# Patient Record
Sex: MALE | Race: WHITE | NOT HISPANIC OR LATINO | Employment: FULL TIME | URBAN - METROPOLITAN AREA
[De-identification: names, ages, dates, MRNs, and addresses within clinical notes are randomized per-mention and may not be internally consistent; named-entity substitution may affect disease eponyms.]

---

## 2017-01-03 ENCOUNTER — CLINICAL SUPPORT (OUTPATIENT)
Dept: SPEECH THERAPY | Facility: HOSPITAL | Age: 72
End: 2017-01-03
Attending: INTERNAL MEDICINE
Payer: COMMERCIAL

## 2017-01-03 DIAGNOSIS — M25.551 PAIN OF RIGHT HIP JOINT: ICD-10-CM

## 2017-01-03 DIAGNOSIS — R29.898 WEAKNESS OF BOTH LOWER EXTREMITIES: ICD-10-CM

## 2017-01-03 PROCEDURE — 97140 MANUAL THERAPY 1/> REGIONS: CPT

## 2017-01-03 PROCEDURE — 97110 THERAPEUTIC EXERCISES: CPT

## 2017-01-03 NOTE — PROGRESS NOTES
"TIME RECORD    Date:  01/03/2017    Start Time:  400  Stop Time:  500    PROCEDURES:    TIMED  Procedure Min.   Manual therapy 15   Therex 40                 UNTIMED  Procedure Min.   Bike 5         Total Timed Minutes:  55  Total Timed Units:  4  Total Untimed Units:  0  Charges Billed/# of units:  4  MTx1, TEx3      Progress/Current Status    Subjective:     Patient ID: Edis Huggins Jr. is a 71 y.o. male.  Diagnosis:   1. Pain of right hip joint     2. Weakness of both lower extremities       Patient states he has been doing HEP, but his are 2.5# weights.  Reports no problem using them.  Requested review of stretches.  "I want to make sure I'm doing it right."    Objective:     Pt initiated therapy session on stationary bike x 5' supervise5. Pt received MT consisting of STM to (R) quad and (R) ITB x 15'. Pt participated in therex per log below 1:1 with PTA x 40'. Verbal and tactile cues for correct technique with self stretching and therex.    Date  1/3/16 12/30/16   VISIT 3 2   MT 15' 13'   Long Sit HS 30"x3 B 3x30" B    Piriformis stretch 30"x3 B 3x30" B   IT Band stretch 30"x3 supine w/strap 3x30" supine w/strap   Gastroc Str.  -   Bike S=11  5' 5'   QS -- -   SAQ -- -   SLR 2.5# 2x10 B 1.5# 2x10 B   SL Abd 2.5# 2x12 B 1.5# 2x10 B   bridging 2x10 2x10   SL clams RTB 2x12 RTB 2x10   Heel Slides -- -   Nesha Hip Add -- -   LAQs -- -   Seated Hip Flex -- -   Cybex   Leg Press -- -   TKE -- -   Hip Abd -- -   Hip Flex -- -   Hip Ext -- -   HS Curls -- -   Knee Ext -- -   Step Ups -- -   Step Downs -- -   Gait -- -   CP -- -   Initials DH 1/6 CK       Assessment:     Patient required multiple tactile cues and verbal instruction as well as increased time to perform self stretching and therex.  Able to complete all therex without reports of pain or difficulty.    Patient Education/Response:     Reviewed correct technique for self stretching.  Performed same after.  At patient's request, given written printout to " assist ordering of stretch out strap from [employer].    Plans and Goals:     Continue with POC, progress pt as tolerated    Short Term Goals = Long Term Goals (8 Weeks): 2/12/17  1. Pt will be independent with HEP  2. Pt will improve (B) LE strength to 5/5 on MMT   3. Pt will report 0/10 pain in (R) hip with activity and ADLS  4. Pt will improve FOTO score to </= to 30% impaired  5. Pt will improve (B) hamstring flexibility by at least 10 degrees

## 2017-01-05 ENCOUNTER — CLINICAL SUPPORT (OUTPATIENT)
Dept: SPEECH THERAPY | Facility: HOSPITAL | Age: 72
End: 2017-01-05
Attending: INTERNAL MEDICINE
Payer: COMMERCIAL

## 2017-01-05 ENCOUNTER — TELEPHONE (OUTPATIENT)
Dept: INTERNAL MEDICINE | Facility: CLINIC | Age: 72
End: 2017-01-05

## 2017-01-05 DIAGNOSIS — R29.898 WEAKNESS OF BOTH LOWER EXTREMITIES: ICD-10-CM

## 2017-01-05 DIAGNOSIS — M25.551 PAIN OF RIGHT HIP JOINT: ICD-10-CM

## 2017-01-05 PROCEDURE — 97110 THERAPEUTIC EXERCISES: CPT

## 2017-01-05 PROCEDURE — 97140 MANUAL THERAPY 1/> REGIONS: CPT

## 2017-01-05 NOTE — PROGRESS NOTES
"TIME RECORD    Date:  01/05/2017    Start Time:  400  Stop Time:  505    PROCEDURES:    TIMED  Procedure Min.   Manual therpay 15   Therex 40                 UNTIMED  Procedure Min.   Bike 7         Total Timed Minutes:  55  Total Timed Units:  4  Total Untimed Units:  0  Charges Billed/# of units:  4  MTx1, TEx3      Progress/Current Status    Subjective:     Patient ID: Edis Huggins Jr. is a 71 y.o. male.  Diagnosis:   1. Pain of right hip joint     2. Weakness of both lower extremities       Patient reports he has been too busy to do HEP.  Will make time over the weekend.  Reports "tightness" right quad and hip.    Objective:     Pt initiated therapy session on stationary bike x 7' supervised. Patient received MT consisting of STM to (R) quad and (R) ITB x 15'. Pt participated in therex per log below 1:1 with PTA x 40'. Verbal and tactile cues for correct technique with self stretching and therex.    Date  1/5/17 1/3/16 12/30/16   VISIT 4 3 2   MT 15' 15' 13'   Long Sit HS 30"x3 B 30"x3 B 3x30" B    Piriformis stretch 30"x3 B 30"x3 B 3x30" B   IT Band stretch 30"x3 supine w/strap 30"x3 supine w/strap 3x30" supine w/strap   Gastroc Str.   -   Bike S=11  7' S=11  5' 5'   QS -- -- -   SAQ -- -- -   SLR 2.5# 2x15 B 2.5# 2x10 B 1.5# 2x10 B   SL Abd 2.5# 2x15 B 2.5# 2x12 B 1.5# 2x10 B   bridging 2x12 2x10 2x10   SL clams RTB 2x15 RTB 2x12 RTB 2x10   Heel Slides -- -- -   Nesha Hip Add -- -- -   LAQs -- -- -   Seated Hip Flex -- -- -   Cybex   Leg Press 4.0 2x15  ^ next   -- -   TKE -- -- -   Hip Abd next -- -   Hip Flex next -- -   Hip Ext next -- -   HS Curls -- -- -   Knee Ext -- -- -   Step Ups -- -- -   Step Downs -- -- -   Gait -- -- -   CP -- -- -   Initials DH 2/6  1/6 CK       Assessment:     Patient able to increase activity with added reps and trial on Leg Press without complaint of pain or difficulty.  Stated "Leg Press was easy."    Patient education and response:    Patient educated importance of and " to perform HEP.  Verbalized understanding.    Plans and Goals:     Continue with POC, progress pt as tolerated    Short Term Goals = Long Term Goals (8 Weeks): 2/12/17  1. Pt will be independent with HEP  2. Pt will improve (B) LE strength to 5/5 on MMT   3. Pt will report 0/10 pain in (R) hip with activity and ADLS  4. Pt will improve FOTO score to </= to 30% impaired  5. Pt will improve (B) hamstring flexibility by at least 10 degrees

## 2017-01-05 NOTE — TELEPHONE ENCOUNTER
----- Message from Sylvain Price sent at 1/5/2017  9:24 AM CST -----  Contact: Sylvain Price/Surgery Coordinator/Ophthalmology   Mr. Huggins is scheduled for cataract surgery  with Dr. Chow on Wednesday 2/8/17. Pt will need medical clearance for this procedure.  Surgery is scheduled with local/MAC anesthesia. Please call if you have any questions.    Thanks,   Sylvain Price  Surgery Coordinator  Ophthalmology  [facility] [[id number]] [phone]

## 2017-01-10 ENCOUNTER — CLINICAL SUPPORT (OUTPATIENT)
Dept: SPEECH THERAPY | Facility: HOSPITAL | Age: 72
End: 2017-01-10
Attending: INTERNAL MEDICINE
Payer: COMMERCIAL

## 2017-01-10 DIAGNOSIS — R29.898 WEAKNESS OF BOTH LOWER EXTREMITIES: ICD-10-CM

## 2017-01-10 DIAGNOSIS — M25.551 PAIN OF RIGHT HIP JOINT: ICD-10-CM

## 2017-01-10 PROCEDURE — 97110 THERAPEUTIC EXERCISES: CPT

## 2017-01-10 PROCEDURE — 97140 MANUAL THERAPY 1/> REGIONS: CPT

## 2017-01-10 NOTE — PROGRESS NOTES
"TIME RECORD    Date:  01/10/2017    Start Time:  400  Stop Time:  505    PROCEDURES:    TIMED  Procedure Min.   Manual therapy 15   Therex 38                 UNTIMED  Procedure Min.   Bike 8         Total Timed Minutes:  53  Total Timed Units:  4  Total Untimed Units:  0  Charges Billed/# of units:  4  MTx1, TEx3      Progress/Current Status    Subjective:     Patient ID: Edis Huggins Jr. is a 71 y.o. male.  Diagnosis:   1. Pain of right hip joint     2. Weakness of both lower extremities         "I really don't feel any pain - the bursitis is much better and not disturbing my sleep anymore.    Objective:     Pt initiated therapy session on stationary bike x 8' supervised. Patient received MT consisting of STM to (R) quad and (R) ITB x 15'. Pt participated in therex per log below 1:1 with PTA x 38'. Verbal and tactile cues for correct technique with self stretching and therex. Added trial standing hip TE as noted today.    **FOTO THIS VISIT**    Date  1/10/17 1/5/17 1/3/16 12/30/16   VISIT 5 4 3 2   MT 15' 15' 15' 13'   Long Sit HS 30"x3 B 30"x3 B 30"x3 B 3x30" B    Piriformis stretch 30"x3 B 30"x3 B 30"x3 B 3x30" B   IT Band stretch 30"x3 30"x3 supine w/strap 30"x3 supine w/strap 3x30" supine w/strap   Gastroc Str.    -   Bike S=11 8' S=11  7' S=11  5' 5'   QS -- -- -- -   SAQ -- -- -- -   SLR 2.5# 2.5# 2x15 B 2.5# 2x10 B 1.5# 2x10 B   SL Abd 2.5# 2x15 B 2.5# 2x15 B 2.5# 2x12 B 1.5# 2x10 B   bridging 2x15 2x12 2x10 2x10   SL clams GTB 2x10 RTB 2x15 RTB 2x12 RTB 2x10   Heel Slides -- -- -- -   Nesha Hip Add -- -- -- -   LAQs -- -- -- -   Seated Hip Flex -- -- -- -   Cybex   Leg Press 5.0 2x15 DL 4.0 2x15  ^ next   -- -   TKE -- -- -- -   Hip Abd 2x10 B next -- -   Hip Flex 2x10 B next -- -   Hip Ext 2x10 B next -- -   HS Curls -- -- -- -   Knee Ext -- -- -- -   Step Ups -- -- -- -   Step Downs -- -- -- -   Gait -- -- -- -   CP -- -- -- -   Initials DH 3/6 DH 2/6 DH 1/6 CK       Assessment:     Patient able to " "increase activity with reps and added standing therex as noted.  Reports "good" after treatment.    Patient Education/Response:     Reviewed written handout for HEP in standing including hip abd, flex, extension, hamstring curls, heel raises and mini squats.  Given to patient who demonstrated understanding.    Plans and Goals:     Continue with POC, progress pt as tolerated    Short Term Goals = Long Term Goals (8 Weeks): 2/12/17  1. Pt will be independent with HEP  2. Pt will improve (B) LE strength to 5/5 on MMT   3. Pt will report 0/10 pain in (R) hip with activity and ADLS  4. Pt will improve FOTO score to </= to 30% impaired  5. Pt will improve (B) hamstring flexibility by at least 10 degrees       "

## 2017-01-12 ENCOUNTER — CLINICAL SUPPORT (OUTPATIENT)
Dept: SPEECH THERAPY | Facility: HOSPITAL | Age: 72
End: 2017-01-12
Attending: INTERNAL MEDICINE
Payer: COMMERCIAL

## 2017-01-12 DIAGNOSIS — R29.898 WEAKNESS OF BOTH LOWER EXTREMITIES: ICD-10-CM

## 2017-01-12 DIAGNOSIS — M25.551 PAIN OF RIGHT HIP JOINT: ICD-10-CM

## 2017-01-12 PROCEDURE — 97110 THERAPEUTIC EXERCISES: CPT

## 2017-01-12 PROCEDURE — 97140 MANUAL THERAPY 1/> REGIONS: CPT

## 2017-01-12 NOTE — PROGRESS NOTES
TIME RECORD    Date:  01/12/2017    Start Time:  1700  Stop Time:  1715    PROCEDURES:    TIMED  Procedure Min.   MT 15                     UNTIMED  Procedure Min.             Total Timed Minutes:  15  Total Timed Units:  1  Total Untimed Units:  0  Charges Billed/# of units:  1 MT       Progress/Current Status    Subjective:     Patient ID: Edis Huggins Jr. is a 71 y.o. male.  Diagnosis:   1. Pain of right hip joint     2. Weakness of both lower extremities       Agreeable to FDN    Objective:     Patient educated on fdn. patient provided written and verbal consent to FDN. MT x 15 consisting of FDN to R glut min with estim in prone, R glut med with estim in prone, R vastus lateralis along quad/ITB interface with estim in supine.     Assessment:     Patient demonstrated appropriate response to FDN.     Patient Education/Response:         Plans and Goals:     Monitor response to FDN. Continue with FDN in POC as tolerated.

## 2017-01-12 NOTE — PROGRESS NOTES
"TIME RECORD    Date:  01/12/2017    Start Time:  4:00 pm  Stop Time:   5:00pm     PROCEDURES:    TIMED  Procedure Min.   Manual therapy 15   Therex 38                 UNTIMED  Procedure Min.   Bike 7         Total Timed Minutes:  53  Total Timed Units:  4  Total Untimed Units:  0  Charges Billed/# of units:  4  MTx1, TEx3      Progress/Current Status    Subjective:     Patient ID: Edis Huggins Jr. is a 71 y.o. male.  Diagnosis:   1. Pain of right hip joint     2. Weakness of both lower extremities         "I really don't feel any pain - the bursitis is much better and not disturbing my sleep anymore.    Objective:     Pt initiated therapy session on stationary bike x 7' supervised. Patient received MT consisting of STM to (R) quad,HS,calf and (R) ITB x 15'. Pt participated in therex per log below 1:1 with PTA x 38'. Verbal and tactile cues for correct technique with self stretching and therex. Also seen by Evelin Ruiz PT for dry needling.    **FOTO THIS VISIT**     Date  1/12/17 1/10/17 1/5/17 1/3/16 12/30/16    VISIT 6 5 4 3 2    MT 15' 15' 15' 15' 13'    Long Sit HS NP 30"x3 B 30"x3 B 30"x3 B 3x30" B     HSS wstrap 2 x 30"        Piriformis stretch 3 x 30" 30"x3 B 30"x3 B 30"x3 B 3x30" B    IT Band stretch 3 x30" 30"x3 30"x3 supine w/strap 30"x3 supine w/strap 3x30" supine w/strap    Gastroc Str. EOS 3x30'  "        -    Bike S=11   10  mins S=11 8' S=11  7' S=11  5' 5'    QS  -- -- -- -    SAQ  -- -- -- -    SLR 2.5# 2x15 2.5# 2.5# 2x15 B 2.5# 2x10 B 1.5# 2x10 B    SL Abd 2.5# 2x15 B 2.5# 2x15 B 2.5# 2x15 B 2.5# 2x12 B 1.5# 2x10 B    bridging 2x15 2x15 2x12 2x10 2x10    SL clams GTB 2x10 GTB 2x10 RTB 2x15 RTB 2x12 RTB 2x10    Heel Slides  -- -- -- -    Nesha Hip Add  -- -- -- -    LAQs  -- -- -- -    Seated Hip Flex  -- -- -- -    Cybex   Leg Press 6.0 2x15 B 5.0 2x15 DL 4.0 2x15  ^ next   -- -    TKE  -- -- -- -    Hip Abd 2x10 B 4.0 2x10 B next -- -    Hip Flex 2x10 B 4.0 2x10 B next -- -    Hip Ext " "2x10 B 4.0 2x10 B next -- -    HS Curls  -- -- -- -    Knee Ext  -- -- -- -    Step Ups  -- -- -- -    Step Downs  -- -- -- -    Gait  -- -- -- -    CP  -- -- -- -    Initials MB 4/6 DH 3/6 DH 2/6 DH 1/6 CK       Assessment:     Patient able to increase activity with reps and added standing therex as noted.  Reports "good" after treatment.    Patient Education/Response:     Reviewed written handout for HEP in standing including hip abd, flex, extension, hamstring curls, heel raises and mini squats.  Given to patient who demonstrated understanding.    Plans and Goals:     Continue with POC, progress pt as tolerated    Short Term Goals = Long Term Goals (8 Weeks): 2/12/17  1. Pt will be independent with HEP  2. Pt will improve (B) LE strength to 5/5 on MMT   3. Pt will report 0/10 pain in (R) hip with activity and ADLS  4. Pt will improve FOTO score to </= to 30% impaired  5. Pt will improve (B) hamstring flexibility by at least 10 degrees     Alfie Olguin, PTA  1/12/17  "

## 2017-01-17 ENCOUNTER — CLINICAL SUPPORT (OUTPATIENT)
Dept: SPEECH THERAPY | Facility: HOSPITAL | Age: 72
End: 2017-01-17
Attending: INTERNAL MEDICINE
Payer: COMMERCIAL

## 2017-01-17 DIAGNOSIS — M25.551 PAIN OF RIGHT HIP JOINT: ICD-10-CM

## 2017-01-17 DIAGNOSIS — R29.898 WEAKNESS OF BOTH LOWER EXTREMITIES: ICD-10-CM

## 2017-01-17 PROCEDURE — 97140 MANUAL THERAPY 1/> REGIONS: CPT

## 2017-01-17 PROCEDURE — 97110 THERAPEUTIC EXERCISES: CPT

## 2017-01-17 NOTE — PROGRESS NOTES
TIME RECORD    Date:  01/17/2017    Start Time:  1605  Stop Time:  1620    PROCEDURES:    TIMED  Procedure Min.   MT 15                     UNTIMED  Procedure Min.             Total Timed Minutes:  15  Total Timed Units:  1  Total Untimed Units:  0  Charges Billed/# of units:  1 MT       Progress/Current Status    Subjective:     Patient ID: Edis Huggins Jr. is a 71 y.o. male.  Diagnosis:   1. Pain of right hip joint     2. Weakness of both lower extremities       Agreeable to FDN    Objective:     patient provided verbal consent to FDN. MT x 15 consisting of FDN to R glut min with estim in prone, R glut med with estim in prone, R vastus lateralis along quad/ITB interface with estim in supine.     Assessment:     Unable to elicit glut med contraction with estim     Patient Education/Response:         Plans and Goals:     Monitor response to FDN. Continue with FDN in POC as tolerated.

## 2017-01-17 NOTE — PROGRESS NOTES
"TIME RECORD    Date:  01/17/2017    Start Time:  4:05 pm   Stop Time:  5:09 pm     PROCEDURES:    TIMED  Procedure Min.   TE supervised 15' NC   TE 34'   FDN 15' see note             UNTIMED  Procedure Min.             Total Timed Minutes:  34'  Total Timed Units:  2  Total Untimed Units:  0  Charges Billed/# of units:  2 TE      Progress/Current Status    Subjective:     Patient ID: Edis Huggins Jr. is a 71 y.o. male.  Diagnosis:   1. Pain of right hip joint     2. Weakness of both lower extremities       Pain: 0 /10  Pt reported that he was not experiencing any pain prior to therapy session.     Objective:     Pt initiated therapy session on stationary bike x 5' supervised. Pt then received FDN performed by Mykel Ram PT ( see note).  Pt participated in therex per log below supervised by PT x 10' and  1:1 with PT x 34'. Verbal and tactile cues for correct technique with self stretching and therex.     **FOTO THIS VISIT**    Date  1/17/17 1/12/17 1/10/17 1/5/17 1/3/16 12/30/16   VISIT 7 6 5 4 3 2   MT FDN 15' 15' 15' 15' 13'   Long Sit HS  NP 30"x3 B 30"x3 B 30"x3 B 3x30" B    HSS wstrap 3x30"  2 x 30"       Piriformis stretch 3x30" B 3 x 30" 30"x3 B 30"x3 B 30"x3 B 3x30" B   IT Band stretch - 3 x30" 30"x3 30"x3 supine w/strap 30"x3 supine w/strap 3x30" supine w/strap   Gastroc Str. oot EOS 3x30'  "        -   Bike 5' S= 11 5' S=11   10  mins S=11 8' S=11  7' S=11  5' 5'   QS -  -- -- -- -   SAQ -  -- -- -- -   SLR 3# 2x10 2.5# 2x15 2.5# 2.5# 2x15 B 2.5# 2x10 B 1.5# 2x10 B   SL Abd 3# 2x15 N 2.5# 2x15 B 2.5# 2x15 B 2.5# 2x15 B 2.5# 2x12 B 1.5# 2x10 B   bridging 2x15  2x15 2x15 2x12 2x10 2x10   SL clams GTB 2x15  GTB 2x10 GTB 2x10 RTB 2x15 RTB 2x12 RTB 2x10   Heel Slides -  -- -- -- -   Nesha Hip Add -  -- -- -- -   LAQs -  -- -- -- -   Seated Hip Flex -  -- -- -- -   Cybex   Leg Press 6.0 2x15 6.0 2x15 B 5.0 2x15 DL 4.0 2x15  ^ next   -- -   TKE -  -- -- -- -   Hip Abd 2x15 B 4.0  2x10 B 4.0 2x10 B next " -- -   Hip Flex 2x15 B 4.0 2x10 B 4.0 2x10 B next -- -   Hip Ext 2x15 B 4.0  2x10 B 4.0 2x10 B next -- -   HS Curls 5.5 2x10  -- -- -- -   Knee Ext -  -- -- -- -   Step Ups -  -- -- -- -   Step Downs -  -- -- -- -   Gait -  -- -- -- -   CP -  -- -- -- -   Initials CK MB 4/6 DH 3/6 DH 2/6 DH 1/6 CK       Assessment:     Pt was able to tolerate increased reps noted per log above. Pt tolerated therapy session without any c/o increased pain.     Patient Education/Response:     Continue with HEP     Plans and Goals:     Continue with POC, progress pt as tolerated    Short Term Goals = Long Term Goals (8 Weeks): 2/12/17  1. Pt will be independent with HEP  2. Pt will improve (B) LE strength to 5/5 on MMT   3. Pt will report 0/10 pain in (R) hip with activity and ADLS  4. Pt will improve FOTO score to </= to 30% impaired  5. Pt will improve (B) hamstring flexibility by at least 10 degrees

## 2017-01-19 ENCOUNTER — CLINICAL SUPPORT (OUTPATIENT)
Dept: SPEECH THERAPY | Facility: HOSPITAL | Age: 72
End: 2017-01-19
Attending: INTERNAL MEDICINE
Payer: COMMERCIAL

## 2017-01-19 DIAGNOSIS — R29.898 WEAKNESS OF BOTH LOWER EXTREMITIES: ICD-10-CM

## 2017-01-19 DIAGNOSIS — M25.551 PAIN OF RIGHT HIP JOINT: ICD-10-CM

## 2017-01-19 PROCEDURE — 97140 MANUAL THERAPY 1/> REGIONS: CPT

## 2017-01-19 PROCEDURE — 97110 THERAPEUTIC EXERCISES: CPT

## 2017-01-19 NOTE — PROGRESS NOTES
"TIME RECORD    Date:  01/19/2017    Start Time:  400  Stop Time:  505    PROCEDURES:    TIMED  Procedure Min.   Manual therapy 15   Therex 45                 UNTIMED  Procedure Min.   Bike 5'         Total Timed Minutes:  60  Total Timed Units:  4  Total Untimed Units:  0  Charges Billed/# of units:  4  MTx1, TEx3    Progress/Current Status    Subjective:     Patient ID: Edis Huggins Jr. is a 71 y.o. male.  Diagnosis:   1. Pain of right hip joint     2. Weakness of both lower extremities       Pain: 2-3 /10  Patient reports increased soreness after FDN continued through yesterday.  "It was 4/10 pain and I was very tired."  Patient requested no FDN today.  States increased pain after performed multi hip last visit as well.  "Can we go down on the weight?"    Objective:     Pt initiated therapy session on stationary bike x 5' supervised. .Patient received MT consisting of STM to (R) quad and (R) ITB x 15'.  Pt participated in therex per log below with 1:1 by PTA x 45'. Verbal and tactile cues for correct technique with self stretching and therex. Extensive edu for self ITB stretching with modifications.    Date  1/19/17 1/17/17 1/12/17 1/10/17 1/5/17 1/3/16 12/30/16   VISIT 8 7 6 5 4 3 2   MT 15' FDN 15' 15' 15' 15' 13'   Long Sit HS 30"x3  NP 30"x3 B 30"x3 B 30"x3 B 3x30" B    HSS wstrap 30"x3 3x30"  2 x 30"       Piriformis stretch 30"x3 3x30" B 3 x 30" 30"x3 B 30"x3 B 30"x3 B 3x30" B   IT Band stretch MT & self w/wo strap - 3 x30" 30"x3 30"x3 supine w/strap 30"x3 supine w/strap 3x30" supine w/strap   Gastroc Str.  oot EOS 3x30'          -   Bike S=11 x 5' 5' S= 11 5' S=11   10  mins S=11 8' S=11  7' S=11  5' 5'   QS -- -  -- -- -- -   SAQ -- -  -- -- -- -   SLR 3# 2x10 B 3# 2x10 2.5# 2x15 2.5# 2.5# 2x15 B 2.5# 2x10 B 1.5# 2x10 B   SL Abd 3# 2x10 B 3# 2x15 N 2.5# 2x15 B 2.5# 2x15 B 2.5# 2x15 B 2.5# 2x12 B 1.5# 2x10 B   bridging 2x15 2x15  2x15 2x15 2x12 2x10 2x10   SL clams GTB 2x15 GTB 2x15  GTB 2x10 GTB 2x10 " "RTB 2x15 RTB 2x12 RTB 2x10   Heel Slides -- -  -- -- -- -   Nesha Hip Add -- -  -- -- -- -   LAQs -- -  -- -- -- -   Seated Hip Flex -- -  -- -- -- -   Cybex   Leg Press 6.5 2x15 DL    3.0 2x10 R 6.0 2x15 6.0 2x15 B 5.0 2x15 DL 4.0 2x15  ^ next   -- -   TKE  -  -- -- -- -   Hip Abd 2.0 2x15 B 2x15 B 4.0  2x10 B 4.0 2x10 B next -- -   Hip Flex 2.0 2x15 B 2x15 B 4.0 2x10 B 4.0 2x10 B next -- -   Hip Ext 2.0 2x15 B 2x15 B 4.0  2x10 B 4.0 2x10 B next -- -   HS Curls 5.5 2x15 5.5 2x10  -- -- -- -   Knee Ext -- -  -- -- -- -   Step Ups -- -  -- -- -- -   Step Downs -- -  -- -- -- -   Gait -- -  -- -- -- -   CP -- -  -- -- -- -   Initials DH 1/6 CK MB 4/6 DH 3/6 DH 2/6 DH 1/6 CK       Assessment:     Patient able to perform all therex as noted without complaint of pain or difficulty.  Reports Multi hip "much better" with lowered weight.  Extensive stretching and review for self stretching right ITB.  Modifications needed due to difficulty with positioning as a result of body mass. And fear of following with trial in sidelying.  "I fell out of bed at home trying that one.  Reports no complainnnnnnnts of pain after treatment.    Patient Education/Response:     Self stretching technique.  Will need review.    Plans and Goals:     Continue with POC, progress pt as tolerated    Short Term Goals = Long Term Goals (8 Weeks): 2/12/17  1. Pt will be independent with HEP  2. Pt will improve (B) LE strength to 5/5 on MMT   3. Pt will report 0/10 pain in (R) hip with activity and ADLS  4. Pt will improve FOTO score to </= to 30% impaired  5. Pt will improve (B) hamstring flexibility by at least 10 degrees     "

## 2017-01-24 ENCOUNTER — CLINICAL SUPPORT (OUTPATIENT)
Dept: SPEECH THERAPY | Facility: HOSPITAL | Age: 72
End: 2017-01-24
Attending: INTERNAL MEDICINE
Payer: COMMERCIAL

## 2017-01-24 DIAGNOSIS — M25.551 PAIN OF RIGHT HIP JOINT: ICD-10-CM

## 2017-01-24 DIAGNOSIS — R29.898 WEAKNESS OF BOTH LOWER EXTREMITIES: ICD-10-CM

## 2017-01-24 PROCEDURE — 97110 THERAPEUTIC EXERCISES: CPT

## 2017-01-24 PROCEDURE — 97140 MANUAL THERAPY 1/> REGIONS: CPT

## 2017-01-24 NOTE — PROGRESS NOTES
"TIME RECORD    Date:  01/24/2017    Start Time:  4:05 pm   Stop Time:  5:02 pm     PROCEDURES:    TIMED  Procedure Min.   TE supervised 5' NC   TE 42'   MT 10'             UNTIMED  Procedure Min.             Total Timed Minutes:  52'  Total Timed Units:  3  Total Untimed Units:  0  Charges Billed/# of units:  3 ( 2 TE, 1 MT)       Progress/Current Status    Subjective:     Patient ID: Edis Huggins Jr. is a 71 y.o. male.  Diagnosis:   1. Pain of right hip joint     2. Weakness of both lower extremities       Pain: 2 /10  Pt reported experiencing pain in (R) anterior thigh extending into (R) hip.     Objective:     Pt initiated therapy session on stationary bike x 5' supervised. .Patient received MT consisting of STM to (R) quad and (R) ITB x 10'.  Pt participated in therex per log below with 1:1 by PT x 42'.     Date  1/24/7 1/19/17 1/17/17 1/12/17 1/10/17 1/5/17 1/3/16 12/30/16   VISIT 9 8 7 6 5 4 3 2   MT 10' 15' FDN 15' 15' 15' 15' 13'   Long Sit HS - 30"x3  NP 30"x3 B 30"x3 B 30"x3 B 3x30" B    HSS wstrap 30"x3  30"x3 3x30"  2 x 30"       Piriformis stretch 30"x3 B 30"x3 3x30" B 3 x 30" 30"x3 B 30"x3 B 30"x3 B 3x30" B   IT Band stretch W/ strap 3x30" MT & self w/wo strap - 3 x30" 30"x3 30"x3 supine w/strap 30"x3 supine w/strap 3x30" supine w/strap   Gastroc Str. -  oot EOS 3x30'          -   Bike 5' S=11 x 5' 5' S= 11 5' S=11   10  mins S=11 8' S=11  7' S=11  5' 5'   QS - -- -  -- -- -- -   SAQ - -- -  -- -- -- -   SLR 3# 2x15 B 3# 2x10 B 3# 2x10 2.5# 2x15 2.5# 2.5# 2x15 B 2.5# 2x10 B 1.5# 2x10 B   SL Abd 3# 2x15 B 3# 2x10 B 3# 2x15 N 2.5# 2x15 B 2.5# 2x15 B 2.5# 2x15 B 2.5# 2x12 B 1.5# 2x10 B   bridging 2x15 2x15 2x15  2x15 2x15 2x12 2x10 2x10   SL clams GTB 2x15 B GTB 2x15 GTB 2x15  GTB 2x10 GTB 2x10 RTB 2x15 RTB 2x12 RTB 2x10   Heel Slides - -- -  -- -- -- -   Nesha Hip Add - -- -  -- -- -- -   LAQs - -- -  -- -- -- -   Seated Hip Flex - -- -  -- -- -- -   Cybex   Leg Press 6.5 2x15 DL 6.5 2x15 DL    3.0 " 2x10 R 6.0 2x15 6.0 2x15 B 5.0 2x15 DL 4.0 2x15  ^ next   -- -   TKE -  -  -- -- -- -   Hip Abd 2.0 2x15 B 2.0 2x15 B 2x15 B 4.0  2x10 B 4.0 2x10 B next -- -   Hip Flex 2.0 2x15 B 2.0 2x15 B 2x15 B 4.0 2x10 B 4.0 2x10 B next -- -   Hip Ext 2.0 2x15 B 2.0 2x15 B 2x15 B 4.0  2x10 B 4.0 2x10 B next -- -   HS Curls oot 5.5 2x15 5.5 2x10  -- -- -- -   Knee Ext - -- -  -- -- -- -   Step Ups - -- -  -- -- -- -   Step Downs - -- -  -- -- -- -   Gait - -- -  -- -- -- -   CP - -- -  -- -- -- -   Initials CK DH 1/6 CK MB 4/6 DH 3/6 DH 2/6 DH 1/6 CK       Assessment:     Pt stated that his (R) hip/thigh felt better after therapy session. Pt tolerated therapy session without any c/o increased pain.     Patient Education/Response:     Continue with HEP    Plans and Goals:     Continue with POC, progress pt as tolerated    Short Term Goals = Long Term Goals (8 Weeks): 2/12/17  1. Pt will be independent with HEP  2. Pt will improve (B) LE strength to 5/5 on MMT   3. Pt will report 0/10 pain in (R) hip with activity and ADLS  4. Pt will improve FOTO score to </= to 30% impaired  5. Pt will improve (B) hamstring flexibility by at least 10 degrees

## 2017-01-26 ENCOUNTER — CLINICAL SUPPORT (OUTPATIENT)
Dept: SPEECH THERAPY | Facility: HOSPITAL | Age: 72
End: 2017-01-26
Attending: INTERNAL MEDICINE
Payer: COMMERCIAL

## 2017-01-26 DIAGNOSIS — M25.551 PAIN OF RIGHT HIP JOINT: ICD-10-CM

## 2017-01-26 DIAGNOSIS — R29.898 WEAKNESS OF BOTH LOWER EXTREMITIES: ICD-10-CM

## 2017-01-26 PROCEDURE — 97140 MANUAL THERAPY 1/> REGIONS: CPT

## 2017-01-26 PROCEDURE — 97110 THERAPEUTIC EXERCISES: CPT

## 2017-01-26 NOTE — PROGRESS NOTES
"TIME RECORD    Date:  01/26/2017    Start Time:  4:00 pm   Stop Time:  4:55 pm     PROCEDURES:    TIMED  Procedure Min.   TE supervised 5' NC   TE 40'   MT 10'             UNTIMED  Procedure Min.             Total Timed Minutes:  55'  Total Timed Units:  3  Total Untimed Units:  0  Charges Billed/# of units:  3 ( 2 TE, 1 MT)       Progress/Current Status    Subjective:     Patient ID: Edis Huggins Jr. is a 71 y.o. male.  Diagnosis:   1. Pain of right hip joint     2. Weakness of both lower extremities       Pain: 0 /10  Pt reported experiencing pain in (R) anterior thigh extending into (R) hip.     Objective:     Pt initiated therapy session on stationary bike x 5' supervised. .Patient received MT consisting of STM to (R) quad and (R) ITB x 10'.  Pt participated in therex per log below with 1:1 by PT x 42'.     Date  1/26/17 1/24/7 1/19/17 1/17/17 1/12/17 1/10/17 1/5/17 1/3/16 12/30/16   VISIT 10 9 8 7 6 5 4 3 2   MT 10' 10' 15' FDN 15' 15' 15' 15' 13'   Long Sit HS 30" x 3 - 30"x3  NP 30"x3 B 30"x3 B 30"x3 B 3x30" B    HSS wstrap 30" x 3 30"x3  30"x3 3x30"  2 x 30"       Piriformis stretch 30" x 3 30"x3 B 30"x3 3x30" B 3 x 30" 30"x3 B 30"x3 B 30"x3 B 3x30" B   Prone quad stretch w/strap 2 x 30:            IT Band stretch W/ strap 3x30" W/ strap 3x30" MT & self w/wo strap - 3 x30" 30"x3 30"x3 supine w/strap 30"x3 supine w/strap 3x30" supine w/strap   Gastroc Str. EOS 3x30" -  oot EOS 3x30'          -   Bike 5' 5' S=11 x 5' 5' S= 11 5' S=11   10  mins S=11 8' S=11  7' S=11  5' 5'   QS  - -- -  -- -- -- -   SAQ  - -- -  -- -- -- -   SLR  3# 2x15 B 3# 2x10 B 3# 2x10 2.5# 2x15 2.5# 2.5# 2x15 B 2.5# 2x10 B 1.5# 2x10 B   SL Abd  3# 2x15 B 3# 2x10 B 3# 2x15 N 2.5# 2x15 B 2.5# 2x15 B 2.5# 2x15 B 2.5# 2x12 B 1.5# 2x10 B   bridging  2x15 2x15 2x15  2x15 2x15 2x12 2x10 2x10   SL clams  GTB 2x15 B GTB 2x15 GTB 2x15  GTB 2x10 GTB 2x10 RTB 2x15 RTB 2x12 RTB 2x10   Heel Slides  - -- -  -- -- -- -   Nesha Hip Add  - -- -  -- -- " -- -   LAQs  - -- -  -- -- -- -   Seated Hip Flex  - -- -  -- -- -- -   Cybex   Leg Press 7.0 2x15 B 6.5 2x15 DL 6.5 2x15 DL    3.0 2x10 R 6.0 2x15 6.0 2x15 B 5.0 2x15 DL 4.0 2x15  ^ next   -- -   TKE  -  -  -- -- -- -   Hip Abd 2.0 2x15 B 2.0 2x15 B 2.0 2x15 B 2x15 B 4.0  2x10 B 4.0 2x10 B next -- -   Hip Flex 2.5 2x15 B 2.0 2x15 B 2.0 2x15 B 2x15 B 4.0 2x10 B 4.0 2x10 B next -- -   Hip Ext 2.5 2x15 B 2.0 2x15 B 2.0 2x15 B 2x15 B 4.0  2x10 B 4.0 2x10 B next -- -   HS Curls  oot 5.5 2x15 5.5 2x10  -- -- -- -   Knee Ext  - -- -  -- -- -- -   Step Ups  - -- -  -- -- -- -   Step Downs  - -- -  -- -- -- -   Gait  - -- -  -- -- -- -   CP  - -- -  -- -- -- -   Initials MB 1/6 CK DH 1/6 CK MB 4/6 DH 3/6 DH 2/6 DH 1/6 CK       Assessment:     Pt stated pain free currently and better since last visit. Added prone quad stretch with good tolerance. Increased resistance Cybex hip flex and ext. Pt tolerated therapy session without any c/o increased pain.     Patient Education/Response:     Continue with HEP    Plans and Goals:     Continue with POC, progress pt as tolerated    Short Term Goals = Long Term Goals (8 Weeks): 2/12/17  1. Pt will be independent with HEP  2. Pt will improve (B) LE strength to 5/5 on MMT   3. Pt will report 0/10 pain in (R) hip with activity and ADLS  4. Pt will improve FOTO score to </= to 30% impaired  5. Pt will improve (B) hamstring flexibility by at least 10 degrees

## 2017-01-27 ENCOUNTER — OFFICE VISIT (OUTPATIENT)
Dept: OPHTHALMOLOGY | Facility: CLINIC | Age: 72
End: 2017-01-27
Payer: COMMERCIAL

## 2017-01-27 DIAGNOSIS — H25.12 NUCLEAR SCLEROTIC CATARACT OF LEFT EYE: ICD-10-CM

## 2017-01-27 DIAGNOSIS — H25.13 SENILE NUCLEAR SCLEROSIS, BILATERAL: Primary | ICD-10-CM

## 2017-01-27 DIAGNOSIS — H40.043 STEROID RESPONDERS, BILATERAL: ICD-10-CM

## 2017-01-27 DIAGNOSIS — H57.03 SMALL PUPILS: ICD-10-CM

## 2017-01-27 DIAGNOSIS — H40.1111 PRIMARY OPEN ANGLE GLAUCOMA OF RIGHT EYE, MILD STAGE: ICD-10-CM

## 2017-01-27 DIAGNOSIS — H40.1122 PRIMARY OPEN ANGLE GLAUCOMA OF LEFT EYE, MODERATE STAGE: ICD-10-CM

## 2017-01-27 DIAGNOSIS — H57.09 RELATIVE AFFERENT PUPILLARY DEFECT: ICD-10-CM

## 2017-01-27 PROCEDURE — 92136 OPHTHALMIC BIOMETRY: CPT | Mod: 26,LT,S$GLB, | Performed by: OPHTHALMOLOGY

## 2017-01-27 PROCEDURE — 99499 UNLISTED E&M SERVICE: CPT | Mod: S$GLB,,, | Performed by: OPHTHALMOLOGY

## 2017-01-27 PROCEDURE — 99999 PR PBB SHADOW E&M-EST. PATIENT-LVL III: CPT | Mod: PBBFAC,,, | Performed by: OPHTHALMOLOGY

## 2017-01-27 RX ORDER — CYCLOBENZAPRINE HCL 10 MG
TABLET ORAL
Refills: 1 | COMMUNITY
Start: 2017-01-08 | End: 2017-03-30

## 2017-01-31 ENCOUNTER — CLINICAL SUPPORT (OUTPATIENT)
Dept: SPEECH THERAPY | Facility: HOSPITAL | Age: 72
End: 2017-01-31
Attending: INTERNAL MEDICINE
Payer: COMMERCIAL

## 2017-01-31 DIAGNOSIS — R29.898 WEAKNESS OF BOTH LOWER EXTREMITIES: ICD-10-CM

## 2017-01-31 DIAGNOSIS — M25.551 PAIN OF RIGHT HIP JOINT: ICD-10-CM

## 2017-01-31 PROCEDURE — 97530 THERAPEUTIC ACTIVITIES: CPT

## 2017-01-31 NOTE — PROGRESS NOTES
"TIME RECORD    Date:  01/31/2017    Start Time:  4:05 pm   Stop Time:   4:55 pm     PROCEDURES:    TIMED  Procedure Min.   TE supervised 5' NC   TE 45'   MT '             UNTIMED  Procedure Min.             Total Timed Minutes:  50'  Total Timed Units:  3  Total Untimed Units:  0  Charges Billed/# of units:  3 ( 3 TE,)       Progress/Current Status    Subjective:   Reports adding new stretches. Especially benefits form prone quad stretch. Presents with no pain today but is pre medicated muscle relaxer. Did have pain earlier when walking from grocery to car carrying bags.  Patient ID: Edis Huggins Jr. is a 71 y.o. male.  Diagnosis:   1. Pain of right hip joint     2. Weakness of both lower extremities       Pain: 0 /10; 0/10 following interventions  Pt reported experiencing no pain in (R) anterior thigh extending into (R) hip today in treatment.     Objective:     Pt initiated therapy session on stationary bike x 5' supervised. .Patient received MT consisting of STM to (R) quad and (R) ITB x 10'.  Pt participated in therex per log below with 1:1 by PT x 45.     Date  1/31/17 1/26/17 1/24/7 1/19/17 1/17/17 1/12/17 1/10/17 1/5/17 1/3/16 12/30/16   VISIT 11 10 9 8 7 6 5 4 3 2   MT --- 10' 10' 15' FDN 15' 15' 15' 15' 13'   Long Sit HS 30"x3 30" x 3 - 30"x3  NP 30"x3 B 30"x3 B 30"x3 B 3x30" B    HSS wstrap 30"x3 30" x 3 30"x3  30"x3 3x30"  2 x 30"       Piriformis stretch 30"x3 30" x 3 30"x3 B 30"x3 3x30" B 3 x 30" 30"x3 B 30"x3 B 30"x3 B 3x30" B   Prone quad stretch w/strap 30"x3 2 x 30:            IT Band stretch W/ strap 3x30" W/ strap 3x30" W/ strap 3x30" MT & self w/wo strap - 3 x30" 30"x3 30"x3 supine w/strap 30"x3 supine w/strap 3x30" supine w/strap   Gastroc Str. EOS 3x30" EOS 3x30" -  oot EOS 3x30'          -   Bike 5' 5' 5' S=11 x 5' 5' S= 11 5' S=11   10  mins S=11 8' S=11  7' S=11  5' 5'   QS   - -- -  -- -- -- -   SAQ   - -- -  -- -- -- -   SLR 3# 2x15 B  3# 2x15 B 3# 2x10 B 3# 2x10 2.5# 2x15 2.5# 2.5# " 2x15 B 2.5# 2x10 B 1.5# 2x10 B   SL Abd 3# 2x15 B  3# 2x15 B 3# 2x10 B 3# 2x15 N 2.5# 2x15 B 2.5# 2x15 B 2.5# 2x15 B 2.5# 2x12 B 1.5# 2x10 B   bridging 2x15  2x15 2x15 2x15  2x15 2x15 2x12 2x10 2x10   SL clams GTB 2x15 B  GTB 2x15 B GTB 2x15 GTB 2x15  GTB 2x10 GTB 2x10 RTB 2x15 RTB 2x12 RTB 2x10   Heel Slides   - -- -  -- -- -- -   Nesha Hip Add   - -- -  -- -- -- -   LAQs   - -- -  -- -- -- -   Seated Hip Flex   - -- -  -- -- -- -   Cybex   Leg Press 7.0 2x15 B 7.0 2x15 B 6.5 2x15 DL 6.5 2x15 DL    3.0 2x10 R 6.0 2x15 6.0 2x15 B 5.0 2x15 DL 4.0 2x15  ^ next   -- -   TKE   -  -  -- -- -- -   Hip Abd 2.0 2x15 B 2.0 2x15 B 2.0 2x15 B 2.0 2x15 B 2x15 B 4.0  2x10 B 4.0 2x10 B next -- -   Hip Flex 2.5 2x15 B 2.5 2x15 B 2.0 2x15 B 2.0 2x15 B 2x15 B 4.0 2x10 B 4.0 2x10 B next -- -   Hip Ext 2.5 2x15 B 2.5 2x15 B 2.0 2x15 B 2.0 2x15 B 2x15 B 4.0  2x10 B 4.0 2x10 B next -- -   HS Curls   oot 5.5 2x15 5.5 2x10  -- -- -- -   Knee Ext   - -- -  -- -- -- -   Step Ups   - -- -  -- -- -- -   Step Downs   - -- -  -- -- -- -   Gait   - -- -  -- -- -- -   CP   - -- -  -- -- -- -   Initials MB 2/6 MB 1/6 CK DH 1/6 CK MB 4/6 DH 3/6 DH 2/6 DH 1/6 CK       Assessment:      Pt tolerated therapy session without any c/o increased pain. Deferred manual as patient pain free.    Patient Education/Response:     Continue with HEP    Plans and Goals:     Continue with POC, progress pt as tolerated    Short Term Goals = Long Term Goals (8 Weeks): 2/12/17  1. Pt will be independent with HEP  2. Pt will improve (B) LE strength to 5/5 on MMT   3. Pt will report 0/10 pain in (R) hip with activity and ADLS  4. Pt will improve FOTO score to </= to 30% impaired  5. Pt will improve (B) hamstring flexibility by at least 10 degrees

## 2017-02-01 ENCOUNTER — OFFICE VISIT (OUTPATIENT)
Dept: INTERNAL MEDICINE | Facility: CLINIC | Age: 72
End: 2017-02-01
Payer: COMMERCIAL

## 2017-02-01 VITALS
HEART RATE: 62 BPM | BODY MASS INDEX: 41.76 KG/M2 | WEIGHT: 298.31 LBS | HEIGHT: 71 IN | DIASTOLIC BLOOD PRESSURE: 78 MMHG | SYSTOLIC BLOOD PRESSURE: 134 MMHG

## 2017-02-01 DIAGNOSIS — Z01.818 PRE-OP EVALUATION: ICD-10-CM

## 2017-02-01 DIAGNOSIS — M25.551 PAIN OF RIGHT HIP JOINT: ICD-10-CM

## 2017-02-01 DIAGNOSIS — E78.1 HYPERTRIGLYCERIDEMIA: ICD-10-CM

## 2017-02-01 DIAGNOSIS — I10 ESSENTIAL HYPERTENSION: ICD-10-CM

## 2017-02-01 DIAGNOSIS — H26.9 CATARACT OF LEFT EYE, UNSPECIFIED CATARACT TYPE: Primary | ICD-10-CM

## 2017-02-01 PROCEDURE — 1159F MED LIST DOCD IN RCRD: CPT | Mod: S$GLB,,, | Performed by: INTERNAL MEDICINE

## 2017-02-01 PROCEDURE — 1126F AMNT PAIN NOTED NONE PRSNT: CPT | Mod: S$GLB,,, | Performed by: INTERNAL MEDICINE

## 2017-02-01 PROCEDURE — 1157F ADVNC CARE PLAN IN RCRD: CPT | Mod: S$GLB,,, | Performed by: INTERNAL MEDICINE

## 2017-02-01 PROCEDURE — 3075F SYST BP GE 130 - 139MM HG: CPT | Mod: S$GLB,,, | Performed by: INTERNAL MEDICINE

## 2017-02-01 PROCEDURE — 3078F DIAST BP <80 MM HG: CPT | Mod: S$GLB,,, | Performed by: INTERNAL MEDICINE

## 2017-02-01 PROCEDURE — 99213 OFFICE O/P EST LOW 20 MIN: CPT | Mod: S$GLB,,, | Performed by: INTERNAL MEDICINE

## 2017-02-01 PROCEDURE — 1160F RVW MEDS BY RX/DR IN RCRD: CPT | Mod: S$GLB,,, | Performed by: INTERNAL MEDICINE

## 2017-02-01 PROCEDURE — 99999 PR PBB SHADOW E&M-EST. PATIENT-LVL III: CPT | Mod: PBBFAC,,, | Performed by: INTERNAL MEDICINE

## 2017-02-01 RX ORDER — MOXIFLOXACIN 5 MG/ML
1 SOLUTION/ DROPS OPHTHALMIC
Status: CANCELLED | OUTPATIENT
Start: 2017-02-01

## 2017-02-01 RX ORDER — LOSARTAN POTASSIUM AND HYDROCHLOROTHIAZIDE 25; 100 MG/1; MG/1
1 TABLET ORAL DAILY
Qty: 90 TABLET | Refills: 3
Start: 2017-02-01 | End: 2017-06-12 | Stop reason: ALTCHOICE

## 2017-02-01 RX ORDER — TROPICAMIDE 10 MG/ML
1 SOLUTION/ DROPS OPHTHALMIC
Status: CANCELLED | OUTPATIENT
Start: 2017-02-01

## 2017-02-01 RX ORDER — LIDOCAINE HYDROCHLORIDE 10 MG/ML
1 INJECTION, SOLUTION EPIDURAL; INFILTRATION; INTRACAUDAL; PERINEURAL ONCE
Status: CANCELLED | OUTPATIENT
Start: 2017-02-01 | End: 2017-02-01

## 2017-02-01 RX ORDER — FINASTERIDE 5 MG/1
5 TABLET, FILM COATED ORAL DAILY
Qty: 90 TABLET | Refills: 3 | Status: SHIPPED | OUTPATIENT
Start: 2017-02-01 | End: 2017-06-28 | Stop reason: SDUPTHER

## 2017-02-01 RX ORDER — KETOROLAC TROMETHAMINE 5 MG/ML
1 SOLUTION OPHTHALMIC
Status: CANCELLED | OUTPATIENT
Start: 2017-02-01

## 2017-02-01 RX ORDER — ACYCLOVIR 400 MG/1
400 TABLET ORAL 3 TIMES DAILY
Qty: 30 TABLET | Refills: 5
Start: 2017-02-01 | End: 2017-05-11 | Stop reason: ALTCHOICE

## 2017-02-01 RX ORDER — PHENYLEPHRINE HYDROCHLORIDE 100 MG/ML
1 SOLUTION/ DROPS OPHTHALMIC
Status: CANCELLED | OUTPATIENT
Start: 2017-02-01

## 2017-02-01 RX ORDER — GEMFIBROZIL 600 MG/1
600 TABLET, FILM COATED ORAL 2 TIMES DAILY
Qty: 180 TABLET | Refills: 3
Start: 2017-02-01 | End: 2017-04-04 | Stop reason: SDUPTHER

## 2017-02-01 RX ORDER — SODIUM CHLORIDE 9 MG/ML
INJECTION, SOLUTION INTRAVENOUS CONTINUOUS
Status: CANCELLED | OUTPATIENT
Start: 2017-02-01

## 2017-02-01 NOTE — PROGRESS NOTES
HPI     DLS: 11/22/16    Pt here for Pre-Op complex phaco w/IOL OS- (Surgery is 2/08/17)    Meds: T 1/2 GFS qam ou             Travatan Z qhs ou     1. Primary open angle glaucoma of right eye, mild stage  2. Primary open angle glaucoma of left eye, moderate stage  3. Senile nuclear sclerosis, bilateral   4. Steroid responders, bilateral  5. Relative afferent  pupillary defect, left eye       Last edited by Millie Lakhani on 1/27/2017  2:06 PM.         Assessment /Plan     For exam results, see Encounter Report.    Senile nuclear sclerosis, bilateral    Nuclear sclerotic cataract of left eye    Primary open angle glaucoma of right eye, mild stage    Primary open angle glaucoma of left eye, moderate stage    Steroid responders, bilateral    Relative afferent pupillary defect - Left Eye    Small pupils        Pt is switching from [employer] to [place] - bring photo file over   Lost to F/u from 11/4/2013 to 8/11/2015   Knows Sharlene Mo and MIKHAIL - use to work with them     1. POAG (primary open-angle glaucoma) - Both Eyes   Old pt of [employer] [facility] [id number]  Began at [facility] 2012       POAG OS>OD           Family history    neg        Glaucoma meds    travatan z ou, T1/2 GFS ou        H/O adverse rxn to glaucoma drops    none        LASERS    SLT os 9/26/2013 -( good resp 17 --> 14)        GLAUCOMA SURGERIES    none        OTHER EYE SURGERIES    none        CDR    0.6/0.7-0.8        Tbase    20-23/21-24          Tmax    23/24            Ttarget    16/16            HVF    5 tests 3716-7973 OD essentially full; OS Sup paracentral defect, IAD/INS        Gonio    +4 ou        CCT    598/598        OCT    3 test 2012 to 2013 - RNFL - nl od // dec S bord T        HRT    3 test-2012 to 2016 OD bord N; OS dec S/T/I        Disc photos    2012 - IOS    - Ttoday    16/15  - Test done today   Pre-op phaco/IOL os - complex - small pupil // goniotomy - [place] or trabectome       2. Senile nuclear sclerosis - Both Eyes   -  vis  sign       3. Steroid responders - Both Eyes  2/2 nasacort      4. ? Trace APD os      5. S/P Rt knee replacement Feb 2013  -doing well      6.  Myopia / presbyopia ou       Plan  CSM  IOP below target OU  SLT done OS 9/26/2013 - good initial resp 24-->17    -( if responds well consider SLT od - but full VF and helathy ON still od)    HVF stable OU    patient is interested in cataract surgery/ TAYLER's - kahook / or trabectome oOS then OD   Explained HVF losses OS to patient at length and went over OCT- patient understands that he may still have blurry vision 2/2 to glaucoma after cataract surgery OS.     Dilates to 6.5 mm OD 5.5 mm OS- may need ej ring    Aim for -0.50 ou   OS  PCB00 14.0  AC IOL 11.5    Risks and benefits discussed and consent signed.    I have seen and personally examined the patient.  I agree with the findings, assessment and plan of the resident and/or fellow.     Deborah Khan MD      I have seen and personally examined the patient.  I agree with the findings, assessment and plan of the resident and/or fellow.     Deborah Khan MD

## 2017-02-01 NOTE — H&P
Subjective:       Patient ID: Edis Huggins Jr. is a 71 y.o. male.    Chief Complaint: Pre-op Exam      Past Medical History   Diagnosis Date    Allergy     Anemia     Anxiety     Basal cell carcinoma     BPH (benign prostatic hypertrophy)     Cataract     HLD (hyperlipidemia)     HTN (hypertension)     IFG (impaired fasting glucose)     Primary open angle glaucoma      Past Surgical History   Procedure Laterality Date    Hernia repair      Tonsillectomy      Right tka       Family History   Problem Relation Age of Onset    Colon cancer Maternal Grandmother      70s    Cataracts Mother     Atrial fibrillation Mother     Cataracts Father     Heart disease Father      Valve disorder    Glaucoma Brother     Prostate cancer Neg Hx     Amblyopia Neg Hx     Blindness Neg Hx     Macular degeneration Neg Hx     Retinal detachment Neg Hx     Strabismus Neg Hx     Diabetes Neg Hx     Melanoma Neg Hx     Psoriasis Neg Hx     Lupus Neg Hx     Eczema Neg Hx     Acne Neg Hx      Social History     Social History    Marital status:      Spouse name: N/A    Number of children: N/A    Years of education: N/A     Occupational History     [employer]     Social History Main Topics    Smoking status: Former Smoker     Packs/day: 1.00     Years: 10.00     Quit date: 1/1/1974    Smokeless tobacco: Never Used    Alcohol use 0.0 oz/week     0 Standard drinks or equivalent per week      Comment: one drink a month    Drug use: No    Sexual activity: Not Asked     Other Topics Concern    None     Social History Narrative       Current Outpatient Prescriptions   Medication Sig Dispense Refill    citalopram (CELEXA) 20 MG tablet TAKE 1 TABLET BY MOUTH DAILY 90 tablet 3    cyclobenzaprine (FLEXERIL) 10 MG tablet TK 1 T PO QHS PRF MUSCLE SPASMS  1    timolol maleate 0.5% (TIMOPTIC-XE) 0.5 % SolG Place 1 drop into both eyes every morning. 5 mL 12    travoprost (TRAVATAN Z)  0.004 % Drop Place 1 drop into both eyes every evening. 5 mL 12    acyclovir (ZOVIRAX) 400 MG tablet Take 1 tablet (400 mg total) by mouth 3 (three) times daily. X 5 days at sign of fever blister 30 tablet 5    finasteride (PROSCAR) 5 mg tablet Take 1 tablet (5 mg total) by mouth once daily. 90 tablet 3    gemfibrozil (LOPID) 600 MG tablet Take 1 tablet (600 mg total) by mouth 2 (two) times daily. 180 tablet 3    losartan-hydrochlorothiazide 100-25 mg (HYZAAR) 100-25 mg per tablet Take 1 tablet by mouth once daily. 90 tablet 3     No current facility-administered medications for this visit.      Review of patient's allergies indicates:  No Known Allergies    Review of Systems   Constitutional: Negative.    HENT: Negative.    Eyes: Positive for visual disturbance.   Respiratory: Negative.    Cardiovascular: Negative.    Neurological: Negative.    Psychiatric/Behavioral: Negative.        Objective:      There were no vitals filed for this visit.  Physical Exam   Constitutional: He is oriented to person, place, and time. He appears well-developed and well-nourished.   HENT:   Head: Normocephalic and atraumatic.   Eyes:   See eye exam   Cardiovascular: Normal rate.    Pulmonary/Chest: Effort normal.   Neurological: He is alert and oriented to person, place, and time.   Skin: Skin is warm and dry.   Psychiatric: He has a normal mood and affect.          Assessment:       1. Senile nuclear sclerosis, bilateral    2. Nuclear sclerotic cataract of left eye    3. Primary open angle glaucoma of right eye, mild stage    4. Primary open angle glaucoma of left eye, moderate stage    5. Steroid responders, bilateral    6. Relative afferent pupillary defect - Left Eye    7. Small pupils        Plan:       Complex phaco/IOL os - trypan blue and ? Renita ring // with goniotomy -

## 2017-02-01 NOTE — PROGRESS NOTES
"Subjective:       Patient ID: Edis Huggins Jr. is a 71 y.o. male.    Chief Complaint: Pre-op Exam    HPI    Last visit with me 11/2016. Since then seen by Ophthalmology and Rehab.     Hasn't needed Flexeril, things are going better on Rehab. Still with pain if walks a lot or stands a lot. Plans to complete therapy next Monday.    Denies chest pain and dyspnea on exertion. Has chronic shortness of breath, thinks related to weight. Has some wheezing occasionally with exercise. Never used inhaler, never dx with asthma. Not new symptoms, even as symptoms had some shortness of breath on exertion.    No history of stroke or MI.     Review of Systems    As per HPI    Objective:      Physical Exam   Constitutional: He is oriented to person, place, and time. No distress.    man whose Body mass index is 41.6 kg/(m^2).    HENT:   Mouth/Throat: Oropharynx is clear and moist. No oropharyngeal exudate.   Neck: Normal range of motion. No thyromegaly present.   Cardiovascular: Normal rate, regular rhythm and normal heart sounds.    Pulmonary/Chest: Effort normal and breath sounds normal. No stridor. He has no wheezes. He has no rales.   Lymphadenopathy:     He has no cervical adenopathy.   Neurological: He is alert and oriented to person, place, and time.   Skin: Skin is warm and dry. No rash noted.   Nursing note and vitals reviewed.      Vitals:    02/01/17 1410   BP: 134/78   BP Location: Right arm   Patient Position: Sitting   BP Method: Manual   Pulse: 62   Weight: 135.3 kg (298 lb 4.5 oz)   Height: 5' 11" (1.803 m)     Body mass index is 41.6 kg/(m^2).    RESULTS: Reviewed labs from last 12 months. Reviewed EKG 2013.    Assessment:       1. Cataract of left eye, unspecified cataract type    2. Hypertriglyceridemia    3. Essential hypertension    4. Pain of right hip joint    5. Pre-op evaluation        Plan:   Edis was seen today for pre-op exam.    Diagnoses and all orders for this visit:    Cataract of left " eye, unspecified cataract type:  Going for surgical removal in 1 week. Preop evaluation completed.    Hypertriglyceridemia:  Prior diagnosis, well controlled on current management.   -     gemfibrozil (LOPID) 600 MG tablet; Take 1 tablet (600 mg total) by mouth 2 (two) times daily.    Essential hypertension:  Prior diagnosis, well controlled on current management.   -     losartan-hydrochlorothiazide 100-25 mg (HYZAAR) 100-25 mg per tablet; Take 1 tablet by mouth once daily.    Pain of right hip joint:  improving with rehab, not needing Flexeril.    Pre-op evaluation:  Exercise capacity >=4 METS, has no risk factors on Revised Cardiac Risk Index. Patient is at low cardiovascular risk for planned operation, recommend proceed with surgery as planned.     Other orders  -     finasteride (PROSCAR) 5 mg tablet; Take 1 tablet (5 mg total) by mouth once daily.  -     acyclovir (ZOVIRAX) 400 MG tablet; Take 1 tablet (400 mg total) by mouth 3 (three) times daily. X 5 days at sign of fever blister    Keep follow up appointment March 3rd  Forest Miles MD  Internal Medicine    Portions of this note were completed using [employer] dictation software. Please excuse typographical or syntax errors.

## 2017-02-01 NOTE — Clinical Note
Exercise capacity >=4 METS, has no risk factors on Revised Cardiac Risk Index. Patient is at low cardiovascular risk for planned operation, recommend proceed with surgery as planned.   If pt needs to be seen prior to cataract extraction on right eye in a few weeks please let me know, however I do not see any specific need unless his condition changes.  Please let me know if you have additional questions or concerns. Thanks!  Forest Miles MD Internal Medicine

## 2017-02-01 NOTE — MR AVS SNAPSHOT
[facility] - Internal Medicine  [address]  [place] [address]  Phone: [phone]  Fax: [phone]                  Edis Huggins Jr.   2017 1:40 PM   Office Visit    Description:  Male : 1945   Provider:  Forest Miles MD   Department:  [facility] - Internal Medicine           Reason for Visit     Pre-op Exam           Diagnoses this Visit        Comments    Cataract of left eye, unspecified cataract type    -  Primary     Hypertriglyceridemia         Essential hypertension         Pain of right hip joint         Pre-op evaluation                To Do List           Future Appointments        Provider Department Dept Phone    2017 4:00 PM Yvonne Baca PTA [facility] [phone]    2017 3:00 PM Mendy Canseco PT [facility] [phone]    2017 8:30 AM Deborah Khan MD [facility] Ophthalmology [phone]    3/3/2017 10:00 AM Forest Miles MD [facility] Internal Medicine [phone]      Your Future Surgeries/Procedures     2017   Surgery with Deborah Khan MD   [facility]    [address] [facility] [address] [phone]              Goals (5 Years of Data)     None       These Medications        Disp Refills Start End    gemfibrozil (LOPID) 600 MG tablet 180 tablet 3 2017     Take 1 tablet (600 mg total) by mouth 2 (two) times daily. - Oral    Pharmacy: [facility] [address] [place] [address] [address] AT [facility] Ph #: [phone]       Notes to Pharmacy: **Patient requests 90 days supply**    finasteride (PROSCAR) 5 mg tablet 90 tablet 3 2017     Take 1 tablet (5 mg total) by mouth once daily. - Oral    Pharmacy: [facility] [place] [address] [address] AT [facility] Ph #: [phone]       losartan-hydrochlorothiazide 100-25 mg (HYZAAR) 100-25 mg per  tablet 90 tablet 3 2/1/2017     Take 1 tablet by mouth once daily. - Oral    Pharmacy: [facility] [address] [place] - [address] AT [facility] #: [phone]       acyclovir (ZOVIRAX) 400 MG tablet 30 tablet 5 2/1/2017     Take 1 tablet (400 mg total) by mouth 3 (three) times daily. X 5 days at sign of fever blister - Oral    Pharmacy: [facility] [id number] [place] - [address] AT [facility] Ph #: [phone]         [facility] On [facility]     [facility] - 24/7 Assistance  Registered nurses in the [facility] provide clinical advisement, health education, appointment booking, and other advisory services.  Call for this free service at [phone].             Medications           Message regarding Medications     Verify the changes and/or additions to your medication regime listed below are the same as discussed with your clinician today.  If any of these changes or additions are incorrect, please notify your healthcare provider.        CHANGE how you are taking these medications     Start Taking Instead of    gemfibrozil (LOPID) 600 MG tablet gemfibrozil (LOPID) 600 MG tablet    Dosage:  Take 1 tablet (600 mg total) by mouth 2 (two) times daily. Dosage:  TAKE 1 TABLET BY MOUTH TWICE DAILY    Reason for Change:  Reorder     finasteride (PROSCAR) 5 mg tablet finasteride (PROSCAR) 5 mg tablet    Dosage:  Take 1 tablet (5 mg total) by mouth once daily. Dosage:  TAKE 1 TABLET BY MOUTH EVERY DAY    Reason for Change:  Reorder     losartan-hydrochlorothiazide 100-25 mg (HYZAAR) 100-25 mg per tablet losartan-hydrochlorothiazide 100-25 mg (HYZAAR) 100-25 mg per tablet    Dosage:  Take 1 tablet by mouth once daily. Dosage:  TAKE 1 TABLET BY MOUTH DAILY    Reason for Change:  Reorder            Verify that the below list of medications is an accurate representation of the medications you are currently taking.  If  "none reported, the list may be blank. If incorrect, please contact your healthcare provider. Carry this list with you in case of emergency.           Current Medications     acyclovir (ZOVIRAX) 400 MG tablet Take 1 tablet (400 mg total) by mouth 3 (three) times daily. X 5 days at sign of fever blister    citalopram (CELEXA) 20 MG tablet TAKE 1 TABLET BY MOUTH DAILY    cyclobenzaprine (FLEXERIL) 10 MG tablet TK 1 T PO QHS PRF MUSCLE SPASMS    finasteride (PROSCAR) 5 mg tablet Take 1 tablet (5 mg total) by mouth once daily.    gemfibrozil (LOPID) 600 MG tablet Take 1 tablet (600 mg total) by mouth 2 (two) times daily.    losartan-hydrochlorothiazide 100-25 mg (HYZAAR) 100-25 mg per tablet Take 1 tablet by mouth once daily.    timolol maleate 0.5% (TIMOPTIC-XE) 0.5 % SolG Place 1 drop into both eyes every morning.    travoprost (TRAVATAN Z) 0.004 % Drop Place 1 drop into both eyes every evening.           Clinical Reference Information           Vital Signs - Last Recorded  Most recent update: 2/1/2017  2:12 PM by Flor Brown MA    BP Pulse Ht Wt BMI    134/78 (BP Location: Right arm, Patient Position: Sitting, BP Method: Manual) 62 5' 11" (1.803 m) 135.3 kg (298 lb 4.5 oz) 41.6 kg/m2      Blood Pressure          Most Recent Value    BP  134/78      Allergies as of 2/1/2017     No Known Allergies      Immunizations Administered on Date of Encounter - 2/1/2017     None      "

## 2017-02-02 ENCOUNTER — CLINICAL SUPPORT (OUTPATIENT)
Dept: SPEECH THERAPY | Facility: HOSPITAL | Age: 72
End: 2017-02-02
Attending: INTERNAL MEDICINE
Payer: COMMERCIAL

## 2017-02-02 DIAGNOSIS — R29.898 WEAKNESS OF BOTH LOWER EXTREMITIES: ICD-10-CM

## 2017-02-02 DIAGNOSIS — M25.551 PAIN OF RIGHT HIP JOINT: ICD-10-CM

## 2017-02-02 PROCEDURE — 97140 MANUAL THERAPY 1/> REGIONS: CPT

## 2017-02-02 PROCEDURE — 97110 THERAPEUTIC EXERCISES: CPT

## 2017-02-02 NOTE — PROGRESS NOTES
"TIME RECORD    Date:  02/02/2017    Start Time:  400  Stop Time:  505    PROCEDURES:    TIMED  Procedure Min.   Manual therapy 10   Therex 45                 UNTIMED  Procedure Min.   Bike 5         Total Timed Minutes:  55  Total Timed Units:  4  Total Untimed Units:  0  Charges Billed/# of units:  4  MTx1, TYEx3      Progress/Current Status    Subjective:     Patient ID: Edis Huggins Jr. is a 71 y.o. male.  Diagnosis:   1. Pain of right hip joint     2. Weakness of both lower extremities         Patient states he is having more right hip pain and "feels weaker today - I don't know why."  Further reports he will have cataract removal surgery    Objective:     Pt initiated therapy session on stationary bike x 5' supervised. Patient received MT consisting of STM to (R) quad and (R) ITB x 10'.  Pt participated in therex per log below with 1:1 by PTA x 45.     Date  2/2/17 1/31/17 1/26/17 1/24/7 1/19/17 1/17/17 1/12/17 1/10/17 1/5/17 1/3/16 12/30/16   VISIT 12 11 10 9 8 7 6 5 4 3 2   MT 10 --- 10' 10' 15' FDN 15' 15' 15' 15' 13'   Long Sit HS 30"x3 B 30"x3 30" x 3 - 30"x3  NP 30"x3 B 30"x3 B 30"x3 B 3x30" B    HSS wstrap 30"x3 B 30"x3 30" x 3 30"x3  30"x3 3x30"  2 x 30"       Piriformis stretch 30"x3  30"x3 30" x 3 30"x3 B 30"x3 3x30" B 3 x 30" 30"x3 B 30"x3 B 30"x3 B 3x30" B   Prone quad stretch w/strap 30"x3 30"x3 2 x 30           IT Band stretch W/strap 30"x3 W/ strap 3x30" W/ strap 3x30" W/ strap 3x30" MT & self w/wo strap - 3 x30" 30"x3 30"x3 supine w/strap 30"x3 supine w/strap 3x30" supine w/strap   Gastroc Str. EOS  EOS 3x30" EOS 3x30" -  oot EOS 3x30'          -   Bike 5' 5' 5' 5' S=11 x 5' 5' S= 11 5' S=11   10  mins S=11 8' S=11  7' S=11  5' 5'   QS    - -- -  -- -- -- -   SAQ    - -- -  -- -- -- -   SLR 3# 2x15 B 3# 2x15 B  3# 2x15 B 3# 2x10 B 3# 2x10 2.5# 2x15 2.5# 2.5# 2x15 B 2.5# 2x10 B 1.5# 2x10 B   SL Abd 3# 2x15 B 3# 2x15 B  3# 2x15 B 3# 2x10 B 3# 2x15 N 2.5# 2x15 B 2.5# 2x15 B 2.5# 2x15 B 2.5# " 2x12 B 1.5# 2x10 B   bridging 2x15 2x15  2x15 2x15 2x15  2x15 2x15 2x12 2x10 2x10   SL clams BTB 2x10 B GTB 2x15 B  GTB 2x15 B GTB 2x15 GTB 2x15  GTB 2x10 GTB 2x10 RTB 2x15 RTB 2x12 RTB 2x10   Heel Slides    - -- -  -- -- -- -   Nesha Hip Add    - -- -  -- -- -- -   LAQs    - -- -  -- -- -- -   Seated Hip Flex    - -- -  -- -- -- -   Cybex   Leg Press 7.0 2x15 B 7.0 2x15 B 7.0 2x15 B 6.5 2x15 DL 6.5 2x15 DL    3.0 2x10 R 6.0 2x15 6.0 2x15 B 5.0 2x15 DL 4.0 2x15  ^ next   -- -   TKE    -  -  -- -- -- -   Hip Abd 2.0 2x15 B  ^ next 2.0 2x15 B 2.0 2x15 B 2.0 2x15 B 2.0 2x15 B 2x15 B 4.0  2x10 B 4.0 2x10 B next -- -   Hip Flex 2.5 2x15 B 2.5 2x15 B 2.5 2x15 B 2.0 2x15 B 2.0 2x15 B 2x15 B 4.0 2x10 B 4.0 2x10 B next -- -   Hip Ext 2.5 2x15 B 2.5 2x15 B 2.5 2x15 B 2.0 2x15 B 2.0 2x15 B 2x15 B 4.0  2x10 B 4.0 2x10 B next -- -   HS Curls    oot 5.5 2x15 5.5 2x10  -- -- -- -   Knee Ext --   - -- -  -- -- -- -   Step Ups --   - -- -  -- -- -- -   Step Downs --   - -- -  -- -- -- -   Gait --   - -- -  -- -- -- -   CP --   - -- -  -- -- -- -   Initials DH 3/6 MB 2/6 MB 1/6 CK DH 1/6 CK MB 4/6 DH 3/6 DH 2/6 DH 1/6 CK       Assessment:     Held most weight and reps same today at patient request due to right hip pain.     Patient Education/Response:     Patient educated to continue HEP.  Verbalized understanding.    Plans and Goals:     Continue with POC, progress pt as tolerated    Short Term Goals = Long Term Goals (8 Weeks): 2/12/17  1. Pt will be independent with HEP  2. Pt will improve (B) LE strength to 5/5 on MMT   3. Pt will report 0/10 pain in (R) hip with activity and ADLS  4. Pt will improve FOTO score to </= to 30% impaired  5. Pt will improve (B) hamstring flexibility by at least 10 degrees

## 2017-02-06 ENCOUNTER — CLINICAL SUPPORT (OUTPATIENT)
Dept: SPEECH THERAPY | Facility: HOSPITAL | Age: 72
End: 2017-02-06
Attending: INTERNAL MEDICINE
Payer: COMMERCIAL

## 2017-02-06 DIAGNOSIS — R29.898 WEAKNESS OF BOTH LOWER EXTREMITIES: ICD-10-CM

## 2017-02-06 DIAGNOSIS — M25.551 PAIN OF RIGHT HIP JOINT: ICD-10-CM

## 2017-02-06 PROCEDURE — 97110 THERAPEUTIC EXERCISES: CPT

## 2017-02-06 NOTE — PROGRESS NOTES
"TIME RECORD    Date:  02/06/2017    Start Time:  3:05 pm   Stop Time:  3:55 pm     PROCEDURES:    TIMED  Procedure Min.   TE supervised 5'   TE 45'                 UNTIMED  Procedure Min.             Total Timed Minutes:  45'  Total Timed Units:  3  Total Untimed Units:  0  Charges Billed/# of units:  3 TE       Progress/Current Status    Subjective:     Patient ID: Edis Huggins Jr. is a 71 y.o. male.  Diagnosis:   1. Pain of right hip joint     2. Weakness of both lower extremities       Pain:   Pt stated that he has been experiencing pain in (L) anteiror hip. Pt stated that he sat in emergency room chair last night for prolonged period of time. Pt also reported that he stopped taking his pain medicine due to future eye surgery.     Objective:     Pt initiated therapy session on stationary bike x 5' supervised. Objective measurements were taken and pt participated in therex per log below 1:1 with PT x 45'.     Date  2/6/17 2/2/17 1/31/17 1/26/17 1/24/7 1/19/17 1/17/17 1/12/17 1/10/17 1/5/17 1/3/16 12/30/16   VISIT 13 12 11 10 9 8 7 6 5 4 3 2   MT  10 --- 10' 10' 15' FDN 15' 15' 15' 15' 13'   Long Sit HS - 30"x3 B 30"x3 30" x 3 - 30"x3  NP 30"x3 B 30"x3 B 30"x3 B 3x30" B    HSS wstrap 30"x3 B 30"x3 B 30"x3 30" x 3 30"x3  30"x3 3x30"  2 x 30"       Piriformis stretch 30"x3 B 30"x3  30"x3 30" x 3 30"x3 B 30"x3 3x30" B 3 x 30" 30"x3 B 30"x3 B 30"x3 B 3x30" B   Prone quad stretch w/strap - 30"x3 30"x3 2 x 30           IT Band stretch W/ strap 30"x3 W/strap 30"x3 W/ strap 3x30" W/ strap 3x30" W/ strap 3x30" MT & self w/wo strap - 3 x30" 30"x3 30"x3 supine w/strap 30"x3 supine w/strap 3x30" supine w/strap   Gastroc Str.  EOS  EOS 3x30" EOS 3x30" -  oot EOS 3x30'          -   Bike 5' 5' 5' 5' 5' S=11 x 5' 5' S= 11 5' S=11   10  mins S=11 8' S=11  7' S=11  5' 5'   TAs 5"x10              QS -    - -- -  -- -- -- -   SAQ -    - -- -  -- -- -- -   SLR - 3# 2x15 B 3# 2x15 B  3# 2x15 B 3# 2x10 B 3# 2x10 2.5# 2x15 2.5# 2.5# " 2x15 B 2.5# 2x10 B 1.5# 2x10 B   SL Abd - 3# 2x15 B 3# 2x15 B  3# 2x15 B 3# 2x10 B 3# 2x15 N 2.5# 2x15 B 2.5# 2x15 B 2.5# 2x15 B 2.5# 2x12 B 1.5# 2x10 B   bridging - 2x15 2x15  2x15 2x15 2x15  2x15 2x15 2x12 2x10 2x10   SL clams - BTB 2x10 B GTB 2x15 B  GTB 2x15 B GTB 2x15 GTB 2x15  GTB 2x10 GTB 2x10 RTB 2x15 RTB 2x12 RTB 2x10   Heel Slides -    - -- -  -- -- -- -   Nesha Hip Add --    - -- -  -- -- -- -   LAQs -    - -- -  -- -- -- -   Seated Hip Flex -    - -- -  -- -- -- -   Cybex   Leg Press - 7.0 2x15 B 7.0 2x15 B 7.0 2x15 B 6.5 2x15 DL 6.5 2x15 DL    3.0 2x10 R 6.0 2x15 6.0 2x15 B 5.0 2x15 DL 4.0 2x15  ^ next   -- -   TKE -    -  -  -- -- -- -   Hip Abd 2.5# 2x10 B 2.0 2x15 B  ^ next 2.0 2x15 B 2.0 2x15 B 2.0 2x15 B 2.0 2x15 B 2x15 B 4.0  2x10 B 4.0 2x10 B next -- -   Hip Flex 2.5# 2x10 B 2.5 2x15 B 2.5 2x15 B 2.5 2x15 B 2.0 2x15 B 2.0 2x15 B 2x15 B 4.0 2x10 B 4.0 2x10 B next -- -   Hip Ext 2.5# 2x10 B  2.5 2x15 B 2.5 2x15 B 2.5 2x15 B 2.0 2x15 B 2.0 2x15 B 2x15 B 4.0  2x10 B 4.0 2x10 B next -- -   HS Curls 2.5# 2x10 B    oot 5.5 2x15 5.5 2x10  -- -- -- -   Knee Ext - --   - -- -  -- -- -- -   Step Ups - --   - -- -  -- -- -- -   Step Downs - --   - -- -  -- -- -- -   Gait - --   - -- -  -- -- -- -   CP - --   - -- -  -- -- -- -   Initials CK DH 3/6 MB 2/6 MB 1/6 CK DH 1/6 CK MB 4/6 DH 3/6 DH 2/6 DH 1/6 CK         Assessment:     See DC summary below    Patient Education/Response:     See DC summary below    Plans and Goals:   DC from PT    REHAB SERVICES OUTPATIENT DISCHARGE SUMMARY  Physical Therapy      Name:  Edis Huggins   Date:  2/6/17  Date of Evaluation:  12/12/16  Physician:  Forest Miles MD  Total # Of Visits:  13  Diagnosis:    1. Pain of right hip joint     2. Weakness of both lower extremities         Physical/Functional Status:  At time of discharge, patient stated that his bursitis is gone and he can lie on his (B) sides without any difficulty. Pt stated that currently he is having  increased pain in (L) anterior hip when ambulating. Pt informed therapist that he is scheduled to under eye surgery on 2/8/17 and surgery on his other eye in a couple of weeks. Therefore, pt stated that he would not be able to attend PT for a few weeks. Pt was agreeable to being discharged with HEP at this time. See objective measurements below.     Objective measurements: 2/7/17    Lumbar AROM: Pain/Dysfunction with Movement:     No c/o increased pain with lumbar AROM   Flexion 70 degrees     Extension 15 degrees     Right side bending 25 degrees      Left side bending 25 degrees     Right rotation 80 degrees     Left rotation 80 degrees           Range of Motion/Strength: strength measurements taken within available ROM     Hip Right   Left   Pain/Dysfunction with Movement     AROM MMT AROM MMT ! = pain   Flexion 90 5/5 90 5/5     Extension 15 4+/5 14 4+/5     Abduction 40 5/5 40 5/5     Adduction 35 4+/5 35 4+/5     Internal rotation 28 5/5 26 5/5    External rotation 45 5/5 45 5/5        Knee Right   Left   Pain/Dysfunction with Movement     AROM MMT AROM MMT     Flexion WFL 5/5 WFL 5/5     Extension WFL 5/5 WFL 5/5       FOTO hip survey: 47% limited    Objective measurements: initial evaluation    Lumbar AROM: Pain/Dysfunction with Movement:     No c/o increased pain with lumbar AROM   Flexion 60 degrees     Extension 15 degrees     Right side bending 20 degrees      Left side bending 20 degrees     Right rotation 80 degrees     Left rotation 80 degrees           Range of Motion/Strength: strength measurements taken within available ROM     Hip Right   Left   Pain/Dysfunction with Movement     AROM MMT AROM MMT ! = pain   Flexion 90 5/5 90 5/5     Extension 12 4/5 12 4+/5     Abduction 30 4/5 40 4/5     Adduction 35 4+/5 35 4+/5     Internal rotation 25 ! 4/5 25! 5/5 Pain in (R) lateral hip    External rotation 40  4/5 40 4/5        Knee Right   Left   Pain/Dysfunction with Movement     AROM MMT AROM MMT      Flexion WFL 5/5 WFL 5/5     Extension WFL 5/5 WFL 5/5       FOTO hip survey: 53% limited      The patient is to be discharged from our Therapy service for the following reason(s):  Patient has reached the maximum rehab potential for the present time    Degree of Goal Achievement:  Patient has partially met goals  Short Term Goals = Long Term Goals (8 Weeks): 2/12/17  1. Pt will be independent with HEP - MET  2. Pt will improve (B) LE strength to 5/5 on MMT - PARTIALLY MET  3. Pt will report 0/10 pain in (R) hip with activity and ADLS - NOT MET  4. Pt will improve FOTO score to </= to 30% impaired - PARTIALLY MET  5. Pt will improve (B) hamstring flexibility by at least 10 degrees  NT    Patient Education:  Pt was educated on and given handout on HEP consisting of supine IT band stretch, supine hamstring stretch, supine piriformis stretch, prone quad stretch, supine TA activation, SLR, SL hip abduction, SL clams, bridges, standing hip flexion, standing hip extension, standing hip abduction. Pt was given BTB to use for resistance. Pt demo and verbalized understanding.     Discharge Plan:  Home Program:  See above. Follow up with MD prn

## 2017-02-06 NOTE — Clinical Note
Please see PT DC summary for Edis Huggins,  49. Thank you for this referral.   Mendy Canseco, PT 2017

## 2017-02-08 ENCOUNTER — ANESTHESIA EVENT (OUTPATIENT)
Dept: SURGERY | Facility: HOSPITAL | Age: 72
End: 2017-02-08
Payer: COMMERCIAL

## 2017-02-08 ENCOUNTER — ANESTHESIA (OUTPATIENT)
Dept: SURGERY | Facility: HOSPITAL | Age: 72
End: 2017-02-08
Payer: COMMERCIAL

## 2017-02-08 PROCEDURE — D9220A PRA ANESTHESIA: Mod: CRNA,,, | Performed by: NURSE ANESTHETIST, CERTIFIED REGISTERED

## 2017-02-08 PROCEDURE — 63600175 PHARM REV CODE 636 W HCPCS: Performed by: NURSE ANESTHETIST, CERTIFIED REGISTERED

## 2017-02-08 PROCEDURE — D9220A PRA ANESTHESIA: Mod: ANES,,, | Performed by: ANESTHESIOLOGY

## 2017-02-08 RX ORDER — MIDAZOLAM HYDROCHLORIDE 1 MG/ML
INJECTION, SOLUTION INTRAMUSCULAR; INTRAVENOUS
Status: DISCONTINUED | OUTPATIENT
Start: 2017-02-08 | End: 2017-02-08

## 2017-02-08 RX ORDER — FENTANYL CITRATE 50 UG/ML
INJECTION, SOLUTION INTRAMUSCULAR; INTRAVENOUS
Status: DISCONTINUED | OUTPATIENT
Start: 2017-02-08 | End: 2017-02-08

## 2017-02-08 RX ADMIN — FENTANYL CITRATE 50 MCG: 50 INJECTION, SOLUTION INTRAMUSCULAR; INTRAVENOUS at 09:02

## 2017-02-08 RX ADMIN — MIDAZOLAM HYDROCHLORIDE 2 MG: 1 INJECTION, SOLUTION INTRAMUSCULAR; INTRAVENOUS at 09:02

## 2017-02-08 NOTE — TRANSFER OF CARE
"Anesthesia Transfer of Care Note    Patient: Edis Huggins Jr.    Procedure(s) Performed: Procedure(s) (LRB):  PHACOEMULSIFICATION-ASPIRATION-CATARACT (Left)  INSERTION-INTRAOCULAR LENS (IOL) (Left)  GONIOTOMY (Left)    Patient location: PACU    Anesthesia Type: MAC    Transport from OR: Transported from OR on room air with adequate spontaneous ventilation    Post pain: adequate analgesia    Post assessment: no apparent anesthetic complications    Post vital signs: stable    Level of consciousness: awake and alert    Nausea/Vomiting: no nausea/vomiting    Complications: none          Last vitals:   Visit Vitals    /70 (BP Location: Left arm, Patient Position: Lying, BP Method: Automatic)    Pulse (!) 54    Temp 36.7 °C (98.1 °F)    Resp 18    Ht 5' 11" (1.803 m)    Wt 131.5 kg (290 lb)    SpO2 98%    BMI 40.45 kg/m2     "

## 2017-02-08 NOTE — ANESTHESIA PREPROCEDURE EVALUATION
02/08/2017  Edis Huggins Jr. is a 71 y.o., male.    [facility] Anesthesia Evaluation         Review of Systems  Anesthesia Hx:  No problems with previous Anesthesia   Social:  Non-Smoker, No Alcohol Use    Cardiovascular:   Exercise tolerance: good Hypertension, well controlled    Pulmonary:  Pulmonary Normal snoaring   Hepatic/GI:  Hepatic/GI Normal  Denies GERD.    Neurological:  Neurology Normal        Physical Exam  General:  Morbid Obesity    Airway/Jaw/Neck:  Airway Findings: Mouth Opening: Normal Tongue: Normal  General Airway Assessment: Adult  Mallampati: III  Improves to II with phonation.  TM Distance: Normal, at least 6 cm  Jaw/Neck Findings:  Neck ROM: Normal ROM      Dental:  Dental Findings: In tact   Chest/Lungs:  Chest/Lungs Findings: Clear to auscultation         Mental Status:  Mental Status Findings:  Cooperative         Anesthesia Plan  Type of Anesthesia, risks & benefits discussed:  Anesthesia Type:  MAC  Patient's Preference:   Intra-op Monitoring Plan:   Intra-op Monitoring Plan Comments:   Post Op Pain Control Plan:   Post Op Pain Control Plan Comments:   Induction:   IV  Beta Blocker:  Patient is not currently on a Beta-Blocker (No further documentation required).       Informed Consent: Patient understands risks and agrees with Anesthesia plan.  Questions answered. Anesthesia consent signed with patient.  ASA Score: 2     Day of Surgery Review of History & Physical:    H&P update referred to the surgeon.         Ready For Surgery From Anesthesia Perspective.

## 2017-02-08 NOTE — ANESTHESIA RELEASE NOTE
"Anesthesia Release from PACU Note    Patient: Edis Huggins Jr.    Procedure(s) Performed: Procedure(s) (LRB):  PHACOEMULSIFICATION-ASPIRATION-CATARACT (Left)  INSERTION-INTRAOCULAR LENS (IOL) (Left)  GONIOTOMY (Left)    Anesthesia type: general    Post pain: Adequate analgesia    Post assessment: no apparent anesthetic complications    Last Vitals:   Visit Vitals    BP (!) 123/59 (BP Location: Left arm, Patient Position: Lying, BP Method: Automatic)    Pulse (!) 55    Temp 36.7 °C (98.1 °F) (Temporal)    Resp 18    Ht 5' 11" (1.803 m)    Wt 131.5 kg (290 lb)    SpO2 98%    BMI 40.45 kg/m2       Post vital signs: stable    Level of consciousness: awake, alert  and oriented    Nausea/Vomiting: no nausea/no vomiting    Complications: none    Airway Patency: patent    Respiratory: unassisted    Cardiovascular: stable and blood pressure at baseline    Hydration: euvolemic  "

## 2017-02-08 NOTE — ANESTHESIA POSTPROCEDURE EVALUATION
"Anesthesia Post Evaluation    Patient: Edis Huggins Jr.    Procedure(s) Performed: Procedure(s) (LRB):  PHACOEMULSIFICATION-ASPIRATION-CATARACT (Left)  INSERTION-INTRAOCULAR LENS (IOL) (Left)  GONIOTOMY (Left)    Final Anesthesia Type: general  Patient location during evaluation: PACU  Patient participation: Yes- Able to Participate  Level of consciousness: awake and alert  Post-procedure vital signs: reviewed and stable  Pain management: adequate  Airway patency: patent  PONV status at discharge: No PONV  Anesthetic complications: no      Cardiovascular status: blood pressure returned to baseline  Respiratory status: unassisted  Hydration status: euvolemic  Follow-up not needed.        Visit Vitals    BP (!) 123/59 (BP Location: Left arm, Patient Position: Lying, BP Method: Automatic)    Pulse (!) 55    Temp 36.7 °C (98.1 °F) (Temporal)    Resp 18    Ht 5' 11" (1.803 m)    Wt 131.5 kg (290 lb)    SpO2 98%    BMI 40.45 kg/m2       Pain/Channing Score: Pain Assessment Performed: Yes (2/8/2017 10:08 AM)  Presence of Pain: denies (2/8/2017 10:08 AM)  Channing Score: 10 (2/8/2017 10:08 AM)      "

## 2017-02-09 ENCOUNTER — OFFICE VISIT (OUTPATIENT)
Dept: OPHTHALMOLOGY | Facility: CLINIC | Age: 72
End: 2017-02-09
Payer: COMMERCIAL

## 2017-02-09 DIAGNOSIS — H40.1111 PRIMARY OPEN ANGLE GLAUCOMA OF RIGHT EYE, MILD STAGE: ICD-10-CM

## 2017-02-09 DIAGNOSIS — H40.1122 PRIMARY OPEN ANGLE GLAUCOMA OF LEFT EYE, MODERATE STAGE: ICD-10-CM

## 2017-02-09 DIAGNOSIS — H40.043 STEROID RESPONDERS, BILATERAL: ICD-10-CM

## 2017-02-09 DIAGNOSIS — Z48.89 ENCOUNTER FOR POSTOPERATIVE CARE: Primary | ICD-10-CM

## 2017-02-09 DIAGNOSIS — H57.09 RELATIVE AFFERENT PUPILLARY DEFECT: ICD-10-CM

## 2017-02-09 PROCEDURE — 99024 POSTOP FOLLOW-UP VISIT: CPT | Mod: S$GLB,,, | Performed by: OPHTHALMOLOGY

## 2017-02-09 PROCEDURE — 99999 PR PBB SHADOW E&M-EST. PATIENT-LVL II: CPT | Mod: PBBFAC,,, | Performed by: OPHTHALMOLOGY

## 2017-02-09 RX ORDER — MOXIFLOXACIN 5 MG/ML
1 SOLUTION/ DROPS OPHTHALMIC 4 TIMES DAILY
COMMUNITY
Start: 2017-02-09 | End: 2017-02-18

## 2017-02-09 RX ORDER — PREDNISOLONE ACETATE 10 MG/ML
1 SUSPENSION/ DROPS OPHTHALMIC 4 TIMES DAILY
COMMUNITY
End: 2017-02-13 | Stop reason: SDUPTHER

## 2017-02-09 NOTE — PROGRESS NOTES
HPI     DLS: 1/27/17    Pt here for 1 day post complex phaco w/IOL OS- 2/08/17  Pt states no eye pain but since the surgery he has been having severe   headaches.    1. Post operative state  2. Primary open angle glaucoma of right eye, mild stage  3. Primary open angle glaucoma of left eye, moderate stage  4. Senile nuclear sclerosis, bilateral  5. Steroid responders, bilateral  6. Relative afferent pupillary defect, left eye       Last edited by Millie Lakhani on 2/9/2017  8:47 AM.         Assessment /Plan     For exam results, see Encounter Report.    Encounter for postoperative care    Primary open angle glaucoma of right eye, mild stage    Primary open angle glaucoma of left eye, moderate stage    Steroid responders, bilateral    Relative afferent pupillary defect - Left Eye      Pt is switching from [facility] to [place] - bring photo file over   Lost to F/u from 11/4/2013 to 8/11/2015   Knows Sharlene Mo and MIKHAIL - use to work with them     1. POAG (primary open-angle glaucoma) - Both Eyes   Old pt of [facility] [id number]  Began at [facility] 2012       POAG OS>OD           Family history    neg        Glaucoma meds    travatan z ou, T1/2 GFS ou        H/O adverse rxn to glaucoma drops    none        LASERS    SLT os 9/26/2013 -( good resp 17 --> 14)        GLAUCOMA SURGERIES    none        OTHER EYE SURGERIES    none        CDR    0.6/0.7-0.8        Tbase    20-23/21-24          Tmax    23/24            Ttarget    16/16            HVF    5 tests 4225-8471 OD essentially full; OS Sup paracentral defect, IAD/INS        Gonio    +4 ou        CCT    598/598        OCT    3 test 2012 to 2013 - RNFL - nl od // dec S bord T        HRT    3 test-2012 to 2016 OD bord N; OS dec S/T/I        Disc photos    2012 - IOS    - Ttoday    16/15  - Test done today   Pre-op phaco/IOL os - complex - small pupil // goniotomy - kahook or trabectome       2. Senile nuclear sclerosis - Both Eyes   -  vis sign       3. Steroid  responders - Both Eyes  2/2 nasacort      4. ? Trace APD os      5. S/P Rt knee replacement Feb 2013  -doing well      6.  Myopia / presbyopia ou       Plan  CSM  IOP below target OU  SLT done OS 9/26/2013 - good initial resp 24-->17    -( if responds well consider SLT od - but full VF and helathy ON still od)    HVF stable OU    patient is interested in cataract surgery/ TAYLER's - kahook / or trabectome oOS then OD   Explained HVF losses OS to patient at length and went over OCT- patient understands that he may still have blurry vision 2/2 to glaucoma after cataract surgery OS.     Phaco / IOL OS Date: 2/8/2017 - combined with a goniotomy / trabectome // ++ heme at end of case - healon left in eye   POD # 1 - phaco/IOL   High IOP 2/2 healon - decrease vision 2/2 to heme // IOP better after decompression - done twice on POD #1   Begin Pred Acetate QID   Begin vigamox QID  Begin combigan bid   Begin diamox 500 PO bid   Shield at night  Glasses day  No lifting, no bending, no eye rubbing  F/U 1  Day - IOP check // vision check / hyphema check         Aim for -0.50 ou   OS  PCB00 14.0  AC IOL 11.5        I have seen and personally examined the patient.  I agree with the findings, assessment and plan of the resident and/or fellow.     Deborah Khan MD

## 2017-02-10 ENCOUNTER — OFFICE VISIT (OUTPATIENT)
Dept: OPHTHALMOLOGY | Facility: CLINIC | Age: 72
End: 2017-02-10
Payer: COMMERCIAL

## 2017-02-10 DIAGNOSIS — Z48.89 ENCOUNTER FOR POSTOPERATIVE CARE: Primary | ICD-10-CM

## 2017-02-10 DIAGNOSIS — H57.09 RELATIVE AFFERENT PUPILLARY DEFECT: ICD-10-CM

## 2017-02-10 DIAGNOSIS — H40.1111 PRIMARY OPEN ANGLE GLAUCOMA OF RIGHT EYE, MILD STAGE: ICD-10-CM

## 2017-02-10 DIAGNOSIS — H40.043 STEROID RESPONDERS, BILATERAL: ICD-10-CM

## 2017-02-10 DIAGNOSIS — H40.1122 PRIMARY OPEN ANGLE GLAUCOMA OF LEFT EYE, MODERATE STAGE: ICD-10-CM

## 2017-02-10 PROCEDURE — 99999 PR PBB SHADOW E&M-EST. PATIENT-LVL III: CPT | Mod: PBBFAC,,, | Performed by: OPHTHALMOLOGY

## 2017-02-10 PROCEDURE — 99024 POSTOP FOLLOW-UP VISIT: CPT | Mod: S$GLB,,, | Performed by: OPHTHALMOLOGY

## 2017-02-10 NOTE — PROGRESS NOTES
"HPI     DLS: 2/09/17    Pt here for 2 day post complex phaco w/IOL OS- 2/08/17  Pt states no pain or discomfort but vision seems to be "frosty". Pt states   he hasn't been having any headaches since yesterday.    Meds: PA qid os             Vigamox qid os             Combigan bid              Diamox 500mg po bid                    Last edited by Millie Lakhani on 2/10/2017 10:45 AM.         Assessment /Plan     For exam results, see Encounter Report.    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 POAG (primary open-angle glaucoma) - Both Eyes   Old pt of [facility] [id number]  Began at [facility] 2012       POAG OS>OD           Family history    neg        Glaucoma meds    travatan z ou, T1/2 GFS ou        H/O adverse rxn to glaucoma drops    none        LASERS    SLT os 9/26/2013 -( good resp 17 --> 14)        GLAUCOMA SURGERIES    none        OTHER EYE SURGERIES    none        CDR    0.6/0.7-0.8        Tbase    20-23/21-24          Tmax    23/24            Ttarget    16/16            HVF    5 tests 9343-4646 OD essentially full; OS Sup paracentral defect, IAD/INS        Gonio    +4 ou        CCT    598/598        OCT    3 test 2012 to 2013 - RNFL - nl od // dec S bord T        HRT    3 test-2012 to 2016 OD bord N; OS dec S/T/I        Disc photos    2012 - IOS    - Ttoday    16/15  - Test done today   Pre-op phaco/IOL os - complex - small pupil // goniotomy - kahook or trabectome       2. Senile nuclear sclerosis - Both Eyes   -  vis sign       3. Steroid responders - Both Eyes  2/2 nasacort      4. ? Trace APD os      5. S/P Rt knee replacement Feb " 2013  -doing well      6.  Myopia / presbyopia ou       Plan  CSM  IOP below target OU  SLT done OS 9/26/2013 - good initial resp 24-->17    -( if responds well consider SLT od - but full VF and helathy ON still od)    HVF stable OU    patient is interested in cataract surgery/ TAYLER's - kahook / or trabectome oOS then OD   Explained HVF losses OS to patient at length and went over OCT- patient understands that he may still have blurry vision 2/2 to glaucoma after cataract surgery OS.     Phaco / IOL OS Date: 2/8/2017 - combined with a goniotomy / trabectome // ++ heme at end of case - healon left in eye   POD # 1 - phaco/IOL   High IOP 2/2 healon - decrease vision 2/2 to heme // IOP better after decompression - done twice on POD #1   con't Pred Acetate QID   con't vigamox QID  con't combigan bid   con't diamox 500 PO bid   Shield at night  Glasses day  No lifting, no bending, no eye rubbing  F/U 3  Day - IOP check // vision check / hyphema check         Aim for -0.50 ou   OS  PCB00 14.0  AC IOL 11.5        I have seen and personally examined the patient.  I agree with the findings, assessment and plan of the resident and/or fellow.     Deborah Khan MD

## 2017-02-10 NOTE — PROGRESS NOTES
"HPI     DLS: 2/09/17    Pt here for 2 day post complex phaco w/IOL OS- 2/08/17  Pt states no pain or discomfort but vision seems to be "frosty". Pt states   he hasn't been having any headaches since yesterday.    Meds: PA qid os             Vigamox qid os             Combigan bid              Diamox 500mg po bid                    Last edited by Millie Lakhani on 2/10/2017 10:45 AM.         Assessment /Plan     For exam results, see Encounter Report.    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 POAG (primary open-angle glaucoma) - Both Eyes   Old pt of [facility] [id number]  B[facility] at [facility]       POAG OS>OD           Family history    neg        Glaucoma meds    travatan z ou, T1/2 GFS ou        H/O adverse rxn to glaucoma drops    none        LASERS    SLT os 9/26/2013 -( good resp 17 --> 14)        GLAUCOMA SURGERIES    none        OTHER EYE SURGERIES    none        CDR    0.6/0.7-0.8        Tbase    20-23/21-24          Tmax    23/24            Ttarget    16/16            HVF    5 tests 0698-3842 OD essentially full; OS Sup paracentral defect, IAD/INS        Gonio    +4 ou        CCT    598/598        OCT    3 test 2012 to 2013 - RNFL - nl od // dec S bord T        HRT    3 test-2012 to 2016 OD bord N; OS dec S/T/I        Disc photos    2012 - IOS    - Ttoday    16/15  - Test done today   Pre-op phaco/IOL os - complex - small pupil // goniotomy - kahook or trabectome       2. Senile nuclear sclerosis - Both Eyes   -  vis sign       3. Steroid responders - Both Eyes  2/2 nasacort      4. ? Trace APD os      5. S/P Rt knee replacement Feb " 2013  -doing well      6.  Myopia / presbyopia ou       Plan  CSM  IOP below target OU  SLT done OS 9/26/2013 - good initial resp 24-->17    -( if responds well consider SLT od - but full VF and helathy ON still od)    HVF stable OU    patient is interested in cataract surgery/ TAYLER's - kahook / or trabectome oOS then OD   Explained HVF losses OS to patient at length and went over OCT- patient understands that he may still have blurry vision 2/2 to glaucoma after cataract surgery OS.     Phaco / IOL OS Date: 2/8/2017 - combined with a goniotomy / trabectome // ++ heme at end of case - healon left in eye   POD # 2 - phaco/IOL   High IOP 2/2 healon - decrease vision 2/2 to heme // IOP better after decompression - done twice on POD #1 // AC tap done on POD #2 33--> 10   Cont  Pred Acetate QID   Cont  vigamox QID  Cont  combigan bid   Cont  diamox 500 PO bid   Shield at night  Glasses day  No lifting, no bending, no eye rubbing  F/U 3  Day - IOP check // vision check / hyphema check         Aim for -0.50 ou   OS  PCB00 14.0  AC IOL 11.5        I have seen and personally examined the patient.  I agree with the findings, assessment and plan of the resident and/or fellow.     Deborah Khan MD

## 2017-02-13 ENCOUNTER — OFFICE VISIT (OUTPATIENT)
Dept: OPHTHALMOLOGY | Facility: CLINIC | Age: 72
End: 2017-02-13
Payer: COMMERCIAL

## 2017-02-13 DIAGNOSIS — H57.09 RELATIVE AFFERENT PUPILLARY DEFECT: ICD-10-CM

## 2017-02-13 DIAGNOSIS — H40.043 STEROID RESPONDERS, BILATERAL: ICD-10-CM

## 2017-02-13 DIAGNOSIS — H40.1122 PRIMARY OPEN ANGLE GLAUCOMA OF LEFT EYE, MODERATE STAGE: Primary | ICD-10-CM

## 2017-02-13 PROCEDURE — 99024 POSTOP FOLLOW-UP VISIT: CPT | Mod: S$GLB,,, | Performed by: OPHTHALMOLOGY

## 2017-02-13 PROCEDURE — 99999 PR PBB SHADOW E&M-EST. PATIENT-LVL II: CPT | Mod: PBBFAC,,, | Performed by: OPHTHALMOLOGY

## 2017-02-13 RX ORDER — PREDNISOLONE ACETATE 10 MG/ML
1 SUSPENSION/ DROPS OPHTHALMIC 4 TIMES DAILY
Qty: 1 BOTTLE | Refills: 2 | Status: SHIPPED | OUTPATIENT
Start: 2017-02-13 | End: 2017-02-24 | Stop reason: ALTCHOICE

## 2017-02-13 NOTE — PROGRESS NOTES
HPI     DLS: 2/10/17    Pt here for post complex phaco w/IOL OS- 2/08/17  Pt states OS is feeling okay this am. Pt states vision seems to be getting   a little better.    Meds: PA qid os             Vigamox qid os             Combigan bid              Diamox po bid 500mg    1. Post operative state  2. Primary open angle glaucoma of right eye, mild stage  3. Primary open angle glaucoma of left eye, moderate stage  4. Steroid responders, bilateral  5. Relative afferent pupillary defect, left eye        Last edited by Millie Lakhani on 2/13/2017  9:02 AM.         Assessment /Plan     For exam results, see Encounter Report.    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 POAG (primary open-angle glaucoma) - Both Eyes   Old pt of [facility] [id number]  Began at [facility] 2012       POAG OS>OD           Family history    neg        Glaucoma meds    travatan z ou, T1/2 GFS ou        H/O adverse rxn to glaucoma drops    none        LASERS    SLT os 9/26/2013 -( good resp 17 --> 14)        GLAUCOMA SURGERIES    none        OTHER EYE SURGERIES    none        CDR    0.6/0.7-0.8        Tbase    20-23/21-24          Tmax    23/24            Ttarget    16/16            HVF    5 tests 2391-5075 OD essentially full; OS Sup paracentral defect, IAD/INS        Gonio    +4 ou        CCT    598/598        OCT    3 test 2012 to 2013 - RNFL - nl od // dec S bord T        HRT    3 test-2012 to 2016 OD bord N; OS dec S/T/I        Disc photos    2012 - IOS    - Ttoday    7 OS  - Test done today   Pre-op phaco/IOL os - complex - small pupil // goniotomy - kahook or trabectome       2. Senile nuclear sclerosis - Both Eyes   -  vis sig      3. Steroid responders - Both Eyes   2/2 nasacort      4. ? Trace APD os      5. S/P Rt knee replacement Feb 2013  -doing well      6.  Myopia / presbyopia ou       Plan  CSM  IOP below target OU  SLT done OS 9/26/2013 - good initial resp 24-->17    -( if responds well consider SLT od - but full VF and helathy ON still od)    HVF stable OU    patient is interested in cataract surgery/ TAYLER's - kahook / or trabectome oOS then OD   Explained HVF losses OS to patient at length and went over OCT- patient understands that he may still have blurry vision 2/2 to glaucoma after cataract surgery OS.     Phaco / IOL OS Date: 2/8/2017 - combined with a goniotomy / trabectome // ++ heme at end of case - healon left in eye   POD # 1 - phaco/IOL   High IOP 2/2 healon - decrease vision 2/2 to heme // IOP better after decompression - done twice on POD #1   con't Pred Acetate QID   con't vigamox QID  con't combigan bid   con't diamox 500 PO bid   Shield at night  Glasses day  No lifting, no bending, no eye rubbing   F/U 3  Day - IOP check // vision check / hyphema check     Today POD5 2/13/17   still with microhyphema and central edema limiting VA   IOP good at 7   stop diamox   con't Pred Acetate QID    con't vigamox QID   con't combigan bid    RTC 4 days IOP/AC check // FRANCIS/MR       Aim for -0.50 ou   OS  PCB00 14.0  AC IOL 11.5    I have seen and personally examined the patient.  I agree with the findings, assessment and plan of the resident and/or fellow.     Deborah Khan MD

## 2017-02-17 ENCOUNTER — OFFICE VISIT (OUTPATIENT)
Dept: OPHTHALMOLOGY | Facility: CLINIC | Age: 72
End: 2017-02-17
Payer: COMMERCIAL

## 2017-02-17 DIAGNOSIS — H18.20 CORNEAL EDEMA OF LEFT EYE: ICD-10-CM

## 2017-02-17 DIAGNOSIS — Z48.89 ENCOUNTER FOR POSTOPERATIVE CARE: Primary | ICD-10-CM

## 2017-02-17 DIAGNOSIS — H57.09 RELATIVE AFFERENT PUPILLARY DEFECT: ICD-10-CM

## 2017-02-17 DIAGNOSIS — H25.11 SENILE NUCLEAR SCLEROSIS, RIGHT: ICD-10-CM

## 2017-02-17 DIAGNOSIS — Z96.1 PSEUDOPHAKIA OF LEFT EYE: ICD-10-CM

## 2017-02-17 DIAGNOSIS — H40.1122 PRIMARY OPEN ANGLE GLAUCOMA OF LEFT EYE, MODERATE STAGE: ICD-10-CM

## 2017-02-17 DIAGNOSIS — H40.043 STEROID RESPONDERS, BILATERAL: ICD-10-CM

## 2017-02-17 PROCEDURE — 99999 PR PBB SHADOW E&M-EST. PATIENT-LVL III: CPT | Mod: PBBFAC,,, | Performed by: OPHTHALMOLOGY

## 2017-02-17 PROCEDURE — 99024 POSTOP FOLLOW-UP VISIT: CPT | Mod: S$GLB,,, | Performed by: OPHTHALMOLOGY

## 2017-02-17 RX ORDER — DIFLUPREDNATE OPHTHALMIC 0.5 MG/ML
1 EMULSION OPHTHALMIC 4 TIMES DAILY
Qty: 5 ML | Refills: 1 | Status: SHIPPED | OUTPATIENT
Start: 2017-02-17 | End: 2017-11-10

## 2017-02-17 RX ORDER — BRIMONIDINE TARTRATE AND TIMOLOL MALEATE 2; 5 MG/ML; MG/ML
1 SOLUTION OPHTHALMIC 2 TIMES DAILY
COMMUNITY
Start: 2017-02-02 | End: 2017-02-24 | Stop reason: ALTCHOICE

## 2017-02-17 NOTE — PROGRESS NOTES
HPI     Post-op Evaluation    Additional comments: Pt states his vision is very blurry           Comments   S/p Complex Phaco IOL OS 2/8/17    MEDS:  PA QID OS  Vigamox QID OS  Combigan BID OS    travatan OD  Timolol od        Last edited by Deborah Khan MD on 2/17/2017 11:01 AM. (History)            Assessment /Plan     For exam results, see Encounter Report.    Encounter for postoperative care  -     difluprednate (DUREZOL) 0.05 % Drop ophthalmic solution; Place 1 drop into the left eye 4 (four) times daily.  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 POAG (primary open-angle glaucoma) - Both Eyes   Old pt of [facility] [id number]  Began at [facility]       POAG OS>OD           Family history    neg        Glaucoma meds    travatan z ou, T1/2 GFS ou        H/O adverse rxn to glaucoma drops    none        LASERS    SLT os 9/26/2013 -( good resp 17 --> 14)        GLAUCOMA SURGERIES    none        OTHER EYE SURGERIES    none        CDR    0.6/0.7-0.8        Tbase    20-23/21-24          Tmax    23/24            Ttarget    16/16            HVF    5 tests 1423-3097 OD essentially full; OS Sup paracentral defect, IAD/INS        Gonio    +4 ou        CCT    598/598        OCT    3 test 2012 to 2013 - RNFL - nl od // dec S bord T        HRT    3 test-2012 to 2016 OD bord N; OS dec S/T/I        Disc photos    2012 - IOS    - Ttoday    7 OS  - Test done today   Pre-op phaco/IOL os - complex - small pupil  // goniotomy - kahook or trabectome       2. Senile nuclear sclerosis - Both Eyes   -  vis sig      3. Steroid responders - Both Eyes  2/2 nasacort      4. ? Trace APD os      5. S/P Rt knee replacement Feb 2013  -doing well      6.  Myopia / presbyopia ou       Plan  CSM  IOP below target OU  SLT done OS 9/26/2013 - good initial resp 24-->17    -( if responds well consider SLT od - but full VF and helathy ON still od)    HVF stable OU    Explained HVF losses OS to patient at length and went over OCT- patient understands that he may still have blurry vision 2/2 to glaucoma after cataract surgery OS.     Phaco / IOL OS Date: 2/8/2017 - combined with a goniotomy / trabectome // ++ heme at end of case - healon left in eye   POW # 1 - phaco/IOL   High IOP 2/2 healon - decrease vision 2/2 to heme // IOP better after decompression - done twice on POD #1   con't Pred Acetate QID - swithc to durezol qid os   con't vigamox QID- stop when runs out   con't combigan bid - stop IOP low     Shield at night  Glasses day  No lifting, no bending, no eye rubbing   F/U 1 week - IOP check // vision check / hyphema check  // IOP check off combigan and on durezol // ?? If NaCl gtts help cornea       Aim for -0.50 ou   OS  PCB00 14.0  AC IOL 11.5    I have seen and personally examined the patient.  I agree with the findings, assessment and plan of the resident and/or fellow.     Deborah Khan MD

## 2017-02-24 ENCOUNTER — OFFICE VISIT (OUTPATIENT)
Dept: OPHTHALMOLOGY | Facility: CLINIC | Age: 72
End: 2017-02-24
Payer: COMMERCIAL

## 2017-02-24 DIAGNOSIS — H57.09 RELATIVE AFFERENT PUPILLARY DEFECT: ICD-10-CM

## 2017-02-24 DIAGNOSIS — Z48.89 ENCOUNTER FOR POSTOPERATIVE CARE: Primary | ICD-10-CM

## 2017-02-24 DIAGNOSIS — H40.1122 PRIMARY OPEN ANGLE GLAUCOMA OF LEFT EYE, MODERATE STAGE: ICD-10-CM

## 2017-02-24 DIAGNOSIS — H25.11 SENILE NUCLEAR SCLEROSIS, RIGHT: ICD-10-CM

## 2017-02-24 DIAGNOSIS — H40.043 STEROID RESPONDERS, BILATERAL: ICD-10-CM

## 2017-02-24 DIAGNOSIS — Z96.1 PSEUDOPHAKIA OF LEFT EYE: ICD-10-CM

## 2017-02-24 PROCEDURE — 99999 PR PBB SHADOW E&M-EST. PATIENT-LVL II: CPT | Mod: PBBFAC,,, | Performed by: OPHTHALMOLOGY

## 2017-02-24 PROCEDURE — 99024 POSTOP FOLLOW-UP VISIT: CPT | Mod: S$GLB,,, | Performed by: OPHTHALMOLOGY

## 2017-02-24 NOTE — PROGRESS NOTES
HPI     Post-op Evaluation    Additional comments: Pt states no complaints today           Comments   S/p Complex Phaco IOL OS 2/8/17    MEDS:  Durezol QID OS  Vigamox QID OS  Angle 128 QID OS    T1/2 GFS QAM OD  Travatan Z QHS OD         Last edited by Lili Booker MA on 2/24/2017  3:00 PM. (History)            Assessment /Plan     For exam results, see Encounter Report.    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 POAG (primary open-angle glaucoma) - Both Eyes   Old pt of [facility] 5940-4814  Began at [facility]       POAG OS>OD           Family history    neg        Glaucoma meds    travatan z ou, T1/2 GFS ou        H/O adverse rxn to glaucoma drops    none        LASERS    SLT os 9/26/2013 -( good resp 17 --> 14)        GLAUCOMA SURGERIES    none        OTHER EYE SURGERIES    none        CDR    0.6/0.7-0.8        Tbase    20-23/21-24          Tmax    23/24            Ttarget    16/16            HVF    5 tests 6755-4028 OD essentially full; OS Sup paracentral defect, IAD/INS        Gonio    +4 ou        CCT    598/598        OCT    3 test 2012 to 2013 - RNFL - nl od // dec S bord T        HRT    3 test-2012 to 2016 OD bord N; OS dec S/T/I        Disc photos    2012 - IOS    - Ttoday    7 OS  - Test done today   Pre-op phaco/IOL os - complex - small pupil // goniotomy - kahook or trabectome       2. Senile nuclear sclerosis - Both Eyes   -  vis sig      3. Steroid responders - Both Eyes  2/2 nasacort      4. ? Trace APD  os      5. S/P Rt knee replacement Feb 2013  -doing well      6.  Myopia / presbyopia ou       Plan  CSM  IOP below target OU  SLT done OS 9/26/2013 - good initial resp 24-->17    -( if responds well consider SLT od - but full VF and helathy ON still od)    HVF stable OU    Explained HVF losses OS to patient at length and went over OCT- patient understands that he may still have blurry vision 2/2 to glaucoma after cataract surgery OS.     Phaco / IOL OS Date: 2/8/2017 - combined with a goniotomy / trabectome // ++ heme at end of case - healon left in eye   POW # 2 - phaco/IOL   High IOP 2/2 healon - decrease vision 2/2 to heme // IOP better after decompression - done twice on POD #1   con't durezol qid os   VA improving os - still has some heme in the bag behind the IOL // the cornea is much more clear and the IOP is ok     F/U 2-3  week - IOP check // vision check / hyphema check  // IOP check    discussed with patient that the dilated pupil may be permeate - can try terese in future - but may not respond       Aim for -0.50 ou   OS  PCB00 14.0  AC IOL 11.5    I have seen and personally examined the patient.  I agree with the findings, assessment and plan of the resident and/or fellow.     Deborah Khan MD

## 2017-03-23 ENCOUNTER — OFFICE VISIT (OUTPATIENT)
Dept: OPHTHALMOLOGY | Facility: CLINIC | Age: 72
End: 2017-03-23
Payer: COMMERCIAL

## 2017-03-23 DIAGNOSIS — H40.043 STEROID RESPONDERS, BILATERAL: ICD-10-CM

## 2017-03-23 DIAGNOSIS — H57.04 DILATED PUPIL: ICD-10-CM

## 2017-03-23 DIAGNOSIS — Z96.1 PSEUDOPHAKIA OF LEFT EYE: ICD-10-CM

## 2017-03-23 DIAGNOSIS — H40.1122 PRIMARY OPEN ANGLE GLAUCOMA OF LEFT EYE, MODERATE STAGE: ICD-10-CM

## 2017-03-23 DIAGNOSIS — Z48.89 ENCOUNTER FOR POSTOPERATIVE CARE: Primary | ICD-10-CM

## 2017-03-23 DIAGNOSIS — H57.09 RELATIVE AFFERENT PUPILLARY DEFECT: ICD-10-CM

## 2017-03-23 PROCEDURE — 99999 PR PBB SHADOW E&M-EST. PATIENT-LVL I: CPT | Mod: PBBFAC,,, | Performed by: OPHTHALMOLOGY

## 2017-03-23 PROCEDURE — 99024 POSTOP FOLLOW-UP VISIT: CPT | Mod: S$GLB,,, | Performed by: OPHTHALMOLOGY

## 2017-03-23 NOTE — PROGRESS NOTES
HPI     DLS: 2/274/17    Pt here for S/P Complex phaco w/IOL OS- 2/08/17    Meds: Angle 128 qid os             Durezol qid os              T 1/2 GFS qam od             Travatan Z qhs od     1. Post operative state  2. Primary open angle glaucoma of left eye, moderate stage  3. Relative afferent pupillary defect, left eye  4. Steroid responders, bilateral  5. Pseudophakia, left eye  6. Senile nuclear sclerosis, right        Last edited by Millie Lakhani on 3/23/2017  3:22 PM.         Assessment /Plan     For exam results, see Encounter Report.    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 to [place] - bring photo file over   Lost to F/u from 11/4/2013 to 8/11/2015   Knows Sharlene Steele - use to work with them     1. POAG (primary open-angle glaucoma) - Both Eyes   [facility] [employer] [facility]  Began [facility]       POAG OS>OD           Family history    neg        Glaucoma meds    travatan z ou, T1/2 GFS ou        H/O adverse rxn to glaucoma drops    none        LASERS    SLT os 9/26/2013 -( good resp 17 --> 14)        GLAUCOMA SURGERIES    trabectome os (combined with phaco - 2/8/2017        OTHER EYE SURGERIES    Combined - phaco/IOL / goniotomy - trabectome os 2/8/2017 - +heme and increae in IOP)         CDR    0.6/0.7-0.8        Tbase    20-23/21-24          Tmax    23/24            Ttarget    16/16            HVF    5 tests 7572-0251 OD essentially full; OS Sup paracentral defect, IAD/INS        Gonio    +4 ou        CCT    598/598        OCT    3 test 2012 to 2013 - RNFL - nl od // dec S bord T        HRT    3 test-2012 to 2016 OD bord N; OS dec S/T/I         Disc photos    2012 - IOS    - Ttoday   15/15 OS  - Test done today   Post-op phaco/IOL os - complex - small pupil // goniotomy - kahook or trabectome       2. Senile nuclear sclerosis - Both Eyes   -  vis sig      3. Steroid responders - Both Eyes  2/2 nasacort      4. ? Trace APD os      5. S/P Rt knee replacement Feb 2013  -doing well      6.  Myopia / presbyopia ou       Plan  CSM  IOP below target OU  SLT done OS 9/26/2013 - good initial resp 24-->17    -( if responds well consider SLT od - but full VF and helathy ON still od)    HVF stable OU    Explained the pre-existing HVF losses OS to patient at length and went over OCT- patient understands that he may still have blurry vision 2/2 to glaucoma after cataract surgery OS.     Phaco / IOL OS Date: 2/8/2017 - combined with a goniotomy / trabectome // ++ heme at end of case - healon left in eye   POM # 1.5- phaco/IOL   High IOP 2/2 healon - decrease vision 2/2 to heme // IOP better after decompression - done twice on POD #1   con't durezol - decrease to tid   rio 128 - decrease to tid   VA improving os - still has some heme in the bag behind the IOL // the cornea is much more clear and the IOP is ok   Cornea has cleared   Fixed dilated pupil os - 2/2 high IOP post op - will likely be a permeant change     discussed with patient that the dilated pupil may be permeate - can try terese in future - but may not respond     F/U 2 weeks with AR/MR ou and OCT macula - if no CME - continue with steroid taper       Aim for -0.50 ou   OS  PCB00 14.0  AC IOL 11.5    I have seen and personally examined the patient.  I agree with the findings, assessment and plan of the resident and/or fellow.     Deborah Khan MD

## 2017-03-30 ENCOUNTER — PATIENT MESSAGE (OUTPATIENT)
Dept: INTERNAL MEDICINE | Facility: CLINIC | Age: 72
End: 2017-03-30

## 2017-03-30 ENCOUNTER — OFFICE VISIT (OUTPATIENT)
Dept: INTERNAL MEDICINE | Facility: CLINIC | Age: 72
End: 2017-03-30
Attending: INTERNAL MEDICINE
Payer: COMMERCIAL

## 2017-03-30 VITALS
WEIGHT: 288.81 LBS | DIASTOLIC BLOOD PRESSURE: 88 MMHG | HEIGHT: 71 IN | BODY MASS INDEX: 40.43 KG/M2 | SYSTOLIC BLOOD PRESSURE: 152 MMHG | HEART RATE: 60 BPM

## 2017-03-30 DIAGNOSIS — Z12.5 PROSTATE CANCER SCREENING: ICD-10-CM

## 2017-03-30 DIAGNOSIS — N40.0 BENIGN NON-NODULAR PROSTATIC HYPERPLASIA WITHOUT LOWER URINARY TRACT SYMPTOMS: ICD-10-CM

## 2017-03-30 DIAGNOSIS — M79.10 MYALGIA: ICD-10-CM

## 2017-03-30 DIAGNOSIS — E78.5 HYPERLIPIDEMIA, UNSPECIFIED HYPERLIPIDEMIA TYPE: ICD-10-CM

## 2017-03-30 DIAGNOSIS — Z97.4 USES HEARING AID: ICD-10-CM

## 2017-03-30 DIAGNOSIS — R73.01 IMPAIRED FASTING GLUCOSE: ICD-10-CM

## 2017-03-30 DIAGNOSIS — R79.89 LOW TESTOSTERONE: ICD-10-CM

## 2017-03-30 DIAGNOSIS — I10 ESSENTIAL HYPERTENSION: ICD-10-CM

## 2017-03-30 DIAGNOSIS — R53.83 FATIGUE, UNSPECIFIED TYPE: ICD-10-CM

## 2017-03-30 DIAGNOSIS — Z00.00 ANNUAL PHYSICAL EXAM: Primary | ICD-10-CM

## 2017-03-30 DIAGNOSIS — Z13.6 ENCOUNTER FOR ABDOMINAL AORTIC ANEURYSM (AAA) SCREENING: ICD-10-CM

## 2017-03-30 LAB
BACTERIA #/AREA URNS AUTO: NORMAL /HPF
BILIRUB UR QL STRIP: NEGATIVE
CLARITY UR REFRACT.AUTO: CLEAR
COLOR UR AUTO: YELLOW
GLUCOSE UR QL STRIP: NEGATIVE
HGB UR QL STRIP: NEGATIVE
KETONES UR QL STRIP: NEGATIVE
LEUKOCYTE ESTERASE UR QL STRIP: NEGATIVE
MICROSCOPIC COMMENT: NORMAL
NITRITE UR QL STRIP: NEGATIVE
PH UR STRIP: 5 [PH] (ref 5–8)
PROT UR QL STRIP: NEGATIVE
RBC #/AREA URNS AUTO: 0 /HPF (ref 0–4)
SP GR UR STRIP: 1.02 (ref 1–1.03)
SQUAMOUS #/AREA URNS AUTO: 0 /HPF
URN SPEC COLLECT METH UR: NORMAL
UROBILINOGEN UR STRIP-ACNC: NEGATIVE EU/DL
WBC #/AREA URNS AUTO: 1 /HPF (ref 0–5)

## 2017-03-30 PROCEDURE — 3079F DIAST BP 80-89 MM HG: CPT | Mod: S$GLB,,, | Performed by: INTERNAL MEDICINE

## 2017-03-30 PROCEDURE — 99999 PR PBB SHADOW E&M-EST. PATIENT-LVL III: CPT | Mod: PBBFAC,,, | Performed by: INTERNAL MEDICINE

## 2017-03-30 PROCEDURE — 3077F SYST BP >= 140 MM HG: CPT | Mod: S$GLB,,, | Performed by: INTERNAL MEDICINE

## 2017-03-30 PROCEDURE — 81001 URINALYSIS AUTO W/SCOPE: CPT

## 2017-03-30 PROCEDURE — 99397 PER PM REEVAL EST PAT 65+ YR: CPT | Mod: S$GLB,,, | Performed by: INTERNAL MEDICINE

## 2017-03-30 RX ORDER — PNV NO.95/FERROUS FUM/FOLIC AC 28MG-0.8MG
2000 TABLET ORAL DAILY
COMMUNITY

## 2017-03-30 RX ORDER — CETIRIZINE HYDROCHLORIDE 10 MG/1
10 TABLET ORAL DAILY
COMMUNITY

## 2017-03-30 RX ORDER — GUAIFENESIN 600 MG/1
1200 TABLET, EXTENDED RELEASE ORAL DAILY
COMMUNITY
End: 2017-04-28

## 2017-03-30 RX ORDER — TAMSULOSIN HYDROCHLORIDE 0.4 MG/1
0.4 CAPSULE ORAL DAILY
Qty: 30 CAPSULE | Refills: 11 | Status: SHIPPED | OUTPATIENT
Start: 2017-03-30 | End: 2018-03-05 | Stop reason: SDUPTHER

## 2017-03-30 RX ORDER — AMOXICILLIN 500 MG
1 CAPSULE ORAL DAILY
COMMUNITY

## 2017-03-30 NOTE — MR AVS SNAPSHOT
[facility] - Internal Medicine  [address]  [facility] [address]  Phone: [phone]  Fax: [phone]                  Edis Huggins Jr.   3/30/2017 8:20 AM   Office Visit    Description:  Male : 1945   Provider:  Forest Miles MD   Department:  [facility] - Internal Medicine           Reason for Visit     Follow-up           Diagnoses this Visit        Comments    Annual physical exam    -  Primary     Hyperlipidemia, unspecified hyperlipidemia type         Impaired fasting glucose         Benign non-nodular prostatic hyperplasia without lower urinary tract symptoms         Encounter for abdominal aortic aneurysm (AAA) screening         Prostate cancer screening         Essential hypertension         Myalgia         Low testosterone         Uses hearing aid         Fatigue, unspecified type                To Do List           Future Appointments        Provider Department Dept Phone    2017 3:15 PM Deborah Khan MD [facility] - Ophthalmology [phone]    2017 8:45 AM [facility] 11 ALL [facility] [phone]    2017 10:00 AM AUDIOGRAM, AUDIO [facility] [phone]      Goals (5 Years of Data)     None      Follow-Up and Disposition     Return in about 6 months (around 2017).    Follow-up and Disposition History       These Medications        Disp Refills Start End    tamsulosin (FLOMAX) 0.4 mg Cp24 30 capsule 11 3/30/2017 3/30/2018    Take 1 capsule (0.4 mg total) by mouth once daily. - Oral    Pharmacy: [facility] [address] [place] [address] [place] [address] AT [facility] [address] Ph #: [phone]         [facility]     [facility] Nurse Care Line - 24/ Assistance  Unless otherwise directed by your provider, please contact [facility], our nurse care line that is available for / assistance.     Registered nurses in the [facility] provide: appointment scheduling, clinical  advisement, health education, and other advisory services.  Call: [phone] (toll free)               Medications           Message regarding Medications     Verify the changes and/or additions to your medication regime listed below are the same as discussed with your clinician today.  If any of these changes or additions are incorrect, please notify your healthcare provider.        START taking these NEW medications        Refills    tamsulosin (FLOMAX) 0.4 mg Cp24 11    Sig: Take 1 capsule (0.4 mg total) by mouth once daily.    Class: Normal    Route: Oral      STOP taking these medications     cyclobenzaprine (FLEXERIL) 10 MG tablet TK 1 T PO QHS PRF MUSCLE SPASMS           Verify that the below list of medications is an accurate representation of the medications you are currently taking.  If none reported, the list may be blank. If incorrect, please contact your healthcare provider. Carry this list with you in case of emergency.           Current Medications     acyclovir (ZOVIRAX) 400 MG tablet Take 1 tablet (400 mg total) by mouth 3 (three) times daily. X 5 days at sign of fever blister    cetirizine (ZYRTEC) 10 MG tablet Take 10 mg by mouth once daily.    citalopram (CELEXA) 20 MG tablet TAKE 1 TABLET BY MOUTH DAILY    difluprednate (DUREZOL) 0.05 % Drop ophthalmic solution Place 1 drop into the left eye 4 (four) times daily.    finasteride (PROSCAR) 5 mg tablet Take 1 tablet (5 mg total) by mouth once daily.    fish oil-omega-3 fatty acids 300-1,000 mg capsule Take 2 g by mouth once daily.    gemfibrozil (LOPID) 600 MG tablet Take 1 tablet (600 mg total) by mouth 2 (two) times daily.    guaifenesin (MUCINEX) 600 mg 12 hr tablet Take 1,200 mg by mouth once daily.    losartan-hydrochlorothiazide 100-25 mg (HYZAAR) 100-25 mg per tablet Take 1 tablet by mouth once daily.    timolol maleate 0.5% (TIMOPTIC-XE) 0.5 % SolG Place 1 drop into both eyes every morning.    travoprost (TRAVATAN Z) 0.004 % Drop Place  "1 drop into both eyes every evening.    vitamin E 1000 UNIT capsule Take 1,000 Units by mouth once daily.    tamsulosin (FLOMAX) 0.4 mg Cp24 Take 1 capsule (0.4 mg total) by mouth once daily.           Clinical Reference Information           Your Vitals Were     BP Pulse Height Weight BMI    152/88 (BP Location: Right arm, Patient Position: Sitting, BP Method: Manual) 60 5' 11" (1.803 m) 131 kg (288 lb 12.8 oz) 40.28 kg/m2      Blood Pressure          Most Recent Value    BP  (!)  152/88      Allergies as of 3/30/2017     No Known Allergies      Immunizations Administered on Date of Encounter - 3/30/2017     None      Orders Placed During Today's Visit      Normal Orders This Visit    Ambulatory Referral to Audiology     Urinalysis     Future Labs/Procedures Expected by Expires    Comprehensive metabolic panel  3/30/2017 3/30/2018    Hemoglobin A1c  3/30/2017 3/30/2018    Lipid panel  3/30/2017 5/29/2018    PSA, Screening  3/30/2017 (Approximate) 5/29/2018    Testosterone  3/30/2017 5/29/2018    TSH  3/30/2017 (Approximate) 3/30/2018    US Abdominal Aorta  3/30/2017 3/30/2018    Vitamin D  3/30/2017 (Approximate) 5/29/2018      Instructions    Blood Pressure Measurement:    -- Please record your blood pressure daily for the next.  When checking, make sure you have been sitting for about 5 minutes, your legs are uncrossed, and the blood pressure cuff at the level of your heart. Record your blood pressure with the date and time in a log and bring it with you to every doctor's visit.  -- Goal blood pressure is top number less than 140 and bottom number less than 90.  Dr Miles's office will contact you for the readings in 1 week.       Language Assistance Services     ATTENTION: Language assistance services are available, free of charge. Please call [phone].      ATENCIÓN: Si habla rigoberto, tiene a smith disposición servicios gratuitos de asistencia lingüística. Llame al [phone].     CHÚ Ý: N?u b?n nói Ti?ng " Vi?t, có các d?ch v? h? tr? ngôn ng? mi?n phí dành cho b?n. G?i s? [phone].         [facility] complies with applicable Federal civil rights laws and does not discriminate on the basis of race, color, national origin, age, disability, or sex.

## 2017-03-30 NOTE — PROGRESS NOTES
Subjective:       Patient ID: Edis Huggins Jr. is a 71 y.o. male.    Chief Complaint: Follow-up    HPI    Last visit with me 02/2017. Since then seen by Speech and Ophthalmology. Had L cataract removed 02/2017.     Weight loss of about 10#, mostly through exercise with walking. Reports bursitis has resolved. Walks for about 1 hour then needs to rest due to muscle soreness in anterior left leg.    During cataract operation had bleeding in eye. Has been using fish oil 3 tabs daily.    Urinary dribbling after going to bathroom at work. Not at much of a problem at home. Goes to bathroom every 1-2 hours during day. Never used Zyrtec.     Using antihistamine Zyrtec daily.    Reports sleeps 7-8 hrs per night during the week, but feels fatigued and tired by end of week. Gets up 2-3x/night to to bathroom, but goes to sleep immediately again.    Reviewed PMH, PSH, SH, FH, allergies, and medications.     Review of Systems   All other systems reviewed and are negative.      Objective:      Physical Exam   Constitutional: He is oriented to person, place, and time. No distress.    man whose Body mass index is 40.28 kg/(m^2).    HENT:   Head: Atraumatic.   Mouth/Throat: Oropharynx is clear and moist. No oropharyngeal exudate.   Wears hearing aids bilaterally    Eyes: Right eye exhibits no discharge. Left eye exhibits no discharge.   R pupil ERRL. L pupil enlarged and eccentric.   Neck: Normal range of motion. No thyromegaly present.   Cardiovascular: Normal rate, regular rhythm and normal heart sounds.    Pulmonary/Chest: Effort normal and breath sounds normal. He has no wheezes. He has no rales.   Abdominal: Soft. He exhibits no distension and no mass. There is no hepatosplenomegaly. There is no tenderness. There is no rigidity, no guarding and negative Levine's sign.   Genitourinary: Rectal exam shows external hemorrhoid (not thrombosed or bleeding). Prostate is enlarged (soft without nodules). Prostate is not  "tender.   Musculoskeletal: He exhibits no edema or tenderness.   Lymphadenopathy:     He has no cervical adenopathy.   Neurological: He is alert and oriented to person, place, and time.   Skin: Skin is warm and dry. No rash noted.   Psychiatric: He has a normal mood and affect. His behavior is normal.   Nursing note and vitals reviewed.      Vitals:    03/30/17 0823 03/30/17 0847   BP: (!) 146/88 (!) 152/88   BP Location: Right arm Right arm   Patient Position: Sitting Sitting   BP Method: Manual Manual   Pulse: 60    Weight: 131 kg (288 lb 12.8 oz)    Height: 5' 11" (1.803 m)      Body mass index is 40.28 kg/(m^2).    RESULTS: Reviewed labs from last 12 months    Assessment:       1. Annual physical exam    2. Hyperlipidemia, unspecified hyperlipidemia type    3. Impaired fasting glucose    4. Benign non-nodular prostatic hyperplasia without lower urinary tract symptoms    5. Encounter for abdominal aortic aneurysm (AAA) screening    6. Prostate cancer screening    7. Essential hypertension    8. Myalgia    9. Low testosterone    10. Uses hearing aid    11. Fatigue, unspecified type        Plan:   Edis was seen today for follow-up.    Diagnoses and all orders for this visit:    Annual physical exam:  Age-appropriate health screening reviewed, indicated tests ordered.   -     Lipid panel; Future  -     Hemoglobin A1c; Future  -     Comprehensive metabolic panel; Future  -     PSA, Screening; Future  -     US Abdominal Aorta; Future  -     Testosterone; Future  -     TSH; Future    Hyperlipidemia, unspecified hyperlipidemia type:  Prior diagnosis, well controlled on current management. No changes at this time, will continue to monitor.   -     Lipid panel; Future    Impaired fasting glucose:  Prior diagnosis, well controlled on current management. No changes at this time, will continue to monitor.   -     Hemoglobin A1c; Future    Benign non-nodular prostatic hyperplasia without lower urinary tract symptoms:  Still " with urinary frequency and some dribbling on finasteride, try addition of Flomax.  -     PSA, Screening; Future  -     Urinalysis  -     tamsulosin (FLOMAX) 0.4 mg Cp24; Take 1 capsule (0.4 mg total) by mouth once daily.    Encounter for abdominal aortic aneurysm (AAA) screening  -     US Abdominal Aorta; Future    Prostate cancer screening  -     PSA, Screening; Future    Essential hypertension:  Not at goal, check ambulatory pressures daily for next week, will message in 1 week for readings.  -     Comprehensive metabolic panel; Future    Myalgia:  In anterior left thigh, low Vit D in past, recheck.  -     Vitamin D; Future    Low testosterone:  Low in past, recheck levels.  -     Testosterone; Future    Uses hearing aid  -     Ambulatory Referral to Audiology    Fatigue, unspecified type:  Likely due to not enough sleep during week, check hormone function. Pt reports good sleeping without observed apnea, will not test for obstructive sleep apnea at this time.  -     TSH; Future    Return in about 6 months (around 9/30/2017). Labs today.  Forest Miles MD  Internal Medicine    Portions of this note were completed using [employer] dictation software. Please excuse typographical or syntax errors.

## 2017-03-30 NOTE — PATIENT INSTRUCTIONS
Blood Pressure Measurement:    -- Please record your blood pressure daily for the next.  When checking, make sure you have been sitting for about 5 minutes, your legs are uncrossed, and the blood pressure cuff at the level of your heart. Record your blood pressure with the date and time in a log and bring it with you to every doctor's visit.  -- Goal blood pressure is top number less than 140 and bottom number less than 90.  Dr Miles's office will contact you for the readings in 1 week.

## 2017-03-31 ENCOUNTER — PATIENT MESSAGE (OUTPATIENT)
Dept: INTERNAL MEDICINE | Facility: CLINIC | Age: 72
End: 2017-03-31

## 2017-04-02 DIAGNOSIS — E78.1 HYPERTRIGLYCERIDEMIA: ICD-10-CM

## 2017-04-03 ENCOUNTER — PATIENT MESSAGE (OUTPATIENT)
Dept: OPHTHALMOLOGY | Facility: CLINIC | Age: 72
End: 2017-04-03

## 2017-04-04 RX ORDER — GEMFIBROZIL 600 MG/1
TABLET, FILM COATED ORAL
Qty: 60 TABLET | Refills: 5 | Status: SHIPPED | OUTPATIENT
Start: 2017-04-04 | End: 2017-09-24 | Stop reason: SDUPTHER

## 2017-04-06 ENCOUNTER — OFFICE VISIT (OUTPATIENT)
Dept: OPHTHALMOLOGY | Facility: CLINIC | Age: 72
End: 2017-04-06
Payer: COMMERCIAL

## 2017-04-06 DIAGNOSIS — Z96.1 PSEUDOPHAKIA OF LEFT EYE: ICD-10-CM

## 2017-04-06 DIAGNOSIS — Z48.89 ENCOUNTER FOR POSTOPERATIVE CARE: Primary | ICD-10-CM

## 2017-04-06 DIAGNOSIS — H57.09 RELATIVE AFFERENT PUPILLARY DEFECT: ICD-10-CM

## 2017-04-06 DIAGNOSIS — H57.03 SMALL PUPILS: ICD-10-CM

## 2017-04-06 DIAGNOSIS — H57.04 DILATED PUPIL: ICD-10-CM

## 2017-04-06 DIAGNOSIS — H35.359 CME (CYSTOID MACULAR EDEMA), UNSPECIFIED LATERALITY: ICD-10-CM

## 2017-04-06 DIAGNOSIS — H40.043 STEROID RESPONDERS, BILATERAL: ICD-10-CM

## 2017-04-06 DIAGNOSIS — H40.1122 PRIMARY OPEN ANGLE GLAUCOMA OF LEFT EYE, MODERATE STAGE: ICD-10-CM

## 2017-04-06 DIAGNOSIS — H25.11 SENILE NUCLEAR SCLEROSIS, RIGHT: ICD-10-CM

## 2017-04-06 DIAGNOSIS — H52.31 ANISOMETROPIA: ICD-10-CM

## 2017-04-06 DIAGNOSIS — H21.562 PUPIL IRREGULAR, LEFT: ICD-10-CM

## 2017-04-06 PROCEDURE — 99024 POSTOP FOLLOW-UP VISIT: CPT | Mod: S$GLB,,, | Performed by: OPHTHALMOLOGY

## 2017-04-06 PROCEDURE — 99999 PR PBB SHADOW E&M-EST. PATIENT-LVL III: CPT | Mod: PBBFAC,,, | Performed by: OPHTHALMOLOGY

## 2017-04-06 PROCEDURE — 92134 CPTRZ OPH DX IMG PST SGM RTA: CPT | Mod: S$GLB,,, | Performed by: OPHTHALMOLOGY

## 2017-04-06 NOTE — Clinical Note
Schedule phaco/IOL od - complex - ? Floppy iris on flomax and ? Renita ring and trypan blue  Cell phone [phone] - ?? May 3rd

## 2017-04-06 NOTE — PROGRESS NOTES
Assessment /Plan     For exam results, see Encounter Report.    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 POAG (primary open-angle glaucoma) - Both Eyes   Old pt of [facility] [phone]  Began at [facility] 2012       POAG OS>OD           Family history    neg        Glaucoma meds    travatan z ou, T1/2 GFS ou        H/O adverse rxn to glaucoma drops    none        LASERS    SLT os 9/26/2013 -( good resp 17 --> 14)        GLAUCOMA SURGERIES    trabectome os (combined with phaco - 2/8/2017        OTHER EYE SURGERIES    Combined - phaco/IOL / goniotomy - trabectome os 2/8/2017 - +heme and increae in IOP)         CDR    0.6/0.7-0.8        Tbase    20-23/21-24          Tmax    23/24            Ttarget    16/16            HVF    5 tests 3398-8713 OD essentially full; OS Sup paracentral defect, IAD/INS        Gonio    +4 ou        CCT    598/598        OCT    3 test 2012 to 2013 - RNFL - nl od // dec S bord T        HRT    3 test-2012 to 2016 OD bord N; OS dec S/T/I        Disc photos    2012 - IOS    - Ttoday   15/15 OS  - Test done today   Post-op phaco/IOL os - complex - small pupil // goniotomy -  trabectome       2. Senile nuclear sclerosis - Both Eyes   -  vis sig      3. Steroid responders - Both Eyes  2/2 nasacort      4. ? Trace APD os      5. S/P Rt knee replacement Feb 2013  -doing well      6.  Myopia / presbyopia ou       Plan  CSM  IOP below target OU  SLT done OS 9/26/2013 - good initial resp 24-->17    -( if responds well consider  "SLT od - but full VF and helathy ON still od)    HVF stable OU    Explained the pre-existing HVF losses OS to patient at length and went over OCT- patient understands that he may still have blurry vision 2/2 to glaucoma after cataract surgery OS.     Phaco / IOL OS Date: 2/8/2017 - combined with a goniotomy / trabectome // ++ heme at end of case - healon left in eye - PCB00 - 14.0  POM # 2- phaco/IOL   High IOP 2/2 healon - decrease vision 2/2 to heme // IOP better after decompression - done twice on POD #1   con't durezol - decrease to bid x 2 weeks the q day till runs out   rio 128 - decrease bid - stop when runs out   VA improving os -   Cornea has cleared   Fixed dilated pupil os - 2/2 high IOP post op - will likely be a permeant change     discussed with patient that the dilated pupil may be permeate - can try terese in future - but may not respond       Aim for -0.50 ou   OS  PCB00 14.0  AC IOL 11.5    rec (complex - trypan blue / ? Renita ring) phaco/IOL od - this eye has a full HVF - hold off on any glaucoma surgery // again aim for plano to -0.50   Pt recently started flowmax - may now have a floppy iris   Try and balance the 2 eyes refraction - intolerant to glasses - 2/2 diplopia from the anisometropia     Pt will stop the aleeve (takes for hip / knee pain) and the fish oil ahead of time   Pt may stop the flomax - since he just recently started it     If the glare from the fixed dilated pupil bothers him a lot post 2nd eye getting done - consider pupil surgery with dr Peacock to "purse string it " smaller     I have seen and personally examined the patient.  I agree with the findings, assessment and plan of the resident and/or fellow.     Deborah Khan MD                 "

## 2017-04-12 ENCOUNTER — TELEPHONE (OUTPATIENT)
Dept: OPHTHALMOLOGY | Facility: CLINIC | Age: 72
End: 2017-04-12

## 2017-04-12 DIAGNOSIS — H25.11 NUCLEAR SCLEROTIC CATARACT OF RIGHT EYE: Primary | ICD-10-CM

## 2017-04-13 ENCOUNTER — CLINICAL SUPPORT (OUTPATIENT)
Dept: AUDIOLOGY | Facility: CLINIC | Age: 72
End: 2017-04-13
Payer: COMMERCIAL

## 2017-04-13 ENCOUNTER — HOSPITAL ENCOUNTER (OUTPATIENT)
Dept: RADIOLOGY | Facility: HOSPITAL | Age: 72
Discharge: HOME | End: 2017-04-13
Attending: INTERNAL MEDICINE
Payer: COMMERCIAL

## 2017-04-13 DIAGNOSIS — Z00.00 ANNUAL PHYSICAL EXAM: ICD-10-CM

## 2017-04-13 DIAGNOSIS — Z13.6 ENCOUNTER FOR ABDOMINAL AORTIC ANEURYSM (AAA) SCREENING: ICD-10-CM

## 2017-04-13 DIAGNOSIS — H90.3 SENSORINEURAL HEARING LOSS, BILATERAL: Primary | ICD-10-CM

## 2017-04-13 PROCEDURE — 76775 US EXAM ABDO BACK WALL LIM: CPT | Mod: TC

## 2017-04-13 PROCEDURE — 92557 COMPREHENSIVE HEARING TEST: CPT | Mod: S$GLB,,, | Performed by: AUDIOLOGIST

## 2017-04-13 PROCEDURE — 76706 US ABDL AORTA SCREEN AAA: CPT | Mod: 26,,, | Performed by: RADIOLOGY

## 2017-04-17 ENCOUNTER — PATIENT MESSAGE (OUTPATIENT)
Dept: INTERNAL MEDICINE | Facility: CLINIC | Age: 72
End: 2017-04-17

## 2017-04-17 DIAGNOSIS — I10 ESSENTIAL HYPERTENSION: Primary | ICD-10-CM

## 2017-04-17 RX ORDER — AMLODIPINE BESYLATE 5 MG/1
5 TABLET ORAL DAILY
Qty: 30 TABLET | Refills: 11 | Status: SHIPPED | OUTPATIENT
Start: 2017-04-17 | End: 2017-05-25 | Stop reason: DRUGHIGH

## 2017-04-28 ENCOUNTER — OFFICE VISIT (OUTPATIENT)
Dept: OPHTHALMOLOGY | Facility: CLINIC | Age: 72
End: 2017-04-28
Payer: COMMERCIAL

## 2017-04-28 DIAGNOSIS — H57.09 RELATIVE AFFERENT PUPILLARY DEFECT: ICD-10-CM

## 2017-04-28 DIAGNOSIS — H40.1122 PRIMARY OPEN ANGLE GLAUCOMA OF LEFT EYE, MODERATE STAGE: ICD-10-CM

## 2017-04-28 DIAGNOSIS — H57.04 DILATED PUPIL: ICD-10-CM

## 2017-04-28 DIAGNOSIS — H25.11 NUCLEAR SCLEROTIC CATARACT OF RIGHT EYE: Primary | ICD-10-CM

## 2017-04-28 DIAGNOSIS — H40.043 STEROID RESPONDERS, BILATERAL: ICD-10-CM

## 2017-04-28 PROCEDURE — 99999 PR PBB SHADOW E&M-EST. PATIENT-LVL III: CPT | Mod: PBBFAC,,, | Performed by: OPHTHALMOLOGY

## 2017-04-28 PROCEDURE — 99499 UNLISTED E&M SERVICE: CPT | Mod: S$GLB,,, | Performed by: OPHTHALMOLOGY

## 2017-04-28 PROCEDURE — 92136 OPHTHALMIC BIOMETRY: CPT | Mod: 26,RT,S$GLB, | Performed by: OPHTHALMOLOGY

## 2017-04-28 RX ORDER — TROPICAMIDE 10 MG/ML
1 SOLUTION/ DROPS OPHTHALMIC
Status: CANCELLED | OUTPATIENT
Start: 2017-04-28

## 2017-04-28 RX ORDER — LIDOCAINE HYDROCHLORIDE 10 MG/ML
1 INJECTION, SOLUTION EPIDURAL; INFILTRATION; INTRACAUDAL; PERINEURAL ONCE
Status: CANCELLED | OUTPATIENT
Start: 2017-04-28 | End: 2017-04-28

## 2017-04-28 RX ORDER — SODIUM CHLORIDE 9 MG/ML
INJECTION, SOLUTION INTRAVENOUS CONTINUOUS
Status: CANCELLED | OUTPATIENT
Start: 2017-04-28

## 2017-04-28 RX ORDER — PHENYLEPHRINE HYDROCHLORIDE 100 MG/ML
1 SOLUTION/ DROPS OPHTHALMIC
Status: CANCELLED | OUTPATIENT
Start: 2017-04-28

## 2017-04-28 RX ORDER — PILOCARPINE HYDROCHLORIDE 20 MG/ML
1 SOLUTION/ DROPS OPHTHALMIC 3 TIMES DAILY
Qty: 15 ML | Refills: 12 | Status: SHIPPED | OUTPATIENT
Start: 2017-04-28 | End: 2017-11-10

## 2017-04-28 RX ORDER — MOXIFLOXACIN 5 MG/ML
1 SOLUTION/ DROPS OPHTHALMIC
Status: CANCELLED | OUTPATIENT
Start: 2017-04-28

## 2017-04-28 RX ORDER — PILOCARPINE HYDROCHLORIDE 20 MG/ML
1 SOLUTION/ DROPS OPHTHALMIC 3 TIMES DAILY
Qty: 15 ML | Refills: 12 | Status: SHIPPED | OUTPATIENT
Start: 2017-04-28 | End: 2017-04-28 | Stop reason: SDUPTHER

## 2017-04-28 RX ORDER — KETOROLAC TROMETHAMINE 5 MG/ML
1 SOLUTION OPHTHALMIC
Status: CANCELLED | OUTPATIENT
Start: 2017-04-28

## 2017-04-28 NOTE — PROGRESS NOTES
HPI     Pre-op Exam    Additional comments: phaco io lod           Comments   Preop Complex Phaco IOL OD (sx 5/10/17/)  S/p Complex Phaco IOL and [place] OS 2/8/17    1) POAG  2) NS OD  3) Steroid Responder  4) APD OS  5) PCIOL OS    MEDS:  Durezol QDAY OS  Angle 128 QDAY OS  T1/2 GFS QAM OD  Travatan Z QHS OD       Last edited by Lili Booker MA on 4/28/2017  2:38 PM. (History)            Assessment /Plan     For exam results, see Encounter Report.    Nuclear sclerotic cataract of right eye  -     IOL Master - MOD 26 - OD- Right eye    Primary open angle glaucoma of left eye, moderate stage    Steroid responders, bilateral    Relative afferent pupillary defect - Left Eye    Dilated pupil        F/U phaco/IOL os with trabectome OS // ++ heme and increase IOP post op 2/2 healon   VA still blurry / IOP ok now     [facility] is [facility] - bring photo file over   Lost to F/u from 11/4/2013 to 8/11/2015   Knows Sharlene Mo and MIKHAIL - use to work with them     1. POAG (primary open-angle glaucoma) - Both Eyes   [facility] [phone]  Began at [facility] 2012       POAG OS>OD           Family history    neg        Glaucoma meds    travatan z ou, T1/2 GFS ou        H/O adverse rxn to glaucoma drops    none        LASERS    SLT os 9/26/2013 -( good resp 17 --> 14)        GLAUCOMA SURGERIES    trabectome os (combined with phaco - 2/8/2017        OTHER EYE SURGERIES    Combined - phaco/IOL / goniotomy - trabectome os 2/8/2017 - +heme and increae in IOP)         CDR    0.6/0.7-0.8        Tbase    20-23/21-24          Tmax    23/24            Ttarget    16/16            HVF    5 tests 5425-0072 OD essentially full; OS Sup paracentral defect, IAD/INS        Gonio    +4 ou        CCT    598/598        OCT    3 test 2012 to 2013 - RNFL - nl od // dec S bord T        HRT    3 test-2012 to 2016 OD bord N; OS dec S/T/I        Disc photos    2012 - IOS    - Ttoday   15/15 OS  - Test done today    Post-op phaco/IOL os - complex - small pupil // goniotomy -  trabectome       2. Senile nuclear sclerosis - Both Eyes   -  vis sig      3. Steroid responders - Both Eyes  2/2 nasacort      4. ? Trace APD os      5. S/P Rt knee replacement Feb 2013  -doing well      6.  Myopia / presbyopia ou       Plan  CSM  IOP below target OU  SLT done OS 9/26/2013 - good initial resp 24-->17    -( if responds well consider SLT od - but full VF and helathy ON still od)    HVF stable OU    Explained the pre-existing HVF losses OS to patient at length and went over OCT- patient understands that he may still have blurry vision 2/2 to glaucoma after cataract surgery OS.     Phaco / IOL OS Date: 2/8/2017 - combined with a goniotomy / trabectome // ++ heme at end of case - healon left in eye - PCB00 - 14.0  POM # 2- phaco/IOL   High IOP 2/2 healon - decrease vision 2/2 to heme // IOP better after decompression - done twice on POD #1   con't durezol - decrease to bid x 2 weeks the q day till runs out   rio 128 - decrease bid - stop when runs out   VA improving os -   Cornea has cleared   Fixed dilated pupil os - 2/2 high IOP post op - will likely be a permeant change     discussed with patient that the dilated pupil may be permeate - can try terese in future - but may not respond       Aim for -0.50 ou   OS  PCB00 14.0  AC IOL 11.5    rec (complex - trypan blue / ? Renita ring) phaco/IOL od - this eye has a full HVF - hold off on any glaucoma surgery // again aim for plano to -0.50   Pt recently started flowmax - may now have a floppy iris   Try and balance the 2 eyes refraction - intolerant to glasses - 2/2 diplopia from the anisometropia     Pt will stop the aleeve (takes for hip / knee pain) and the fish oil ahead of time   Pt may stop the flomax - since he just recently started it     IOL calc OD  PCB00 14.5  AC IOL 12.0    If the glare from the fixed dilated pupil bothers him a lot post 2nd eye getting done - consider pupil surgery  "with dr Peacock to "purse string it " smaller     I have seen and personally examined the patient.  I agree with the findings, assessment and plan of the resident and/or fellow.     Deborah Khan MD                            "

## 2017-04-28 NOTE — H&P
Subjective:       Patient ID: Edis Huggins Jr. is a 71 y.o. male.    Chief Complaint: Pre-op Exam (phaco io lod)      Past Medical History:   Diagnosis Date    Allergy     Anemia     Anxiety     Basal cell carcinoma     BPH (benign prostatic hypertrophy)     Cataract     HLD (hyperlipidemia)     HTN (hypertension)     IFG (impaired fasting glucose)     Primary open angle glaucoma      Past Surgical History:   Procedure Laterality Date    CATARACT EXTRACTION W/  INTRAOCULAR LENS IMPLANT Left 02/08/2017    WITH [place] GONIOTOMY (Dr. Khan)    HERNIA REPAIR      right tka      TONSILLECTOMY       Family History   Problem Relation Age of Onset    Colon cancer Maternal Grandmother      70s    Cataracts Mother     Atrial fibrillation Mother     Cataracts Father     Heart disease Father      Valve disorder    Glaucoma Brother     Prostate cancer Neg Hx     Amblyopia Neg Hx     Blindness Neg Hx     Macular degeneration Neg Hx     Retinal detachment Neg Hx     Strabismus Neg Hx     Diabetes Neg Hx     Melanoma Neg Hx     Psoriasis Neg Hx     Lupus Neg Hx     Eczema Neg Hx     Acne Neg Hx      Social History     Social History    Marital status:      Spouse name: N/A    Number of children: N/A    Years of education: N/A     Occupational History     [employer]     Social History Main Topics    Smoking status: Former Smoker     Packs/day: 1.00     Years: 10.00     Quit date: 1/1/1974    Smokeless tobacco: Never Used    Alcohol use 0.0 oz/week     0 Standard drinks or equivalent per week      Comment: one drink a month    Drug use: No    Sexual activity: Not Asked     Other Topics Concern    None     Social History Narrative    None       Current Outpatient Prescriptions   Medication Sig Dispense Refill    acyclovir (ZOVIRAX) 400 MG tablet Take 1 tablet (400 mg total) by mouth 3 (three) times daily. X 5 days at sign of fever blister 30 tablet 5     amlodipine (NORVASC) 5 MG tablet Take 1 tablet (5 mg total) by mouth once daily. 30 tablet 11    cetirizine (ZYRTEC) 10 MG tablet Take 10 mg by mouth once daily.      citalopram (CELEXA) 20 MG tablet TAKE 1 TABLET BY MOUTH DAILY 90 tablet 3    difluprednate (DUREZOL) 0.05 % Drop ophthalmic solution Place 1 drop into the left eye 4 (four) times daily. (Patient taking differently: Place 1 drop into the left eye once daily. ) 5 mL 1    finasteride (PROSCAR) 5 mg tablet Take 1 tablet (5 mg total) by mouth once daily. 90 tablet 3    fish oil-omega-3 fatty acids 300-1,000 mg capsule Take 2 g by mouth once daily.      gemfibrozil (LOPID) 600 MG tablet TAKE 1 TABLET BY MOUTH TWICE DAILY 60 tablet 5    losartan-hydrochlorothiazide 100-25 mg (HYZAAR) 100-25 mg per tablet Take 1 tablet by mouth once daily. 90 tablet 3    tamsulosin (FLOMAX) 0.4 mg Cp24 Take 1 capsule (0.4 mg total) by mouth once daily. 30 capsule 11    timolol maleate 0.5% (TIMOPTIC-XE) 0.5 % SolG Place 1 drop into both eyes every morning. (Patient taking differently: Place 1 drop into the right eye every morning. ) 5 mL 12    travoprost (TRAVATAN Z) 0.004 % Drop Place 1 drop into both eyes every evening. (Patient taking differently: Place 1 drop into the right eye every evening. ) 5 mL 12    vitamin E 1000 UNIT capsule Take 1,000 Units by mouth once daily.       No current facility-administered medications for this visit.      Review of patient's allergies indicates:  No Known Allergies    Review of Systems   Constitutional: Negative.    HENT: Negative.    Eyes: Positive for visual disturbance.   Respiratory: Negative.    Cardiovascular: Negative.    Gastrointestinal: Negative.    Endocrine: Negative.    Genitourinary: Negative.    Musculoskeletal: Negative.    Skin: Negative.    Neurological: Negative.    Hematological: Negative.        Objective:      There were no vitals filed for this visit.  Physical Exam   Constitutional: He is oriented to  person, place, and time. He appears well-developed.   HENT:   Head: Normocephalic.   Eyes:   See eye chart    Neck: Normal range of motion.   Cardiovascular: Normal rate.    Pulmonary/Chest: Effort normal.   Abdominal: Soft.   Musculoskeletal: Normal range of motion.   Neurological: He is alert and oriented to person, place, and time.   Skin: Skin is warm.   Psychiatric: He has a normal mood and affect.            Assessment:       1. Nuclear sclerotic cataract of right eye    2. Primary open angle glaucoma of left eye, moderate stage    3. Steroid responders, bilateral    4. Relative afferent pupillary defect - Left Eye    5. Dilated pupil        Plan:       Plan for CEIOL OD

## 2017-05-02 ENCOUNTER — PATIENT MESSAGE (OUTPATIENT)
Dept: OPHTHALMOLOGY | Facility: CLINIC | Age: 72
End: 2017-05-02

## 2017-05-09 ENCOUNTER — PATIENT MESSAGE (OUTPATIENT)
Dept: INTERNAL MEDICINE | Facility: CLINIC | Age: 72
End: 2017-05-09

## 2017-05-09 ENCOUNTER — TELEPHONE (OUTPATIENT)
Dept: OPHTHALMOLOGY | Facility: CLINIC | Age: 72
End: 2017-05-09

## 2017-05-10 ENCOUNTER — ANESTHESIA (OUTPATIENT)
Dept: SURGERY | Facility: HOSPITAL | Age: 72
End: 2017-05-10
Payer: COMMERCIAL

## 2017-05-10 ENCOUNTER — HOSPITAL ENCOUNTER (OUTPATIENT)
Facility: HOSPITAL | Age: 72
Discharge: HOME | End: 2017-05-10
Attending: OPHTHALMOLOGY | Admitting: OPHTHALMOLOGY
Payer: COMMERCIAL

## 2017-05-10 ENCOUNTER — ANESTHESIA EVENT (OUTPATIENT)
Dept: SURGERY | Facility: HOSPITAL | Age: 72
End: 2017-05-10
Payer: COMMERCIAL

## 2017-05-10 ENCOUNTER — SURGERY (OUTPATIENT)
Age: 72
End: 2017-05-10

## 2017-05-10 VITALS
TEMPERATURE: 98 F | OXYGEN SATURATION: 97 % | SYSTOLIC BLOOD PRESSURE: 128 MMHG | DIASTOLIC BLOOD PRESSURE: 64 MMHG | WEIGHT: 285 LBS | HEIGHT: 71 IN | RESPIRATION RATE: 16 BRPM | HEART RATE: 58 BPM | BODY MASS INDEX: 39.9 KG/M2

## 2017-05-10 DIAGNOSIS — H25.11 NUCLEAR SCLEROTIC CATARACT OF RIGHT EYE: Primary | ICD-10-CM

## 2017-05-10 PROCEDURE — C9447 INJ, PHENYLEPHRINE KETOROLAC: HCPCS | Performed by: OPHTHALMOLOGY

## 2017-05-10 PROCEDURE — 71000015 HC POSTOP RECOV 1ST HR: Performed by: OPHTHALMOLOGY

## 2017-05-10 PROCEDURE — 99499 UNLISTED E&M SERVICE: CPT | Mod: RT,,, | Performed by: OPHTHALMOLOGY

## 2017-05-10 PROCEDURE — 37000008 HC ANESTHESIA 1ST 15 MINUTES: Performed by: OPHTHALMOLOGY

## 2017-05-10 PROCEDURE — 25000003 PHARM REV CODE 250: Performed by: OPHTHALMOLOGY

## 2017-05-10 PROCEDURE — 25000003 PHARM REV CODE 250

## 2017-05-10 PROCEDURE — V2632 POST CHMBR INTRAOCULAR LENS: HCPCS | Performed by: OPHTHALMOLOGY

## 2017-05-10 PROCEDURE — D9220A PRA ANESTHESIA: Mod: CRNA,,, | Performed by: NURSE ANESTHETIST, CERTIFIED REGISTERED

## 2017-05-10 PROCEDURE — 63600175 PHARM REV CODE 636 W HCPCS: Performed by: OPHTHALMOLOGY

## 2017-05-10 PROCEDURE — 63600175 PHARM REV CODE 636 W HCPCS: Performed by: NURSE ANESTHETIST, CERTIFIED REGISTERED

## 2017-05-10 PROCEDURE — 36000706: Performed by: OPHTHALMOLOGY

## 2017-05-10 PROCEDURE — D9220A PRA ANESTHESIA: Mod: ANES,,, | Performed by: ANESTHESIOLOGY

## 2017-05-10 PROCEDURE — 37000009 HC ANESTHESIA EA ADD 15 MINS: Performed by: OPHTHALMOLOGY

## 2017-05-10 PROCEDURE — 36000707: Performed by: OPHTHALMOLOGY

## 2017-05-10 PROCEDURE — 66982 XCAPSL CTRC RMVL CPLX WO ECP: CPT | Mod: RT,,, | Performed by: OPHTHALMOLOGY

## 2017-05-10 DEVICE — LENS IOL ITEC PRELOAD 14.5D: Type: IMPLANTABLE DEVICE | Site: EYE | Status: FUNCTIONAL

## 2017-05-10 RX ORDER — MOXIFLOXACIN 5 MG/ML
SOLUTION/ DROPS OPHTHALMIC
Status: DISCONTINUED | OUTPATIENT
Start: 2017-05-10 | End: 2017-05-10 | Stop reason: HOSPADM

## 2017-05-10 RX ORDER — LIDOCAINE HYDROCHLORIDE 10 MG/ML
INJECTION, SOLUTION EPIDURAL; INFILTRATION; INTRACAUDAL; PERINEURAL
Status: DISCONTINUED
Start: 2017-05-10 | End: 2017-05-10 | Stop reason: HOSPADM

## 2017-05-10 RX ORDER — PREDNISOLONE ACETATE 10 MG/ML
SUSPENSION/ DROPS OPHTHALMIC
Status: DISCONTINUED
Start: 2017-05-10 | End: 2017-05-10 | Stop reason: HOSPADM

## 2017-05-10 RX ORDER — LIDOCAINE HYDROCHLORIDE 20 MG/ML
JELLY TOPICAL
Status: DISCONTINUED | OUTPATIENT
Start: 2017-05-10 | End: 2017-05-10 | Stop reason: HOSPADM

## 2017-05-10 RX ORDER — ACETAZOLAMIDE 500 MG/1
CAPSULE, EXTENDED RELEASE ORAL
Status: COMPLETED
Start: 2017-05-10 | End: 2017-05-10

## 2017-05-10 RX ORDER — MOXIFLOXACIN 5 MG/ML
1 SOLUTION/ DROPS OPHTHALMIC
Status: DISCONTINUED | OUTPATIENT
Start: 2017-05-10 | End: 2017-05-10 | Stop reason: HOSPADM

## 2017-05-10 RX ORDER — PREDNISOLONE ACETATE 10 MG/ML
SUSPENSION/ DROPS OPHTHALMIC
Status: DISCONTINUED | OUTPATIENT
Start: 2017-05-10 | End: 2017-05-10 | Stop reason: HOSPADM

## 2017-05-10 RX ORDER — LIDOCAINE HYDROCHLORIDE 10 MG/ML
INJECTION, SOLUTION EPIDURAL; INFILTRATION; INTRACAUDAL; PERINEURAL
Status: DISCONTINUED | OUTPATIENT
Start: 2017-05-10 | End: 2017-05-10 | Stop reason: HOSPADM

## 2017-05-10 RX ORDER — SODIUM CHLORIDE 9 MG/ML
INJECTION, SOLUTION INTRAVENOUS CONTINUOUS
Status: DISCONTINUED | OUTPATIENT
Start: 2017-05-10 | End: 2017-05-10 | Stop reason: HOSPADM

## 2017-05-10 RX ORDER — MIDAZOLAM HYDROCHLORIDE 1 MG/ML
INJECTION INTRAMUSCULAR; INTRAVENOUS
Status: DISCONTINUED | OUTPATIENT
Start: 2017-05-10 | End: 2017-05-10

## 2017-05-10 RX ORDER — KETOROLAC TROMETHAMINE 5 MG/ML
1 SOLUTION OPHTHALMIC
Status: DISCONTINUED | OUTPATIENT
Start: 2017-05-10 | End: 2017-05-10 | Stop reason: HOSPADM

## 2017-05-10 RX ORDER — LIDOCAINE HYDROCHLORIDE 10 MG/ML
1 INJECTION, SOLUTION EPIDURAL; INFILTRATION; INTRACAUDAL; PERINEURAL ONCE
Status: COMPLETED | OUTPATIENT
Start: 2017-05-10 | End: 2017-05-10

## 2017-05-10 RX ORDER — LIDOCAINE HYDROCHLORIDE 40 MG/ML
INJECTION, SOLUTION RETROBULBAR
Status: DISCONTINUED
Start: 2017-05-10 | End: 2017-05-10 | Stop reason: HOSPADM

## 2017-05-10 RX ORDER — FENTANYL CITRATE 50 UG/ML
INJECTION, SOLUTION INTRAMUSCULAR; INTRAVENOUS
Status: DISCONTINUED | OUTPATIENT
Start: 2017-05-10 | End: 2017-05-10

## 2017-05-10 RX ORDER — ACETAZOLAMIDE 500 MG/1
500 CAPSULE, EXTENDED RELEASE ORAL ONCE
Status: COMPLETED | OUTPATIENT
Start: 2017-05-10 | End: 2017-05-10

## 2017-05-10 RX ORDER — LIDOCAINE HYDROCHLORIDE 20 MG/ML
JELLY TOPICAL
Status: DISCONTINUED
Start: 2017-05-10 | End: 2017-05-10 | Stop reason: HOSPADM

## 2017-05-10 RX ORDER — LIDOCAINE HYDROCHLORIDE 10 MG/ML
1 INJECTION, SOLUTION EPIDURAL; INFILTRATION; INTRACAUDAL; PERINEURAL ONCE
Status: CANCELLED | OUTPATIENT
Start: 2017-05-10 | End: 2017-05-10

## 2017-05-10 RX ORDER — PHENYLEPHRINE HYDROCHLORIDE 100 MG/ML
1 SOLUTION/ DROPS OPHTHALMIC
Status: DISCONTINUED | OUTPATIENT
Start: 2017-05-10 | End: 2017-05-10 | Stop reason: HOSPADM

## 2017-05-10 RX ORDER — TROPICAMIDE 10 MG/ML
1 SOLUTION/ DROPS OPHTHALMIC
Status: DISCONTINUED | OUTPATIENT
Start: 2017-05-10 | End: 2017-05-10 | Stop reason: HOSPADM

## 2017-05-10 RX ADMIN — SODIUM CHONDROITIN SULFATE / SODIUM HYALURONATE 1.05 ML: 0.55-0.5 INJECTION INTRAOCULAR at 12:05

## 2017-05-10 RX ADMIN — KETOROLAC TROMETHAMINE 1 DROP: 5 SOLUTION OPHTHALMIC at 11:05

## 2017-05-10 RX ADMIN — PHENYLEPHRINE HYDROCHLORIDE 1 DROP: 100 SOLUTION/ DROPS OPHTHALMIC at 11:05

## 2017-05-10 RX ADMIN — MOXIFLOXACIN HYDROCHLORIDE 1 DROP: 5 SOLUTION/ DROPS OPHTHALMIC at 11:05

## 2017-05-10 RX ADMIN — LIDOCAINE HYDROCHLORIDE 1 ML: 10 INJECTION, SOLUTION EPIDURAL; INFILTRATION; INTRACAUDAL; PERINEURAL at 12:05

## 2017-05-10 RX ADMIN — MOXIFLOXACIN HYDROCHLORIDE 2 DROP: 5 SOLUTION/ DROPS OPHTHALMIC at 12:05

## 2017-05-10 RX ADMIN — MIDAZOLAM HYDROCHLORIDE 1 MG: 1 INJECTION, SOLUTION INTRAMUSCULAR; INTRAVENOUS at 12:05

## 2017-05-10 RX ADMIN — TROPICAMIDE 1 DROP: 10 SOLUTION/ DROPS OPHTHALMIC at 11:05

## 2017-05-10 RX ADMIN — FENTANYL CITRATE 25 MCG: 50 INJECTION INTRAMUSCULAR; INTRAVENOUS at 12:05

## 2017-05-10 RX ADMIN — ACETAZOLAMIDE 500 MG: 500 CAPSULE, EXTENDED RELEASE ORAL at 01:05

## 2017-05-10 RX ADMIN — SODIUM CHLORIDE 10 ML/HR: 0.9 INJECTION, SOLUTION INTRAVENOUS at 11:05

## 2017-05-10 RX ADMIN — LIDOCAINE HYDROCHLORIDE 10 MG: 10 INJECTION, SOLUTION EPIDURAL; INFILTRATION; INTRACAUDAL; PERINEURAL at 11:05

## 2017-05-10 RX ADMIN — PREDNISOLONE ACETATE 2 DROP: 10 SUSPENSION/ DROPS OPHTHALMIC at 12:05

## 2017-05-10 RX ADMIN — TRYPAN BLUE 0.5 ML: 0.3 INJECTION, SOLUTION INTRAOCULAR; OPHTHALMIC at 12:05

## 2017-05-10 RX ADMIN — LIDOCAINE HYDROCHLORIDE 4 ML: 20 JELLY TOPICAL at 12:05

## 2017-05-10 RX ADMIN — PHENYLEPHRINE AND KETOROLAC 4 ML: 10.16; 2.88 INJECTION, SOLUTION, CONCENTRATE INTRAOCULAR at 12:05

## 2017-05-10 NOTE — TRANSFER OF CARE
"Anesthesia Transfer of Care Note    Patient: Edis Huggins Jr.    Procedure(s) Performed: Procedure(s) (LRB):  PHACOEMULSIFICATION-ASPIRATION-CATARACT/COMPLEX/M RING/TRYPAN BLUE (Right)  INSERTION-INTRAOCULAR LENS (IOL) (Right)    Patient location: PACU    Anesthesia Type: MAC    Transport from OR: Transported from OR on room air with adequate spontaneous ventilation    Post pain: adequate analgesia    Post assessment: no apparent anesthetic complications and tolerated procedure well    Post vital signs: stable    Level of consciousness: alert, oriented and awake    Nausea/Vomiting: no nausea/vomiting    Complications: none    Transfer of care protocol was followed      Last vitals:   Visit Vitals    BP (!) 155/77 (BP Location: Left arm, Patient Position: Lying, BP Method: Automatic)    Pulse 61    Temp 36.8 °C (98.2 °F) (Temporal)    Resp 20    Ht 5' 11" (1.803 m)    Wt 129.3 kg (285 lb)    SpO2 97%    BMI 39.75 kg/m2     "

## 2017-05-10 NOTE — ANESTHESIA POSTPROCEDURE EVALUATION
"Anesthesia Post Evaluation    Patient: Edis Huggins Jr.    Procedure(s) Performed: Procedure(s) (LRB):  PHACOEMULSIFICATION-ASPIRATION-CATARACT/COMPLEX/M RING/TRYPAN BLUE (Right)  INSERTION-INTRAOCULAR LENS (IOL) (Right)    Final Anesthesia Type: MAC  Patient location during evaluation: PACU  Patient participation: Yes- Able to Participate  Level of consciousness: awake and alert, awake and oriented  Post-procedure vital signs: reviewed and stable  Pain management: adequate  Airway patency: patent  PONV status at discharge: No PONV  Anesthetic complications: no      Cardiovascular status: blood pressure returned to baseline and hemodynamically stable  Respiratory status: unassisted, spontaneous ventilation and room air  Hydration status: euvolemic  Follow-up not needed.        Visit Vitals    /72 (BP Location: Right arm, Patient Position: Lying, BP Method: Automatic)    Pulse 60    Temp 36.9 °C (98.4 °F) (Skin)    Resp 16    Ht 5' 11" (1.803 m)    Wt 129.3 kg (285 lb)    SpO2 99%    BMI 39.75 kg/m2       Pain/Channing Score: Pain Assessment Performed: Yes (5/10/2017  1:00 PM)  Presence of Pain: denies (5/10/2017  1:00 PM)  Pain Rating Prior to Med Admin: 0 (5/10/2017  1:00 PM)  Channing Score: 10 (5/10/2017  1:00 PM)      "

## 2017-05-10 NOTE — OP NOTE
DATE OF PROCEDURE: 5/10/2017    PREOPERATIVE DIAGNOSIS: Complex Cataract - poor red reflex // Nuclear sclerotic cataract of right eye OD.     POSTOPERATIVE DIAGNOSIS: Complex Cataract - poor red reflex // Nuclear sclerotic cataract of right eyeOD, status post procedure.     PROCEDURE PERFORMED: complex Cataract extraction with phacoemulsification, trypan blue and posterior   chamber intraocular lens placement.     SURGEON: Deborah Khan M.D.     ASSISTANT: Purvi Minaya MD      COMPLICATIONS: None.     BLOOD LOSS: Less than 5 mL.     ANESTHESIA: Local MAC    IMPLANT DATA:   1. Cam PCB00 , power 14.5 diopters, serial #[id number]    PROCEDURE IN DETAIL: After informed consent including risks, benefits,   alternatives, the patient was brought to the operating room table, placed in   supine position. Monitored anesthesia care was used throughout the entire   procedure. Once adequate anesthesia was confirmed, the patient was then prepped   and draped in usual sterile fashion for intraocular surgery. Wire speculum was   used to hold the eyelids apart and the procedure was initiated by making   a partial thickness corneal wound with a joaquim blade temporally.   A supersharp blade was then used to make a second entry into the eye via a paracentesis incision.  Intracameral lidocaine followed by trypan blue, followed by  Viscoat were placed in the anterior chamber.   A 2.4 mm blade was then used to make to complete the clear corneal   incision temporally. A cystotome was used to make a tear in   the anterior capsular flap, which was continued around with Utrata forceps for   continuous curvilinear capsulorrhexis. BSS on a Jules cannula was then used for   hydrodissection and hydrodelineation of lens. The lens was noted to spin   freely in the bag. Phacoemulsification handpiece was then used to remove the   lens in a divide-and-conquer manner. Irrigation aspiration handpiece removed   the remaining cortical  material. Provisc was placed in the eye followed by the   lens as mentioned above without any complications. Once the lens was in proper   position, irrigation aspiration handpiece was used to remove the remaining Provisc. The   wounds were then hydrated with BSS and noted to be watertight. The eye had a   good physiological pressure and the lid speculum was removed under the   microscope without any shallowing. The eye was then covered with a collagen   shield soaked in Pred Forte and Vigamox and turned over to Anesthesia in stable   condition after placement of patch and shield.

## 2017-05-10 NOTE — DISCHARGE SUMMARY
Date of Procedure / Discharge: 05/10/2017     PreOp Diagnosis: Complex Cataract OD      PostOp Diagnosis:as above s/p procedure    Procedure:Complex Cataract Extraction with Phacoemulsification and trypan blue w/ Posterior Chamber IOL Implantation    Attending:Deborah Khan MD    Assistant:Purvi anderson MD     Anesthesia:Local MAC    Complications:: None    Blood loss: <5 CC    Specimens: none    Procedure Details:  See Dictation      -D/C home when patient meets anesthesia requirements  -Follow up tomorrow with surgeon in the [facility].

## 2017-05-10 NOTE — DISCHARGE INSTRUCTIONS
ACTIVITY LEVEL:  If you received sedation or an anesthetic, you may feel sleepy for several hours. Rest until you are more awake.  Gradually resume your normal activities.    RESTRICTIONS - for the next 7 days   Do not lift anything over 10 pounds.   When bending, bend at the knees not the waist.   Do not rub the eye.   Do not get water in the eye.   Do not sleep on the side that had surgery.   Protect your eye at bedtime with the shield provided.    DIET:  At home, continue with liquids, and if there is no nausea, you may eat a soft diet. Gradually resume your  normal diet.    BATHING:  You may shower or take a bath. Protect your eye with the shield provided.    MEDICATIONS:  You will receive instructions for any pain and/or antibiotic prescriptions. Pain medication should be taken only  if needed and as directed. Antibiotics should be taken as directed until the entire prescription is completed.    EYE CARE:  1. Protect your eye at all times for the first month after surgery. Your old prescription glasses or  sunglasses may be worn during the day. Any time the glasses are removed (ANYTIME, even while  bathing or showering) wear the protective shield instead, especially at night during sleep.  2. Your doctor will tell you when to start using the eye drops. Use them as directed. They will help  decrease the normal postoperative reaction and tenderness and help prevent infection.  3. The eye will be sensitive to light and glare for several weeks. This may produce a headache,  secondarily. However, the eye itself should not be very painful, and taking Tylenol should relieve this.  Avoid aspirin products for the first few days after surgery, if possible, as these produce a bleeding  tendency.    WHEN TO CALL THE DOCTOR:   If your eye becomes more painful or more red than when you left the hospital   For any significant decrease in vision    FOR EMERGENCIES:  If any unusual problems or difficulties occur,  contact Dr. Khan's office or the resident at [phone] (page ) or at the Clinic office, [phone].

## 2017-05-10 NOTE — ANESTHESIA RELEASE NOTE
"Anesthesia Release from PACU Note    Patient: Edis Huggins Jr.    Procedure(s) Performed: Procedure(s) (LRB):  PHACOEMULSIFICATION-ASPIRATION-CATARACT/COMPLEX/M RING/TRYPAN BLUE (Right)  INSERTION-INTRAOCULAR LENS (IOL) (Right)    Anesthesia type: MAC    Post pain: Adequate analgesia    Post assessment: no apparent anesthetic complications and tolerated procedure well    Last Vitals:   Visit Vitals    /72 (BP Location: Right arm, Patient Position: Lying, BP Method: Automatic)    Pulse 60    Temp 36.9 °C (98.4 °F) (Skin)    Resp 16    Ht 5' 11" (1.803 m)    Wt 129.3 kg (285 lb)    SpO2 99%    BMI 39.75 kg/m2       Post vital signs: stable    Level of consciousness: awake, alert  and oriented    Nausea/Vomiting: no nausea/no vomiting    Complications: none    Airway Patency: patent    Respiratory: unassisted, spontaneous ventilation, room air    Cardiovascular: stable and blood pressure at baseline    Hydration: euvolemic  "

## 2017-05-10 NOTE — ANESTHESIA PREPROCEDURE EVALUATION
05/10/2017  Edis Huggins Jr. is a 72 y.o., male with HTN, HLD, anxiety, anemia here for cataract.  Had other eye in 2/2017 and did well.    Anesthesia Evaluation         Review of Systems  Anesthesia Hx:  No problems with previous Anesthesia  History of prior surgery of interest to airway management or planning: Denies Family Hx of Anesthesia complications.   Denies Personal Hx of Anesthesia complications.   Social:  Former Smoker    Hematology/Oncology:         -- Anemia:   Cardiovascular:   Hypertension  Denies Angina. hyperlipidemia ECG has been reviewed.    >4 mets   Pulmonary:   Denies Asthma. Sleep Apnea (risk factors)    Renal/:   BPH    Hepatic/GI:   Denies GERD.    Neurological:   Denies CVA. Denies Seizures.    Endocrine:   Diabetes (pre-DM)   Central obesity   Psych:   anxiety          Physical Exam  General:  Well nourished, Obesity    Airway/Jaw/Neck:  Airway Findings: Mouth Opening: Normal Tongue: Large  General Airway Assessment: Average, Adult  Mallampati: II  Improves to II with phonation.  TM Distance: Normal, at least 6 cm  Jaw/Neck Findings:     Neck ROM: Normal ROM      Dental:  Dental Findings: In tact, molar caps, upper front caps   Chest/Lungs:  Chest/Lungs Findings: Clear to auscultation, Normal Respiratory Rate     Heart/Vascular:  Heart Findings: Rate: Normal  Rhythm: Regular Rhythm  Heart Murmur (no radiation to carotids.)  Systolic  Systolic Heart Murmur Description: R Upper Sternal Border  Systolic Heart Murmur Grade: Grade II        Mental Status:  Mental Status Findings:  Cooperative, Alert and Oriented         Anesthesia Plan  Type of Anesthesia, risks & benefits discussed:  Anesthesia Type:  general, MAC  Patient's Preference:   Intra-op Monitoring Plan: standard ASA monitors  Intra-op Monitoring Plan Comments:   Post Op Pain Control Plan:   Post Op Pain Control Plan  Comments:   Induction:   IV  Beta Blocker:  Patient is not currently on a Beta-Blocker (No further documentation required).       Informed Consent:    ASA Score: 3     Day of Surgery Review of History & Physical:    H&P update referred to the surgeon.         Ready For Surgery From Anesthesia Perspective.

## 2017-05-11 ENCOUNTER — OFFICE VISIT (OUTPATIENT)
Dept: OPHTHALMOLOGY | Facility: CLINIC | Age: 72
End: 2017-05-11
Payer: COMMERCIAL

## 2017-05-11 VITALS — HEART RATE: 60 BPM | DIASTOLIC BLOOD PRESSURE: 77 MMHG | SYSTOLIC BLOOD PRESSURE: 127 MMHG

## 2017-05-11 DIAGNOSIS — H57.09 RELATIVE AFFERENT PUPILLARY DEFECT: ICD-10-CM

## 2017-05-11 DIAGNOSIS — Z98.49 POSTOPERATIVE CARE FOR CATARACT: Primary | ICD-10-CM

## 2017-05-11 DIAGNOSIS — H40.1111 PRIMARY OPEN-ANGLE GLAUCOMA, RIGHT EYE, MILD STAGE: ICD-10-CM

## 2017-05-11 DIAGNOSIS — Z48.810 POSTOPERATIVE CARE FOR CATARACT: Primary | ICD-10-CM

## 2017-05-11 DIAGNOSIS — Z96.1 PSEUDOPHAKIA, BOTH EYES: ICD-10-CM

## 2017-05-11 DIAGNOSIS — H40.1122 PRIMARY OPEN ANGLE GLAUCOMA OF LEFT EYE, MODERATE STAGE: ICD-10-CM

## 2017-05-11 DIAGNOSIS — H40.043 STEROID RESPONDERS, BILATERAL: ICD-10-CM

## 2017-05-11 DIAGNOSIS — H57.04 DILATED PUPIL: ICD-10-CM

## 2017-05-11 PROCEDURE — 99999 PR PBB SHADOW E&M-EST. PATIENT-LVL III: CPT | Mod: PBBFAC,,, | Performed by: OPHTHALMOLOGY

## 2017-05-11 PROCEDURE — 99024 POSTOP FOLLOW-UP VISIT: CPT | Mod: S$GLB,,, | Performed by: OPHTHALMOLOGY

## 2017-05-11 RX ORDER — MOXIFLOXACIN 5 MG/ML
1 SOLUTION/ DROPS OPHTHALMIC 4 TIMES DAILY
COMMUNITY
Start: 2017-05-11 | End: 2017-05-18

## 2017-05-11 RX ORDER — PREDNISOLONE ACETATE 10 MG/ML
1 SUSPENSION/ DROPS OPHTHALMIC 4 TIMES DAILY
COMMUNITY
Start: 2017-05-11 | End: 2017-05-18 | Stop reason: SDUPTHER

## 2017-05-14 ENCOUNTER — PATIENT MESSAGE (OUTPATIENT)
Dept: OPHTHALMOLOGY | Facility: CLINIC | Age: 72
End: 2017-05-14

## 2017-05-18 ENCOUNTER — OFFICE VISIT (OUTPATIENT)
Dept: OPHTHALMOLOGY | Facility: CLINIC | Age: 72
End: 2017-05-18
Payer: COMMERCIAL

## 2017-05-18 DIAGNOSIS — H40.1122 PRIMARY OPEN ANGLE GLAUCOMA OF LEFT EYE, MODERATE STAGE: ICD-10-CM

## 2017-05-18 DIAGNOSIS — Z98.49 POSTOPERATIVE CARE FOR CATARACT: Primary | ICD-10-CM

## 2017-05-18 DIAGNOSIS — Z48.810 POSTOPERATIVE CARE FOR CATARACT: Primary | ICD-10-CM

## 2017-05-18 DIAGNOSIS — H40.1111 PRIMARY OPEN-ANGLE GLAUCOMA, RIGHT EYE, MILD STAGE: ICD-10-CM

## 2017-05-18 DIAGNOSIS — Z96.1 PSEUDOPHAKIA, BOTH EYES: ICD-10-CM

## 2017-05-18 DIAGNOSIS — H57.09 RELATIVE AFFERENT PUPILLARY DEFECT: ICD-10-CM

## 2017-05-18 DIAGNOSIS — H40.043 STEROID RESPONDERS, BILATERAL: ICD-10-CM

## 2017-05-18 DIAGNOSIS — H57.04 DILATED PUPIL: ICD-10-CM

## 2017-05-18 PROCEDURE — 99024 POSTOP FOLLOW-UP VISIT: CPT | Mod: S$GLB,,, | Performed by: OPHTHALMOLOGY

## 2017-05-18 PROCEDURE — 99999 PR PBB SHADOW E&M-EST. PATIENT-LVL III: CPT | Mod: PBBFAC,,, | Performed by: OPHTHALMOLOGY

## 2017-05-18 RX ORDER — PREDNISOLONE ACETATE 10 MG/ML
1 SUSPENSION/ DROPS OPHTHALMIC 3 TIMES DAILY
Qty: 1 BOTTLE | Refills: 1 | Status: SHIPPED | OUTPATIENT
Start: 2017-05-18 | End: 2017-11-10

## 2017-05-18 RX ORDER — BRIMONIDINE TARTRATE 2 MG/ML
1 SOLUTION/ DROPS OPHTHALMIC 3 TIMES DAILY
Qty: 5 ML | Refills: 2 | Status: SHIPPED | OUTPATIENT
Start: 2017-05-18 | End: 2017-11-10

## 2017-05-18 NOTE — PROGRESS NOTES
HPI     Post-op Evaluation    Additional comments: Pt states OD is doing well           Comments   S/p Complex Phaco IOL OD 5/10/17  S/p Complex Phaco IOL and [place] OS 2/8/17    MEDS:  PA QID OD  Vigamox QID OD  T1/2 GFS QAM OU  Kendall 2% TID OS  Durezol QDAY OS           Last edited by Lili Booker MA on 5/18/2017  8:52 AM. (History)            Assessment /Plan     For exam results, see Encounter Report.    Postoperative care for cataract  -     prednisoLONE acetate (PRED FORTE) 1 % DrpS; Place 1 drop into the right eye 3 (three) times daily.  Dispense: 1 Bottle; Refill: 1    Primary open angle glaucoma of left eye, moderate stage    Primary open-angle glaucoma, right eye, mild stage  -     brimonidine 0.2% (ALPHAGAN) 0.2 % Drop; Place 1 drop into the right eye 3 (three) times daily.  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 to [place] - bring photo file over   Lost to F/u from 11/4/2013 to 8/11/2015   Knows Sharlene Steele - use to work with them     1. POAG (primary open-angle glaucoma) - Both Eyes   Old pt of [facility] [id number]  B[facility]       POAG OS>OD           Family history    neg        Glaucoma meds    travatan z ou, T1/2 GFS ou        H/O adverse rxn to glaucoma drops    none        LASERS    SLT os 9/26/2013 -( good resp 17 --> 14)        GLAUCOMA SURGERIES    trabectome os (combined with phaco - 2/8/2017        OTHER EYE SURGERIES    Combined - phaco/IOL / goniotomy - trabectome os 2/8/2017 - +heme and increae in IOP)         CDR    0.6/0.7-0.8        Tbase    20-23/21-24          Tmax    23/24            Ttarget    16/16            HVF    5 tests 2940-5545 OD essentially full; OS Sup paracentral defect,  IAD/INS        Gonio    +4 ou        CCT    598/598        OCT    3 test 2012 to 2013 - RNFL - nl od // dec S bord T        HRT    3 test-2012 to 2016 OD bord N; OS dec S/T/I        Disc photos    2012 - IOS    - Ttoday   15/15 OS  - Test done today   Post-op phaco/IOL os - complex - small pupil // goniotomy -  trabectome       2. Senile nuclear sclerosis - Both Eyes   -  vis sig      3. Steroid responders - Both Eyes  2/2 nasacort      4. ? Trace APD os      5. S/P Rt knee replacement Feb 2013  -doing well      6.  Myopia / presbyopia ou       Plan  CSM  IOP below target OU  SLT done OS 9/26/2013 - good initial resp 24-->17    -( if responds well consider SLT od - but full VF and helathy ON still od)    HVF stable OU    Explained the pre-existing HVF losses OS to patient at length and went over OCT- patient understands that he may still have blurry vision 2/2 to glaucoma after cataract surgery OS.     Phaco / IOL OS Date: 2/8/2017 - combined with a goniotomy / trabectome // ++ heme at end of case - healon left in eye - PCB00 - 14.0  POM # 2- phaco/IOL   High IOP 2/2 healon - decrease vision 2/2 to heme // IOP better after decompression - done twice on POD #1   con't durezol - decrease to bid x 2 weeks the q day till runs out   rio 128 -  - stop when runs out   VA improving os -   Cornea has cleared   Fixed dilated pupil os - 2/2 high IOP post op - will likely be a permeant change     discussed with patient that the dilated pupil may be permeate - trial of terese 2% os tid - so far no response      Aim for -0.50 ou   OS  PCB00 14.0  AC IOL 11.5    Try and balance the 2 eyes refraction - intolerant to glasses - 2/2 diplopia from the anisometropia     Ok to re-start  aleeve (takes for hip / knee pain) and the fish oil ahead of time   Of to re-start  flomax -  he just recently started it     Phaco / IOL OD Date: 5/10/2017 - PCB00 14.5 - 2nd eye // no TAYLER's done 2nd eye   POW # 1 - phaco/IOL   Doing well  Begin Pred  "Acetate QID - begin taper   Begin vigamox QID - finish   Shield at night  Glasses day  No lifting, no bending, no eye rubbing    F/U 1 month , AR/MR ou - IOP check ou - add brimonidine tid od         IOL calc OD  PCB00 14.5    Cont timolol gfs ou q day   Add brimonidine tid od only - ? Steroid response   Stop travatan - OU   Cont terese os   Cont durezol os - decrease to every other day     If the glare from the fixed dilated pupil bothers him a lot post 2nd eye getting done - consider pupil surgery with dr Peacock to "purse string it " smaller     I have seen and personally examined the patient.  I agree with the findings, assessment and plan of the resident and/or fellow.     Deborah Khan MD    "

## 2017-05-22 RX ORDER — LOSARTAN POTASSIUM AND HYDROCHLOROTHIAZIDE 25; 100 MG/1; MG/1
TABLET ORAL
Qty: 90 TABLET | Refills: 0 | OUTPATIENT
Start: 2017-05-22

## 2017-05-24 ENCOUNTER — PATIENT MESSAGE (OUTPATIENT)
Dept: INTERNAL MEDICINE | Facility: CLINIC | Age: 72
End: 2017-05-24

## 2017-05-24 DIAGNOSIS — I10 ESSENTIAL HYPERTENSION: Primary | ICD-10-CM

## 2017-05-25 ENCOUNTER — PATIENT MESSAGE (OUTPATIENT)
Dept: INTERNAL MEDICINE | Facility: CLINIC | Age: 72
End: 2017-05-25

## 2017-05-25 RX ORDER — AMLODIPINE BESYLATE 10 MG/1
10 TABLET ORAL DAILY
Qty: 90 TABLET | Refills: 3 | Status: SHIPPED | OUTPATIENT
Start: 2017-05-25 | End: 2018-04-30 | Stop reason: SDUPTHER

## 2017-06-09 ENCOUNTER — PATIENT MESSAGE (OUTPATIENT)
Dept: INTERNAL MEDICINE | Facility: CLINIC | Age: 72
End: 2017-06-09

## 2017-06-09 DIAGNOSIS — I10 ESSENTIAL HYPERTENSION: Primary | ICD-10-CM

## 2017-06-12 RX ORDER — CHLORTHALIDONE 25 MG/1
25 TABLET ORAL DAILY
Qty: 90 TABLET | Refills: 3 | Status: SHIPPED | OUTPATIENT
Start: 2017-06-12 | End: 2017-11-08 | Stop reason: DRUGHIGH

## 2017-06-12 RX ORDER — VALSARTAN 320 MG/1
320 TABLET ORAL DAILY
Qty: 90 TABLET | Refills: 3 | Status: SHIPPED | OUTPATIENT
Start: 2017-06-12 | End: 2018-05-21 | Stop reason: SDUPTHER

## 2017-06-13 ENCOUNTER — PATIENT MESSAGE (OUTPATIENT)
Dept: INTERNAL MEDICINE | Facility: CLINIC | Age: 72
End: 2017-06-13

## 2017-06-23 ENCOUNTER — OFFICE VISIT (OUTPATIENT)
Dept: OPHTHALMOLOGY | Facility: CLINIC | Age: 72
End: 2017-06-23
Payer: COMMERCIAL

## 2017-06-23 DIAGNOSIS — H40.1111 PRIMARY OPEN-ANGLE GLAUCOMA, RIGHT EYE, MILD STAGE: ICD-10-CM

## 2017-06-23 DIAGNOSIS — H40.043 STEROID RESPONDERS, BILATERAL: ICD-10-CM

## 2017-06-23 DIAGNOSIS — H57.09 RELATIVE AFFERENT PUPILLARY DEFECT: ICD-10-CM

## 2017-06-23 DIAGNOSIS — H21.562 PUPIL IRREGULAR, LEFT: ICD-10-CM

## 2017-06-23 DIAGNOSIS — Z98.49 POSTOPERATIVE CARE FOR CATARACT: Primary | ICD-10-CM

## 2017-06-23 DIAGNOSIS — Z96.1 PSEUDOPHAKIA, BOTH EYES: ICD-10-CM

## 2017-06-23 DIAGNOSIS — H40.1122 PRIMARY OPEN ANGLE GLAUCOMA OF LEFT EYE, MODERATE STAGE: ICD-10-CM

## 2017-06-23 DIAGNOSIS — Z48.810 POSTOPERATIVE CARE FOR CATARACT: Primary | ICD-10-CM

## 2017-06-23 PROCEDURE — 99024 POSTOP FOLLOW-UP VISIT: CPT | Mod: S$GLB,,, | Performed by: OPHTHALMOLOGY

## 2017-06-23 PROCEDURE — 99999 PR PBB SHADOW E&M-EST. PATIENT-LVL II: CPT | Mod: PBBFAC,,, | Performed by: OPHTHALMOLOGY

## 2017-06-23 NOTE — PROGRESS NOTES
HPI     DLS: 5/18/17    Pt here for post complex phaco w/IOl OD- 5/10/17  S/P complex phaco w/IOL and trabectome goniotomy  OS- 2/08/17 - had   bleeding intraop and elevated IOP post op -   Pt states vision seems to be doing good still have glare issues. Pt denies   any pain or discomfort.    Meds: PA qday od             Brimonidine tid od              T 1/2 qam ou             Kendall 2% tid os            Durezol every other day os    1. Post operative state  2. Primary open angle glaucoma of left eye, moderate stage  3. Primary open angle glaucoma of right eye, mild stage  4. Steroid responders, bilateral  5. Relative afferent pupillary defect, left eye  6. Dilated pupil   7. Pseudophakia, both eyes     Last edited by Deborah Khan MD on 6/23/2017  2:49 PM. (History)            Assessment /Plan     For exam results, see Encounter Report.    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 POAG (primary open-angle glaucoma) - Both Eyes   Old pt of [facility] [id number]  Began at [facility]       POAG OS>OD           Family history    neg        Glaucoma meds    travatan z ou, T1/2 GFS ou        H/O adverse rxn to glaucoma drops    none        LASERS    SLT os 9/26/2013 -( good resp 17 --> 14)        GLAUCOMA SURGERIES    trabectome os (combined with phaco  - 2/8/2017        OTHER EYE SURGERIES    Combined - phaco/IOL / goniotomy - trabectome os 2/8/2017 - +heme and increae in IOP)                                                 PC IOL OD - 5/10/2017         CDR    0.6/0.7-0.8        Tbase    20-23/21-24          Tmax    23/24            Ttarget    16/16            HVF    5 tests 5375-4930 OD essentially full; OS Sup paracentral defect, IAD/INS        Gonio    +4 ou        CCT    598/598        OCT    3 test 2012 to 2013 - RNFL - nl od // dec S bord T        HRT    3 test-2012 to 2016 OD bord N; OS dec S/T/I        Disc photos    2012 - IOS    - Ttoday   17/11 OS  - Test done today   Post-op phaco/IOL os - complex - small pupil // goniotomy -  trabectome       2. Steroid responders - Both Eyes  2/2 nasacort      3. ? Trace APD os      4. S/P Rt knee replacement Feb 2013  -doing well      5.  Myopia / presbyopia ou     6.  PC IOL OU  OD 5/10/2017 - PCB00 14.5  OS 2/8/2017 - + hyphema and high IOP 2/2 trabectome - irregular pupil post op - PCB00 14.0      Plan  CSM  IOP below target OU  SLT done OS 9/26/2013 - good initial resp 24-->17    -( if responds well consider SLT od - but full VF and helathy ON still od)    HVF stable OU    Explained the pre-existing HVF losses OS to patient at length and went over OCT- patient understands that he may still have blurry vision 2/2 to glaucoma after cataract surgery OS.     Phaco / IOL OS Date: 2/8/2017 - combined with a goniotomy / trabectome // ++ heme at end of case - healon left in eye - PCB00 - 14.0  POM # 4- phaco/IOL   High IOP 2/2 healon - decrease vision 2/2 to heme // IOP better after decompression - done twice on POD #1   VA improving os -   Cornea has cleared   Fixed dilated pupil os - 2/2 high IOP post op - will likely be a permeant change     discussed with patient that the dilated pupil may be permeate - trial of terese 2% os tid - so far no response      Aim for -0.50 ou   OS  PCB00 14.0  AC IOL 11.5    Try and  "balance the 2 eyes refraction - intolerant to glasses - 2/2 diplopia from the anisometropia     Ok to re-start  aleeve (takes for hip / knee pain) and the fish oil ahead of time   Of to re-start  flomax -  he just recently started it     Phaco / IOL OD Date: 5/10/2017 - PCB00 14.5 - 2nd eye // no TAYLER's done 2nd eye   POM # 1 - phaco/IOL   Doing well  BCVA today 20/20-1    IOL calc OD  PCB00 14.5    Cont timolol gfs ou q day   Add brimonidine tid od only  Hold travatan - OU   Cont terese os - stop not helping   Cont durezol os - decrease to every other day for 2 weeks then stop   pred acetate od - 1 x day for 2 weeks then stop     Rx glasses - 6/23/2017     If the glare from the fixed dilated pupil bothers him a lot post 2nd eye getting done - consider pupil surgery with dr Peacock to "purse string it " smaller - pt is doing ok for now - glare is not bothering him too much     F/U 3 months with HRT - update flow sheet if not yet done     I have seen and personally examined the patient.  I agree with the findings, assessment and plan of the resident and/or fellow.     Deborah Khan MD    "

## 2017-06-26 RX ORDER — ACYCLOVIR 400 MG/1
TABLET ORAL
Qty: 30 TABLET | Refills: 0 | Status: SHIPPED | OUTPATIENT
Start: 2017-06-26 | End: 2018-06-27 | Stop reason: SDUPTHER

## 2017-06-28 RX ORDER — FINASTERIDE 5 MG/1
TABLET, FILM COATED ORAL
Qty: 90 TABLET | Refills: 3 | Status: SHIPPED | OUTPATIENT
Start: 2017-06-28 | End: 2018-02-26 | Stop reason: SDUPTHER

## 2017-06-29 RX ORDER — ACYCLOVIR 400 MG/1
TABLET ORAL
Qty: 30 TABLET | Refills: 0 | OUTPATIENT
Start: 2017-06-29

## 2017-08-04 ENCOUNTER — PATIENT MESSAGE (OUTPATIENT)
Dept: INTERNAL MEDICINE | Facility: CLINIC | Age: 72
End: 2017-08-04

## 2017-08-21 ENCOUNTER — TELEPHONE (OUTPATIENT)
Dept: OPHTHALMOLOGY | Facility: CLINIC | Age: 72
End: 2017-08-21

## 2017-09-03 ENCOUNTER — PATIENT MESSAGE (OUTPATIENT)
Dept: OPHTHALMOLOGY | Facility: CLINIC | Age: 72
End: 2017-09-03

## 2017-09-04 ENCOUNTER — PATIENT MESSAGE (OUTPATIENT)
Dept: OPHTHALMOLOGY | Facility: CLINIC | Age: 72
End: 2017-09-04

## 2017-09-06 ENCOUNTER — PATIENT MESSAGE (OUTPATIENT)
Dept: INTERNAL MEDICINE | Facility: CLINIC | Age: 72
End: 2017-09-06

## 2017-09-07 ENCOUNTER — PATIENT MESSAGE (OUTPATIENT)
Dept: INTERNAL MEDICINE | Facility: CLINIC | Age: 72
End: 2017-09-07

## 2017-09-07 ENCOUNTER — OFFICE VISIT (OUTPATIENT)
Dept: OPHTHALMOLOGY | Facility: CLINIC | Age: 72
End: 2017-09-07
Payer: COMMERCIAL

## 2017-09-07 DIAGNOSIS — H40.1122 PRIMARY OPEN ANGLE GLAUCOMA OF LEFT EYE, MODERATE STAGE: ICD-10-CM

## 2017-09-07 DIAGNOSIS — H57.09 RELATIVE AFFERENT PUPILLARY DEFECT: ICD-10-CM

## 2017-09-07 DIAGNOSIS — Z96.1 PSEUDOPHAKIA, BOTH EYES: ICD-10-CM

## 2017-09-07 DIAGNOSIS — B30.9 ACUTE VIRAL CONJUNCTIVITIS OF RIGHT EYE: Primary | ICD-10-CM

## 2017-09-07 DIAGNOSIS — H40.1111 PRIMARY OPEN-ANGLE GLAUCOMA, RIGHT EYE, MILD STAGE: ICD-10-CM

## 2017-09-07 DIAGNOSIS — H40.043 STEROID RESPONDERS, BILATERAL: ICD-10-CM

## 2017-09-07 PROCEDURE — 99999 PR PBB SHADOW E&M-EST. PATIENT-LVL I: CPT | Mod: PBBFAC,,, | Performed by: OPHTHALMOLOGY

## 2017-09-07 PROCEDURE — 92012 INTRM OPH EXAM EST PATIENT: CPT | Mod: S$GLB,,, | Performed by: OPHTHALMOLOGY

## 2017-09-07 NOTE — PROGRESS NOTES
HPI     Right eye red x 1 week, itching, crusting.  Tried Vigamox QID (had left   over from cataract surgery in May) for last two days.  Has not seemed to   helped.  He has been having URI symptoms x 2-3 weeks, cough, runny nose,   sinus congestion.      Last edited by Deborah Khan MD on 9/7/2017 11:09 AM. (History)            Assessment /Plan     For exam results, see Encounter Report.    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 POAG (primary open-angle glaucoma) - Both Eyes   Old pt of [facility] [id number]  Began at [facility]       POAG OS>OD           Family history    neg        Glaucoma meds    travatan z ou, T1/2 GFS ou        H/O adverse rxn to glaucoma drops    none        LASERS    SLT os 9/26/2013 -( good resp 17 --> 14)        GLAUCOMA SURGERIES    trabectome os (combined with phaco - 2/8/2017        OTHER EYE SURGERIES    Combined - phaco/IOL / goniotomy - trabectome os 2/8/2017 - +heme and increae in IOP)                                                 PC IOL OD - 5/10/2017         CDR    0.6/0.7-0.8        Tbase    20-23/21-24          Tmax    23/24            Ttarget    16/16            HVF    5 tests 8446-8440 OD essentially full; OS Sup paracentral defect, IAD/INS        Gonio    +4 ou        CCT    598/598        OCT    3 test 2012 to 2013 - RNFL - nl od // dec S bord T        HRT    3 test-2012 to 2016 OD bord N; OS dec S/T/I        Disc photos    2012 - IOS    - Ttoday   13/13  - Test done today   Post-op phaco/IOL os - complex - small pupil // goniotomy -  trabectome       2. Steroid  responders - Both Eyes  2/2 nasacort      3. ? Trace APD os      4. S/P Rt knee replacement Feb 2013  -doing well      5.  Myopia / presbyopia ou     6.  PC IOL OU  OD 5/10/2017 - PCB00 14.5  OS 2/8/2017 - + hyphema and high IOP 2/2 trabectome - irregular pupil post op - PCB00 14.0      Plan  CSM  IOP below target OU  SLT done OS 9/26/2013 - good initial resp 24-->17    -( if responds well consider SLT od - but full VF and helathy ON still od)    HVF stable OU    Explained the pre-existing HVF losses OS to patient at length and went over OCT- patient understands that he may still have blurry vision 2/2 to glaucoma after cataract surgery OS.     Phaco / IOL OS Date: 2/8/2017 - combined with a goniotomy / trabectome // ++ heme at end of case - healon left in eye - PCB00 - 14.0  POM # 4- phaco/IOL   High IOP 2/2 healon - decrease vision 2/2 to heme // IOP better after decompression - done twice on POD #1   VA improving os -   Cornea has cleared   Fixed dilated pupil os - 2/2 high IOP post op - will likely be a permeant change     discussed with patient that the dilated pupil may be permeate - trial of terese 2% os tid - so far no response      Aim for -0.50 ou   OS  PCB00 14.0  AC IOL 11.5    Try and balance the 2 eyes refraction - intolerant to glasses - 2/2 diplopia from the anisometropia     Ok to re-start  aleeve (takes for hip / knee pain) and the fish oil ahead of time   Of to re-start  flomax -  he just recently started it     Phaco / IOL OD Date: 5/10/2017 - PCB00 14.5 - 2nd eye // no TAYLER's done 2nd eye    Doing well  BCVA today 20/20-1    IOL calc OD  PCB00 14.5    Cont timolol gfs ou q day   Hold travatan - OU     UCARE 9/7/17 for conjunctivitis OD  - possible viral vs brimonidine (uses brimonidine BID OD only)  - stop brimonidine (can use travatan if IOP goes back up)  - hand hygiene  - use different bottles of timolol for each eye - has extra at home  - recheck IOP at next visit - already scheduled to see on  "9/22 - if IOP goes up OD will add back travatan or other gtt    Rx glasses - 6/23/2017     If the glare from the fixed dilated pupil bothers him a lot post 2nd eye getting done - consider pupil surgery with dr Peacock to "purse string it " smaller - pt is doing ok for now - glare is not bothering him too much     F/U as scheduled 9/22 for  HRT - update flow sheet if not yet done     I have seen and personally examined the patient.  I agree with the findings, assessment and plan of the resident and/or fellow.     Deborah Khan MD                 "

## 2017-09-19 ENCOUNTER — PATIENT MESSAGE (OUTPATIENT)
Dept: INTERNAL MEDICINE | Facility: CLINIC | Age: 72
End: 2017-09-19

## 2017-09-20 ENCOUNTER — TELEPHONE (OUTPATIENT)
Dept: OPHTHALMOLOGY | Facility: CLINIC | Age: 72
End: 2017-09-20

## 2017-09-24 DIAGNOSIS — E78.1 HYPERTRIGLYCERIDEMIA: ICD-10-CM

## 2017-09-24 RX ORDER — GEMFIBROZIL 600 MG/1
TABLET, FILM COATED ORAL
Qty: 60 TABLET | Refills: 5 | Status: SHIPPED | OUTPATIENT
Start: 2017-09-24 | End: 2018-10-01 | Stop reason: SDUPTHER

## 2017-11-07 ENCOUNTER — PATIENT MESSAGE (OUTPATIENT)
Dept: INTERNAL MEDICINE | Facility: CLINIC | Age: 72
End: 2017-11-07

## 2017-11-07 DIAGNOSIS — I10 ESSENTIAL HYPERTENSION: Primary | ICD-10-CM

## 2017-11-08 ENCOUNTER — PATIENT MESSAGE (OUTPATIENT)
Dept: INTERNAL MEDICINE | Facility: CLINIC | Age: 72
End: 2017-11-08

## 2017-11-08 RX ORDER — CHLORTHALIDONE 25 MG/1
50 TABLET ORAL DAILY
COMMUNITY
End: 2017-11-14 | Stop reason: DRUGHIGH

## 2017-11-10 ENCOUNTER — OFFICE VISIT (OUTPATIENT)
Dept: OPHTHALMOLOGY | Facility: CLINIC | Age: 72
End: 2017-11-10
Attending: OPHTHALMOLOGY
Payer: COMMERCIAL

## 2017-11-10 DIAGNOSIS — H40.1111 PRIMARY OPEN-ANGLE GLAUCOMA, RIGHT EYE, MILD STAGE: ICD-10-CM

## 2017-11-10 DIAGNOSIS — H40.1122 PRIMARY OPEN ANGLE GLAUCOMA OF LEFT EYE, MODERATE STAGE: ICD-10-CM

## 2017-11-10 DIAGNOSIS — H57.09 RELATIVE AFFERENT PUPILLARY DEFECT: ICD-10-CM

## 2017-11-10 DIAGNOSIS — Z96.1 PSEUDOPHAKIA, BOTH EYES: ICD-10-CM

## 2017-11-10 DIAGNOSIS — H21.562 PUPIL IRREGULAR OF LEFT EYE: ICD-10-CM

## 2017-11-10 DIAGNOSIS — H40.043 STEROID RESPONDERS, BILATERAL: ICD-10-CM

## 2017-11-10 DIAGNOSIS — H40.1122 PRIMARY OPEN ANGLE GLAUCOMA OF LEFT EYE, MODERATE STAGE: Primary | ICD-10-CM

## 2017-11-10 PROCEDURE — 92134 CPTRZ OPH DX IMG PST SGM RTA: CPT | Mod: S$GLB,,, | Performed by: OPHTHALMOLOGY

## 2017-11-10 PROCEDURE — 99999 PR PBB SHADOW E&M-EST. PATIENT-LVL II: CPT | Mod: PBBFAC,,, | Performed by: OPHTHALMOLOGY

## 2017-11-10 PROCEDURE — 92012 INTRM OPH EXAM EST PATIENT: CPT | Mod: S$GLB,,, | Performed by: OPHTHALMOLOGY

## 2017-11-10 NOTE — PROGRESS NOTES
HPI     Glaucoma    Additional comments: HRT review today           Comments   DLS: 9/7/17    1) POAG  2) PCIOL OU  3) Steroid Responder  4) APD OS    MEDS:  T1/2 GFS QAM OU       Last edited by Lili Booker MA on 11/10/2017  2:21 PM. (History)            Assessment /Plan     For exam results, see Encounter Report.    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 POAG (primary open-angle glaucoma) - Both Eyes   Old pt of [place] [id number]  Began at [facility]       POAG OS>OD           Family history    neg        Glaucoma meds    travatan z ou, T1/2 GFS ou        H/O adverse rxn to glaucoma drops    none        LASERS    SLT os 9/26/2013 -( good resp 17 --> 14)        GLAUCOMA SURGERIES    trabectome os (combined with phaco - 2/8/2017        OTHER EYE SURGERIES    Combined - phaco/IOL / goniotomy - trabectome os 2/8/2017 - +heme and increae in IOP)                                                 PC IOL OD - 5/10/2017         CDR    0.6/0.7-0.8        Tbase    20-23/21-24          Tmax    23/24            Ttarget    16/16            HVF    5 tests 5968-1478 OD essentially full; OS Sup paracentral defect, IAD/INS        Gonio    +4 ou        CCT    598/598        OCT    3 test 2012 to 2013 - RNFL - nl od // dec S alissa VILLASENOR        HRT    4 test-2012 to 2017  - MR -  nl od // dec.Talissa  "I os /// CDR 0.56 od // 0.64 os        Disc photos    2012 - IOS    - Ttoday   16/14  - Test done today   HRT ou       2. Steroid responders - Both Eyes  2/2 nasacort      3. ? Trace APD os      4. S/P Rt knee replacement Feb 2013  -doing well      5.  Myopia / presbyopia ou     6.  PC IOL OU  OD 5/10/2017 - PCB00 14.5  OS 2/8/2017 - + hyphema and high IOP 2/2 trabectome - irregular pupil post op - PCB00 14.0      Plan  CSM  IOP below target OU  SLT done OS 9/26/2013 - good initial resp 24-->17    -( if responds well consider SLT od - but full VF and helathy ON still od)    HVF stable OU    Cont timolol gfs ou q day     If the glare from the fixed dilated pupil bothers him a lot post 2nd eye getting done - consider pupil surgery with dr Paecock to "purse string it " smaller - pt is doing ok for now - glare is not bothering him too much     F/U 4 months with HVF / DFE / photos     I have seen and personally examined the patient.  I agree with the findings, assessment and plan of the resident and/or fellow.     Deborah Khan MD    "

## 2017-11-13 ENCOUNTER — PATIENT MESSAGE (OUTPATIENT)
Dept: INTERNAL MEDICINE | Facility: CLINIC | Age: 72
End: 2017-11-13

## 2017-11-13 DIAGNOSIS — I10 ESSENTIAL HYPERTENSION: Primary | ICD-10-CM

## 2017-11-14 RX ORDER — CHLORTHALIDONE 50 MG/1
50 TABLET ORAL DAILY
Qty: 90 TABLET | Refills: 3 | Status: SHIPPED | OUTPATIENT
Start: 2017-11-14 | End: 2018-10-20 | Stop reason: SDUPTHER

## 2017-11-28 ENCOUNTER — OFFICE VISIT (OUTPATIENT)
Dept: DERMATOLOGY | Facility: CLINIC | Age: 72
End: 2017-11-28
Payer: COMMERCIAL

## 2017-11-28 DIAGNOSIS — L82.1 SEBORRHEIC KERATOSIS: ICD-10-CM

## 2017-11-28 DIAGNOSIS — D22.9 MULTIPLE BENIGN NEVI: ICD-10-CM

## 2017-11-28 DIAGNOSIS — D18.00 ANGIOMA: ICD-10-CM

## 2017-11-28 DIAGNOSIS — L57.0 AK (ACTINIC KERATOSIS): ICD-10-CM

## 2017-11-28 DIAGNOSIS — D48.5 NEOPLASM OF UNCERTAIN BEHAVIOR OF SKIN: Primary | ICD-10-CM

## 2017-11-28 DIAGNOSIS — Z85.828 HISTORY OF NONMELANOMA SKIN CANCER: ICD-10-CM

## 2017-11-28 PROCEDURE — 99213 OFFICE O/P EST LOW 20 MIN: CPT | Mod: 25,S$GLB,, | Performed by: DERMATOLOGY

## 2017-11-28 PROCEDURE — 88305 TISSUE EXAM BY PATHOLOGIST: CPT | Performed by: PATHOLOGY

## 2017-11-28 PROCEDURE — 17000 DESTRUCT PREMALG LESION: CPT | Mod: S$GLB,,, | Performed by: DERMATOLOGY

## 2017-11-28 PROCEDURE — 11100 PR BIOPSY OF SKIN LESION: CPT | Mod: 59,S$GLB,, | Performed by: DERMATOLOGY

## 2017-11-28 PROCEDURE — 99999 PR PBB SHADOW E&M-EST. PATIENT-LVL II: CPT | Mod: PBBFAC,,, | Performed by: DERMATOLOGY

## 2017-11-28 RX ORDER — BRIMONIDINE TARTRATE 2 MG/ML
SOLUTION/ DROPS OPHTHALMIC
COMMUNITY
Start: 2017-08-21 | End: 2018-03-14

## 2017-11-28 NOTE — PATIENT INSTRUCTIONS
Shave Biopsy Wound Care    Your doctor has performed a shave biopsy today.  A band aid and vaseline ointment has been placed over the site.  This should remain in place for 24 hours.  It is recommended that you keep the area dry for the first 24 hours.  After 24 hours, you may remove the band aid and wash the area with warm soap and water and apply Vaseline jelly.  Many patients prefer to use Neosporin or Bacitracin ointment.  This is acceptable; however, know that you can develop an allergy to this medication even if you have used it safely for years.  It is important to keep the area moist.  Letting it dry out and get air slows healing time, and will worsen the scar.  Band aid is optional after first 24 hours.      If you notice increasing redness, tenderness, pain, or yellow drainage at the biopsy site, please notify your doctor.  These are signs of an infection.    If your biopsy site is bleeding, apply firm pressure for 15 minutes straight.  Repeat for another 15 minutes, if it is still bleeding.   If the surgical site continues to bleed, then please contact your doctor.      [facility] [place] [address]/ [phone] FAX/ [email or web address]      CRYOSURGERY      Your doctor has used a method called cryosurgery to treat your skin condition. Cryosurgery refers to the use of very cold substances to treat a variety of skin conditions such as warts, pre-skin cancers, molluscum contagiosum, sun spots, and several benign growths. The substance we use in cryosurgery is liquid nitrogen and is so cold (-195 degrees Celsius) that is burns when administered.     Following treatment in the office, the skin may immediately burn and become red. You may find the area around the lesion is affected as well. It is sometimes necessary to treat not only the lesion, but a small area of the surrounding normal skin to achieve a good response.     A blister, and even a blood filled blister, may form  after treatment.   This is a normal response. If the blister is painful, it is acceptable to sterilize a needle and with rubbing alcohol and gently pop the blister. It is important that you gently wash the area with soap and warm water as the blister fluid may contain wart virus if a wart was treated. Do no remove the roof of the blister.     The area treated can take anywhere from 1-3 weeks to heal. Healing time depends on the kind skin lesion treated, the location, and how aggressively the lesion was treated. It is recommended that the areas treated are covered with Vaseline or bacitracin ointment and a band-aid. If a band-aid is not practical, just ointment applied several times per day will do. Keeping these areas moist will speed the healing time.    Treatment with liquid nitrogen can leave a scar. In dark skin, it may be a light or dark scar, in light skin it may be a white or pink scar. These will generally fade with time, but may never go away completely.     If you have any concerns after your treatment, please feel free to call the office.       [facility] [place] [address]/ [phone] FAX/ [email or web address]

## 2017-11-28 NOTE — PROGRESS NOTES
Subjective:       Patient ID:  Edis Huggins Jr. is a 72 y.o. male who presents for   Chief Complaint   Patient presents with    Spot     nasal bridge x 6 months +, bled in the past x several weeks no prev tx     Skin Check     UBSE     History of Present Illness: The patient presents for follow up of skin check.    The patient was last seen on: 9/15 for shave bx of NF chest  This is a high risk patient here to check for the development of new lesions.    Other skin complaints: non healing lesion on nose x 6 months. No prev treatment          Review of Systems   Skin: Positive for wears hat (always). Negative for daily sunscreen use, activity-related sunscreen use and recent sunburn.   Hematologic/Lymphatic: Does not bruise/bleed easily.        Objective:    Physical Exam   Constitutional: He appears well-developed and well-nourished. No distress.   Neurological: He is alert and oriented to person, place, and time. He is not disoriented.   Psychiatric: He has a normal mood and affect.   Skin:   Areas Examined (abnormalities noted in diagram):   Scalp / Hair Palpated and Inspected  Head / Face Inspection Performed  Neck Inspection Performed  Chest / Axilla Inspection Performed  Abdomen Inspection Performed  Back Inspection Performed  RUE Inspected  LUE Inspection Performed                   Diagram Legend     Erythematous scaling macule/papule c/w actinic keratosis       Vascular papule c/w angioma      Pigmented verrucoid papule/plaque c/w seborrheic keratosis      Yellow umbilicated papule c/w sebaceous hyperplasia      Irregularly shaped tan macule c/w lentigo     1-2 mm smooth white papules consistent with Milia      Movable subcutaneous cyst with punctum c/w epidermal inclusion cyst      Subcutaneous movable cyst c/w pilar cyst      Firm pink to brown papule c/w dermatofibroma      Pedunculated fleshy papule(s) c/w skin tag(s)      Evenly pigmented macule c/w junctional nevus     Mildly variegated  pigmented, slightly irregular-bordered macule c/w mildly atypical nevus      Flesh colored to evenly pigmented papule c/w intradermal nevus       Pink pearly papule/plaque c/w basal cell carcinoma      Erythematous hyperkeratotic cursted plaque c/w SCC      Surgical scar with no sign of skin cancer recurrence      Open and closed comedones      Inflammatory papules and pustules      Verrucoid papule consistent consistent with wart     Erythematous eczematous patches and plaques     Dystrophic onycholytic nail with subungual debris c/w onychomycosis     Umbilicated papule    Erythematous-base heme-crusted tan verrucoid plaque consistent with inflamed seborrheic keratosis     Erythematous Silvery Scaling Plaque c/w Psoriasis     See annotation          Assessment / Plan:      Pathology Orders:      Normal Orders This Visit    Tissue Specimen To Pathology, Dermatology     Questions:    Directional Terms:  Other(comment)    Clinical information:  r/o bcc    Specific Site:  right nasal bridge        Neoplasm of uncertain behavior of skin  -     Tissue Specimen To Pathology, Dermatology  Shave biopsy procedure note:    Shave biopsy performed after verbal consent including risk of infection, scar, recurrence, need for additional treatment of site. Area prepped with alcohol, anesthetized with approximately 1.0cc of 1% lidocaine with epinephrine. Lesional tissue shaved with razor blade. Hemostasis achieved with application of aluminum chloride followed by hyfrecation. No complications. Dressing applied. Wound care explained.    If biopsy positive for malignancy, refer to Dr. Thomas for Mohs surgery consultation.      AK (actinic keratosis) - scalp  Cryosurgery Procedure Note    Verbal consent from the patient is obtained and the patient is aware of the precancerous quality and need for treatment of these lesions. Liquid nitrogen cryosurgery is applied to the 1 actinic keratoses, as detailed in the physical exam, to produce a  freeze injury. The patient is aware that blisters may form and is instructed on wound care with gentle cleansing and use of vaseline ointment to keep moist until healed. The patient is supplied a handout on cryosurgery and is instructed to call if lesions do not completely resolve.      Seborrheic keratosis   - stable and chronic      Multiple benign nevi   - stable and chronic      History of nonmelanoma skin cancer  Area(s) of previous NMSC evaluated with no signs of recurrence.    Upper body skin examination performed today including at least 6 points as noted in physical examination. Suspicious lesions noted.      Angioma   - stable and chronic               Return in about 6 months (around 5/28/2018).

## 2017-11-29 ENCOUNTER — LAB VISIT (OUTPATIENT)
Dept: LAB | Facility: HOSPITAL | Age: 72
End: 2017-11-29
Attending: INTERNAL MEDICINE
Payer: COMMERCIAL

## 2017-11-29 ENCOUNTER — TELEPHONE (OUTPATIENT)
Dept: INTERNAL MEDICINE | Facility: CLINIC | Age: 72
End: 2017-11-29

## 2017-11-29 DIAGNOSIS — R73.01 ELEVATED FASTING GLUCOSE: Primary | ICD-10-CM

## 2017-11-29 DIAGNOSIS — I10 ESSENTIAL HYPERTENSION: ICD-10-CM

## 2017-11-29 LAB
ANION GAP SERPL CALC-SCNC: 9 MMOL/L
BUN SERPL-MCNC: 20 MG/DL
CALCIUM SERPL-MCNC: 9.6 MG/DL
CHLORIDE SERPL-SCNC: 103 MMOL/L
CO2 SERPL-SCNC: 30 MMOL/L
CREAT SERPL-MCNC: 0.8 MG/DL
EST. GFR  (AFRICAN AMERICAN): >60 ML/MIN/1.73 M^2
EST. GFR  (NON AFRICAN AMERICAN): >60 ML/MIN/1.73 M^2
GLUCOSE SERPL-MCNC: 112 MG/DL
MAGNESIUM SERPL-MCNC: 2.1 MG/DL
POTASSIUM SERPL-SCNC: 3.9 MMOL/L
SODIUM SERPL-SCNC: 142 MMOL/L

## 2017-11-29 PROCEDURE — 80048 BASIC METABOLIC PNL TOTAL CA: CPT

## 2017-11-29 PROCEDURE — 83735 ASSAY OF MAGNESIUM: CPT

## 2017-11-29 PROCEDURE — 36415 COLL VENOUS BLD VENIPUNCTURE: CPT

## 2017-11-30 NOTE — TELEPHONE ENCOUNTER
Please schedule for fasting lab visit sometime the morning of 12/5/17 before his 10:40 appointment with me. I have sent the patient a [facility] message.

## 2017-12-04 NOTE — TELEPHONE ENCOUNTER
Pt would like to schedule labs. Please schedule him for fasting labs about 1 hr before his appointment tomorrow, labs ordered 11/30/17. I have sent the patient a [facility] message.

## 2017-12-05 ENCOUNTER — OFFICE VISIT (OUTPATIENT)
Dept: INTERNAL MEDICINE | Facility: CLINIC | Age: 72
End: 2017-12-05
Payer: COMMERCIAL

## 2017-12-05 ENCOUNTER — LAB VISIT (OUTPATIENT)
Dept: LAB | Facility: HOSPITAL | Age: 72
End: 2017-12-05
Attending: INTERNAL MEDICINE
Payer: COMMERCIAL

## 2017-12-05 ENCOUNTER — PATIENT MESSAGE (OUTPATIENT)
Dept: INTERNAL MEDICINE | Facility: CLINIC | Age: 72
End: 2017-12-05

## 2017-12-05 VITALS
WEIGHT: 293.44 LBS | SYSTOLIC BLOOD PRESSURE: 134 MMHG | BODY MASS INDEX: 41.08 KG/M2 | HEART RATE: 60 BPM | DIASTOLIC BLOOD PRESSURE: 72 MMHG | HEIGHT: 71 IN

## 2017-12-05 DIAGNOSIS — R09.81 SINUS CONGESTION: ICD-10-CM

## 2017-12-05 DIAGNOSIS — R06.02 SHORTNESS OF BREATH: Primary | ICD-10-CM

## 2017-12-05 DIAGNOSIS — R73.9 ELEVATED BLOOD SUGAR: ICD-10-CM

## 2017-12-05 DIAGNOSIS — Z91.89 AT RISK FOR OBSTRUCTIVE SLEEP APNEA: ICD-10-CM

## 2017-12-05 DIAGNOSIS — R73.01 ELEVATED FASTING GLUCOSE: ICD-10-CM

## 2017-12-05 DIAGNOSIS — R60.0 BILATERAL LOWER EXTREMITY EDEMA: ICD-10-CM

## 2017-12-05 LAB
ESTIMATED AVG GLUCOSE: 117 MG/DL
GLUCOSE SERPL-MCNC: 118 MG/DL
HBA1C MFR BLD HPLC: 5.7 %

## 2017-12-05 PROCEDURE — 82947 ASSAY GLUCOSE BLOOD QUANT: CPT

## 2017-12-05 PROCEDURE — 83036 HEMOGLOBIN GLYCOSYLATED A1C: CPT

## 2017-12-05 PROCEDURE — 99999 PR PBB SHADOW E&M-EST. PATIENT-LVL IV: CPT | Mod: PBBFAC,,, | Performed by: INTERNAL MEDICINE

## 2017-12-05 PROCEDURE — 36415 COLL VENOUS BLD VENIPUNCTURE: CPT

## 2017-12-05 PROCEDURE — 99214 OFFICE O/P EST MOD 30 MIN: CPT | Mod: S$GLB,,, | Performed by: INTERNAL MEDICINE

## 2017-12-05 RX ORDER — CHLORTHALIDONE 25 MG/1
TABLET ORAL
COMMUNITY
Start: 2017-12-02 | End: 2017-12-05 | Stop reason: SDUPTHER

## 2017-12-05 NOTE — PROGRESS NOTES
Subjective:       Patient ID: Edis Huggins Jr. is a 72 y.o. male.    Chief Complaint: Follow-up    Last visit with me 03/2017. Since then increased chlorthalidone. Blood sugar slightly elevated, A1c pending.     Having congestion in sinus ever since summer following an infection. Starting using Afrin max strength before bedtime at night. Pt also snoring with labored breathing per wife.     Leg swelling is improved on 50 mg but still has some edema at night. Looking at decreasing salt in diet.     At eye surgery in May noted heart murmur. Not noted in past. Mother has this as well. Has noted some shortness of breath, initially attributed to being overweight. Had stress test about 4 yrs ago.    GERD at night about once/mo. Not during day, not too severe, usually resolves with Pepto-Bismol when occurs.    Hypertension   This is a recurrent problem. The current episode started more than 1 year ago. The problem is unchanged. The problem is controlled. Associated symptoms include peripheral edema and shortness of breath. Pertinent negatives include no anxiety, blurred vision, chest pain, headaches, malaise/fatigue, neck pain, orthopnea, palpitations, PND or sweats. Agents associated with hypertension include decongestants. Risk factors for coronary artery disease include obesity. The current treatment provides moderate improvement. Compliance problems include medication side effects.      Review of Systems   Constitutional: Negative for malaise/fatigue.   Eyes: Negative for blurred vision.   Respiratory: Positive for shortness of breath.    Cardiovascular: Negative for chest pain, palpitations, orthopnea and PND.   Musculoskeletal: Negative for neck pain.   Neurological: Negative for headaches.       Objective:      Physical Exam   Constitutional: No distress.    man whose Body mass index is 40.93 kg/m².    HENT:   Mouth/Throat: Oropharynx is clear and moist. No oropharyngeal exudate.   Neck: Normal range of  "motion. No thyromegaly present.   Cardiovascular: Normal rate, regular rhythm and normal heart sounds.    Pulmonary/Chest: Effort normal and breath sounds normal. No stridor. He has no wheezes. He has no rales.   Lymphadenopathy:     He has no cervical adenopathy.   Nursing note and vitals reviewed.      Vitals:    12/05/17 1055   BP: 134/72   BP Location: Right arm   Patient Position: Sitting   BP Method: Large (Manual)   Pulse: 60   Weight: 133.1 kg (293 lb 6.9 oz)   Height: 5' 11" (1.803 m)     Body mass index is 40.93 kg/m².    RESULTS: Reviewed labs from last 3 months. Also reviewed cardiac stress test 2010.    Assessment:       1. Shortness of breath    2. Bilateral lower extremity edema    3. Elevated blood sugar    4. Sinus congestion    5. At risk for obstructive sleep apnea        Plan:   Edis was seen today for follow-up.    Diagnoses and all orders for this visit:    Shortness of breath:  rule out CHF with stress test and labs. Pt has some lower extremities edema that should be evaluated on ultrasound as well given his shortness of breath. If cardiac workup negative consider PFTs given history of smoking.  -     Brain natriuretic peptide; Future  -     Exercise stress echo; Future    Bilateral lower extremity edema:  eval for CHF  -     Exercise stress echo; Future    Elevated blood sugar:  Seen on fasting blood sugars, A1c from today is pending. Based on results will refer to Health , or Nutrition if prediabetic.    Sinus congestion:  Chronic problem, uses Afrin daily, discussed concerns of rebound. When the patient tapers the situation recurs; refer to ENT for eval.  -     Ambulatory Referral to ENT    At risk for obstructive sleep apnea  -     Polysomnogram (CPAP will be added if patient meets diagnostic criteria.); Future    Return in about 4 months (around 4/5/2018).  Forest Miles MD  Internal Medicine    Portions of this note were completed using [employer] dictation software. Please " excuse typographical or syntax errors.

## 2017-12-05 NOTE — TELEPHONE ENCOUNTER
Please contact this patient to set up an appointment to meet for Health Coaching. Their major goal is healthy diet, including changes to help treat elevated fasting blood sugar. He also wants to decrease salt intake. Please let me know if there are other questions. I have sent the patient a [facility] message.

## 2017-12-06 ENCOUNTER — CLINICAL SUPPORT (OUTPATIENT)
Dept: CARDIOLOGY | Facility: CLINIC | Age: 72
End: 2017-12-06
Attending: INTERNAL MEDICINE
Payer: COMMERCIAL

## 2017-12-06 DIAGNOSIS — R06.02 SHORTNESS OF BREATH: ICD-10-CM

## 2017-12-06 DIAGNOSIS — R60.0 BILATERAL LOWER EXTREMITY EDEMA: ICD-10-CM

## 2017-12-06 LAB — RETIRED EF AND QEF - SEE NOTES: 65 (ref 55–65)

## 2017-12-06 PROCEDURE — 93321 DOPPLER ECHO F-UP/LMTD STD: CPT | Mod: S$GLB,,, | Performed by: INTERNAL MEDICINE

## 2017-12-06 PROCEDURE — 93351 STRESS TTE COMPLETE: CPT | Mod: S$GLB,,, | Performed by: INTERNAL MEDICINE

## 2017-12-06 NOTE — TELEPHONE ENCOUNTER
..Called patient left message, intro self, referred by Dr. Miles.   Request call back at direct number [phone].  Sandy Moses RN HC

## 2017-12-07 ENCOUNTER — TELEPHONE (OUTPATIENT)
Dept: INTERNAL MEDICINE | Facility: CLINIC | Age: 72
End: 2017-12-07

## 2017-12-07 ENCOUNTER — PATIENT MESSAGE (OUTPATIENT)
Dept: INTERNAL MEDICINE | Facility: CLINIC | Age: 72
End: 2017-12-07

## 2017-12-07 DIAGNOSIS — R06.09 DYSPNEA ON EXERTION: Primary | ICD-10-CM

## 2017-12-07 NOTE — TELEPHONE ENCOUNTER
I am referring for pulmonary function tests for further evaluation. Please schedule this appointment with the patient or provide the number for the scheduling desk. I have sent the patient a [facility] message.

## 2017-12-11 ENCOUNTER — HOSPITAL ENCOUNTER (OUTPATIENT)
Dept: PULMONOLOGY | Facility: CLINIC | Age: 72
Discharge: HOME | End: 2017-12-11
Payer: COMMERCIAL

## 2017-12-11 DIAGNOSIS — R06.09 DYSPNEA ON EXERTION: ICD-10-CM

## 2017-12-11 LAB
PRE FEV1 FVC: 86
PRE FEV1: 3.08
PRE FVC: 3.6
PREDICTED FEV1 FVC: 78
PREDICTED FEV1: 3.45
PREDICTED FVC: 4.34

## 2017-12-11 PROCEDURE — 94010 BREATHING CAPACITY TEST: CPT | Mod: S$GLB,,, | Performed by: INTERNAL MEDICINE

## 2017-12-11 PROCEDURE — 94729 DIFFUSING CAPACITY: CPT | Mod: S$GLB,,, | Performed by: INTERNAL MEDICINE

## 2017-12-12 ENCOUNTER — TELEPHONE (OUTPATIENT)
Dept: INTERNAL MEDICINE | Facility: CLINIC | Age: 72
End: 2017-12-12

## 2017-12-12 ENCOUNTER — PATIENT MESSAGE (OUTPATIENT)
Dept: INTERNAL MEDICINE | Facility: CLINIC | Age: 72
End: 2017-12-12

## 2017-12-12 NOTE — TELEPHONE ENCOUNTER
..Patient referred by Dr. Miles for Health coaching (Healthy diet, lower fasting glucose, lifestyle changes).  Called patient and gave an explanation about Health Coaching program and invited participation.   Patient states he would like to participate.  Set appointment for 12/22/17 at 1500.   Gave direct contact number to call if he/ she has any questions [phone].   Sandy Moses RN  Health

## 2017-12-13 ENCOUNTER — PATIENT MESSAGE (OUTPATIENT)
Dept: INTERNAL MEDICINE | Facility: CLINIC | Age: 72
End: 2017-12-13

## 2017-12-13 ENCOUNTER — TELEPHONE (OUTPATIENT)
Dept: INTERNAL MEDICINE | Facility: CLINIC | Age: 72
End: 2017-12-13

## 2017-12-13 DIAGNOSIS — G47.30 SLEEP APNEA, UNSPECIFIED TYPE: Primary | ICD-10-CM

## 2017-12-13 DIAGNOSIS — G89.29 CHRONIC NECK PAIN: Primary | ICD-10-CM

## 2017-12-13 DIAGNOSIS — M54.2 CHRONIC NECK PAIN: Primary | ICD-10-CM

## 2017-12-13 NOTE — TELEPHONE ENCOUNTER
I have resent the order with the appropriate diagnosis code for the patient evaluation. Pt is experiencing labored breathing at night consistent with sleep apnea, further assessment of the condition will be performed as part of sleep study.

## 2017-12-20 ENCOUNTER — PATIENT MESSAGE (OUTPATIENT)
Dept: INTERNAL MEDICINE | Facility: CLINIC | Age: 72
End: 2017-12-20

## 2018-01-12 ENCOUNTER — OFFICE VISIT (OUTPATIENT)
Dept: OTOLARYNGOLOGY | Facility: CLINIC | Age: 73
End: 2018-01-12
Payer: COMMERCIAL

## 2018-01-12 ENCOUNTER — PATIENT MESSAGE (OUTPATIENT)
Dept: OPHTHALMOLOGY | Facility: CLINIC | Age: 73
End: 2018-01-12

## 2018-01-12 ENCOUNTER — LAB VISIT (OUTPATIENT)
Dept: LAB | Facility: HOSPITAL | Age: 73
End: 2018-01-12
Attending: OTOLARYNGOLOGY
Payer: COMMERCIAL

## 2018-01-12 VITALS
HEIGHT: 71 IN | SYSTOLIC BLOOD PRESSURE: 112 MMHG | RESPIRATION RATE: 20 BRPM | WEIGHT: 284.75 LBS | TEMPERATURE: 98 F | BODY MASS INDEX: 39.86 KG/M2 | HEART RATE: 65 BPM | DIASTOLIC BLOOD PRESSURE: 65 MMHG

## 2018-01-12 DIAGNOSIS — J32.8 OTHER CHRONIC SINUSITIS: Primary | ICD-10-CM

## 2018-01-12 DIAGNOSIS — J30.89 CHRONIC NON-SEASONAL ALLERGIC RHINITIS, UNSPECIFIED TRIGGER: ICD-10-CM

## 2018-01-12 DIAGNOSIS — J34.2 NASAL SEPTAL DEVIATION: ICD-10-CM

## 2018-01-12 DIAGNOSIS — J34.3 HYPERTROPHY OF INFERIOR NASAL TURBINATE: ICD-10-CM

## 2018-01-12 PROCEDURE — 99244 OFF/OP CNSLTJ NEW/EST MOD 40: CPT | Mod: 25,S$GLB,, | Performed by: OTOLARYNGOLOGY

## 2018-01-12 PROCEDURE — 86003 ALLG SPEC IGE CRUDE XTRC EA: CPT | Mod: 59

## 2018-01-12 PROCEDURE — 31231 NASAL ENDOSCOPY DX: CPT | Mod: S$GLB,,, | Performed by: OTOLARYNGOLOGY

## 2018-01-12 PROCEDURE — 36415 COLL VENOUS BLD VENIPUNCTURE: CPT

## 2018-01-12 PROCEDURE — 99999 PR PBB SHADOW E&M-EST. PATIENT-LVL IV: CPT | Mod: PBBFAC,,, | Performed by: OTOLARYNGOLOGY

## 2018-01-12 PROCEDURE — 86003 ALLG SPEC IGE CRUDE XTRC EA: CPT

## 2018-01-12 RX ORDER — AZELASTINE 1 MG/ML
1 SPRAY, METERED NASAL 2 TIMES DAILY
Qty: 30 ML | Refills: 0 | Status: SHIPPED | OUTPATIENT
Start: 2018-01-12 | End: 2018-03-07 | Stop reason: SDUPTHER

## 2018-01-12 NOTE — LETTER
January 12, 2018      Forest Miles MD  [address]  [facility] [address]           [facility]  [address] [place] [address], [address]  [place] [address]  Phone: [phone]  Fax: [phone]          Patient: Edis Huggins Jr.   MR Number: [id number]   YOB: 1945   Date of Visit: 1/12/2018       Dear Dr. Forest Miles:    Thank you for referring Edis Huggins to me for evaluation. Attached you will find relevant portions of my assessment and plan of care.    If you have questions, please do not hesitate to call me. I look forward to following Edis Huggins along with you.    Sincerely,    Oriana Storm MD    Enclosure  CC:  No Recipients    If you would like to receive this communication electronically, please contact [email or web address] or [phone] to request more information on [employer] Link access.    For providers and/or their staff who would like to refer a patient to [facility], please contact us through our one-stop-shop provider referral line, [facility], at [phone].    If you feel you have received this communication in error or would no longer like to receive these types of communications, please e-mail [email or web address]

## 2018-01-12 NOTE — PROCEDURES
Nasal/sinus endoscopy  Date/Time: 1/12/2018 4:23 PM  Performed by: ROSA FLORES  Authorized by: ROSA FLORES     Consent Done?:  Yes (Verbal)  Anesthesia:     Local anesthetic:  Topical anesthetic  Nose:     Procedure Performed:  Nasal Endoscopy  External:      External nasal deformity  Intranasal:      Mucosa no polyps     Mucosa ulcers not present     Enlarged turbinates     Septum gross deformity  Nasopharynx:      No mucosa lesions     Adenoids not present     Posterior choanae not patent     Eustachian tube not patent     Nasal septal deviation to the right. Larynx within normal limits; no evidence of subglottic stenosis.

## 2018-01-12 NOTE — PROGRESS NOTES
Chief Complaint   Patient presents with    Other     sinus congestion   .     HPI:   Mr. Rodas is a 72 year old male who is referred by Dr. Miles. presents in evaluation for sinus congestion. He relays that he has been having difficulty breathing through his nose. He had an upper respiratory tract infection and has been having continued difficulty. He is currently using nasal saline rinses nightly.  He has been on Zyrtec.  He has not been on nasal steroid sprays secondary to glaucoma.  He states the he occasionally uses afrin nasal spray. He wonders if he is having allergy to something in the environment.  He feels that cold weather triggers his nasal congestion. He deneis facial pain and pressure today but will get intermittent facial pain and pressure 2-3 days per week.       Past Medical History:   Diagnosis Date    Allergy     Anemia     Anxiety     Basal cell carcinoma     BPH (benign prostatic hypertrophy)     HLD (hyperlipidemia)     HTN (hypertension)     IFG (impaired fasting glucose)     Primary open angle glaucoma      Social History     Social History    Marital status:      Spouse name: N/A    Number of children: N/A    Years of education: N/A     Occupational History     [employer]     Social History Main Topics    Smoking status: Former Smoker     Packs/day: 1.00     Years: 10.00     Quit date: 1/1/1974    Smokeless tobacco: Never Used    Alcohol use 0.0 oz/week      Comment: one drink a month    Drug use: No    Sexual activity: Not on file     Other Topics Concern    Not on file     Social History Narrative    No narrative on file     Past Surgical History:   Procedure Laterality Date    CATARACT EXTRACTION W/  INTRAOCULAR LENS IMPLANT Left 02/08/2017    WITH KAHOOK GONIOTOMY (Dr. Khan)    CATARACT EXTRACTION W/  INTRAOCULAR LENS IMPLANT Right 05/10/2017        HERNIA REPAIR      JOINT REPLACEMENT      right tka       TONSILLECTOMY       Family History   Problem Relation Age of Onset    Colon cancer Maternal Grandmother      70s    Cataracts Mother     Atrial fibrillation Mother     Cataracts Father     Heart disease Father 85     Valve disorder    Glaucoma Brother     Prostate cancer Neg Hx     Amblyopia Neg Hx     Blindness Neg Hx     Macular degeneration Neg Hx     Retinal detachment Neg Hx     Strabismus Neg Hx     Diabetes Neg Hx     Melanoma Neg Hx     Psoriasis Neg Hx     Lupus Neg Hx     Eczema Neg Hx     Acne Neg Hx            Review of Systems  General: negative for chills, fever or weight loss  Psychological: negative for mood changes or depression  Ophthalmic: negative for blurry vision, photophobia or eye pain  ENT: see HPI  Respiratory: no cough, shortness of breath, or wheezing  Cardiovascular: no chest pain or dyspnea on exertion  Gastrointestinal: no abdominal pain, change in bowel habits, or black/ bloody stools  Musculoskeletal: negative for gait disturbance or muscular weakness  Neurological: no syncope or seizures; no ataxia  Dermatological: negative for puritis,  rash and jaundice  Hematologic/lymphatic: no easy bruising, no new lumps or bumps      Physical Exam:    Vitals:    01/12/18 1317   BP: 112/65   Pulse: 65   Resp: 20   Temp: 97.5 °F (36.4 °C)       Constitutional: Well appearing / communicating without difficutly.  NAD.  Eyes: EOM I Bilaterally  Head/Face: Normocephalic.  Negative paranasal sinus pressure/tenderness.  Salivary glands WNL.  House Brackmann I Bilaterally.    Right Ear: Auricle normal appearance. External Auditory Canal within normal limits no lesions or masses,TM w/o masses/lesions/perforations. TM mobility noted.   Left Ear: Auricle normal appearance. External Auditory Canal within normal limits no lesions or masses,TM w/o masses/lesions/perforations. TM mobility noted.  Nose: No gross nasal septal deviation. Inferior Turbinates 3+ bilaterally. No septal  perforation. No masses/lesions. External nasal skin appears normal without masses/lesions.  Oral Cavity: Gingiva/lips within normal limits.  Dentition/gingiva healthy appearing. Mucus membranes moist. Floor of mouth soft, no masses palpated. Oral Tongue mobile. Hard Palate appears normal.    Oropharynx: Base of tongue appears normal. No masses/lesions noted. Tonsillar fossa/pharyngeal wall without lesions. Posterior oropharynx WNL.  Soft palate without masses. Midline uvula.   Neck/Lymphatic: No LAD I-VI bilaterally.  No thyromegaly.  No masses noted on exam.    Mirror laryngoscopy/nasopharyngoscopy: Active gag reflex.  Unable to perform.    Neuro/Psychiatric: AOx3.  Normal mood and affect.   Cardiovascular: Normal carotid pulses bilaterally, no increasing jugular venous distention noted at cervical region bilaterally.    Respiratory: Normal respiratory effort, no stridor, no retractions noted.      See separate procedure note for nasal endoscopy.       Assessment:    ICD-10-CM ICD-9-CM    1. Other chronic sinusitis J32.8 473.8    2. Chronic non-seasonal allergic rhinitis, unspecified trigger J30.89 477.9    3. Hypertrophy of inferior nasal turbinate J34.3 478.0    4. Nasal septal deviation J34.2 470      The primary encounter diagnosis was Other chronic sinusitis. Diagnoses of Chronic non-seasonal allergic rhinitis, unspecified trigger, Hypertrophy of inferior nasal turbinate, and Nasal septal deviation were also pertinent to this visit.      Plan:  No orders of the defined types were placed in this encounter.    Astelin nasal spray.   Ct of the sinuses to assess intraluminal patency.   Immuncap allergy testing.   Continue nasal saline spray.  We briefly discussed nasal septal deviation and option for septoplasty/SMRT. Would combine with ESS if CT scan shows evidence of CRS.     Thank you kindly for allowing me to participate in the patient's care.       Oriana Storm MD

## 2018-01-13 ENCOUNTER — PATIENT MESSAGE (OUTPATIENT)
Dept: INTERNAL MEDICINE | Facility: CLINIC | Age: 73
End: 2018-01-13

## 2018-01-15 ENCOUNTER — TELEPHONE (OUTPATIENT)
Dept: SLEEP MEDICINE | Facility: CLINIC | Age: 73
End: 2018-01-15

## 2018-01-15 LAB
A ALTERNATA IGE QN: <0.35 KU/L
A FUMIGATUS IGE QN: <0.35 KU/L
ALLERGEN BOXELDER MAPLE TREE IGE: <0.35 KU/L
ALLERGEN MAPLE (BOX ELDER) CLASS: NORMAL
ALLERGEN MULBERRY CLASS: NORMAL
ALLERGEN MULBERRY TREE IGE: <0.35 KU/L
ALLERGEN PENICILLIUM IGE: <0.35 KU/L
BALD CYPRESS IGE QN: <0.35 KU/L
BERMUDA GRASS IGE QN: <0.35 KU/L
C HERBARUM IGE QN: <0.35 KU/L
CAT DANDER IGE QN: <0.35 KU/L
CEDAR IGE QN: <0.35 KU/L
CMN PIGWEED IGE QN: <0.35 KU/L
COMMON RAGWEED IGE QN: <0.35 KU/L
D FARINAE IGE QN: <0.35 KU/L
D PTERONYSS IGE QN: <0.35 KU/L
DEPRECATED A ALTERNATA IGE RAST QL: NORMAL
DEPRECATED A FUMIGATUS IGE RAST QL: NORMAL
DEPRECATED BALD CYPRESS IGE RAST QL: NORMAL
DEPRECATED BERMUDA GRASS IGE RAST QL: NORMAL
DEPRECATED C HERBARUM IGE RAST QL: NORMAL
DEPRECATED CAT DANDER IGE RAST QL: NORMAL
DEPRECATED CEDAR IGE RAST QL: NORMAL
DEPRECATED COMMON PIGWEED IGE RAST QL: NORMAL
DEPRECATED COMMON RAGWEED IGE RAST QL: NORMAL
DEPRECATED D FARINAE IGE RAST QL: NORMAL
DEPRECATED D PTERONYSS IGE RAST QL: NORMAL
DEPRECATED DOG DANDER IGE RAST QL: NORMAL
DEPRECATED ENGL PLANTAIN IGE RAST QL: NORMAL
DEPRECATED JOHNSON GRASS IGE RAST QL: NORMAL
DEPRECATED KENT BLUE GRASS IGE RAST QL: NORMAL
DEPRECATED M RACEMOSUS IGE RAST QL: NORMAL
DEPRECATED MARSH ELDER IGE RAST QL: NORMAL
DEPRECATED NETTLE IGE RAST QL: NORMAL
DEPRECATED PECAN/HICK TREE IGE RAST QL: NORMAL
DEPRECATED ROACH IGE RAST QL: NORMAL
DEPRECATED SILVER BIRCH IGE RAST QL: NORMAL
DEPRECATED TIMOTHY IGE RAST QL: NORMAL
DEPRECATED WHITE OAK IGE RAST QL: NORMAL
DOG DANDER IGE QN: <0.35 KU/L
ELM CEDAR CLASS: NORMAL
ELM CEDAR, IGE: <0.35 KU/L
ENGL PLANTAIN IGE QN: <0.35 KU/L
JOHNSON GRASS IGE QN: <0.35 KU/L
KENT BLUE GRASS IGE QN: <0.35 KU/L
M RACEMOSUS IGE QN: <0.35 KU/L
MARSH ELDER IGE QN: <0.35 KU/L
NETTLE IGE QN: <0.35 KU/L
PECAN/HICK TREE IGE QN: <0.35 KU/L
PENICILLIUM CLASS: NORMAL
ROACH IGE QN: <0.35 KU/L
SILVER BIRCH IGE QN: <0.35 KU/L
TIMOTHY IGE QN: <0.35 KU/L
WHITE OAK IGE QN: <0.35 KU/L

## 2018-01-16 ENCOUNTER — HOSPITAL ENCOUNTER (OUTPATIENT)
Dept: RADIOLOGY | Facility: HOSPITAL | Age: 73
Discharge: HOME | End: 2018-01-16
Attending: OTOLARYNGOLOGY
Payer: COMMERCIAL

## 2018-01-16 DIAGNOSIS — J32.8 OTHER CHRONIC SINUSITIS: ICD-10-CM

## 2018-01-16 LAB — AMER SYCAMORE IGE QN: <0.35 KU/L

## 2018-01-16 PROCEDURE — 70486 CT MAXILLOFACIAL W/O DYE: CPT | Mod: 26,,, | Performed by: RADIOLOGY

## 2018-01-16 PROCEDURE — 70486 CT MAXILLOFACIAL W/O DYE: CPT | Mod: TC

## 2018-01-24 ENCOUNTER — CLINICAL SUPPORT (OUTPATIENT)
Dept: INTERNAL MEDICINE | Facility: CLINIC | Age: 73
End: 2018-01-24
Payer: COMMERCIAL

## 2018-01-24 PROCEDURE — 99999 PR PBB SHADOW E&M-EST. PATIENT-LVL I: CPT | Mod: PBBFAC,,,

## 2018-01-24 NOTE — PROGRESS NOTES
Date:    1/24/18                  Time: 1500  Initial Health  Contact - [x]Clinic visit  []  Phone Visit  ASSEMENT: Information obtained through combination of chart review prior to seeing patient, patient self-report.   Primary Referral diagnosis/reason for referral:     HTN, Healthy diet, lower fasting glucose, lifestyle changes    Other:    (See physician progress note for complete list of diagnoses.)  PCP:   Dr. Miles     Referent :  [x] PCP       [] Transition Navigator    []  E.NILSON    []  APRN  []  Other:     Supports:   wife       Preferred Learning Style  (may be a combination)  [x]  Visual      [x] Auditory     []  Reading    []  Hands-on    []  1:1    []  Group        []  Alone       []  No preference   [x]  Combination  []  Other:         Presenting issues from patients point of view:  [x]  Improve nutrition/increase healthy choices.                    [x]  Improve /maintain current functioning.  [x]  Obtain and maintain healthy blood pressure.                  []  Stop smoking.  [x]  Improved weight management.                                       [x]  Lower cholesterol.  [x]  Improved blood glucose management.                            [x]  Improve breathing.  [x]  Increase energy level.                                                      []  Increase/maintain independence.   [x]  Improve sleep.                                                                [x]  Decrease stress.  [x]  Improve pain management.                                             []  Improve mood.  []  Other:                  Pt. Education Level:      Graduate degree 3 masters degrees      Disease specific education services  attended:      GERD     Patient Employed:  [x]yes    [] No [employer]  Days and Hours:      815-445 pm          Current Self-help Behaviors  []  Checks glucose level        times a day.  [x]  Tries to modify meals so more healthy.  []  Exercises by        []  Takes BP        times a        (insert frequency)  []  Weighs self         (how often)  [x]  Takes all medications as prescribed.  [x]  Rarely misses health care appointments.  []  Sleeps 8 hours without waking more than twice. Wakes 2-4 x per night to urinate  [x]  Consistently wears/uses functioning aids as recommended  (ie:  Contacts [] , glasses [x] , hearing  Aid [] , prosthesis [] ,  Walker [] ,  Wheelchair []  etc.)  [x]  Regularly engages in stress management activities I.e.:  [x]  Quit smoking at 47 years old  []  Purposefully educates self about health issues.  []  Pt did bring glucose log with him/her.  []  Other  (explain)           []  Comments:       []  Reported Blood Sugar Readings:          Health screenings   Last PCP visit:12/5/17                                               PSA:   3/30/17        Colon: 4/5/2012   Lipids: 3/30/17   CMP: 3/3017   HgA1C: 3/30/17           Eye Exam:  11/22/16          Strengths:  [x]  Confident                      [x]  Determined/persistent           [x]  Enthusiastic         [x]  Good problem solving          [x]  Good self-control                   [x]  Hard working        [x]  Hopeful/optimistic                 [x]  Intelligent                                [x]  Pos support network  Wife and children  [x]  Self-aware                             [x]  Creative         [x]  Flexible  [x] Sense of humor                     [x]  Open/willing       [x]  Spiritual                                 [x] Values health    [x]  Successful overcoming difficult challenges in the past.     [x]   Health literate (The degree to which individuals have the capacity to obtain, process, and    understand basic health information and services needed to make appropriate health decisions.- Dept. of Health and Human services.)  []   Other Comments:             Change Markers  Scale 1-10 with 10 representing most important, most confident.     [] NA at this time.   Readiness:  8   ;  Importance:  8   ;   Confidence:  8      INTERVENTIONS:  [x] Given an explanation about health coaching program and invited participation.  [x] Listened reflectively; provided support, encouragement, and validation; respected  and maintained patient self-direction; explored pros/cons of change; personalized health risks of maintaining the status quo; and facilitated recognition of discrepancy between current behaviors and patients goals and values.  [x] Assessed stage of change and employed appropriate motivational interviewing strategies to facilitate movement toward the next stage of change and healthy behavior modification.  [x] Normalized occurrence of relapse, helped patient recognize outcome of new self-learning as a result of the relapse such as triggers, and helped focus on factors to reduce chances of returning to previous behaviors .  [x] Used readiness scale ratings to guide assessment of importance and confidence of successfully reaching goals.  [x] Assisted patient to develop goal, action plan, and address potential barriers to goal     achievement.  [] Patient was given a new (insert glucose meter or other supplies), taught to use, and      successfully demonstrated ability to carry out essential steps of process.    [] Rx for ( monitor/supplies, pen needles, etc.) was obtained.  [x] Provided educational review, written materials/handouts (Meal Planning Counting Carbs, Healthy Plate, 10 Rules for Eating Safely for Life, 10 Tips to Cutting Your Cravings).   [x] Topics discussed/provided:    GI of foods and how it affects your metabolism and helps you burn fat, carb counting    [x] Facilitated completion of needed exams and screenings by reviewing Diabetic/Health Maintenance Report Card with patient.  [x] Provided pt with my business card.  [x] Informed of/reviewed how to access health  and after hours assistance.  [x] Discussed, planned, and scheduled future contacts.  [x]Challenges/Barriers identified discussed as  identified by patient.  [x] Needs identified by this Health :    Education and support     [] Other:            For additional care management support patient was referred to:        []  Community resources for                        []  Medication assistance programs               []  Dietician              []  Diabetes  [place]                                        []  Mental health services                           []                  []  Diabetes [place]                                              []  Home health services                                []  Complex case management              []  PCP/covering provider due to                 []  Emergency Dept.                                  []  GYN              []  Optometrist/ Ophthalmologist                          []  Podiatrist                                                      [x]  N/A        []  Other:             RESPONSE/IMPRESSION  Behavior is consistent with the following stage of change:  []  Pre-contemplation  []  Contemplation  [x]  Preparation  []  Action  []  Maintenance  []  Relapse    Level of participation:  []  Refused to participate  []  Guarded / resistant  []  Passive participant  [x]  Active participant/interactive  [x]  Interested/receptive  [x]  Self-motivated  []  Other          Goal developed by patient today:  8/8  Eat real food  Eat more nuts and fish  Take vitamin D3 1-2000 iu  Continue to avoid salty foods    Plan (needed actions to accomplish goal):          [x] Pt will work on action plan as stated above.        [x] Pt will follow up with Health  on   4/19/18 at 1500.     [x] Health  will remain available.  [x] Health  will maintain regular contacts with patient to educate, support, encourage; help problem-solve; assist with development of self-advocacy/increasing independent health management; and provide resources as needed.  [] Pt has declined Health  Program at this time.  Provided pt with Health  Program information should they decide to participate at a later time.        [] Pt to facilitate contact with Health        [] Health  to facilitate contact with patient.        [] Other:          Notes:   Met with patient he is interested in decreasing SOB when walking, improving glucose and cholesterol.   His best reasons are to be able to take stress test and walk longer, walk further distances with grandchildren, and be more comfortable at work.  He states he still works full time needs the money and has no time to walk or exercise. His wife has fibromyalgia so he does most shopping and cooking.   States he has back pain at times that also prevents him from exercise pain level is 0 now sometimes is 4-5.   Goals set by patient and will continue to work with patient to assist to reach his goals.    Best Method of Contact:  [x] email:   [x] Phone:   [] Mail  [] Office     Sandy Moses RN

## 2018-01-25 ENCOUNTER — PATIENT MESSAGE (OUTPATIENT)
Dept: INTERNAL MEDICINE | Facility: CLINIC | Age: 73
End: 2018-01-25

## 2018-01-29 VITALS
WEIGHT: 287.94 LBS | BODY MASS INDEX: 40.16 KG/M2 | DIASTOLIC BLOOD PRESSURE: 64 MMHG | SYSTOLIC BLOOD PRESSURE: 126 MMHG

## 2018-01-31 ENCOUNTER — PATIENT MESSAGE (OUTPATIENT)
Dept: OTOLARYNGOLOGY | Facility: CLINIC | Age: 73
End: 2018-01-31

## 2018-02-16 ENCOUNTER — TELEPHONE (OUTPATIENT)
Dept: OTOLARYNGOLOGY | Facility: CLINIC | Age: 73
End: 2018-02-16

## 2018-02-16 NOTE — TELEPHONE ENCOUNTER
----- Message from Cheryle West sent at 2/16/2018 10:30 AM CST -----  Contact: Self/ [phone]  Patient called in requesting to speak with you. Patient prefers to speak with a nurse. He said he also send you a message through the portal but no one has replied.    Please call and advise.

## 2018-02-19 ENCOUNTER — TELEPHONE (OUTPATIENT)
Dept: SLEEP MEDICINE | Facility: OTHER | Age: 73
End: 2018-02-19

## 2018-02-19 ENCOUNTER — PATIENT MESSAGE (OUTPATIENT)
Dept: INTERNAL MEDICINE | Facility: CLINIC | Age: 73
End: 2018-02-19

## 2018-02-19 ENCOUNTER — PATIENT MESSAGE (OUTPATIENT)
Dept: OTOLARYNGOLOGY | Facility: CLINIC | Age: 73
End: 2018-02-19

## 2018-02-19 NOTE — TELEPHONE ENCOUNTER
Spoke to patient and scheduled a consult to discuss surgery for a deviated septum. Patient would like to elect to have surgery.

## 2018-02-21 ENCOUNTER — OFFICE VISIT (OUTPATIENT)
Dept: OTOLARYNGOLOGY | Facility: CLINIC | Age: 73
End: 2018-02-21
Payer: COMMERCIAL

## 2018-02-21 VITALS
BODY MASS INDEX: 39.66 KG/M2 | WEIGHT: 283.31 LBS | HEART RATE: 64 BPM | TEMPERATURE: 99 F | HEIGHT: 71 IN | SYSTOLIC BLOOD PRESSURE: 108 MMHG | DIASTOLIC BLOOD PRESSURE: 64 MMHG

## 2018-02-21 DIAGNOSIS — J30.89 CHRONIC NON-SEASONAL ALLERGIC RHINITIS, UNSPECIFIED TRIGGER: ICD-10-CM

## 2018-02-21 DIAGNOSIS — J34.3 HYPERTROPHY OF INFERIOR NASAL TURBINATE: ICD-10-CM

## 2018-02-21 DIAGNOSIS — J34.2 NASAL SEPTAL DEVIATION: Primary | ICD-10-CM

## 2018-02-21 PROCEDURE — 99214 OFFICE O/P EST MOD 30 MIN: CPT | Mod: S$GLB,,, | Performed by: OTOLARYNGOLOGY

## 2018-02-21 PROCEDURE — 99999 PR PBB SHADOW E&M-EST. PATIENT-LVL IV: CPT | Mod: PBBFAC,,, | Performed by: OTOLARYNGOLOGY

## 2018-02-21 PROCEDURE — 1126F AMNT PAIN NOTED NONE PRSNT: CPT | Mod: S$GLB,,, | Performed by: OTOLARYNGOLOGY

## 2018-02-21 PROCEDURE — 3008F BODY MASS INDEX DOCD: CPT | Mod: S$GLB,,, | Performed by: OTOLARYNGOLOGY

## 2018-02-21 PROCEDURE — 1159F MED LIST DOCD IN RCRD: CPT | Mod: S$GLB,,, | Performed by: OTOLARYNGOLOGY

## 2018-02-21 RX ORDER — CHLORTHALIDONE 25 MG/1
TABLET ORAL
COMMUNITY
Start: 2018-01-29 | End: 2018-03-14

## 2018-02-21 NOTE — PROGRESS NOTES
Chief Complaint   Patient presents with    other     deviated septum   .     HPI:   Mr. Rodas is a 72 year old male who is referred by Dr. Miles. presents in evaluation for sinus congestion. He relays that he has been having difficulty breathing through his nose. He had an upper respiratory tract infection and has been having continued difficulty. He is currently using nasal saline rinses nightly.  He has been on Zyrtec.  He has not been on nasal steroid sprays secondary to glaucoma.  He states the he occasionally uses afrin nasal spray. He wonders if he is having allergy to something in the environment.  He feels that cold weather triggers his nasal congestion. He deneis facial pain and pressure today but will get intermittent facial pain and pressure 2-3 days per week.     Interval HPI:  He states that he has been having continued nasal obstruction right greater than left since last visit.  He has been using Zyrtec and Astelin without relief.  He denies facial pain or pressure. He denies headaches since last visit.        Past Medical History:   Diagnosis Date    Allergy     Anemia     Anxiety     Basal cell carcinoma     BPH (benign prostatic hypertrophy)     HLD (hyperlipidemia)     HTN (hypertension)     IFG (impaired fasting glucose)     Primary open angle glaucoma      Social History     Social History    Marital status:      Spouse name: N/A    Number of children: N/A    Years of education: N/A     Occupational History     [employer]     Social History Main Topics    Smoking status: Former Smoker     Packs/day: 1.00     Years: 10.00     Quit date: 1/1/1974    Smokeless tobacco: Never Used    Alcohol use 0.0 oz/week      Comment: one drink a month    Drug use: No    Sexual activity: Not on file     Other Topics Concern    Not on file     Social History Narrative    No narrative on file     Past Surgical History:   Procedure Laterality Date    CATARACT  EXTRACTION W/  INTRAOCULAR LENS IMPLANT Left 02/08/2017    WITH KAHOOK GONIOTOMY (Dr. Khan)    CATARACT EXTRACTION W/  INTRAOCULAR LENS IMPLANT Right 05/10/2017        HERNIA REPAIR      JOINT REPLACEMENT      right tka      TONSILLECTOMY       Family History   Problem Relation Age of Onset    Colon cancer Maternal Grandmother      70s    Cataracts Mother     Atrial fibrillation Mother     Cataracts Father     Heart disease Father 85     Valve disorder    Glaucoma Brother     Prostate cancer Neg Hx     Amblyopia Neg Hx     Blindness Neg Hx     Macular degeneration Neg Hx     Retinal detachment Neg Hx     Strabismus Neg Hx     Diabetes Neg Hx     Melanoma Neg Hx     Psoriasis Neg Hx     Lupus Neg Hx     Eczema Neg Hx     Acne Neg Hx            Review of Systems  General: negative for chills, fever or weight loss  Psychological: negative for mood changes or depression  Ophthalmic: negative for blurry vision, photophobia or eye pain  ENT: see HPI  Respiratory: no cough, shortness of breath, or wheezing  Cardiovascular: no chest pain or dyspnea on exertion  Gastrointestinal: no abdominal pain, change in bowel habits, or black/ bloody stools  Musculoskeletal: negative for gait disturbance or muscular weakness  Neurological: no syncope or seizures; no ataxia  Dermatological: negative for puritis,  rash and jaundice  Hematologic/lymphatic: no easy bruising, no new lumps or bumps      Physical Exam:    Vitals:    02/21/18 1008   BP: 108/64   Pulse: 64   Temp: 98.5 °F (36.9 °C)       Constitutional: Well appearing / communicating without difficutly.  NAD.  Eyes: EOM I Bilaterally  Head/Face: Normocephalic.  Negative paranasal sinus pressure/tenderness.  Salivary glands WNL.  House Brackmann I Bilaterally.    Right Ear: Auricle normal appearance. External Auditory Canal within normal limits no lesions or masses,TM w/o masses/lesions/perforations. TM mobility noted.   Left Ear: Auricle  normal appearance. External Auditory Canal within normal limits no lesions or masses,TM w/o masses/lesions/perforations. TM mobility noted.  Nose: No gross nasal septal deviation. Inferior Turbinates 3+ bilaterally. No septal perforation. No masses/lesions. External nasal skin appears normal without masses/lesions.  Oral Cavity: Gingiva/lips within normal limits.  Dentition/gingiva healthy appearing. Mucus membranes moist. Floor of mouth soft, no masses palpated. Oral Tongue mobile. Hard Palate appears normal.    Oropharynx: Base of tongue appears normal. No masses/lesions noted. Tonsillar fossa/pharyngeal wall without lesions. Posterior oropharynx WNL.  Soft palate without masses. Midline uvula.   Neck/Lymphatic: No LAD I-VI bilaterally.  No thyromegaly.  No masses noted on exam.    Mirror laryngoscopy/nasopharyngoscopy: Active gag reflex.  Unable to perform.    Neuro/Psychiatric: AOx3.  Normal mood and affect.   Cardiovascular: Normal carotid pulses bilaterally, no increasing jugular venous distention noted at cervical region bilaterally.    Respiratory: Normal respiratory effort, no stridor, no retractions noted.      CT sinus 1/16/2018: The frontal sinuses are clear.  The frontal ethmoid recesses are clear.  The anterior and posterior ethmoid air cells are clear.  The sphenoid sinus are clear.  The bilateral maxillary sinuses are clear.  The bilateral maxillary ostia are patent. No mucosal thickening noted or air-fluid level.  Bilateral small dwain bullosa.  Mild nasal septal deviation toward the right.  The remainder of the visualized osseous structures and soft tissues appear normal.    Assessment:    ICD-10-CM ICD-9-CM    1. Nasal septal deviation J34.2 470    2. Hypertrophy of inferior nasal turbinate J34.3 478.0    3. Chronic non-seasonal allergic rhinitis, unspecified trigger J30.89 477.9      The primary encounter diagnosis was Nasal septal deviation. Diagnoses of Hypertrophy of inferior nasal turbinate  and Chronic non-seasonal allergic rhinitis, unspecified trigger were also pertinent to this visit.      Plan:  No orders of the defined types were placed in this encounter.    Astelin nasal spray and Zyrtec.     He would benefit from septoplasty and turbinate reduction for the treatment of his condition.  I discussed the risks, benefits and alternatives to surgery with the patient, as well as the expected postoperative course.  I gave him the opportunity to ask questions and I answered all of them.  I provided relevant printed information on his condition for him to review at home.  Same-day discharge is anticipated.  He will need evaluation in the pre-anesthesia clinic.   The surgery will be scheduled in the near future.  He will need to return for a postoperative visit 1 week after surgery.      Thank you kindly for allowing me to participate in the patient's care.       Oriana Storm MD

## 2018-02-22 ENCOUNTER — PATIENT MESSAGE (OUTPATIENT)
Dept: INTERNAL MEDICINE | Facility: CLINIC | Age: 73
End: 2018-02-22

## 2018-02-22 ENCOUNTER — TELEPHONE (OUTPATIENT)
Dept: OTOLARYNGOLOGY | Facility: CLINIC | Age: 73
End: 2018-02-22

## 2018-02-22 DIAGNOSIS — J34.2 NASAL SEPTAL DEVIATION: Primary | ICD-10-CM

## 2018-02-26 RX ORDER — FINASTERIDE 5 MG/1
TABLET, FILM COATED ORAL
Qty: 90 TABLET | Refills: 3 | Status: SHIPPED | OUTPATIENT
Start: 2018-02-26 | End: 2019-02-07 | Stop reason: SDUPTHER

## 2018-03-02 DIAGNOSIS — H40.1122 PRIMARY OPEN ANGLE GLAUCOMA OF LEFT EYE, MODERATE STAGE: ICD-10-CM

## 2018-03-02 RX ORDER — TIMOLOL MALEATE 5 MG/ML
SOLUTION OPHTHALMIC
Qty: 5 ML | Refills: 0 | OUTPATIENT
Start: 2018-03-02

## 2018-03-03 DIAGNOSIS — H40.1122 PRIMARY OPEN ANGLE GLAUCOMA OF LEFT EYE, MODERATE STAGE: ICD-10-CM

## 2018-03-05 DIAGNOSIS — N40.0 BENIGN NON-NODULAR PROSTATIC HYPERPLASIA WITHOUT LOWER URINARY TRACT SYMPTOMS: ICD-10-CM

## 2018-03-05 RX ORDER — TAMSULOSIN HYDROCHLORIDE 0.4 MG/1
CAPSULE ORAL
Qty: 90 CAPSULE | Refills: 3 | Status: SHIPPED | OUTPATIENT
Start: 2018-03-05 | End: 2019-02-07 | Stop reason: SDUPTHER

## 2018-03-05 RX ORDER — TIMOLOL MALEATE 5 MG/ML
1 SOLUTION OPHTHALMIC EVERY MORNING
Qty: 5 ML | Refills: 12 | Status: SHIPPED | OUTPATIENT
Start: 2018-03-05 | End: 2019-03-22 | Stop reason: SDUPTHER

## 2018-03-07 RX ORDER — AZELASTINE 1 MG/ML
1 SPRAY, METERED NASAL 2 TIMES DAILY
Qty: 30 ML | Refills: 0 | Status: SHIPPED | OUTPATIENT
Start: 2018-03-07 | End: 2018-05-04 | Stop reason: SDUPTHER

## 2018-03-08 ENCOUNTER — TELEPHONE (OUTPATIENT)
Dept: SLEEP MEDICINE | Facility: OTHER | Age: 73
End: 2018-03-08

## 2018-03-09 ENCOUNTER — TELEPHONE (OUTPATIENT)
Dept: SLEEP MEDICINE | Facility: OTHER | Age: 73
End: 2018-03-09

## 2018-03-14 ENCOUNTER — HOSPITAL ENCOUNTER (OUTPATIENT)
Dept: PREADMISSION TESTING | Facility: HOSPITAL | Age: 73
Discharge: HOME | End: 2018-03-14
Attending: OTOLARYNGOLOGY
Payer: COMMERCIAL

## 2018-03-14 ENCOUNTER — CLINICAL SUPPORT (OUTPATIENT)
Dept: LAB | Facility: HOSPITAL | Age: 73
End: 2018-03-14
Attending: OTOLARYNGOLOGY
Payer: COMMERCIAL

## 2018-03-14 VITALS
DIASTOLIC BLOOD PRESSURE: 61 MMHG | HEIGHT: 71 IN | SYSTOLIC BLOOD PRESSURE: 127 MMHG | HEART RATE: 60 BPM | OXYGEN SATURATION: 97 % | RESPIRATION RATE: 16 BRPM | BODY MASS INDEX: 39.2 KG/M2 | WEIGHT: 280 LBS

## 2018-03-14 DIAGNOSIS — Z01.818 PRE-OP EXAM: ICD-10-CM

## 2018-03-14 DIAGNOSIS — Z01.818 PRE-OP EXAM: Primary | ICD-10-CM

## 2018-03-14 PROCEDURE — 93010 ELECTROCARDIOGRAM REPORT: CPT | Mod: ,,, | Performed by: INTERNAL MEDICINE

## 2018-03-14 PROCEDURE — 93005 ELECTROCARDIOGRAM TRACING: CPT

## 2018-03-14 PROCEDURE — 93010 EKG 12-LEAD: ICD-10-PCS | Mod: ,,, | Performed by: INTERNAL MEDICINE

## 2018-03-14 RX ORDER — SODIUM CHLORIDE, SODIUM LACTATE, POTASSIUM CHLORIDE, CALCIUM CHLORIDE 600; 310; 30; 20 MG/100ML; MG/100ML; MG/100ML; MG/100ML
INJECTION, SOLUTION INTRAVENOUS CONTINUOUS
Status: CANCELLED | OUTPATIENT
Start: 2018-03-14

## 2018-03-14 RX ORDER — LIDOCAINE HYDROCHLORIDE 10 MG/ML
1 INJECTION, SOLUTION EPIDURAL; INFILTRATION; INTRACAUDAL; PERINEURAL ONCE
Status: CANCELLED | OUTPATIENT
Start: 2018-03-14 | End: 2018-03-14

## 2018-03-14 NOTE — DISCHARGE INSTRUCTIONS
Your surgery is scheduled for 3/19/18.    Please report to Outpatient Surgery Intake Office on the 2nd FLOOR at 5:45a.m.          INSTRUCTIONS IMPORTANT!!!  ¨ Do not eat or drink after 12 midnight-including water. OK to brush teeth, no   gum, candy or mints!    ¨ Take only these medicines with a small swallow of water-morning of surgery: amlodipine and valsartan        ____  No powder, lotions or creams to surgical area.  ____  Please remove all jewelry, including piercings and leave at home.  ____  No money or valuables needed. Please leave at home.  ____  Please bring any documents given by your doctor.  ____  If going home the same day, arrange for a ride home. You will not be able to             drive if Anesthesia was used.  ____  Wear loose fitting clothing. Allow for dressings, bandages.  ____  Stop Aspirin, Ibuprofen, Motrin and Aleve at least 3-5 days before surgery, unless otherwise instructed by your doctor, or the nurse.   You MAY use Tylenol/acetaminophen until day of surgery.  ____  If you take diabetic medication, do not take am of surgery unless instructed by Doctor.  ____  Call MD for temperature above 101 degrees.  ____ Stop taking any Fish Oil supplement or any Vitamins that contain Vitamin E at least 5 days prior to surgery.  ____ Do Not wear your contact lenses the day of your procedure.  You may wear your glasses.        I have read or had read and explained to me, and understand the above information.  Additional comments or instructions:  For additional questions call [phone]            Endoscopic Sinus Surgery  The sinuses are hollow areas formed by the bones of the face. Normally, a thin layer of mucus drains from the sinuses into the nose. If the drainage path is blocked, problems such as infection can result. Endoscopic sinus surgery can be done to help clear blockages. The surgeon uses a thin, lighted tube (endoscope) that is put into your nose. The tube lets the doctor see and operate  inside your nose and sinuses.     Straightening the septum       Removing polyps         Opening the ethmoid sinuses       Clearing the outflow pathway      Straightening the septum  The septum is a piece of cartilage and bone that runs straight down the inside of the nose. It divides the nose into two sides.  A deviated septum is crooked instead of straight. A crooked septum can cause breathing problems. To fix a deviated septum, the doctor reshapes or trims the cartilage and bone. There is enough septum left for the nose to hold its shape. But the air has more space to move in and out of the nose. This improves your breathing.  Removing polyps  Polyps are small growths. They can grow in both the nose and sinuses. The surgeon may use different methods to remove them. Often, the surgeon uses special tools to remove polyps without harming nearby tissues.  Opening the ethmoid sinuses  The ethmoid sinuses are made up of many small air spaces, like a honeycomb. Like the other sinuses, the ethmoids have a lining that makes mucus. In some cases the drainage path is blocked. The doctor may open the thin walls of bone that separate the air spaces. This creates a passage for mucus to drain more easily.  Clearing the major outflow pathway of the sinuses  The osteomeatal complex is a term for a major outflow tract of your sinuses. Similar to a traffic jam, when this area becomes blocked, you may get symptoms in your maxillary, ethmoid, and frontal sinuses. Opening this area is a primary step in most sinus surgeries. The uncinate process is a small piece of bone and tissue in the sinuses. It forms an outlet for part of the sinuses. If this tissue is swollen (inflamed), it will block drainage of mucus. The doctor may remove the uncinate process so that mucus can drain.  Date Last Reviewed: 10/1/2016  © 4547-1171 [employer]. [address] [place] [address], [place] [address]. All rights reserved. This information is not  intended as a substitute for professional medical care. Always follow your healthcare professional's instructions.

## 2018-03-14 NOTE — PRE-PROCEDURE INSTRUCTIONS
Wife Maryann Copeland  [phone]    Allergies, medical, surgical, family and psychosocial histories reviewed with patient. Periop plan of care reviewed. Preop instructions given, including medications to take and to hold. Time allotted for questions to be addressed.  Patient verbalized understanding.

## 2018-03-14 NOTE — PRE-PROCEDURE INSTRUCTIONS
Pt needs cardiac clearance.  Appt made for 3/22 (soonest avail.) at 8:20 am in the [facility] [address].  Pt verbalized understanding.

## 2018-03-15 ENCOUNTER — PATIENT MESSAGE (OUTPATIENT)
Dept: INTERNAL MEDICINE | Facility: CLINIC | Age: 73
End: 2018-03-15

## 2018-03-15 DIAGNOSIS — D64.9 NORMOCYTIC ANEMIA: Primary | ICD-10-CM

## 2018-03-15 NOTE — PROGRESS NOTES
Assessment /Plan     For exam results, see Encounter Report.    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 POAG (primary open-angle glaucoma) - Both Eyes   Old pt of [facility] [id number]  Began at [facility] 2012       POAG OS>OD           Family history    neg        Glaucoma meds    travatan z ou, T1/2 GFS ou        H/O adverse rxn to glaucoma drops    none        LASERS    SLT os 9/26/2013 -( good resp 17 --> 14)        GLAUCOMA SURGERIES    trabectome os (combined with phaco - 2/8/2017        OTHER EYE SURGERIES    Combined - phaco/IOL / goniotomy - trabectome os 2/8/2017 - +heme and increae in IOP)                                                 PC IOL OD - 5/10/2017         CDR    0.6/0.7-0.8        Tbase    20-23/21-24          Tmax    23/24            Ttarget    16/16            HVF    5 tests 4619-1176 OD essentially full; OS Sup paracentral defect, IAD/INS        Gonio    +4 ou        CCT    598/598        OCT    3 test 2012 to 2013 - RNFL - nl od // dec S bord T        HRT    4 test-2012 to 2017  - MR -  nl od // dec.T, bord I os /// CDR 0.56 od // 0.64 os        Disc photos    2012 - IOS    - Ttoday   15/15  - Test done today   HVF / DFE / photos       2. Steroid responders - Both Eyes  2/2 nasacort      3. ? Trace APD os      4. S/P Rt knee replacement Feb 2013  -doing well      5.  Myopia / presbyopia ou     6. Irregular pupil - w/ PAS   -2/2 very high IOP POD # 1 post phaco     7.  PC IOL OU  OD 5/10/2017 - PCB00 14.5  OS 2/8/2017 - + hyphema and high IOP 2/2 trabectome - irregular pupil post op -  "PCB00 14.0      Plan  CSM  IOP below target OU  SLT done OS 9/26/2013 - good initial resp 24-->17    -( if responds well consider SLT od - but full VF and helathy ON still od)    HVF stable OU    Cont timolol gfs ou q day     If the glare from the fixed dilated pupil bothers him a lot post 2nd eye getting done - consider pupil surgery with dr Peacock to "purse string it " smaller - pt is doing ok for now - glare is not bothering him too much     F/U 4 months with gonio // photo file is still at [facility] and pt is now going to [facility]    I have seen and personally examined the patient.  I agree with the findings, assessment and plan of the resident and/or fellow.     Deborah Khan MD  "

## 2018-03-16 ENCOUNTER — CLINICAL SUPPORT (OUTPATIENT)
Dept: OPHTHALMOLOGY | Facility: CLINIC | Age: 73
End: 2018-03-16
Payer: COMMERCIAL

## 2018-03-16 ENCOUNTER — OFFICE VISIT (OUTPATIENT)
Dept: OPHTHALMOLOGY | Facility: CLINIC | Age: 73
End: 2018-03-16
Payer: COMMERCIAL

## 2018-03-16 DIAGNOSIS — H21.562 PUPIL IRREGULAR OF LEFT EYE: ICD-10-CM

## 2018-03-16 DIAGNOSIS — H40.1111 PRIMARY OPEN-ANGLE GLAUCOMA, RIGHT EYE, MILD STAGE: ICD-10-CM

## 2018-03-16 DIAGNOSIS — Z96.1 PSEUDOPHAKIA, BOTH EYES: ICD-10-CM

## 2018-03-16 DIAGNOSIS — H57.09 RELATIVE AFFERENT PUPILLARY DEFECT: ICD-10-CM

## 2018-03-16 DIAGNOSIS — H40.1122 PRIMARY OPEN ANGLE GLAUCOMA OF LEFT EYE, MODERATE STAGE: Primary | ICD-10-CM

## 2018-03-16 DIAGNOSIS — H40.043 STEROID RESPONDERS, BILATERAL: ICD-10-CM

## 2018-03-16 DIAGNOSIS — H40.1122 PRIMARY OPEN ANGLE GLAUCOMA OF LEFT EYE, MODERATE STAGE: ICD-10-CM

## 2018-03-16 PROCEDURE — 92083 EXTENDED VISUAL FIELD XM: CPT | Mod: S$GLB,,, | Performed by: OPHTHALMOLOGY

## 2018-03-16 PROCEDURE — 99999 PR PBB SHADOW E&M-EST. PATIENT-LVL II: CPT | Mod: PBBFAC,,, | Performed by: OPHTHALMOLOGY

## 2018-03-16 PROCEDURE — 92250 FUNDUS PHOTOGRAPHY W/I&R: CPT | Mod: S$GLB,,, | Performed by: OPHTHALMOLOGY

## 2018-03-16 PROCEDURE — 92014 COMPRE OPH EXAM EST PT 1/>: CPT | Mod: S$GLB,,, | Performed by: OPHTHALMOLOGY

## 2018-03-22 ENCOUNTER — PATIENT MESSAGE (OUTPATIENT)
Dept: INTERNAL MEDICINE | Facility: CLINIC | Age: 73
End: 2018-03-22

## 2018-03-22 ENCOUNTER — OFFICE VISIT (OUTPATIENT)
Dept: CARDIOLOGY | Facility: CLINIC | Age: 73
End: 2018-03-22
Payer: COMMERCIAL

## 2018-03-22 VITALS
WEIGHT: 282.69 LBS | SYSTOLIC BLOOD PRESSURE: 113 MMHG | BODY MASS INDEX: 39.43 KG/M2 | DIASTOLIC BLOOD PRESSURE: 68 MMHG

## 2018-03-22 DIAGNOSIS — E78.5 HYPERLIPIDEMIA, UNSPECIFIED HYPERLIPIDEMIA TYPE: ICD-10-CM

## 2018-03-22 DIAGNOSIS — R06.00 DYSPNEA, UNSPECIFIED TYPE: ICD-10-CM

## 2018-03-22 DIAGNOSIS — I65.29 CAROTID ATHEROSCLEROSIS, UNSPECIFIED LATERALITY: ICD-10-CM

## 2018-03-22 DIAGNOSIS — R73.01 IMPAIRED FASTING GLUCOSE: ICD-10-CM

## 2018-03-22 DIAGNOSIS — I10 ESSENTIAL HYPERTENSION: ICD-10-CM

## 2018-03-22 DIAGNOSIS — Z82.49 FAMILY HISTORY OF ISCHEMIC HEART DISEASE: ICD-10-CM

## 2018-03-22 DIAGNOSIS — D64.9 NORMOCYTIC ANEMIA: ICD-10-CM

## 2018-03-22 DIAGNOSIS — R06.09 DYSPNEA ON EXERTION: Primary | ICD-10-CM

## 2018-03-22 DIAGNOSIS — F41.9 ANXIETY: ICD-10-CM

## 2018-03-22 PROCEDURE — 99204 OFFICE O/P NEW MOD 45 MIN: CPT | Mod: S$GLB,,, | Performed by: INTERNAL MEDICINE

## 2018-03-22 PROCEDURE — 99999 PR PBB SHADOW E&M-EST. PATIENT-LVL III: CPT | Mod: PBBFAC,,, | Performed by: INTERNAL MEDICINE

## 2018-03-22 PROCEDURE — 3078F DIAST BP <80 MM HG: CPT | Mod: CPTII,S$GLB,, | Performed by: INTERNAL MEDICINE

## 2018-03-22 PROCEDURE — 3074F SYST BP LT 130 MM HG: CPT | Mod: CPTII,S$GLB,, | Performed by: INTERNAL MEDICINE

## 2018-03-22 NOTE — PROGRESS NOTES
Subjective:    Patient ID:  Edis Huggins Jr. is a 72 y.o. male who presents for evaluation of Shortness of Breath      HPI  71 y/o male with hx of HTN, HLD, who presents for eval of SOB. Has been having worsening SOB for a few months now. Mostly with exertion. Had SONYA which he did not reach target HR, normal EF, diastolic function indeterminate, and no evidence of ischemia. Has bilateral LE edema which has improved with chlorthalidone. Denies CP, orthopnea, PND, syncope, palps. Compliant with meds. ECG with sinus leslye.    Review of Systems   Constitution: Negative for weakness and malaise/fatigue.   HENT: Negative for congestion.    Eyes: Negative for blurred vision.   Cardiovascular: Positive for dyspnea on exertion. Negative for chest pain, claudication, cyanosis, irregular heartbeat, leg swelling, near-syncope, orthopnea, palpitations, paroxysmal nocturnal dyspnea and syncope.   Respiratory: Negative for shortness of breath.    Endocrine: Negative for polyuria.   Hematologic/Lymphatic: Negative for bleeding problem.   Skin: Negative for itching and rash.   Musculoskeletal: Negative for joint swelling, muscle cramps and muscle weakness.   Gastrointestinal: Negative for abdominal pain, hematemesis, hematochezia, melena, nausea and vomiting.   Genitourinary: Negative for dysuria and hematuria.   Neurological: Negative for dizziness, focal weakness, headaches, light-headedness and loss of balance.   Psychiatric/Behavioral: Negative for depression. The patient is not nervous/anxious.         Objective:    Physical Exam   Constitutional: He is oriented to person, place, and time. He appears well-developed and well-nourished.   HENT:   Head: Normocephalic and atraumatic.   Neck: Neck supple. No JVD present.   Cardiovascular: Normal rate, regular rhythm and normal heart sounds.    Pulses:       Carotid pulses are 2+ on the right side, and 2+ on the left side.       Radial pulses are 2+ on the right side, and 2+ on  the left side.        Femoral pulses are 2+ on the right side, and 2+ on the left side.       Dorsalis pedis pulses are 2+ on the right side, and 2+ on the left side.        Posterior tibial pulses are 2+ on the right side, and 2+ on the left side.   Pulmonary/Chest: Effort normal and breath sounds normal.   Abdominal: Soft. Bowel sounds are normal.   Musculoskeletal: He exhibits edema.   Neurological: He is alert and oriented to person, place, and time.   Skin: Skin is warm and dry.   Psychiatric: He has a normal mood and affect. His behavior is normal. Thought content normal.         Assessment:       1. Dyspnea on exertion    2. Essential hypertension    3. Hyperlipidemia, unspecified hyperlipidemia type    4. Carotid atherosclerosis, unspecified laterality    5. Normocytic anemia    6. Impaired fasting glucose    7. Family history of ischemic heart disease    8. Anxiety      73 y/o male with hx and presentation as above. Had normal stress test 12/2017, however, failure to reach target heart rate so will order nuclear stress test. Will evaluate with Holter monitor to determine if bradycardia playing a role. Suspect deconditioning and overweight playing a role also.   .     Plan:       -Holter monitor x 24 hours  -Nuclear SPECT  -F/u phone review

## 2018-03-23 ENCOUNTER — TELEPHONE (OUTPATIENT)
Dept: HEMATOLOGY/ONCOLOGY | Facility: CLINIC | Age: 73
End: 2018-03-23

## 2018-04-02 ENCOUNTER — HOSPITAL ENCOUNTER (OUTPATIENT)
Dept: RADIOLOGY | Facility: HOSPITAL | Age: 73
Discharge: HOME | End: 2018-04-02
Attending: INTERNAL MEDICINE
Payer: COMMERCIAL

## 2018-04-02 ENCOUNTER — HOSPITAL ENCOUNTER (OUTPATIENT)
Dept: CARDIOLOGY | Facility: HOSPITAL | Age: 73
Discharge: HOME | End: 2018-04-02
Attending: INTERNAL MEDICINE
Payer: COMMERCIAL

## 2018-04-02 DIAGNOSIS — R07.9 CHEST PAIN: Primary | ICD-10-CM

## 2018-04-02 DIAGNOSIS — R07.9 CHEST PAIN: ICD-10-CM

## 2018-04-02 DIAGNOSIS — R06.09 DYSPNEA ON EXERTION: ICD-10-CM

## 2018-04-02 DIAGNOSIS — I10 ESSENTIAL HYPERTENSION: ICD-10-CM

## 2018-04-02 DIAGNOSIS — R06.00 DYSPNEA, UNSPECIFIED TYPE: ICD-10-CM

## 2018-04-02 LAB — DIASTOLIC DYSFUNCTION: NO

## 2018-04-02 PROCEDURE — 93016 CV STRESS TEST SUPVJ ONLY: CPT | Mod: ,,, | Performed by: INTERNAL MEDICINE

## 2018-04-02 PROCEDURE — A9502 TC99M TETROFOSMIN: HCPCS

## 2018-04-02 PROCEDURE — 93226 XTRNL ECG REC<48 HR SCAN A/R: CPT

## 2018-04-02 PROCEDURE — 78452 HT MUSCLE IMAGE SPECT MULT: CPT | Mod: 26,,, | Performed by: RADIOLOGY

## 2018-04-02 PROCEDURE — 93227 XTRNL ECG REC<48 HR R&I: CPT | Mod: ,,, | Performed by: INTERNAL MEDICINE

## 2018-04-02 PROCEDURE — 93018 CV STRESS TEST I&R ONLY: CPT | Mod: ,,, | Performed by: INTERNAL MEDICINE

## 2018-04-03 ENCOUNTER — PATIENT MESSAGE (OUTPATIENT)
Dept: CARDIOLOGY | Facility: CLINIC | Age: 73
End: 2018-04-03

## 2018-04-05 ENCOUNTER — OFFICE VISIT (OUTPATIENT)
Dept: INTERNAL MEDICINE | Facility: CLINIC | Age: 73
End: 2018-04-05
Payer: COMMERCIAL

## 2018-04-05 VITALS
BODY MASS INDEX: 39.2 KG/M2 | HEIGHT: 71 IN | DIASTOLIC BLOOD PRESSURE: 60 MMHG | WEIGHT: 280 LBS | TEMPERATURE: 99 F | SYSTOLIC BLOOD PRESSURE: 110 MMHG | HEART RATE: 66 BPM

## 2018-04-05 DIAGNOSIS — R60.0 BILATERAL LOWER EXTREMITY EDEMA: ICD-10-CM

## 2018-04-05 DIAGNOSIS — Z13.6 ENCOUNTER FOR ABDOMINAL AORTIC ANEURYSM (AAA) SCREENING: ICD-10-CM

## 2018-04-05 DIAGNOSIS — R19.7 DIARRHEA, UNSPECIFIED TYPE: ICD-10-CM

## 2018-04-05 DIAGNOSIS — D64.9 NORMOCYTIC ANEMIA: ICD-10-CM

## 2018-04-05 DIAGNOSIS — R06.02 SOB (SHORTNESS OF BREATH): Primary | ICD-10-CM

## 2018-04-05 DIAGNOSIS — J34.2 DEVIATED SEPTUM: ICD-10-CM

## 2018-04-05 PROCEDURE — 99215 OFFICE O/P EST HI 40 MIN: CPT | Mod: S$GLB,,, | Performed by: INTERNAL MEDICINE

## 2018-04-05 PROCEDURE — 3074F SYST BP LT 130 MM HG: CPT | Mod: CPTII,S$GLB,, | Performed by: INTERNAL MEDICINE

## 2018-04-05 PROCEDURE — 3078F DIAST BP <80 MM HG: CPT | Mod: CPTII,S$GLB,, | Performed by: INTERNAL MEDICINE

## 2018-04-05 PROCEDURE — 99999 PR PBB SHADOW E&M-EST. PATIENT-LVL III: CPT | Mod: PBBFAC,,, | Performed by: INTERNAL MEDICINE

## 2018-04-05 NOTE — PROGRESS NOTES
Subjective:       Patient ID: Edis Huggins Jr. is a 72 y.o. male.    Chief Complaint: Follow-up; Fever; and Diarrhea    Last visit with me 12/2017. Since then seen by ENT, Ophthalmology, Cardiology. Upcoming appointment with Hematology, Internal Medicine, and Ophthalmology.    Started with diarrhea this morning, also some low grade fever 101.5 last night, today 99 or 100. No cough. Had sinus congestion that relieved with nasal irrigation.     Stress test showed normal LV function including relaxation.     Has been increased oral iron intake. Has cut out tea and coffee as result to help improve iron absorption, using juice instead.     Has chronic lower extremities edema. Hasn't been using compression stockings. Some mild discomfort at days end, not severe.    Shortness of Breath   The current episode started more than 1 month ago. The problem occurs daily. The problem has been waxing and waning. The average episode lasts 4 minutes. Associated symptoms include claudication, leg pain, leg swelling, neck pain and wheezing. Pertinent negatives include no abdominal pain, chest pain, coryza, ear pain, fever, headaches, hemoptysis, orthopnea, PND, rash, rhinorrhea, sore throat, sputum production, swollen glands, syncope or vomiting. The symptoms are aggravated by exercise. The patient has no known risk factors for DVT/PE. He has tried nothing for the symptoms. The treatment provided no relief. His past medical history is significant for allergies. There is no history of aspirin allergies, asthma, bronchiolitis, CAD, chronic lung disease, COPD, DVT, a heart failure, PE, pneumonia or a recent surgery.     Review of Systems   Constitutional: Negative for fever.   HENT: Negative for ear pain, rhinorrhea and sore throat.    Respiratory: Positive for shortness of breath and wheezing. Negative for hemoptysis and sputum production.    Cardiovascular: Positive for claudication and leg swelling. Negative for chest pain,  "orthopnea, syncope and PND.   Gastrointestinal: Negative for abdominal pain and vomiting.   Musculoskeletal: Positive for neck pain.   Skin: Negative for rash.   Neurological: Negative for headaches.       Objective:      Physical Exam   Constitutional: He is oriented to person, place, and time. No distress.    man whose Body mass index is 39.05 kg/m².    HENT:   Mouth/Throat: Oropharynx is clear and moist. No oropharyngeal exudate.   Hearing aids bilaterally    Neck: No thyromegaly present.   Cardiovascular: Normal rate, regular rhythm and normal heart sounds.    Pulmonary/Chest: Effort normal and breath sounds normal. No stridor. He has no wheezes. He has no rales.   Abdominal: Soft. There is no tenderness. There is no guarding.   Musculoskeletal: He exhibits edema (mild, soft pitting edema in b/L LE distal to mid shin). He exhibits no tenderness.   Lymphadenopathy:     He has no cervical adenopathy.   Neurological: He is alert and oriented to person, place, and time.   Skin: Skin is warm and dry. No rash noted.   Psychiatric: He has a normal mood and affect. His behavior is normal.   Nursing note and vitals reviewed.      Vitals:    04/05/18 0835   BP: 110/60   BP Location: Right arm   Patient Position: Sitting   BP Method: Large (Manual)   Pulse: 66   Temp: 99.2 °F (37.3 °C)   TempSrc: Oral   Weight: 127 kg (279 lb 15.8 oz)   Height: 5' 11" (1.803 m)     Body mass index is 39.05 kg/m².    RESULTS: Reviewed labs from last 6 months. Reviewed stress test and PFT results.    Assessment:       1. SOB (shortness of breath)    2. Normocytic anemia    3. Bilateral lower extremity edema    4. Deviated septum    5. Diarrhea, unspecified type    6. Encounter for abdominal aortic aneurysm (AAA) screening        Plan:   Edis was seen today for follow-up, fever and diarrhea.    Diagnoses and all orders for this visit:    SOB (shortness of breath):  Prior dx, longstanding problem. Heart and lungs are normal. Review " "red blood cells with Hematology. After nasal septum surgery plan is to go for repeat sleep study and see about CPAP.    Normocytic anemia:  Prior dx, iron levels normal, follow up with Hematology.    Bilateral lower extremity edema:  Prior dx, longstanding, stable. Not significantly bothersome to the patient. Try compression stockings. I do not want to change diuretic dose at this time as his labs and blood pressure are all stable.  "Please purchase a pair of calf-high or knee-high compression stockings either over-the-counter from a pharmacy or at a [employer]urable Medical [employer] (DME).    Deviated septum:  Prior dx, will return for evaluation with ENT once cleared by Hematology, heart function is good.    Diarrhea, unspecified type:  New problem, acute onset today, also with some fever last night. Likely due to viral gastroenteritis or possible mild food poisoning. Please notify the office if the symptoms persist or worsen.     Encounter for abdominal aortic aneurysm (AAA) screening:  Repeat, last time obscured by gas:  "Before your aorta ultrasound, try using over-the-counter Gas-X with meals the day before, and then fast on the day of the test."  -     [facility] US AAA Screening; Future    Follow-up in about 4 months (around 8/5/2018).  Forest Miles MD  Internal Medicine    Portions of this note were completed using Keywee dictation software. Please excuse typographical or syntax errors.   "

## 2018-04-05 NOTE — PATIENT INSTRUCTIONS
Please purchase a pair of calf-high or knee-high compression stockings either over-the-counter from a pharmacy or at a [employer] Medical [employer] (DME).    Before your aorta ultrasound, try using over-the-counter Gas-X with meals the day before, and then fast on the day of the test.

## 2018-04-06 ENCOUNTER — INITIAL CONSULT (OUTPATIENT)
Dept: HEMATOLOGY/ONCOLOGY | Facility: CLINIC | Age: 73
End: 2018-04-06
Payer: COMMERCIAL

## 2018-04-06 ENCOUNTER — PATIENT MESSAGE (OUTPATIENT)
Dept: INTERNAL MEDICINE | Facility: CLINIC | Age: 73
End: 2018-04-06

## 2018-04-06 VITALS
OXYGEN SATURATION: 95 % | WEIGHT: 278.25 LBS | SYSTOLIC BLOOD PRESSURE: 109 MMHG | HEIGHT: 71 IN | HEART RATE: 65 BPM | BODY MASS INDEX: 38.95 KG/M2 | DIASTOLIC BLOOD PRESSURE: 65 MMHG

## 2018-04-06 DIAGNOSIS — R53.83 FATIGUE, UNSPECIFIED TYPE: Primary | ICD-10-CM

## 2018-04-06 DIAGNOSIS — D64.9 NORMOCYTIC ANEMIA: Primary | ICD-10-CM

## 2018-04-06 DIAGNOSIS — Z12.5 PROSTATE CANCER SCREENING: ICD-10-CM

## 2018-04-06 DIAGNOSIS — E78.5 HYPERLIPIDEMIA, UNSPECIFIED HYPERLIPIDEMIA TYPE: ICD-10-CM

## 2018-04-06 DIAGNOSIS — E66.9 OBESITY (BMI 35.0-39.9 WITHOUT COMORBIDITY): ICD-10-CM

## 2018-04-06 PROCEDURE — 3074F SYST BP LT 130 MM HG: CPT | Mod: CPTII,S$GLB,, | Performed by: INTERNAL MEDICINE

## 2018-04-06 PROCEDURE — 3078F DIAST BP <80 MM HG: CPT | Mod: CPTII,S$GLB,, | Performed by: INTERNAL MEDICINE

## 2018-04-06 PROCEDURE — 99204 OFFICE O/P NEW MOD 45 MIN: CPT | Mod: S$GLB,,, | Performed by: INTERNAL MEDICINE

## 2018-04-06 PROCEDURE — 99999 PR PBB SHADOW E&M-EST. PATIENT-LVL III: CPT | Mod: PBBFAC,,, | Performed by: INTERNAL MEDICINE

## 2018-04-06 NOTE — PROGRESS NOTES
Subjective:       Patient ID: Edis Huggins Jr. is a 72 y.o. male.    Chief Complaint: Anemia    HPI  72-year-old male presents for normocytic anemia evaluation.  Hemoglobin 3/1411.8.  WBC and platelets within normal limits.  Nutritional studies unremarkable.    He gets dyspnea when he walks.  None at rest.  Weight 278 pounds.  Height 511.    Trying to lose weight.    Review of Systems   Constitutional: Negative for fever and unexpected weight change.   HENT: Negative for mouth sores and nosebleeds.    Eyes: Negative for photophobia and pain.   Respiratory: Positive for shortness of breath. Negative for wheezing.    Cardiovascular: Negative for chest pain and palpitations.   Gastrointestinal: Negative for abdominal pain and vomiting.   Musculoskeletal: Negative for neck pain and neck stiffness.   Skin: Negative for rash and wound.   Neurological: Negative for seizures and syncope.   Hematological: Negative for adenopathy. Does not bruise/bleed easily.   Psychiatric/Behavioral: Negative for behavioral problems and confusion.         Objective:      Physical Exam   Constitutional: He is oriented to person, place, and time.  Non-toxic appearance. He does not have a sickly appearance. No distress.   HENT:   Nose: No epistaxis.   Mouth/Throat: Oropharynx is clear and moist. Mucous membranes are not pale, not dry and not cyanotic. No lacerations. No oropharyngeal exudate.   Eyes: Conjunctivae are normal. No scleral icterus.   Neck: Neck supple. No thyroid mass and no thyromegaly present.   Cardiovascular: Normal rate, regular rhythm, S1 normal and normal heart sounds.    Pulmonary/Chest: Effort normal and breath sounds normal. No accessory muscle usage or stridor. No respiratory distress. He has no wheezes. He has no rales.   Abdominal: Soft. He exhibits no ascites and no mass. There is no hepatosplenomegaly. There is no tenderness.   Musculoskeletal: He exhibits no edema.   Lymphadenopathy:        Head (right side):  No submental and no submandibular adenopathy present.        Head (left side): No submental and no submandibular adenopathy present.     He has no cervical adenopathy.     He has no axillary adenopathy.   Neurological: He is alert and oriented to person, place, and time. He has normal strength. No cranial nerve deficit or sensory deficit. Gait normal.   Skin: No bruising, no petechiae and no rash noted. He is not diaphoretic. No cyanosis.   Psychiatric: He has a normal mood and affect. Judgment and thought content normal. Cognition and memory are normal.   Vitals reviewed.        Assessment:       1. Normocytic anemia    2. Obesity (BMI 35.0-39.9 without comorbidity)        Plan:   Labs reviewed dating back to 2008.     He has chronic anemia.  It is stable.    No further workup needed at this point.    Counseled on weight loss.    Will monitor his blood counts and see him back for follow-up in 6 months.    Addendum - 4/11/18 - cleared for sinus surgery from heme standpoint

## 2018-04-06 NOTE — LETTER
April 6, 2018      Shakeel Bello MD  [address]  [address]  [facility] [address]           [facility]  [address] [address]  Phone: [phone]          Patient: Edis Huggins Jr.   MR Number: [id number]   YOB: 1945   Date of Visit: 4/6/2018       Dear Dr. Shakeel Bello:    Thank you for referring Edis Huggins to me for evaluation. Attached you will find relevant portions of my assessment and plan of care.    If you have questions, please do not hesitate to call me. I look forward to following Edis Huggins along with you.    Sincerely,    Asher Auguste MD    Enclosure  CC:  No Recipients    If you would like to receive this communication electronically, please contact [email or web address] or [phone] to request more information on [employer] Link access.    For providers and/or their staff who would like to refer a patient to [facility], please contact us through our one-stop-shop provider referral line, [facility], at [phone].    If you feel you have received this communication in error or would no longer like to receive these types of communications, please e-mail [email or web address]

## 2018-04-09 NOTE — TELEPHONE ENCOUNTER
Labs ordered.  Please schedule the patient for fasting blood work sometime in the next few days.  I have sent the patient a [facility] message.

## 2018-04-11 ENCOUNTER — PATIENT MESSAGE (OUTPATIENT)
Dept: HEMATOLOGY/ONCOLOGY | Facility: CLINIC | Age: 73
End: 2018-04-11

## 2018-04-11 ENCOUNTER — PATIENT MESSAGE (OUTPATIENT)
Dept: CARDIOLOGY | Facility: CLINIC | Age: 73
End: 2018-04-11

## 2018-04-12 ENCOUNTER — PATIENT MESSAGE (OUTPATIENT)
Dept: INTERNAL MEDICINE | Facility: CLINIC | Age: 73
End: 2018-04-12

## 2018-04-13 ENCOUNTER — LAB VISIT (OUTPATIENT)
Dept: LAB | Facility: HOSPITAL | Age: 73
End: 2018-04-13
Attending: INTERNAL MEDICINE
Payer: COMMERCIAL

## 2018-04-13 DIAGNOSIS — E78.5 HYPERLIPIDEMIA, UNSPECIFIED HYPERLIPIDEMIA TYPE: ICD-10-CM

## 2018-04-13 DIAGNOSIS — R53.83 FATIGUE, UNSPECIFIED TYPE: ICD-10-CM

## 2018-04-13 DIAGNOSIS — Z12.5 PROSTATE CANCER SCREENING: ICD-10-CM

## 2018-04-13 LAB
ALBUMIN SERPL BCP-MCNC: 3.9 G/DL
ALP SERPL-CCNC: 49 U/L
ALT SERPL W/O P-5'-P-CCNC: 26 U/L
AST SERPL-CCNC: 19 U/L
BILIRUB DIRECT SERPL-MCNC: 0.2 MG/DL
BILIRUB SERPL-MCNC: 0.4 MG/DL
CHOLEST SERPL-MCNC: 126 MG/DL
CHOLEST/HDLC SERPL: 4.1 {RATIO}
COMPLEXED PSA SERPL-MCNC: 0.89 NG/ML
HDLC SERPL-MCNC: 31 MG/DL
HDLC SERPL: 24.6 %
LDLC SERPL CALC-MCNC: 69.4 MG/DL
NONHDLC SERPL-MCNC: 95 MG/DL
PROT SERPL-MCNC: 7.4 G/DL
TRIGL SERPL-MCNC: 128 MG/DL

## 2018-04-13 PROCEDURE — 36415 COLL VENOUS BLD VENIPUNCTURE: CPT

## 2018-04-13 PROCEDURE — 84153 ASSAY OF PSA TOTAL: CPT

## 2018-04-13 PROCEDURE — 84403 ASSAY OF TOTAL TESTOSTERONE: CPT

## 2018-04-13 PROCEDURE — 82040 ASSAY OF SERUM ALBUMIN: CPT

## 2018-04-13 PROCEDURE — 80076 HEPATIC FUNCTION PANEL: CPT

## 2018-04-13 PROCEDURE — 80061 LIPID PANEL: CPT

## 2018-04-16 ENCOUNTER — HOSPITAL ENCOUNTER (OUTPATIENT)
Dept: VASCULAR SURGERY | Facility: CLINIC | Age: 73
Discharge: HOME | End: 2018-04-16
Attending: INTERNAL MEDICINE
Payer: COMMERCIAL

## 2018-04-16 ENCOUNTER — PATIENT MESSAGE (OUTPATIENT)
Dept: INTERNAL MEDICINE | Facility: CLINIC | Age: 73
End: 2018-04-16

## 2018-04-16 DIAGNOSIS — Z13.6 ENCOUNTER FOR ABDOMINAL AORTIC ANEURYSM (AAA) SCREENING: ICD-10-CM

## 2018-04-16 LAB
ALBUMIN SERPL-MCNC: 4.3 G/DL (ref 3.6–5.1)
SHBG SERPL-SCNC: 31 NMOL/L (ref 22–77)
TESTOST FREE SERPL-MCNC: 30.2 PG/ML (ref 6–73)
TESTOST SERPL-MCNC: 229 NG/DL (ref 250–1100)
TESTOSTERONE.FREE+WB SERPL-MCNC: 59.4 NG/DL (ref 15–150)

## 2018-04-16 PROCEDURE — 93978 VASCULAR STUDY: CPT | Mod: S$GLB,,, | Performed by: SURGERY

## 2018-04-19 ENCOUNTER — CLINICAL SUPPORT (OUTPATIENT)
Dept: INTERNAL MEDICINE | Facility: CLINIC | Age: 73
End: 2018-04-19
Payer: COMMERCIAL

## 2018-04-19 VITALS — BODY MASS INDEX: 39.14 KG/M2 | WEIGHT: 280.63 LBS

## 2018-04-19 PROCEDURE — 99999 PR PBB SHADOW E&M-EST. PATIENT-LVL I: CPT | Mod: PBBFAC,,,

## 2018-04-20 ENCOUNTER — OFFICE VISIT (OUTPATIENT)
Dept: CARDIOLOGY | Facility: CLINIC | Age: 73
End: 2018-04-20
Payer: COMMERCIAL

## 2018-04-20 VITALS
HEART RATE: 60 BPM | HEIGHT: 70 IN | BODY MASS INDEX: 39.76 KG/M2 | SYSTOLIC BLOOD PRESSURE: 109 MMHG | DIASTOLIC BLOOD PRESSURE: 63 MMHG | WEIGHT: 277.75 LBS

## 2018-04-20 DIAGNOSIS — I65.29 CAROTID ATHEROSCLEROSIS, UNSPECIFIED LATERALITY: ICD-10-CM

## 2018-04-20 DIAGNOSIS — I48.0 PAROXYSMAL ATRIAL FIBRILLATION: ICD-10-CM

## 2018-04-20 DIAGNOSIS — I10 ESSENTIAL HYPERTENSION: ICD-10-CM

## 2018-04-20 DIAGNOSIS — I48.91 ATRIAL FIBRILLATION, UNSPECIFIED TYPE: Primary | ICD-10-CM

## 2018-04-20 DIAGNOSIS — R06.09 DYSPNEA ON EXERTION: ICD-10-CM

## 2018-04-20 DIAGNOSIS — E66.9 OBESITY (BMI 35.0-39.9 WITHOUT COMORBIDITY): ICD-10-CM

## 2018-04-20 DIAGNOSIS — R73.01 IMPAIRED FASTING GLUCOSE: ICD-10-CM

## 2018-04-20 DIAGNOSIS — E78.5 HYPERLIPIDEMIA, UNSPECIFIED HYPERLIPIDEMIA TYPE: ICD-10-CM

## 2018-04-20 PROCEDURE — 99214 OFFICE O/P EST MOD 30 MIN: CPT | Mod: S$GLB,,, | Performed by: INTERNAL MEDICINE

## 2018-04-20 PROCEDURE — 99999 PR PBB SHADOW E&M-EST. PATIENT-LVL III: CPT | Mod: PBBFAC,,, | Performed by: INTERNAL MEDICINE

## 2018-04-20 PROCEDURE — 3074F SYST BP LT 130 MM HG: CPT | Mod: CPTII,S$GLB,, | Performed by: INTERNAL MEDICINE

## 2018-04-20 PROCEDURE — 3078F DIAST BP <80 MM HG: CPT | Mod: CPTII,S$GLB,, | Performed by: INTERNAL MEDICINE

## 2018-04-20 NOTE — PROGRESS NOTES
Subjective:    Patient ID:  Edis Huggins Jr. is a 72 y.o. male who presents for follow-up of Atrial Fibrillation and Shortness of Breath      HPI  73 y/o male with hx of HTN, HLD, who presents for f/u.   Last clinic visit was seen for SOB at which time he had an SONYA which he did not reach target HR, normal EF, diastolic function indeterminate, and no evidence of ischemia. Follow up nuclear SPECT with no evidence of ischemia. Had Holter monitor which showed a single run of Paroxysmal atrial fibrillation lasting for 13 sec.   Has bilateral LE edema which has improved with chlorthalidone. SOB has also improved. Denies CP, orthopnea, PND, syncope, palps. Compliant with meds.     Review of Systems   Constitution: Negative for weakness and malaise/fatigue.   HENT: Negative for congestion.    Eyes: Negative for blurred vision.   Cardiovascular: Positive for dyspnea on exertion and leg swelling. Negative for chest pain, claudication, cyanosis, irregular heartbeat, near-syncope, orthopnea, palpitations, paroxysmal nocturnal dyspnea and syncope.   Respiratory: Negative for shortness of breath.    Endocrine: Negative for polyuria.   Hematologic/Lymphatic: Negative for bleeding problem.   Skin: Negative for itching and rash.   Musculoskeletal: Negative for joint swelling, muscle cramps and muscle weakness.   Gastrointestinal: Negative for abdominal pain, hematemesis, hematochezia, melena, nausea and vomiting.   Genitourinary: Negative for dysuria and hematuria.   Neurological: Negative for dizziness, focal weakness, headaches, light-headedness and loss of balance.   Psychiatric/Behavioral: Negative for depression. The patient is not nervous/anxious.         Objective:    Physical Exam   Constitutional: He is oriented to person, place, and time. He appears well-developed and well-nourished.   HENT:   Head: Normocephalic and atraumatic.   Neck: Neck supple. No JVD present.   Cardiovascular: Normal rate, regular rhythm and  normal heart sounds.    Pulses:       Carotid pulses are 2+ on the right side, and 2+ on the left side.       Radial pulses are 2+ on the right side, and 2+ on the left side.        Femoral pulses are 2+ on the right side, and 2+ on the left side.       Posterior tibial pulses are 1+ on the right side, and 1+ on the left side.   Pulmonary/Chest: Effort normal and breath sounds normal.   Abdominal: Soft. Bowel sounds are normal.   Musculoskeletal: He exhibits edema.   Neurological: He is alert and oriented to person, place, and time.   Skin: Skin is warm and dry.   Psychiatric: He has a normal mood and affect. His behavior is normal. Thought content normal.         Assessment:       1. Atrial fibrillation, unspecified type    2. Paroxysmal atrial fibrillation    3. Essential hypertension    4. Dyspnea on exertion    5. Obesity (BMI 35.0-39.9 without comorbidity)    6. Impaired fasting glucose    7. Hyperlipidemia, unspecified hyperlipidemia type    8. Carotid atherosclerosis, unspecified laterality      71 y/o male with hx and presentation as above. Will evaluate Holter findings with 30 day event monitor. Depending on findings, may or may not consider anticoagulation.       Plan:       -30 day event monitor  -f/u in 2 months

## 2018-04-23 ENCOUNTER — PATIENT MESSAGE (OUTPATIENT)
Dept: CARDIOLOGY | Facility: CLINIC | Age: 73
End: 2018-04-23

## 2018-04-23 PROBLEM — I48.0 PAROXYSMAL ATRIAL FIBRILLATION: Status: ACTIVE | Noted: 2018-04-23

## 2018-04-23 NOTE — PROGRESS NOTES
Health  Follow-Up Note   [x] Office  [] Phone  Notes from previous session reviewed.   [x] Previous Session Goals unchanged.   [] Patient/Caregiver Change in Goals.  Goals added or changed by Patient/Caregiver since program participation:  1.  Continue same plan   2. Check with Dr. Miles about vitamin levels       Additional/Changed support that patient/caregiver has experienced/sought?  (Indicate readiness, support from family, friends, others, community groups, etc)  1.  Wife trying to lose weight also with him    Additional/Changed obstacles that could prevent patient/caregiver from reaching their goals?  1.  Eating out     Feedback provided:  1.  Praised for great job down 7.27 lb     Diagnostic values/Desriptors for follow-up as needed for chronic condition(s)   Weight: 127.3 kg 280.65 lb    Interventions:   1. Health  listened reflectively, validated thoughts and feelings, offered support and encouragement.   2. Allowed patient to express themselves in a non-biased atmosphere.  3. Health  assisted pt to problem-solve obstacles such as being in a challenging environment and dealing with these challenges.   4. Motivational Interviewed interventions utilized (OARS).   5. Patient responded favorably to interventions and remained actively engaged in the session.   6. Health  will remain available and connected for patient by phone and/or office visits.   7. Positive reinforcement, emotional support and encouragement provided.   8. Focused Education: MI    Plan:  [x] Pt will work on goals as stated above.   [x] Pt will contact Health  for any questions, concerns or needs.  [x] Pt will follow up with Health  in office on   5/18/18 at 1500.     [] Pt will follow up with Health  on phone in:        [x] Health  will remain available.   [] Health  will contact patient by phone in:        [] Health  will consult:        [] Health  will inform Provider via [employer]  messaging.     Impression:  1. Behavior is consistent with  Action     Stage of Change.   2. Participation level:       [x] Receptive      [x] Interactive      [] Guarded and Resistant      [x] Self Motivated      [] Refused/Declined to participate   3. [x] Pt voiced understanding of all information presented.       [] Pt voiced needing further information/education. This will be arranged.       [x] Pt would benefit from further education/information as identified by this health . This will be arranged.     Sandy Moses RN HC

## 2018-04-25 ENCOUNTER — CLINICAL SUPPORT (OUTPATIENT)
Dept: ELECTROPHYSIOLOGY | Facility: CLINIC | Age: 73
End: 2018-04-25
Attending: INTERNAL MEDICINE
Payer: COMMERCIAL

## 2018-04-25 DIAGNOSIS — I48.91 ATRIAL FIBRILLATION, UNSPECIFIED TYPE: ICD-10-CM

## 2018-04-25 PROCEDURE — 93268 ECG RECORD/REVIEW: CPT | Mod: S$GLB,,, | Performed by: INTERNAL MEDICINE

## 2018-04-27 ENCOUNTER — PATIENT MESSAGE (OUTPATIENT)
Dept: INTERNAL MEDICINE | Facility: CLINIC | Age: 73
End: 2018-04-27

## 2018-04-27 DIAGNOSIS — E29.1 HYPOGONADISM MALE: Primary | ICD-10-CM

## 2018-04-28 NOTE — TELEPHONE ENCOUNTER
please schedule the patient for non-fasting 8 AM labs sometime this week. I have sent the patient a [facility] message.

## 2018-04-29 ENCOUNTER — TELEPHONE (OUTPATIENT)
Dept: INTERNAL MEDICINE | Facility: CLINIC | Age: 73
End: 2018-04-29

## 2018-04-29 ENCOUNTER — PATIENT MESSAGE (OUTPATIENT)
Dept: INTERNAL MEDICINE | Facility: CLINIC | Age: 73
End: 2018-04-29

## 2018-04-29 DIAGNOSIS — E55.9 VITAMIN D INSUFFICIENCY: Primary | ICD-10-CM

## 2018-04-29 NOTE — TELEPHONE ENCOUNTER
Please include vitamin D levels with the upcoming testosterone hormone labs (see phone note from earlier this week). I have sent the patient a [facility] message.

## 2018-04-30 ENCOUNTER — TELEPHONE (OUTPATIENT)
Dept: INTERNAL MEDICINE | Facility: CLINIC | Age: 73
End: 2018-04-30

## 2018-04-30 DIAGNOSIS — I10 ESSENTIAL HYPERTENSION: ICD-10-CM

## 2018-04-30 RX ORDER — AMLODIPINE BESYLATE 10 MG/1
TABLET ORAL
Qty: 90 TABLET | Refills: 3 | Status: SHIPPED | OUTPATIENT
Start: 2018-04-30 | End: 2018-05-28 | Stop reason: SDUPTHER

## 2018-05-04 ENCOUNTER — LAB VISIT (OUTPATIENT)
Dept: LAB | Facility: HOSPITAL | Age: 73
End: 2018-05-04
Attending: INTERNAL MEDICINE
Payer: COMMERCIAL

## 2018-05-04 DIAGNOSIS — E55.9 VITAMIN D INSUFFICIENCY: ICD-10-CM

## 2018-05-04 DIAGNOSIS — E29.1 HYPOGONADISM MALE: ICD-10-CM

## 2018-05-04 LAB
25(OH)D3+25(OH)D2 SERPL-MCNC: 28 NG/ML
FSH SERPL-ACNC: 2.9 MIU/ML
LH SERPL-ACNC: 3.7 MIU/ML
TESTOST SERPL-MCNC: 266 NG/DL

## 2018-05-04 PROCEDURE — 82306 VITAMIN D 25 HYDROXY: CPT

## 2018-05-04 PROCEDURE — 36415 COLL VENOUS BLD VENIPUNCTURE: CPT

## 2018-05-04 PROCEDURE — 84403 ASSAY OF TOTAL TESTOSTERONE: CPT

## 2018-05-04 PROCEDURE — 83001 ASSAY OF GONADOTROPIN (FSH): CPT

## 2018-05-04 PROCEDURE — 83002 ASSAY OF GONADOTROPIN (LH): CPT

## 2018-05-04 RX ORDER — AZELASTINE 1 MG/ML
1 SPRAY, METERED NASAL 2 TIMES DAILY
Qty: 30 ML | Refills: 0 | Status: SHIPPED | OUTPATIENT
Start: 2018-05-04 | End: 2018-06-27 | Stop reason: SDUPTHER

## 2018-05-07 ENCOUNTER — PATIENT MESSAGE (OUTPATIENT)
Dept: INTERNAL MEDICINE | Facility: CLINIC | Age: 73
End: 2018-05-07

## 2018-05-07 DIAGNOSIS — R79.89 LOW TESTOSTERONE IN MALE: Primary | ICD-10-CM

## 2018-05-07 NOTE — TELEPHONE ENCOUNTER
I am referring to Urology for further evaluation of testosterone levels. Please schedule this appointment with the patient. I have sent the patient a [facility] message.

## 2018-05-18 ENCOUNTER — CLINICAL SUPPORT (OUTPATIENT)
Dept: INTERNAL MEDICINE | Facility: CLINIC | Age: 73
End: 2018-05-18
Payer: COMMERCIAL

## 2018-05-18 VITALS — WEIGHT: 284.38 LBS | BODY MASS INDEX: 40.81 KG/M2

## 2018-05-18 NOTE — PROGRESS NOTES
Health  Follow-Up Note   [x] Office  [] Phone  Notes from previous session reviewed.   [x] Previous Session Goals unchanged.   [] Patient/Caregiver Change in Goals.  Goals added or changed by Patient/Caregiver since program participation:  1. Continue same plan continue to decrease carbs increase healthy real food  2. Off sodas only occassional     3. Increase iron rich foods  4. Get deviated septum fixed if cleared for surgury     Additional/Changed support that patient/caregiver has experienced/sought?  (Indicate readiness, support from family, friends, others, community groups, etc)  1.  Wife and sister    Additional/Changed obstacles that could prevent patient/caregiver from reaching their goals?  1.  Special occasions eating out at work    Feedback provided:  1. Praised for continued effort and determination    Diagnostic values/Desriptors for follow-up as needed for chronic condition(s)   Weight: 129 kg 284.39 lb gain 3.74 lb     Interventions:   1. Health  listened reflectively, validated thoughts and feelings, offered support and encouragement.   2. Allowed patient to express themselves in a non-biased atmosphere.  3. Health  assisted pt to problem-solve obstacles such as being in a challenging environment and dealing with these challenges.   4. Motivational Interviewed interventions utilized (OARS).   5. Patient responded favorably to interventions and remained actively engaged in the session.   6. Health  will remain available and connected for patient by phone and/or office visits.   7. Positive reinforcement, emotional support and encouragement provided.   8. Focused Education: MI, iron rich foods    Plan:  [x] Pt will work on goals as stated above.   [x] Pt will contact Health  for any questions, concerns or needs.  [x] Pt will follow up with Health  in office on  8/3/18 at 1530      [] Pt will follow up with Health  on phone in:        [x] Health  will remain  available.   [] Health  will contact patient by phone in:        [] Health  will consult:        [] Health  will inform Provider via [employer] messaging.     Impression:  1. Behavior is consistent with    Action   Stage of Change.   2. Participation level:       [x] Receptive      [x] Interactive      [] Guarded and Resistant      [x] Self Motivated      [] Refused/Declined to participate   3. [x] Pt voiced understanding of all information presented.       [] Pt voiced needing further information/education. This will be arranged.       [x] Pt would benefit from further education/information as identified by this health . This will be arranged.     Sandy Moses RN HC

## 2018-05-21 DIAGNOSIS — I10 ESSENTIAL HYPERTENSION: ICD-10-CM

## 2018-05-21 RX ORDER — VALSARTAN 320 MG/1
TABLET ORAL
Qty: 90 TABLET | Refills: 3 | Status: SHIPPED | OUTPATIENT
Start: 2018-05-21 | End: 2018-06-19 | Stop reason: SDUPTHER

## 2018-05-28 ENCOUNTER — OFFICE VISIT (OUTPATIENT)
Dept: UROLOGY | Facility: CLINIC | Age: 73
End: 2018-05-28
Payer: COMMERCIAL

## 2018-05-28 VITALS
SYSTOLIC BLOOD PRESSURE: 121 MMHG | HEART RATE: 62 BPM | WEIGHT: 280 LBS | BODY MASS INDEX: 39.2 KG/M2 | HEIGHT: 71 IN | DIASTOLIC BLOOD PRESSURE: 70 MMHG

## 2018-05-28 DIAGNOSIS — N13.8 BPH WITH URINARY OBSTRUCTION: ICD-10-CM

## 2018-05-28 DIAGNOSIS — I10 ESSENTIAL HYPERTENSION: ICD-10-CM

## 2018-05-28 DIAGNOSIS — N52.01 ERECTILE DYSFUNCTION DUE TO ARTERIAL INSUFFICIENCY: ICD-10-CM

## 2018-05-28 DIAGNOSIS — N40.1 BPH WITH URINARY OBSTRUCTION: ICD-10-CM

## 2018-05-28 DIAGNOSIS — E78.5 HYPERLIPIDEMIA, UNSPECIFIED HYPERLIPIDEMIA TYPE: ICD-10-CM

## 2018-05-28 DIAGNOSIS — E29.1 MALE HYPOGONADISM: Primary | ICD-10-CM

## 2018-05-28 PROCEDURE — 99999 PR PBB SHADOW E&M-EST. PATIENT-LVL III: CPT | Mod: PBBFAC,,, | Performed by: UROLOGY

## 2018-05-28 PROCEDURE — 99244 OFF/OP CNSLTJ NEW/EST MOD 40: CPT | Mod: S$GLB,,, | Performed by: UROLOGY

## 2018-05-28 RX ORDER — AMLODIPINE BESYLATE 10 MG/1
TABLET ORAL
Qty: 90 TABLET | Refills: 0 | Status: SHIPPED | OUTPATIENT
Start: 2018-05-28 | End: 2018-08-24 | Stop reason: SDUPTHER

## 2018-05-28 RX ORDER — ALUMINUM ZIRCONIUM OCTACHLOROHYDREX GLY 16 G/100G
4 GEL TOPICAL DAILY
COMMUNITY

## 2018-05-28 RX ORDER — TESTOSTERONE 20.25 MG/1.25G
GEL TOPICAL
Qty: 75 G | Refills: 5 | Status: SHIPPED | OUTPATIENT
Start: 2018-05-28 | End: 2018-06-20 | Stop reason: SDUPTHER

## 2018-05-28 NOTE — LETTER
May 28, 2018      Forest Miles MD  [address]  [place] [address]           [facility]rology 4th Floor  [address]  [place] [address]  Phone: [phone]          Patient: Edis Huggins Jr.   MR Number: [id number]   YOB: 1945   Date of Visit: 5/28/2018       Dear Dr. Forest Miles:    Thank you for referring Edis Huggins to me for evaluation. Attached you will find relevant portions of my assessment and plan of care.    If you have questions, please do not hesitate to call me. I look forward to following Edis Huggins along with you.    Sincerely,    Isak Kitchen MD    Enclosure  CC:  No Recipients    If you would like to receive this communication electronically, please contact [email or web address] or [phone] to request more information on [employer] Link access.    For providers and/or their staff who would like to refer a patient to [facility], please contact us through our one-stop-shop provider referral line, [facility], at [phone].    If you feel you have received this communication in error or would no longer like to receive these types of communications, please e-mail [email or web address]

## 2018-05-28 NOTE — PROGRESS NOTES
CHIEF COMPLAINT:    Mr. Huggins is a 73 y.o. male presenting for a consultation at the request of Dr. Miles. Patient presents with hypogonadism.    PRESENTING ILLNESS:    Edis Huggins Jr. is a 73 y.o. male who went to [facility] with Alex who was found to have a low T.  He c/o fatigue.      He has some LUTS for which he's on proscar and flomax.  He has nocturia x 3 and is pleased with how he voids.  No hematuria. No dysuria.    He has ED.  This has been present for > 1 year.  He's not sexually active, so it doesn't bother him.    REVIEW OF SYSTEMS:    Edis Huggins Jr. denies headache, blurred vision, fever, nausea, vomiting, chills, abdominal pain, bleeding per rectum, cough, SOB, recent loss of consciousness, recent mental status changes, seizures, dizziness, or upper or lower extremity weakness.    ROMMEL  1. 1  2. 5  3. 0  4. 0  5. 0      PATIENT HISTORY:    Past Medical History:   Diagnosis Date    Allergic rhinitis due to animal (cat) (dog) hair and dander 3/20/2014    Allergy     Anemia     Anxiety     Basal cell carcinoma     BPH (benign prostatic hypertrophy)     Family history of colon cancer 3/20/2014    Family history of ischemic heart disease 3/20/2014    HLD (hyperlipidemia)     HTN (hypertension)     IFG (impaired fasting glucose)     Primary open angle glaucoma     Status post total knee replacement 3/20/2014       Past Surgical History:   Procedure Laterality Date    CATARACT EXTRACTION W/  INTRAOCULAR LENS IMPLANT Left 02/08/2017    WITH KAHOOK GONIOTOMY (Dr. Khan)    CATARACT EXTRACTION W/  INTRAOCULAR LENS IMPLANT Right 05/10/2017        HERNIA REPAIR      x 2    TONSILLECTOMY      TOTAL KNEE ARTHROPLASTY Right        Family History   Problem Relation Age of Onset    Colon cancer Maternal Grandmother         70s    Cataracts Mother     Atrial fibrillation Mother     Cataracts Father     Heart disease Father 85        Valve disorder     Glaucoma Brother     Prostate cancer Neg Hx     Amblyopia Neg Hx     Blindness Neg Hx     Macular degeneration Neg Hx     Retinal detachment Neg Hx     Strabismus Neg Hx     Diabetes Neg Hx     Melanoma Neg Hx     Psoriasis Neg Hx     Lupus Neg Hx     Eczema Neg Hx     Acne Neg Hx        Social History     Social History    Marital status:      Spouse name: N/A    Number of children: N/A    Years of education: N/A     Occupational History     [employer]     Social History Main Topics    Smoking status: Former Smoker     Packs/day: 1.00     Years: 10.00     Quit date: 1/1/1974    Smokeless tobacco: Never Used    Alcohol use 0.0 oz/week      Comment: one drink a month    Drug use: No    Sexual activity: Not on file     Other Topics Concern    Not on file     Social History Narrative    No narrative on file       Allergies:  Patient has no known allergies.    Medications:    Current Outpatient Prescriptions:     acyclovir (ZOVIRAX) 400 MG tablet, TAKE 1 TABLET BY MOUTH THREE TIMES DAILY FOR 5 DAYS AT SIGN OF FEVER BLISTER, Disp: 30 tablet, Rfl: 0    amLODIPine (NORVASC) 10 MG tablet, TAKE 1 TABLET(10 MG) BY MOUTH EVERY DAY, Disp: 90 tablet, Rfl: 3    azelastine (ASTELIN) 137 mcg (0.1 %) nasal spray, 1 spray (137 mcg total) by Nasal route 2 (two) times daily., Disp: 30 mL, Rfl: 0    Bifidobacterium infantis (ALIGN) 4 mg Cap, Take by mouth., Disp: , Rfl:     cetirizine (ZYRTEC) 10 MG tablet, Take 10 mg by mouth once daily., Disp: , Rfl:     chlorthalidone (HYGROTEN) 50 MG Tab, Take 1 tablet (50 mg total) by mouth once daily., Disp: 90 tablet, Rfl: 3    citalopram (CELEXA) 20 MG tablet, TAKE 1 TABLET BY MOUTH DAILY, Disp: 90 tablet, Rfl: 3    finasteride (PROSCAR) 5 mg tablet, TAKE 1 TABLET(5 MG) BY MOUTH EVERY DAY, Disp: 90 tablet, Rfl: 3    fish oil-omega-3 fatty acids 300-1,000 mg capsule, Take 2 g by mouth 3 (three) times daily. , Disp: , Rfl:     gemfibrozil  (LOPID) 600 MG tablet, TAKE 1 TABLET BY MOUTH TWICE DAILY, Disp: 60 tablet, Rfl: 5    tamsulosin (FLOMAX) 0.4 mg Cp24, TAKE 1 CAPSULE(0.4 MG) BY MOUTH EVERY DAY, Disp: 90 capsule, Rfl: 3    timolol maleate 0.5% (TIMOPTIC-XE) 0.5 % SolG, Place 1 drop into both eyes every morning., Disp: 5 mL, Rfl: 12    valsartan (DIOVAN) 320 MG tablet, TAKE 1 TABLET(320 MG) BY MOUTH EVERY DAY, Disp: 90 tablet, Rfl: 3    vitamin E 1000 UNIT capsule, Take 1,000 Units by mouth once daily., Disp: , Rfl:     PHYSICAL EXAMINATION:    The patient generally appears in good health, is appropriately interactive, and is in no apparent distress.     Eyes: anicteric sclerae, moist conjunctivae; no lid-lag; PERRLA     HENT: Atraumatic; oropharynx clear with moist mucous membranes and no mucosal ulcerations;normal hard and soft palate.  No evidence of lymphadenopathy.    Neck: Trachea midline.  No thyromegaly.    Musculoskeletal: No abnormal gait.    Skin: No lesions.    Mental: Cooperative with normal affect.  Is oriented to time, place, and person.    Neuro: Grossly intact.    Chest: Normal inspiratory effort.   No accessory muscles.  No audible wheezes.  Respirations symmetric on inspiration and expiration.    Heart: Regular rhythm.      Abdomen:  Soft, non-tender. No masses or organomegaly. Bladder is not palpable. No evidence of flank discomfort. No evidence of inguinal hernia.    Genitourinary: The penis is circumcised with no evidence of plaques or induration. The urethral meatus is normal. The testes, epididymides, and cord structures are normal in size and contour bilaterally. The scrotum is normal in size and contour.    Normal anal sphincter tone. No rectal mass.    The prostate is 30 g. Normal landmarks. Lateral sulci. Median furrow intact.  No nodularity or induration. Seminal vesicles are normal.    Extremities: No clubbing, cyanosis, or edema      LABS:    On Proscar  Lab Results   Component Value Date    PSA 0.89 04/13/2018     PSA 0.66 03/30/2017    PSA 0.68 06/20/2016       IMPRESSION:    Encounter Diagnoses   Name Primary?    Male hypogonadism Yes    BPH with urinary obstruction          PLAN:    1. Will draw a prolactin.  2. Continue flomax and proscar for his LUTS.  3. Will observe the ED as it's not bothersome.  4. Discussed the risks and benefits of testosterone replacement today.  This included possible cardiac risks.  He would like to try this.  Will check a T in 2 weeks and adjust the dose of TRT if necessary.  He can then RTC 3 months with T, PSA, CBC, hepatic panel, lipid panel.  A new Rx for Androgel 1.62% was given today.       Copy to: Jd

## 2018-05-28 NOTE — PATIENT INSTRUCTIONS
What is this medicine?  TESTOSTERONE (isaac TOS ter one) is the main male hormone. It supports normal male traits such as muscle growth, facial hair, and deep voice. This gel is used in males to treat low testosterone levels.  This medicine may be used for other purposes; ask your health care provider or pharmacist if you have questions.  What should I tell my health care provider before I take this medicine?  They need to know if you have any of these conditions:  · breast cancer  · diabetes  · heart disease  · if a female partner is pregnant or trying to get pregnant  · kidney disease  · liver disease  · lung disease  · prostate cancer, enlargement  · an unusual or allergic reaction to testosterone, soy proteins, other medicines, foods, dyes, or preservatives  · pregnant or trying to get pregnant  · breast-feeding  How should I use this medicine?  This medicine is for external use only. This medicine is applied at the same time every day (preferably in the morning) to clean, dry, intact skin. If you take a bath or shower in the morning, apply the gel after the bath or shower. Follow the directions on the prescription label. Make sure that you are using your testosterone gel product correctly and applying it only to the appropriate skin area (see below). Allow the skin to dry a few minutes then cover with clothing to prevent others from coming in contact with the medicine on your skin. The gel is flammable. Avoid fire, flame, or smoking until the gel has dried. Wash your hands with soap and water after use.  For AndroGel Packets: Open the packet(s) needed for your dose. You can put the entire dose into your palm all at once or just a little at a time to apply. If you prefer, you can instead squeeze the gel directly onto the area you are applying it to. Apply on the shoulders as directed. Do not apply to the scrotum or genitals. Be sure you use the correct total dose. It is best to wait 5 to 6 hours after application  of the gel before showering or swimming.  For AndroGel 1%: Pump the dose into the palm of your hand. You can put the entire dose into your palm all at once or just a little at a time to apply. If you prefer, you can instead pump the gel directly onto the area you are applying it to. Apply on the shoulders as directed. Do not apply to the scrotum or genitals. Be sure you use the correct total dose. It is best to wait for 5 to 6 hours after application of the gel before showering or swimming.  For AndroGel 1.62%: Pump the dose into the palm of your hand. Dispense one pump of gel at a time into the palm of your hand before applying it. If you prefer, you can instead pump the gel directly onto the area you are applying it to. Apply on the shoulders and upper arms as directed. Do not apply to other parts of the body including the abdomen or genitals. Be sure you use the correct total dose. It is best to wait 2 hours after application of the gel before washing, showering, or swimming.  For Testim: Open the tube(s) needed for your dose. Squeeze the gel from the tube into the palm of your hand. Apply on the shoulders or upper arms as directed. Do not apply to the scrotum, genitals, or abdomen. Be sure you use the correct total dose. Do not shower or swim for at least 2 hours after application of the gel.  For Fortesta: Use the multi-dose pump to pump the gel directly onto the area you are applying it to. Apply on the thighs as directed. Do not apply to the abdomen, penis, scrotum, shoulders or upper arms. Gently rub the gel onto the skin using your finger. Be sure you use the correct total dose. Do not shower or swim for at least 2 hours after application of the gel.  For Axiron: Use the multi-dose pump to pump the gel into the applicator.  Apply under the arm as directed.  Do not apply to other locations.  Do not shower or swim for at least 2 hours after application of the gel.  A special MedGuide will be given to you by  the pharmacist with each prescription and refill. Be sure to read this information carefully each time.  Talk to your pediatrician regarding the use of this medicine in children. Special care may be needed.  Overdosage: If you think you have taken too much of this medicine contact a poison control center or emergency room at once.  NOTE: This medicine is only for you. Do not share this medicine with others.  What if I miss a dose?  If you miss a dose, use it as soon as you can. If it is almost time for your next dose, use only that dose. Do not use double or extra doses.  What may interact with this medicine?  · medicines for diabetes  · medicines that treat or prevent blood clots like warfarin  · oxyphenbutazone  · propranolol  · steroid medicines like prednisone or cortisone  This list may not describe all possible interactions. Give your health care provider a list of all the medicines, herbs, non-prescription drugs, or dietary supplements you use. Also tell them if you smoke, drink alcohol, or use illegal drugs. Some items may interact with your medicine.  What should I watch for while using this medicine?  Visit your doctor or health care professional for regular checks on your progress. They will need to check the level of testosterone in your blood.  This medicine can transfer from your body to others. If a person or pet comes in contact with the area where this medicine was applied to your skin, they may have a serious risk of side effects. If you cannot avoid skin-to-skin contact with another person, make sure the site where this medicine was applied is covered with clothing. If accidental contact happens, the skin of the person or pet should be washed right away with soap and water. Also, a female partner who is pregnant or trying to get pregnant should avoid contact with the gel or treated skin.  This medicine may affect blood sugar levels. If you have diabetes, check with your doctor or health care  professional before you change your diet or the dose of your diabetic medicine.  This drug is banned from use in athletes by most athletic organizations.  What side effects may I notice from receiving this medicine?  Side effects that you should report to your doctor or health care professional as soon as possible:  · allergic reactions like skin rash, itching or hives, swelling of the face, lips, or tongue  · breast enlargement  · breathing problems  · changes in mood, especially anger, depression, or rage  · dark urine  · general ill feeling or flu-like symptoms  · light-colored stools  · loss of appetite, nausea  · nausea, vomiting  · right upper belly pain  · stomach pain  · swelling of ankles  · too frequent or persistent erections  · trouble passing urine or change in the amount of urine  · unusually weak or tired  · yellowing of the eyes or skin  Side effects that usually do not require medical attention (report to your doctor or health care professional if they continue or are bothersome):  · acne  · change in sex drive or performance  · hair loss  · headache  This list may not describe all possible side effects. Call your doctor for medical advice about side effects. You may report side effects to FDA at 1-802-FDA-4693.  Where should I keep my medicine?  Keep out of the reach of children. This medicine can be abused. Keep your medicine in a safe place to protect it from theft. Do not share this medicine with anyone. Selling or giving away this medicine is dangerous and against the law.  Store at room temperature between 15 to 30 degrees C (59 to 86 degrees F). Keep closed until use. Protect from heat and light. This medicine is flammable. Avoid exposure to heat, fire, flame, and smoking. Throw away any unused medicine after the expiration date.  NOTE:This sheet is a summary. It may not cover all possible information. If you have questions about this medicine, talk to your doctor, pharmacist, or health care  provider. Copyright© 2011 [employer]

## 2018-06-04 ENCOUNTER — TELEPHONE (OUTPATIENT)
Dept: PHARMACY | Facility: CLINIC | Age: 73
End: 2018-06-04

## 2018-06-04 NOTE — TELEPHONE ENCOUNTER
Called and spoke with patient notifying him Androgel Pump PA was denied by his insurance.    Explained to the patient it was denied because 2 low testosterone levels drawn before 11am must be on file, along with documented chart note from the doctor of a specific non-sexual function.  Explained to the patient I would notify Dr. Kitchen the reasons why the PA was denied.    Patient indicated that he is willing to have blood re-drawn.  He is leaving town Thursday morning and if possible would like to have this test done before then.    Pa information:  [employer], [phone].  Information to file appeal, MD's office only: Clinical Services Team: [phone] (phone), [phone] (fax).    Thanks,   Monica Horvath  Patient Care Advocate   [facility]  Phone: [phone]  Fax: [phone]

## 2018-06-07 ENCOUNTER — PATIENT MESSAGE (OUTPATIENT)
Dept: UROLOGY | Facility: CLINIC | Age: 73
End: 2018-06-07

## 2018-06-18 ENCOUNTER — PATIENT MESSAGE (OUTPATIENT)
Dept: UROLOGY | Facility: CLINIC | Age: 73
End: 2018-06-18

## 2018-06-19 ENCOUNTER — OFFICE VISIT (OUTPATIENT)
Dept: CARDIOLOGY | Facility: CLINIC | Age: 73
End: 2018-06-19
Payer: COMMERCIAL

## 2018-06-19 VITALS
HEART RATE: 54 BPM | DIASTOLIC BLOOD PRESSURE: 62 MMHG | SYSTOLIC BLOOD PRESSURE: 124 MMHG | HEIGHT: 71 IN | WEIGHT: 281 LBS | BODY MASS INDEX: 39.34 KG/M2

## 2018-06-19 DIAGNOSIS — F41.9 ANXIETY: ICD-10-CM

## 2018-06-19 DIAGNOSIS — R06.02 SOB (SHORTNESS OF BREATH): Primary | ICD-10-CM

## 2018-06-19 DIAGNOSIS — R06.09 DYSPNEA ON EXERTION: ICD-10-CM

## 2018-06-19 DIAGNOSIS — I48.91 LONE ATRIAL FIBRILLATION: ICD-10-CM

## 2018-06-19 DIAGNOSIS — I10 ESSENTIAL HYPERTENSION: ICD-10-CM

## 2018-06-19 DIAGNOSIS — E29.1 MALE HYPOGONADISM: ICD-10-CM

## 2018-06-19 DIAGNOSIS — E66.9 OBESITY (BMI 35.0-39.9 WITHOUT COMORBIDITY): ICD-10-CM

## 2018-06-19 DIAGNOSIS — I65.29 CAROTID ATHEROSCLEROSIS, UNSPECIFIED LATERALITY: ICD-10-CM

## 2018-06-19 DIAGNOSIS — D64.9 NORMOCYTIC ANEMIA: ICD-10-CM

## 2018-06-19 DIAGNOSIS — E78.5 HYPERLIPIDEMIA, UNSPECIFIED HYPERLIPIDEMIA TYPE: ICD-10-CM

## 2018-06-19 DIAGNOSIS — N52.01 ERECTILE DYSFUNCTION DUE TO ARTERIAL INSUFFICIENCY: ICD-10-CM

## 2018-06-19 DIAGNOSIS — I77.9 AORTIC DISEASE: ICD-10-CM

## 2018-06-19 PROBLEM — I48.0 PAROXYSMAL ATRIAL FIBRILLATION: Status: RESOLVED | Noted: 2018-04-23 | Resolved: 2018-06-19

## 2018-06-19 PROCEDURE — 3074F SYST BP LT 130 MM HG: CPT | Mod: CPTII,S$GLB,, | Performed by: INTERNAL MEDICINE

## 2018-06-19 PROCEDURE — 99214 OFFICE O/P EST MOD 30 MIN: CPT | Mod: S$GLB,,, | Performed by: INTERNAL MEDICINE

## 2018-06-19 PROCEDURE — 99999 PR PBB SHADOW E&M-EST. PATIENT-LVL III: CPT | Mod: PBBFAC,,, | Performed by: INTERNAL MEDICINE

## 2018-06-19 PROCEDURE — 3078F DIAST BP <80 MM HG: CPT | Mod: CPTII,S$GLB,, | Performed by: INTERNAL MEDICINE

## 2018-06-19 RX ORDER — VALSARTAN 320 MG/1
TABLET ORAL
Qty: 90 TABLET | Refills: 0 | Status: SHIPPED | OUTPATIENT
Start: 2018-06-19 | End: 2018-06-19 | Stop reason: SDUPTHER

## 2018-06-19 RX ORDER — VALSARTAN 320 MG/1
TABLET ORAL
Qty: 90 TABLET | Refills: 3 | Status: SHIPPED | OUTPATIENT
Start: 2018-06-19 | End: 2018-07-25 | Stop reason: ALTCHOICE

## 2018-06-19 NOTE — PROGRESS NOTES
Subjective:    Patient ID:  Edis Huggins Jr. is a 73 y.o. male who presents for follow-up of Shortness of Breath      HPI     74 y/o male with hx of HTN, HLD, obesity who presents for f/u.   Has had extensive w/u for RESENDEZ including normal nuclear SPECT, normal 30 day monitor, normal PFT's. Did have a previous Holter monitor which showed a single run of Paroxysmal atrial fibrillation lasting for 13 sec. Had bilateral LE edema which has improved with chlorthalidone. Denies CP, orthopnea, PND, syncope, palps. Compliant with meds and follows a low salt diet.      Review of Systems   Constitution: Negative for weakness and malaise/fatigue.   HENT: Negative for congestion.    Eyes: Negative for blurred vision.   Cardiovascular: Positive for dyspnea on exertion and leg swelling. Negative for chest pain, claudication, cyanosis, irregular heartbeat, near-syncope, orthopnea, palpitations, paroxysmal nocturnal dyspnea and syncope.   Respiratory: Positive for shortness of breath.    Endocrine: Negative for polyuria.   Hematologic/Lymphatic: Negative for bleeding problem.   Skin: Negative for itching and rash.   Musculoskeletal: Negative for joint swelling, muscle cramps and muscle weakness.   Gastrointestinal: Negative for abdominal pain, hematemesis, hematochezia, melena, nausea and vomiting.   Genitourinary: Negative for dysuria and hematuria.   Neurological: Negative for dizziness, focal weakness, headaches, light-headedness and loss of balance.   Psychiatric/Behavioral: Negative for depression. The patient is not nervous/anxious.         Objective:    Physical Exam   Constitutional: He is oriented to person, place, and time. He appears well-developed and well-nourished.   HENT:   Head: Normocephalic and atraumatic.   Neck: Neck supple. No JVD present.   Cardiovascular: Normal rate, regular rhythm and normal heart sounds.    Pulses:       Carotid pulses are 2+ on the right side, and 2+ on the left side.       Radial  pulses are 2+ on the right side, and 2+ on the left side.        Femoral pulses are 2+ on the right side, and 2+ on the left side.       Dorsalis pedis pulses are 2+ on the right side, and 2+ on the left side.        Posterior tibial pulses are 2+ on the right side, and 2+ on the left side.   Pulmonary/Chest: Effort normal and breath sounds normal.   Abdominal: Soft. Bowel sounds are normal.   Musculoskeletal: He exhibits no edema.   Neurological: He is alert and oriented to person, place, and time.   Skin: Skin is warm and dry.   Psychiatric: He has a normal mood and affect. His behavior is normal. Thought content normal.         Assessment:       1. SOB (shortness of breath)    2. Aortic disease    3. Lone atrial fibrillation    4. Essential hypertension    5. Carotid atherosclerosis, unspecified laterality    6. Hyperlipidemia, unspecified hyperlipidemia type    7. Anxiety    8. Erectile dysfunction due to arterial insufficiency    9. Normocytic anemia    10. Male hypogonadism    11. Obesity (BMI 35.0-39.9 without comorbidity)    12. Dyspnea on exertion      74 y/o male with hx and presentation as above. Has had extensive evaluation for SOB and may be deconditioning and obesity. Will complete w/u with 2DE to evaluate for diastolic dysfunction. Compensated from a cardiac perspective and on appropriate medical therapy. Also, qualifies for AAA screening due to hx of smoking, however, abdominal US difficult due to body habitus, so will schedule CTA. Will hold off on anticoagulation for now as no Afib on 30 day monitor.        Plan:       -CTA for AAA screening  -2DE with CFD  -f/u in 6 months

## 2018-06-20 ENCOUNTER — OFFICE VISIT (OUTPATIENT)
Dept: UROLOGY | Facility: CLINIC | Age: 73
End: 2018-06-20
Payer: COMMERCIAL

## 2018-06-20 VITALS
HEIGHT: 71 IN | SYSTOLIC BLOOD PRESSURE: 109 MMHG | WEIGHT: 278 LBS | DIASTOLIC BLOOD PRESSURE: 69 MMHG | HEART RATE: 58 BPM | BODY MASS INDEX: 38.92 KG/M2

## 2018-06-20 DIAGNOSIS — N40.1 BPH WITH URINARY OBSTRUCTION: ICD-10-CM

## 2018-06-20 DIAGNOSIS — E78.5 HYPERLIPIDEMIA, UNSPECIFIED HYPERLIPIDEMIA TYPE: ICD-10-CM

## 2018-06-20 DIAGNOSIS — E29.1 MALE HYPOGONADISM: Primary | ICD-10-CM

## 2018-06-20 DIAGNOSIS — N52.01 ERECTILE DYSFUNCTION DUE TO ARTERIAL INSUFFICIENCY: ICD-10-CM

## 2018-06-20 DIAGNOSIS — N13.8 BPH WITH URINARY OBSTRUCTION: ICD-10-CM

## 2018-06-20 DIAGNOSIS — I10 ESSENTIAL HYPERTENSION: ICD-10-CM

## 2018-06-20 PROCEDURE — 99999 PR PBB SHADOW E&M-EST. PATIENT-LVL III: CPT | Mod: PBBFAC,,, | Performed by: UROLOGY

## 2018-06-20 PROCEDURE — 99214 OFFICE O/P EST MOD 30 MIN: CPT | Mod: S$GLB,,, | Performed by: UROLOGY

## 2018-06-20 PROCEDURE — 3078F DIAST BP <80 MM HG: CPT | Mod: CPTII,S$GLB,, | Performed by: UROLOGY

## 2018-06-20 PROCEDURE — 3074F SYST BP LT 130 MM HG: CPT | Mod: CPTII,S$GLB,, | Performed by: UROLOGY

## 2018-06-20 RX ORDER — TESTOSTERONE 20.25 MG/1.25G
GEL TOPICAL
Qty: 75 G | Refills: 5 | Status: SHIPPED | OUTPATIENT
Start: 2018-06-20 | End: 2018-08-17

## 2018-06-20 NOTE — PATIENT INSTRUCTIONS
What is this medicine?  TESTOSTERONE (isaac TOS ter one) is the main male hormone. It supports normal male traits such as muscle growth, facial hair, and deep voice. This gel is used in males to treat low testosterone levels.  This medicine may be used for other purposes; ask your health care provider or pharmacist if you have questions.  What should I tell my health care provider before I take this medicine?  They need to know if you have any of these conditions:  · breast cancer  · diabetes  · heart disease  · if a female partner is pregnant or trying to get pregnant  · kidney disease  · liver disease  · lung disease  · prostate cancer, enlargement  · an unusual or allergic reaction to testosterone, soy proteins, other medicines, foods, dyes, or preservatives  · pregnant or trying to get pregnant  · breast-feeding  How should I use this medicine?  This medicine is for external use only. This medicine is applied at the same time every day (preferably in the morning) to clean, dry, intact skin. If you take a bath or shower in the morning, apply the gel after the bath or shower. Follow the directions on the prescription label. Make sure that you are using your testosterone gel product correctly and applying it only to the appropriate skin area (see below). Allow the skin to dry a few minutes then cover with clothing to prevent others from coming in contact with the medicine on your skin. The gel is flammable. Avoid fire, flame, or smoking until the gel has dried. Wash your hands with soap and water after use.  For AndroGel Packets: Open the packet(s) needed for your dose. You can put the entire dose into your palm all at once or just a little at a time to apply. If you prefer, you can instead squeeze the gel directly onto the area you are applying it to. Apply on the shoulders as directed. Do not apply to the scrotum or genitals. Be sure you use the correct total dose. It is best to wait 5 to 6 hours after application  of the gel before showering or swimming.  For AndroGel 1%: Pump the dose into the palm of your hand. You can put the entire dose into your palm all at once or just a little at a time to apply. If you prefer, you can instead pump the gel directly onto the area you are applying it to. Apply on the shoulders as directed. Do not apply to the scrotum or genitals. Be sure you use the correct total dose. It is best to wait for 5 to 6 hours after application of the gel before showering or swimming.  For AndroGel 1.62%: Pump the dose into the palm of your hand. Dispense one pump of gel at a time into the palm of your hand before applying it. If you prefer, you can instead pump the gel directly onto the area you are applying it to. Apply on the shoulders and upper arms as directed. Do not apply to other parts of the body including the abdomen or genitals. Be sure you use the correct total dose. It is best to wait 2 hours after application of the gel before washing, showering, or swimming.  For Testim: Open the tube(s) needed for your dose. Squeeze the gel from the tube into the palm of your hand. Apply on the shoulders or upper arms as directed. Do not apply to the scrotum, genitals, or abdomen. Be sure you use the correct total dose. Do not shower or swim for at least 2 hours after application of the gel.  For Fortesta: Use the multi-dose pump to pump the gel directly onto the area you are applying it to. Apply on the thighs as directed. Do not apply to the abdomen, penis, scrotum, shoulders or upper arms. Gently rub the gel onto the skin using your finger. Be sure you use the correct total dose. Do not shower or swim for at least 2 hours after application of the gel.  For Axiron: Use the multi-dose pump to pump the gel into the applicator.  Apply under the arm as directed.  Do not apply to other locations.  Do not shower or swim for at least 2 hours after application of the gel.  A special MedGuide will be given to you by  the pharmacist with each prescription and refill. Be sure to read this information carefully each time.  Talk to your pediatrician regarding the use of this medicine in children. Special care may be needed.  Overdosage: If you think you have taken too much of this medicine contact a poison control center or emergency room at once.  NOTE: This medicine is only for you. Do not share this medicine with others.  What if I miss a dose?  If you miss a dose, use it as soon as you can. If it is almost time for your next dose, use only that dose. Do not use double or extra doses.  What may interact with this medicine?  · medicines for diabetes  · medicines that treat or prevent blood clots like warfarin  · oxyphenbutazone  · propranolol  · steroid medicines like prednisone or cortisone  This list may not describe all possible interactions. Give your health care provider a list of all the medicines, herbs, non-prescription drugs, or dietary supplements you use. Also tell them if you smoke, drink alcohol, or use illegal drugs. Some items may interact with your medicine.  What should I watch for while using this medicine?  Visit your doctor or health care professional for regular checks on your progress. They will need to check the level of testosterone in your blood.  This medicine can transfer from your body to others. If a person or pet comes in contact with the area where this medicine was applied to your skin, they may have a serious risk of side effects. If you cannot avoid skin-to-skin contact with another person, make sure the site where this medicine was applied is covered with clothing. If accidental contact happens, the skin of the person or pet should be washed right away with soap and water. Also, a female partner who is pregnant or trying to get pregnant should avoid contact with the gel or treated skin.  This medicine may affect blood sugar levels. If you have diabetes, check with your doctor or health care  professional before you change your diet or the dose of your diabetic medicine.  This drug is banned from use in athletes by most athletic organizations.  What side effects may I notice from receiving this medicine?  Side effects that you should report to your doctor or health care professional as soon as possible:  · allergic reactions like skin rash, itching or hives, swelling of the face, lips, or tongue  · breast enlargement  · breathing problems  · changes in mood, especially anger, depression, or rage  · dark urine  · general ill feeling or flu-like symptoms  · light-colored stools  · loss of appetite, nausea  · nausea, vomiting  · right upper belly pain  · stomach pain  · swelling of ankles  · too frequent or persistent erections  · trouble passing urine or change in the amount of urine  · unusually weak or tired  · yellowing of the eyes or skin  Side effects that usually do not require medical attention (report to your doctor or health care professional if they continue or are bothersome):  · acne  · change in sex drive or performance  · hair loss  · headache  This list may not describe all possible side effects. Call your doctor for medical advice about side effects. You may report side effects to FDA at 6-068-FDA-7569.  Where should I keep my medicine?  Keep out of the reach of children. This medicine can be abused. Keep your medicine in a safe place to protect it from theft. Do not share this medicine with anyone. Selling or giving away this medicine is dangerous and against the law.  Store at room temperature between 15 to 30 degrees C (59 to 86 degrees F). Keep closed until use. Protect from heat and light. This medicine is flammable. Avoid exposure to heat, fire, flame, and smoking. Throw away any unused medicine after the expiration date.  NOTE:This sheet is a summary. It may not cover all possible information. If you have questions about this medicine, talk to your doctor, pharmacist, or health care  provider. Copyright© 2011 [employer]

## 2018-06-20 NOTE — PROGRESS NOTES
CHIEF COMPLAINT:    Mr. Huggins is a 73 y.o. male presenting with hypogonadism.    PRESENTING ILLNESS:    Edis Huggins Jr. is a 73 y.o. male who went to [facility] with [facility] who was found to have a low T.  He c/o fatigue.  He also has loss of axillary and pubic hair.    He has some LUTS for which he's on proscar and flomax.  He has nocturia x 3 and is pleased with how he voids.  No hematuria. No dysuria.    He has ED.  This has been present for > 1 year.  He's not sexually active, so it doesn't bother him.    REVIEW OF SYSTEMS:    Edis Huggins Jr. denies headache, blurred vision, fever, nausea, vomiting, chills, abdominal pain, bleeding per rectum, cough, SOB, recent loss of consciousness, recent mental status changes, seizures, dizziness, or upper or lower extremity weakness.    ROMMEL  1. 1  2. 0  3. 0  4. 0  5. 0      PATIENT HISTORY:    Past Medical History:   Diagnosis Date    Allergic rhinitis due to animal (cat) (dog) hair and dander 3/20/2014    Allergy     Anemia     Anxiety     Basal cell carcinoma     BPH (benign prostatic hypertrophy)     Family history of colon cancer 3/20/2014    Family history of ischemic heart disease 3/20/2014    HLD (hyperlipidemia)     HTN (hypertension)     IFG (impaired fasting glucose)     Primary open angle glaucoma     Status post total knee replacement 3/20/2014       Past Surgical History:   Procedure Laterality Date    CATARACT EXTRACTION W/  INTRAOCULAR LENS IMPLANT Left 02/08/2017    WITH KAHOOK GONIOTOMY (Dr. Khan)    CATARACT EXTRACTION W/  INTRAOCULAR LENS IMPLANT Right 05/10/2017        HERNIA REPAIR      x 2    TONSILLECTOMY      TOTAL KNEE ARTHROPLASTY Right        Family History   Problem Relation Age of Onset    Colon cancer Maternal Grandmother         70s    Cataracts Mother     Atrial fibrillation Mother     Cataracts Father     Heart disease Father 85        Valve disorder    Glaucoma Brother      Prostate cancer Neg Hx     Amblyopia Neg Hx     Blindness Neg Hx     Macular degeneration Neg Hx     Retinal detachment Neg Hx     Strabismus Neg Hx     Diabetes Neg Hx     Melanoma Neg Hx     Psoriasis Neg Hx     Lupus Neg Hx     Eczema Neg Hx     Acne Neg Hx        Social History     Social History    Marital status:      Spouse name: N/A    Number of children: N/A    Years of education: N/A     Occupational History     [employer]     Social History Main Topics    Smoking status: Former Smoker     Packs/day: 1.00     Years: 10.00     Quit date: 1/1/1974    Smokeless tobacco: Never Used    Alcohol use 0.0 oz/week      Comment: one drink a month    Drug use: No    Sexual activity: Not on file     Other Topics Concern    Not on file     Social History Narrative    No narrative on file       Allergies:  Patient has no known allergies.    Medications:    Current Outpatient Prescriptions:     acyclovir (ZOVIRAX) 400 MG tablet, TAKE 1 TABLET BY MOUTH THREE TIMES DAILY FOR 5 DAYS AT SIGN OF FEVER BLISTER, Disp: 30 tablet, Rfl: 0    amLODIPine (NORVASC) 10 MG tablet, TAKE 1 TABLET(10 MG) BY MOUTH EVERY DAY, Disp: 90 tablet, Rfl: 0    azelastine (ASTELIN) 137 mcg (0.1 %) nasal spray, 1 spray (137 mcg total) by Nasal route 2 (two) times daily., Disp: 30 mL, Rfl: 0    Bifidobacterium infantis (ALIGN) 4 mg Cap, Take by mouth., Disp: , Rfl:     cetirizine (ZYRTEC) 10 MG tablet, Take 10 mg by mouth once daily., Disp: , Rfl:     chlorthalidone (HYGROTEN) 50 MG Tab, Take 1 tablet (50 mg total) by mouth once daily., Disp: 90 tablet, Rfl: 3    citalopram (CELEXA) 20 MG tablet, TAKE 1 TABLET BY MOUTH DAILY, Disp: 90 tablet, Rfl: 3    finasteride (PROSCAR) 5 mg tablet, TAKE 1 TABLET(5 MG) BY MOUTH EVERY DAY, Disp: 90 tablet, Rfl: 3    fish oil-omega-3 fatty acids 300-1,000 mg capsule, Take 2 g by mouth 3 (three) times daily. , Disp: , Rfl:     gemfibrozil (LOPID) 600 MG  tablet, TAKE 1 TABLET BY MOUTH TWICE DAILY, Disp: 60 tablet, Rfl: 5    tamsulosin (FLOMAX) 0.4 mg Cp24, TAKE 1 CAPSULE(0.4 MG) BY MOUTH EVERY DAY, Disp: 90 capsule, Rfl: 3    testosterone (ANDROGEL) 20.25 mg/1.25 gram (1.62 %) GlPm, Apply 2 pumps to shoulders daily, Disp: 75 g, Rfl: 5    timolol maleate 0.5% (TIMOPTIC-XE) 0.5 % SolG, Place 1 drop into both eyes every morning., Disp: 5 mL, Rfl: 12    valsartan (DIOVAN) 320 MG tablet, TAKE 1 TABLET(320 MG) BY MOUTH EVERY DAY, Disp: 90 tablet, Rfl: 3    vitamin E 1000 UNIT capsule, Take 1,000 Units by mouth once daily., Disp: , Rfl:     PHYSICAL EXAMINATION:    The patient generally appears in good health, is appropriately interactive, and is in no apparent distress.     Eyes: anicteric sclerae, moist conjunctivae; no lid-lag; PERRLA     HENT: Atraumatic; oropharynx clear with moist mucous membranes and no mucosal ulcerations;normal hard and soft palate.  No evidence of lymphadenopathy.    Neck: Trachea midline.  No thyromegaly.    Musculoskeletal: No abnormal gait.    Skin: No lesions.    Mental: Cooperative with normal affect.  Is oriented to time, place, and person.    Neuro: Grossly intact.    Chest: Normal inspiratory effort.   No accessory muscles.  No audible wheezes.  Respirations symmetric on inspiration and expiration.    Heart: Regular rhythm.      Abdomen:  Soft, non-tender. No masses or organomegaly. Bladder is not palpable. No evidence of flank discomfort. No evidence of inguinal hernia.    Genitourinary: The penis is circumcised with no evidence of plaques or induration. The urethral meatus is normal. The testes, epididymides, and cord structures are normal in size and contour bilaterally. The scrotum is normal in size and contour.    Normal anal sphincter tone. No rectal mass.    The prostate is 30 g. Normal landmarks. Lateral sulci. Median furrow intact.  No nodularity or induration. Seminal vesicles are normal.    Extremities: No clubbing,  cyanosis, or edema      LABS:    On Proscar  Lab Results   Component Value Date    PSA 0.89 04/13/2018    PSA 0.66 03/30/2017    PSA 0.68 06/20/2016       IMPRESSION:    Encounter Diagnoses   Name Primary?    Male hypogonadism Yes    BPH with urinary obstruction     Erectile dysfunction due to arterial insufficiency     Essential hypertension     Hyperlipidemia, unspecified hyperlipidemia type    HTN, controlled  Hyperlipidemia, controlled      PLAN:    1. Continue flomax and proscar for his LUTS.  2. Will observe the ED as it's not bothersome.  3. Discussed the risks and benefits of testosterone replacement today.  This included possible cardiac risks.  He would like to try this.  Will check a T in 2 weeks and adjust the dose of TRT if necessary.  He can then RTC 3 months with T, PSA, CBC, hepatic panel, lipid panel.  A Rx for Androgel 1.62% was given today.       Copy to:

## 2018-06-23 DIAGNOSIS — F41.9 ANXIETY: ICD-10-CM

## 2018-06-25 RX ORDER — CITALOPRAM 20 MG/1
TABLET, FILM COATED ORAL
Qty: 90 TABLET | Refills: 3 | Status: SHIPPED | OUTPATIENT
Start: 2018-06-25 | End: 2019-05-30 | Stop reason: SDUPTHER

## 2018-06-26 ENCOUNTER — PATIENT MESSAGE (OUTPATIENT)
Dept: OTOLARYNGOLOGY | Facility: CLINIC | Age: 73
End: 2018-06-26

## 2018-06-27 RX ORDER — ACYCLOVIR 400 MG/1
TABLET ORAL
Qty: 30 TABLET | Refills: 2 | Status: SHIPPED | OUTPATIENT
Start: 2018-06-27 | End: 2019-07-31 | Stop reason: SDUPTHER

## 2018-06-27 RX ORDER — AZELASTINE 1 MG/ML
1 SPRAY, METERED NASAL 2 TIMES DAILY
Qty: 30 ML | Refills: 0 | Status: SHIPPED | OUTPATIENT
Start: 2018-06-27 | End: 2018-08-27 | Stop reason: SDUPTHER

## 2018-06-27 NOTE — TELEPHONE ENCOUNTER
Patient requesting several refills while he is awaiting to get cardiac clearance from cardiologist

## 2018-06-28 ENCOUNTER — HOSPITAL ENCOUNTER (OUTPATIENT)
Dept: RADIOLOGY | Facility: HOSPITAL | Age: 73
Discharge: HOME | End: 2018-06-28
Attending: INTERNAL MEDICINE
Payer: COMMERCIAL

## 2018-06-28 ENCOUNTER — HOSPITAL ENCOUNTER (OUTPATIENT)
Dept: CARDIOLOGY | Facility: HOSPITAL | Age: 73
Discharge: HOME | End: 2018-06-28
Attending: INTERNAL MEDICINE
Payer: COMMERCIAL

## 2018-06-28 DIAGNOSIS — I77.9 AORTIC DISEASE: ICD-10-CM

## 2018-06-28 DIAGNOSIS — R06.02 SOB (SHORTNESS OF BREATH): ICD-10-CM

## 2018-06-28 LAB
DIASTOLIC DYSFUNCTION: NO
ESTIMATED PA SYSTOLIC PRESSURE: 32.16
MITRAL VALVE MOBILITY: NORMAL
RETIRED EF AND QEF - SEE NOTES: 60 (ref 55–65)
TRICUSPID VALVE REGURGITATION: NORMAL

## 2018-06-28 PROCEDURE — 71275 CT ANGIOGRAPHY CHEST: CPT | Mod: 26,,, | Performed by: RADIOLOGY

## 2018-06-28 PROCEDURE — 93306 TTE W/DOPPLER COMPLETE: CPT

## 2018-06-28 PROCEDURE — 74175 CTA ABDOMEN W/CONTRAST: CPT | Mod: TC

## 2018-06-28 PROCEDURE — 74175 CTA ABDOMEN W/CONTRAST: CPT | Mod: 26,,, | Performed by: RADIOLOGY

## 2018-06-28 PROCEDURE — 93306 TTE W/DOPPLER COMPLETE: CPT | Mod: 26,,, | Performed by: INTERNAL MEDICINE

## 2018-06-28 PROCEDURE — 25500020 PHARM REV CODE 255: Performed by: INTERNAL MEDICINE

## 2018-06-28 RX ADMIN — IOHEXOL 100 ML: 350 INJECTION, SOLUTION INTRAVENOUS at 09:06

## 2018-06-29 ENCOUNTER — PATIENT MESSAGE (OUTPATIENT)
Dept: CARDIOLOGY | Facility: CLINIC | Age: 73
End: 2018-06-29

## 2018-07-05 ENCOUNTER — TELEPHONE (OUTPATIENT)
Dept: PHARMACY | Facility: CLINIC | Age: 73
End: 2018-07-05

## 2018-07-06 ENCOUNTER — PATIENT MESSAGE (OUTPATIENT)
Dept: UROLOGY | Facility: CLINIC | Age: 73
End: 2018-07-06

## 2018-07-18 ENCOUNTER — PATIENT MESSAGE (OUTPATIENT)
Dept: INTERNAL MEDICINE | Facility: CLINIC | Age: 73
End: 2018-07-18

## 2018-07-18 DIAGNOSIS — I10 ESSENTIAL HYPERTENSION: Primary | ICD-10-CM

## 2018-07-20 ENCOUNTER — OFFICE VISIT (OUTPATIENT)
Dept: OPHTHALMOLOGY | Facility: CLINIC | Age: 73
End: 2018-07-20
Payer: COMMERCIAL

## 2018-07-20 DIAGNOSIS — H57.09 RELATIVE AFFERENT PUPILLARY DEFECT: ICD-10-CM

## 2018-07-20 DIAGNOSIS — H40.1122 PRIMARY OPEN ANGLE GLAUCOMA OF LEFT EYE, MODERATE STAGE: Primary | ICD-10-CM

## 2018-07-20 DIAGNOSIS — Z96.1 PSEUDOPHAKIA, BOTH EYES: ICD-10-CM

## 2018-07-20 DIAGNOSIS — H21.562 PUPIL IRREGULAR OF LEFT EYE: ICD-10-CM

## 2018-07-20 DIAGNOSIS — H40.043 STEROID RESPONDERS, BILATERAL: ICD-10-CM

## 2018-07-20 DIAGNOSIS — H40.1111 PRIMARY OPEN-ANGLE GLAUCOMA, RIGHT EYE, MILD STAGE: ICD-10-CM

## 2018-07-20 PROCEDURE — 99999 PR PBB SHADOW E&M-EST. PATIENT-LVL II: CPT | Mod: PBBFAC,,, | Performed by: OPHTHALMOLOGY

## 2018-07-20 PROCEDURE — 92012 INTRM OPH EXAM EST PATIENT: CPT | Mod: S$GLB,,, | Performed by: OPHTHALMOLOGY

## 2018-07-20 NOTE — PROGRESS NOTES
HPI     Glaucoma    Additional comments: 4 month glaucoma ck           Comments   DLS: 3/16/18    1) POAG  2) PCIOL OU  3) Steroid Responder  4) APD OS  5) PAS OS    MEDS:  T1/2 GFS QAM OU       Last edited by Lili Booker MA on 7/20/2018  2:55 PM. (History)            Assessment /Plan     For exam results, see Encounter Report.    Primary open angle glaucoma of left eye, moderate stage    Primary open-angle glaucoma, right eye, mild stage    Steroid responders, bilateral    Pupil irregular of left eye    Relative afferent pupillary defect - Left Eye    Pseudophakia, both eyes          Pt is switching from  [employer] -  Back to [employer] - bring photo file over   Lost to F/u from 11/4/2013 to 8/11/2015   Knows Sharlene Mo and MIKHAIL - use to work with them     1. POAG (primary open-angle glaucoma) - Both Eyes   Old pt of [employer] [id number]  Began at [facility] 2012       POAG OS>OD           Family history    neg        Glaucoma meds    travatan z ou, T1/2 GFS ou        H/O adverse rxn to glaucoma drops    none        LASERS    SLT os 9/26/2013 -( good resp 17 --> 14)        GLAUCOMA SURGERIES    trabectome os (combined with phaco - 2/8/2017        OTHER EYE SURGERIES    Combined - phaco/IOL / goniotomy - trabectome os 2/8/2017 - +heme and increae in IOP)                                                 PC IOL OD - 5/10/2017         CDR    0.6/0.7-0.8        Tbase    20-23/21-24          Tmax    23/24            Ttarget    17/17           HVF    5 tests 4194-4786 OD essentially full; OS Sup paracentral defect, IAD/INS        Gonio    +4 ou        CCT    598/598        OCT    3 test 2012 to 2013 - RNFL - nl od // dec S bord T        HRT    4 test-2012 to 2017  - MR -  nl od // dec.T, bord I os /// CDR 0.56 od // 0.64 os        Disc photos    2012 - IOS    - Ttoday   17/16  - Test done today   IOP / gonio       2. Steroid responders - Both Eyes  2/2 nasacort      3. ? Trace APD os      4. S/P Rt knee replacement  "Feb 2013  -doing well      5.  Myopia / presbyopia ou     6. Irregular pupil - w/ PAS   -2/2 very high IOP POD # 1 post phaco     7.  PC IOL OU  OD 5/10/2017 - PCB00 14.5  OS 2/8/2017 - + hyphema and high IOP 2/2 trabectome - irregular pupil post op - PCB00 14.0      Plan  CSM  IOP below target OU  SLT done OS 9/26/2013 - good initial resp 24-->17    -( if responds well consider SLT od - but full VF and helathy ON still od)  HVF stable OU    Cont timolol gfs ou q day     If the glare from the fixed dilated pupil bothers him a lot post 2nd eye getting done - consider pupil surgery with dr Peacock to "purse string it " smaller - pt is doing ok for now - glare is not bothering him too much     F/U 4 months with HRT// photo file is still at [facility] and pt is now going to [facility]    I have seen and personally examined the patient.  I agree with the findings, assessment and plan of the resident and/or fellow.     Deborah Khan MD  "

## 2018-07-25 RX ORDER — IRBESARTAN 300 MG/1
300 TABLET ORAL DAILY
Qty: 90 TABLET | Refills: 3 | Status: SHIPPED | OUTPATIENT
Start: 2018-07-25 | End: 2019-06-27 | Stop reason: SDUPTHER

## 2018-08-03 ENCOUNTER — CLINICAL SUPPORT (OUTPATIENT)
Dept: INTERNAL MEDICINE | Facility: CLINIC | Age: 73
End: 2018-08-03
Payer: COMMERCIAL

## 2018-08-03 VITALS — BODY MASS INDEX: 39.79 KG/M2 | WEIGHT: 285.25 LBS

## 2018-08-03 NOTE — PROGRESS NOTES
Health  Follow-Up Note   [x] Office  [] Phone  Notes from previous session reviewed.   [x] Previous Session Goals unchanged.   [] Patient/Caregiver Change in Goals.  Goals added or changed by Patient/Caregiver since program participation:  1.   Continued same plan   2. States doing as much as he can to keep up with work and family activities      Additional/Changed support that patient/caregiver has experienced/sought?  (Indicate readiness, support from family, friends, others, community groups, etc)  1.  Wife    Additional/Changed obstacles that could prevent patient/caregiver from reaching their goals?  1.  Traveling for work out of town 2 weeks out of month.     Feedback provided:  1. Praised for continued effort and determination     Diagnostic values/Desriptors for follow-up as needed for chronic condition(s)   Weight: 129.4 kg 285.28 lb gain 0.89 lb    Interventions:   1. Health  listened reflectively, validated thoughts and feelings, offered support and encouragement.   2. Allowed patient to express themselves in a non-biased atmosphere.  3. Health  assisted pt to problem-solve obstacles such as being in a challenging environment and dealing with these challenges.   4. Motivational Interviewed interventions utilized (OARS).   5. Patient responded favorably to interventions and remained actively engaged in the session.   6. Health  will remain available and connected for patient by phone and/or office visits.   7. Positive reinforcement, emotional support and encouragement provided.   8. Focused Education: MI, exercise- walking try to increase    Plan:  [x] Pt will work on goals as stated above.   [x] Pt will contact Health  for any questions, concerns or needs.  [x] Pt will follow up with Health  in office on   11/16/18 at 1530.     [] Pt will follow up with Health  on phone in:        [x] Health  will remain available.   [] Health  will contact patient by phone  in:        [] Health  will consult:        [] Health  will inform Provider via EPIC messaging.     Impression:  1. Behavior is consistent with    Action   Stage of Change.   2. Participation level:       [x] Receptive      [x] Interactive      [] Guarded and Resistant      [x] Self Motivated      [] Refused/Declined to participate   3. [x] Pt voiced understanding of all information presented.       [] Pt voiced needing further information/education. This will be arranged.       [x] Pt would benefit from further education/information as identified by this health . This will be arranged.     Sandy Moses RN HC

## 2018-08-10 ENCOUNTER — OFFICE VISIT (OUTPATIENT)
Dept: DERMATOLOGY | Facility: CLINIC | Age: 73
End: 2018-08-10
Payer: COMMERCIAL

## 2018-08-10 DIAGNOSIS — D18.00 ANGIOMA: ICD-10-CM

## 2018-08-10 DIAGNOSIS — L82.1 SEBORRHEIC KERATOSIS: ICD-10-CM

## 2018-08-10 DIAGNOSIS — D22.9 MULTIPLE BENIGN NEVI: ICD-10-CM

## 2018-08-10 DIAGNOSIS — D48.5 NEOPLASM OF UNCERTAIN BEHAVIOR OF SKIN: Primary | ICD-10-CM

## 2018-08-10 DIAGNOSIS — Z85.828 HISTORY OF NONMELANOMA SKIN CANCER: ICD-10-CM

## 2018-08-10 PROCEDURE — 11101 PR BIOPSY, EACH ADDED LESION: CPT | Mod: S$GLB,,, | Performed by: DERMATOLOGY

## 2018-08-10 PROCEDURE — 88305 TISSUE EXAM BY PATHOLOGIST: CPT | Mod: 26,,, | Performed by: PATHOLOGY

## 2018-08-10 PROCEDURE — 99999 PR PBB SHADOW E&M-EST. PATIENT-LVL III: CPT | Mod: PBBFAC,,, | Performed by: DERMATOLOGY

## 2018-08-10 PROCEDURE — 11100 PR BIOPSY OF SKIN LESION: CPT | Mod: S$GLB,,, | Performed by: DERMATOLOGY

## 2018-08-10 PROCEDURE — 88305 TISSUE EXAM BY PATHOLOGIST: CPT | Performed by: PATHOLOGY

## 2018-08-10 PROCEDURE — 99213 OFFICE O/P EST LOW 20 MIN: CPT | Mod: 25,S$GLB,, | Performed by: DERMATOLOGY

## 2018-08-10 NOTE — PROGRESS NOTES
Subjective:       Patient ID:  Edis Huggins Jr. is a 73 y.o. male who presents for   Chief Complaint   Patient presents with    Skin Check    Lesion     History of Present Illness: The patient presents for follow up of skin check.    The patient was last seen on: 11/28/17 for cryosurgery to actinic keratoses which have resolved and bx of idn right nasal bridge  This is a high risk patient here to check for the development of new lesions.      Other skin complaints: small bump on left and right temple x 1 month          Review of Systems   Skin: Positive for wears hat (always). Negative for daily sunscreen use, activity-related sunscreen use and recent sunburn.   Hematologic/Lymphatic: Does not bruise/bleed easily.        Objective:    Physical Exam   Constitutional: He appears well-developed and well-nourished. No distress.   Neurological: He is alert and oriented to person, place, and time. He is not disoriented.   Psychiatric: He has a normal mood and affect.   Skin:   Areas Examined (abnormalities noted in diagram):   Scalp / Hair Palpated and Inspected  Head / Face Inspection Performed  Neck Inspection Performed  Chest / Axilla Inspection Performed  Abdomen Inspection Performed  Back Inspection Performed  RUE Inspected  LUE Inspection Performed                   Diagram Legend     Erythematous scaling macule/papule c/w actinic keratosis       Vascular papule c/w angioma      Pigmented verrucoid papule/plaque c/w seborrheic keratosis      Yellow umbilicated papule c/w sebaceous hyperplasia      Irregularly shaped tan macule c/w lentigo     1-2 mm smooth white papules consistent with Milia      Movable subcutaneous cyst with punctum c/w epidermal inclusion cyst      Subcutaneous movable cyst c/w pilar cyst      Firm pink to brown papule c/w dermatofibroma      Pedunculated fleshy papule(s) c/w skin tag(s)      Evenly pigmented macule c/w junctional nevus     Mildly variegated pigmented, slightly  irregular-bordered macule c/w mildly atypical nevus      Flesh colored to evenly pigmented papule c/w intradermal nevus       Pink pearly papule/plaque c/w basal cell carcinoma      Erythematous hyperkeratotic cursted plaque c/w SCC      Surgical scar with no sign of skin cancer recurrence      Open and closed comedones      Inflammatory papules and pustules      Verrucoid papule consistent consistent with wart     Erythematous eczematous patches and plaques     Dystrophic onycholytic nail with subungual debris c/w onychomycosis     Umbilicated papule    Erythematous-base heme-crusted tan verrucoid plaque consistent with inflamed seborrheic keratosis     Erythematous Silvery Scaling Plaque c/w Psoriasis     See annotation              Assessment / Plan:      Pathology Orders:     Normal Orders This Visit    Tissue Specimen To Pathology, Dermatology     Questions:    Directional Terms:  Other(comment)    Clinical information:  r/o inflamed nevus vs bcc    Specific Site:  post neck    Tissue Specimen To Pathology, Dermatology     Questions:    Directional Terms:  Other(comment)    Clinical information:  r/o inflamed nevus vs bcc    Specific Site:  left lateral temple        Neoplasm of uncertain behavior of skin  -     Tissue Specimen To Pathology, Dermatology  -     Tissue Specimen To Pathology, Dermatology  Shave biopsy procedure note:    Shave biopsy x 2 performed after verbal consent including risk of infection, scar, recurrence, need for additional treatment of site. Area prepped with alcohol, anesthetized with approximately 1.0cc of 1% lidocaine with epinephrine. Lesional tissue shaved with razor blade. Hemostasis achieved with application of aluminum chloride followed by hyfrecation. No complications. Dressing applied. Wound care explained.      Seborrheic keratosis  These are benign inherited growths without a malignant potential. Reassurance given to patient. No treatment is necessary.       Multiple benign  nevi   - stable and chronic      Angiomas   - stable and chronic      History of nonmelanoma skin cancer  Area(s) of previous NMSC evaluated with no signs of recurrence.    Upper body skin examination performed today including at least 6 points as noted in physical examination. Suspicious lesions noted.               Follow-up in about 1 year (around 8/10/2019).

## 2018-08-10 NOTE — PATIENT INSTRUCTIONS
Shave Biopsy Wound Care    Your doctor has performed a shave biopsy today.  A band aid and vaseline ointment has been placed over the site.  This should remain in place for 24 hours.  It is recommended that you keep the area dry for the first 24 hours.  After 24 hours, you may remove the band aid and wash the area with warm soap and water and apply Vaseline jelly.  Many patients prefer to use Neosporin or Bacitracin ointment.  This is acceptable; however, know that you can develop an allergy to this medication even if you have used it safely for years.  It is important to keep the area moist.  Letting it dry out and get air slows healing time, and will worsen the scar.  Band aid is optional after first 24 hours.      If you notice increasing redness, tenderness, pain, or yellow drainage at the biopsy site, please notify your doctor.  These are signs of an infection.    If your biopsy site is bleeding, apply firm pressure for 15 minutes straight.  Repeat for another 15 minutes, if it is still bleeding.   If the surgical site continues to bleed, then please contact your doctor.      [facility] [place] [address]/ [phone] FAX/ [email or web address]

## 2018-08-15 DIAGNOSIS — I10 ESSENTIAL HYPERTENSION: ICD-10-CM

## 2018-08-16 RX ORDER — VALSARTAN 320 MG/1
TABLET ORAL
Qty: 90 TABLET | Refills: 0 | OUTPATIENT
Start: 2018-08-16

## 2018-08-17 ENCOUNTER — OFFICE VISIT (OUTPATIENT)
Dept: INTERNAL MEDICINE | Facility: CLINIC | Age: 73
End: 2018-08-17
Payer: COMMERCIAL

## 2018-08-17 VITALS
HEIGHT: 71 IN | HEART RATE: 58 BPM | BODY MASS INDEX: 39.81 KG/M2 | WEIGHT: 284.38 LBS | DIASTOLIC BLOOD PRESSURE: 60 MMHG | SYSTOLIC BLOOD PRESSURE: 118 MMHG

## 2018-08-17 DIAGNOSIS — E29.1 MALE HYPOGONADISM: ICD-10-CM

## 2018-08-17 DIAGNOSIS — R06.09 DYSPNEA ON EXERTION: Primary | ICD-10-CM

## 2018-08-17 DIAGNOSIS — D64.9 NORMOCYTIC ANEMIA: ICD-10-CM

## 2018-08-17 DIAGNOSIS — I70.0 AORTIC ATHEROSCLEROSIS: ICD-10-CM

## 2018-08-17 DIAGNOSIS — F41.9 ANXIETY: ICD-10-CM

## 2018-08-17 PROCEDURE — 99999 PR PBB SHADOW E&M-EST. PATIENT-LVL III: CPT | Mod: PBBFAC,,, | Performed by: INTERNAL MEDICINE

## 2018-08-17 PROCEDURE — 99214 OFFICE O/P EST MOD 30 MIN: CPT | Mod: S$GLB,,, | Performed by: INTERNAL MEDICINE

## 2018-08-17 PROCEDURE — 3074F SYST BP LT 130 MM HG: CPT | Mod: CPTII,S$GLB,, | Performed by: INTERNAL MEDICINE

## 2018-08-17 PROCEDURE — 3078F DIAST BP <80 MM HG: CPT | Mod: CPTII,S$GLB,, | Performed by: INTERNAL MEDICINE

## 2018-08-17 RX ORDER — CYANOCOBALAMIN 1000 UG/ML
1000 INJECTION, SOLUTION INTRAMUSCULAR; SUBCUTANEOUS
Qty: 10 ML | Refills: 3 | Status: SHIPPED | OUTPATIENT
Start: 2018-08-17 | End: 2019-02-22

## 2018-08-17 NOTE — PROGRESS NOTES
"Subjective:       Patient ID: Edis Huggins Jr. is a 73 y.o. male.    Chief Complaint: Follow-up    [facility]    Last visit with me 04/2018. Since then seen by Hematology, Health , Cardiology, Urology, Ophthalmology, Dermatology. Upcoming lab, Urology, Health , Ophthalmology.     Some more work stress recently but overall okay. All testing for dyspnea on exertion has been okay, so likely due to obesity and age. Insurance has denied testosterone. Sleeping better at night, holding up on sinus surgery.     Had lost some wt but gained more back, hard to do weight loss with travel. Hard to walk very far on a hot day, was difficult when walking just a few mo ago. Generally his fatigue is ok. No new lung or breathing problems.    Also doing fish oil for cholesterol.    Starting to increase Celexa to 20 mg daily because of stress. Still feels angry at points. Had worked well in past.   Dtr suffered from panic attacks but the patient hasn't had any similar symptoms. Reports in past Lexapro was too strong.    Review of Systems    As per HPI    Objective:      Physical Exam   Constitutional: He is oriented to person, place, and time. No distress.    man whose Body mass index is 39.66 kg/m².    Cardiovascular: Normal rate, regular rhythm and normal heart sounds.   Pulmonary/Chest: Effort normal and breath sounds normal. No stridor. He has no wheezes.   Neurological: He is alert and oriented to person, place, and time.   Psychiatric: He has a normal mood and affect. His behavior is normal.   Nursing note and vitals reviewed.      Vitals:    08/17/18 1620   BP: 118/60   BP Location: Right arm   Patient Position: Sitting   BP Method: Large (Manual)   Pulse: (!) 58   Weight: 129 kg (284 lb 6.3 oz)   Height: 5' 11" (1.803 m)     Body mass index is 39.66 kg/m².    RESULTS: Reviewed labs from last 12 months    Assessment:       1. Dyspnea on exertion    2. Aortic atherosclerosis -- CTA 06/2018    3. Male hypogonadism  "   4. Normocytic anemia    5. Anxiety        Plan:   Edis was seen today for follow-up.    Diagnoses and all orders for this visit:    Dyspnea on exertion:  Prior diagnosis, persistent but stable.  Not getting worse.  Workup for underlying cardiac or pulmonary etiology has been negative.  Likely secondary to deconditioning and weight gain.  Continue regular exercise and attempts at weight loss.    Aortic atherosclerosis -- CTA 06/2018:  Recent diagnosis seen on CT scan, however atherosclerosis is mild.  Continue control of blood pressure and cholesterol to limit further atherosclerosis.     Male hypogonadism:  Prior dx, has been prescribed testosterone by Urology however has not been approved by insurance.  Continue follow-up with Urology for further treatment recommendations.    Normocytic anemia:  Prior diagnosis, mild anemia, unlikely to be the source of patient's symptoms.  He continues to follow up with Hematology for evaluation.    Anxiety:  Prior diagnosis, has been well controlled on citalopram 20 mg for a long time, however recently anxiety symptoms are getting worse.  May be a result of more stress and travel with work.  Continue to monitor for now, if stress worsens notify the office so we can adjust the regimen.    Other orders  -     cyanocobalamin (VITAMIN B-12) 1,000 mcg/mL injection; Inject 1 mL (1,000 mcg total) into the muscle every 30 days.      Follow-up in about 4 months (around 12/17/2018) for EPP annual exam, fasting labs 1 week prior.  Forest Miles MD  Internal Medicine    Portions of this note were completed using medical dictation software. Please excuse typographical or syntax errors that were missed on review.

## 2018-08-20 ENCOUNTER — TELEPHONE (OUTPATIENT)
Dept: UROLOGY | Facility: CLINIC | Age: 73
End: 2018-08-20

## 2018-08-20 NOTE — TELEPHONE ENCOUNTER
Spoke to pt. Pt scheduled appt to discuss low testosterone. Pt has been denied authorization x2 by insurance company. Will contact insurance company for any further interventions that could be done to change decision. Pt aware and verbalized understanding. Will call back to discuss next week.

## 2018-08-24 DIAGNOSIS — I10 ESSENTIAL HYPERTENSION: ICD-10-CM

## 2018-08-27 ENCOUNTER — TELEPHONE (OUTPATIENT)
Dept: OTOLARYNGOLOGY | Facility: CLINIC | Age: 73
End: 2018-08-27

## 2018-08-27 RX ORDER — AZELASTINE 1 MG/ML
1 SPRAY, METERED NASAL 2 TIMES DAILY
Qty: 30 ML | Refills: 0 | Status: SHIPPED | OUTPATIENT
Start: 2018-08-27 | End: 2018-11-09 | Stop reason: SDUPTHER

## 2018-08-27 RX ORDER — AMLODIPINE BESYLATE 10 MG/1
TABLET ORAL
Qty: 90 TABLET | Refills: 3 | Status: SHIPPED | OUTPATIENT
Start: 2018-08-27 | End: 2019-07-31 | Stop reason: SDUPTHER

## 2018-09-26 ENCOUNTER — PATIENT MESSAGE (OUTPATIENT)
Dept: INTERNAL MEDICINE | Facility: CLINIC | Age: 73
End: 2018-09-26

## 2018-09-26 DIAGNOSIS — T23.261A PARTIAL THICKNESS BURN OF BACK OF RIGHT HAND, INITIAL ENCOUNTER: Primary | ICD-10-CM

## 2018-09-27 DIAGNOSIS — E78.1 HYPERTRIGLYCERIDEMIA: ICD-10-CM
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
## 2018-09-28 NOTE — TELEPHONE ENCOUNTER
I am referring to Wound Clinic for further evaluation. Please schedule this appointment with the patient.

## 2018-09-29 ENCOUNTER — OFFICE VISIT (OUTPATIENT)
Dept: WOUND CARE | Facility: CLINIC | Age: 73
End: 2018-09-29
Payer: COMMERCIAL

## 2018-09-29 VITALS
HEIGHT: 71 IN | DIASTOLIC BLOOD PRESSURE: 70 MMHG | SYSTOLIC BLOOD PRESSURE: 116 MMHG | BODY MASS INDEX: 39.66 KG/M2 | HEART RATE: 64 BPM | TEMPERATURE: 98 F | WEIGHT: 283.31 LBS | RESPIRATION RATE: 10 BRPM

## 2018-09-29 DIAGNOSIS — T31.10 BURN ANY DEGREE INVOLVING 10-19 PERCENT OF BODY SURFACE: ICD-10-CM

## 2018-09-29 PROCEDURE — 3078F DIAST BP <80 MM HG: CPT | Mod: CPTII,S$GLB,, | Performed by: NURSE PRACTITIONER

## 2018-09-29 PROCEDURE — 99203 OFFICE O/P NEW LOW 30 MIN: CPT | Mod: S$GLB,,, | Performed by: NURSE PRACTITIONER

## 2018-09-29 PROCEDURE — 99999 PR PBB SHADOW E&M-EST. PATIENT-LVL III: CPT | Mod: PBBFAC,,, | Performed by: NURSE PRACTITIONER

## 2018-09-29 PROCEDURE — 3074F SYST BP LT 130 MM HG: CPT | Mod: CPTII,S$GLB,, | Performed by: NURSE PRACTITIONER

## 2018-09-29 PROCEDURE — 1101F PT FALLS ASSESS-DOCD LE1/YR: CPT | Mod: CPTII,S$GLB,, | Performed by: NURSE PRACTITIONER
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
## 2018-09-29 NOTE — LETTER
September 29, 2018      Forest Miles MD  [address]  [facility] [facility] [address]           [facility]  [address]  [facility] [address]  Phone: [phone]          Patient: Edis Huggins Jr.   MR Number: [id number]   YOB: 1945   Date of Visit: 9/29/2018       Dear Dr. Forest Miles:    Thank you for referring Edis Huggins to me for evaluation. Attached you will find relevant portions of my assessment and plan of care.    If you have questions, please do not hesitate to call me. I look forward to following Edis Huggins along with you.    Sincerely,    Asia Mays, NP    Enclosure  CC:  No Recipients    If you would like to receive this communication electronically, please contact [email or web address] or [phone] to request more information on [employer] Link access.    For providers and/or their staff who would like to refer a patient to [facility], please contact us through our one-stop-shop provider referral line, [facility], at [phone].    If you feel you have received this communication in error or would no longer like to receive these types of communications, please e-mail [email or web address]

## 2018-10-01 RX ORDER — GEMFIBROZIL 600 MG/1
TABLET, FILM COATED ORAL
Qty: 60 TABLET | Refills: 5 | Status: SHIPPED | OUTPATIENT
Start: 2018-10-01 | End: 2019-09-30 | Stop reason: SDUPTHER

## 2018-10-15 ENCOUNTER — LAB VISIT (OUTPATIENT)
Dept: LAB | Facility: HOSPITAL | Age: 73
End: 2018-10-15
Attending: INTERNAL MEDICINE
Payer: COMMERCIAL

## 2018-10-15 DIAGNOSIS — D64.9 NORMOCYTIC ANEMIA: ICD-10-CM

## 2018-10-15 LAB
ALBUMIN SERPL BCP-MCNC: 3.8 G/DL
ALP SERPL-CCNC: 41 U/L
ALT SERPL W/O P-5'-P-CCNC: 21 U/L
ANION GAP SERPL CALC-SCNC: 10 MMOL/L
AST SERPL-CCNC: 16 U/L
BASOPHILS # BLD AUTO: 0.03 K/UL
BASOPHILS NFR BLD: 0.4 %
BILIRUB SERPL-MCNC: 0.6 MG/DL
BUN SERPL-MCNC: 21 MG/DL
CALCIUM SERPL-MCNC: 9.5 MG/DL
CHLORIDE SERPL-SCNC: 104 MMOL/L
CO2 SERPL-SCNC: 26 MMOL/L
CREAT SERPL-MCNC: 0.9 MG/DL
DIFFERENTIAL METHOD: ABNORMAL
EOSINOPHIL # BLD AUTO: 0.3 K/UL
EOSINOPHIL NFR BLD: 4.2 %
ERYTHROCYTE [DISTWIDTH] IN BLOOD BY AUTOMATED COUNT: 13 %
ERYTHROCYTE [SEDIMENTATION RATE] IN BLOOD BY WESTERGREN METHOD: 45 MM/HR
EST. GFR  (AFRICAN AMERICAN): >60 ML/MIN/1.73 M^2
EST. GFR  (NON AFRICAN AMERICAN): >60 ML/MIN/1.73 M^2
FERRITIN SERPL-MCNC: 155 NG/ML
GLUCOSE SERPL-MCNC: 135 MG/DL
HCT VFR BLD AUTO: 35.7 %
HGB BLD-MCNC: 11.5 G/DL
IRON SERPL-MCNC: 113 UG/DL
LYMPHOCYTES # BLD AUTO: 2.4 K/UL
LYMPHOCYTES NFR BLD: 33.3 %
MCH RBC QN AUTO: 30.9 PG
MCHC RBC AUTO-ENTMCNC: 32.2 G/DL
MCV RBC AUTO: 96 FL
MONOCYTES # BLD AUTO: 0.7 K/UL
MONOCYTES NFR BLD: 9.1 %
NEUTROPHILS # BLD AUTO: 3.8 K/UL
NEUTROPHILS NFR BLD: 52.9 %
PLATELET # BLD AUTO: 188 K/UL
PMV BLD AUTO: 11.5 FL
POTASSIUM SERPL-SCNC: 3.5 MMOL/L
PROT SERPL-MCNC: 7.2 G/DL
RBC # BLD AUTO: 3.72 M/UL
SATURATED IRON: 35 %
SODIUM SERPL-SCNC: 140 MMOL/L
TOTAL IRON BINDING CAPACITY: 321 UG/DL
TRANSFERRIN SERPL-MCNC: 217 MG/DL
WBC # BLD AUTO: 7.15 K/UL

## 2018-10-15 PROCEDURE — 80053 COMPREHEN METABOLIC PANEL: CPT

## 2018-10-15 PROCEDURE — 82728 ASSAY OF FERRITIN: CPT

## 2018-10-15 PROCEDURE — 85652 RBC SED RATE AUTOMATED: CPT

## 2018-10-15 PROCEDURE — 83540 ASSAY OF IRON: CPT

## 2018-10-15 PROCEDURE — 36415 COLL VENOUS BLD VENIPUNCTURE: CPT

## 2018-10-15 PROCEDURE — 85025 COMPLETE CBC W/AUTO DIFF WBC: CPT

## 2018-10-17 ENCOUNTER — OFFICE VISIT (OUTPATIENT)
Dept: HEMATOLOGY/ONCOLOGY | Facility: CLINIC | Age: 73
End: 2018-10-17
Payer: COMMERCIAL

## 2018-10-17 VITALS
DIASTOLIC BLOOD PRESSURE: 56 MMHG | HEART RATE: 68 BPM | RESPIRATION RATE: 16 BRPM | TEMPERATURE: 98 F | BODY MASS INDEX: 40.59 KG/M2 | WEIGHT: 291 LBS | OXYGEN SATURATION: 98 % | SYSTOLIC BLOOD PRESSURE: 131 MMHG

## 2018-10-17 DIAGNOSIS — D64.9 NORMOCYTIC ANEMIA: Primary | ICD-10-CM

## 2018-10-17 DIAGNOSIS — E66.9 OBESITY (BMI 35.0-39.9 WITHOUT COMORBIDITY): ICD-10-CM

## 2018-10-17 PROCEDURE — 99999 PR PBB SHADOW E&M-EST. PATIENT-LVL IV: CPT | Mod: PBBFAC,,, | Performed by: INTERNAL MEDICINE

## 2018-10-17 PROCEDURE — 99213 OFFICE O/P EST LOW 20 MIN: CPT | Mod: S$GLB,,, | Performed by: INTERNAL MEDICINE

## 2018-10-17 PROCEDURE — 3078F DIAST BP <80 MM HG: CPT | Mod: CPTII,S$GLB,, | Performed by: INTERNAL MEDICINE

## 2018-10-17 PROCEDURE — 3075F SYST BP GE 130 - 139MM HG: CPT | Mod: CPTII,S$GLB,, | Performed by: INTERNAL MEDICINE

## 2018-10-17 PROCEDURE — 1101F PT FALLS ASSESS-DOCD LE1/YR: CPT | Mod: CPTII,S$GLB,, | Performed by: INTERNAL MEDICINE

## 2018-10-17 RX ORDER — MUPIROCIN 20 MG/G
OINTMENT TOPICAL
Refills: 1 | COMMUNITY
Start: 2018-09-21 | End: 2018-11-30

## 2018-10-17 RX ORDER — CLINDAMYCIN HYDROCHLORIDE 300 MG/1
CAPSULE ORAL
Refills: 0 | COMMUNITY
Start: 2018-09-21 | End: 2018-11-30

## 2018-10-17 NOTE — PROGRESS NOTES
"Subjective:       Patient ID: Edis Huggins Jr. is a 73 y.o. male.    Chief Complaint: No chief complaint on file.    HPI  He is here for f/u of normocytic anemia.    Hemoglobin 3/14/18 was 11.8.  WBC and platelets within normal limits.  Nutritional studies unremarkable.    He gets dyspnea when he walks.      None at rest.  Weight 291 pounds.  Height 5' 11"    Trying to lose weight.    Review of Systems   Constitutional: Negative for fever and unexpected weight change.   HENT: Negative for mouth sores and nosebleeds.    Eyes: Negative for photophobia and pain.   Respiratory: Positive for shortness of breath. Negative for wheezing.    Cardiovascular: Negative for chest pain and palpitations.   Gastrointestinal: Negative for abdominal pain and vomiting.   Musculoskeletal: Negative for neck pain and neck stiffness.   Skin: Negative for rash and wound.   Neurological: Negative for seizures and syncope.   Hematological: Negative for adenopathy. Does not bruise/bleed easily.   Psychiatric/Behavioral: Negative for behavioral problems and confusion.         Objective:      Physical Exam   Constitutional: He is oriented to person, place, and time.  Non-toxic appearance. He does not have a sickly appearance. No distress.   HENT:   Nose: No epistaxis.   Mouth/Throat: Oropharynx is clear and moist. Mucous membranes are not pale, not dry and not cyanotic. No lacerations. No oropharyngeal exudate.   Eyes: Conjunctivae are normal. No scleral icterus.   Neck: Neck supple. No thyroid mass and no thyromegaly present.   Cardiovascular: Normal rate, regular rhythm, S1 normal and normal heart sounds.   Pulmonary/Chest: Effort normal and breath sounds normal. No accessory muscle usage or stridor. No respiratory distress. He has no wheezes. He has no rales.   Abdominal: Soft. He exhibits no ascites and no mass. There is no hepatosplenomegaly. There is no tenderness.   Musculoskeletal: He exhibits no edema.   Lymphadenopathy:        " Head (right side): No submental and no submandibular adenopathy present.        Head (left side): No submental and no submandibular adenopathy present.     He has no cervical adenopathy.     He has no axillary adenopathy.   Neurological: He is alert and oriented to person, place, and time. He has normal strength. No cranial nerve deficit or sensory deficit. Gait normal.   Skin: No bruising, no petechiae and no rash noted. He is not diaphoretic. No cyanosis.   Psychiatric: He has a normal mood and affect. Judgment and thought content normal. Cognition and memory are normal.   Vitals reviewed.        Assessment:       1. Normocytic anemia    2. Obesity (BMI 35.0-39.9 without comorbidity)        Plan:   Labs reviewed dating back to 2008.     He has chronic anemia.  It is stable.    No further workup needed at this point.    Counseled on weight loss.    Will monitor his blood counts and see him back for follow-up in 12 months.

## 2018-10-20 DIAGNOSIS — I10 ESSENTIAL HYPERTENSION: ICD-10-CM

## 2018-10-21 RX ORDER — CHLORTHALIDONE 50 MG/1
TABLET ORAL
Qty: 90 TABLET | Refills: 3 | Status: SHIPPED | OUTPATIENT
Start: 2018-10-21 | End: 2019-10-10 | Stop reason: SDUPTHER

## 2018-11-12 RX ORDER — AZELASTINE 1 MG/ML
1 SPRAY, METERED NASAL 2 TIMES DAILY
Qty: 30 ML | Refills: 0 | Status: SHIPPED | OUTPATIENT
Start: 2018-11-12 | End: 2019-01-07 | Stop reason: SDUPTHER

## 2018-11-16 ENCOUNTER — CLINICAL SUPPORT (OUTPATIENT)
Dept: INTERNAL MEDICINE | Facility: CLINIC | Age: 73
End: 2018-11-16
Payer: COMMERCIAL

## 2018-11-16 VITALS
BODY MASS INDEX: 40.71 KG/M2 | DIASTOLIC BLOOD PRESSURE: 60 MMHG | WEIGHT: 291.88 LBS | SYSTOLIC BLOOD PRESSURE: 124 MMHG

## 2018-11-16 PROCEDURE — 99999 PR PBB SHADOW E&M-EST. PATIENT-LVL I: CPT | Mod: PBBFAC,,,

## 2018-11-16 NOTE — PROGRESS NOTES
Health  Follow-Up Note   [x] Office  [] Phone  Notes from previous session reviewed.   [x] Previous Session Goals unchanged.   [] Patient/Caregiver Change in Goals.  Goals added or changed by Patient/Caregiver since program participation:  1.  See Dr. Miles  2. See cardiologist     Additional/Changed support that patient/caregiver has experienced/sought?  (Indicate readiness, support from family, friends, others, community groups, etc)  1.  none    Additional/Changed obstacles that could prevent patient/caregiver from reaching their goals?  1.  On the road eating out  2. Wife busy on own to eat  3. Holidays    Feedback provided:  1.  Praised for continued effort and determination.    Diagnostic values/Desriptors for follow-up as needed for chronic condition(s)   Weight: 132.4 kg 291.89 lb  Gain 6 lbs    Interventions:   1. Health  listened reflectively, validated thoughts and feelings, offered support and encouragement.   2. Allowed patient to express themselves in a non-biased atmosphere.  3. Health  assisted pt to problem-solve obstacles such as being in a challenging environment and dealing with these challenges.   4. Motivational Interviewed interventions utilized (OARS).   5. Patient responded favorably to interventions and remained actively engaged in the session.   6. Health  will remain available and connected for patient by phone and/or office visits.   7. Positive reinforcement, emotional support and encouragement provided.   8. Focused Education: MI    Plan:  [x] Pt will work on goals as stated above.   [x] Pt will contact Health  for any questions, concerns or needs.  [x] Pt will follow up with Health  in office on    3/29/18 at 1530    [] Pt will follow up with Health  on phone in:        [x] Health  will remain available.   [] Health  will contact patient by phone in:        [] Health  will consult:        [] Health  will inform Provider via [employer]  messaging.     Impression:  1. Behavior is consistent with   Action    Stage of Change.   2. Participation level:       [x] Receptive      [x] Interactive      [] Guarded and Resistant      [] Self Motivated      [] Refused/Declined to participate   3. [x] Pt voiced understanding of all information presented.       [] Pt voiced needing further information/education. This will be arranged.       [x] Pt would benefit from further education/information as identified by this health . This will be arranged.     Sandy Moses RN HC

## 2018-11-29 NOTE — PROGRESS NOTES
Assessment /Plan     For exam results, see Encounter Report.    Primary open angle glaucoma of left eye, moderate stage    Primary open-angle glaucoma, right eye, mild stage    Steroid responders, bilateral    Pupil irregular of left eye    Relative afferent pupillary defect - Left Eye    Pseudophakia, both eyes          Pt is switching from  [place] -  Back to [facility] - bring photo file over   Lost to F/u from 11/4/2013 to 8/11/2015   Knows Sharlene Mo and MIKHAIL - use to work with them     1. POAG (primary open-angle glaucoma) - Both Eyes   Old pt of [employer] [id number]  Began at [facility] 2012       POAG OS>OD           Family history    neg        Glaucoma meds    travatan z ou, T1/2 GFS ou        H/O adverse rxn to glaucoma drops    none        LASERS    SLT os 9/26/2013 -( good resp 17 --> 14)        GLAUCOMA SURGERIES    trabectome os (combined with phaco - 2/8/2017        OTHER EYE SURGERIES    Combined - phaco/IOL / goniotomy - trabectome os 2/8/2017 - +heme and increae in IOP)                                                 PC IOL OD - 5/10/2017         CDR    0.6/0.7-0.8        Tbase    20-23/21-24          Tmax    23/24            Ttarget    17/17           HVF    5 tests 5514-8374 OD essentially full; OS Sup paracentral defect, IAD/INS        Gonio    +4 ou        CCT    598/598        OCT    3 test 2012 to 2013 - RNFL - nl od // dec S bord T        HRT    5 test-2012 to 2018  -MR -  nl od // dec. T, bord I os /// CDR 0.51 od // 0.64 os        Disc photos    2012 - IOS    - Ttoday   16/16  - Test done today   IOP / HRT       2. Steroid responders - Both Eyes  2/2 nasacort      3. ? Trace APD os      4. S/P Rt knee replacement Feb 2013  -doing well      5.  Myopia / presbyopia ou     6. Irregular pupil - w/ PAS   -2/2 very high IOP POD # 1 post phaco     7.  PC IOL OU  OD 5/10/2017 - PCB00 14.5  OS 2/8/2017 - + hyphema and high IOP 2/2 trabectome - irregular pupil post op - PCB00 14.0   "    Plan  CSM  IOP below target OU  SLT done OS 9/26/2013 - good initial resp 24-->17    -( if responds well consider SLT od - but full VF and helathy ON still od)  HVF stable OU    Cont timolol gfs ou q day     If the glare from the fixed dilated pupil bothers him a lot post 2nd eye getting done - consider pupil surgery with dr Peacock to "purse string it " smaller - pt is doing ok for now - glare is not bothering him too much     F/U 4 months with HVF / DFE / OCT     I have seen and personally examined the patient.  I agree with the findings, assessment and plan of the resident and/or fellow.     Deborah Khan MD  "

## 2018-11-30 ENCOUNTER — OFFICE VISIT (OUTPATIENT)
Dept: OPHTHALMOLOGY | Facility: CLINIC | Age: 73
End: 2018-11-30
Payer: COMMERCIAL

## 2018-11-30 ENCOUNTER — CLINICAL SUPPORT (OUTPATIENT)
Dept: OPHTHALMOLOGY | Facility: CLINIC | Age: 73
End: 2018-11-30
Attending: OPHTHALMOLOGY
Payer: COMMERCIAL

## 2018-11-30 DIAGNOSIS — H40.1111 PRIMARY OPEN-ANGLE GLAUCOMA, RIGHT EYE, MILD STAGE: ICD-10-CM

## 2018-11-30 DIAGNOSIS — H21.562 PUPIL IRREGULAR OF LEFT EYE: ICD-10-CM

## 2018-11-30 DIAGNOSIS — H40.1122 PRIMARY OPEN ANGLE GLAUCOMA OF LEFT EYE, MODERATE STAGE: ICD-10-CM

## 2018-11-30 DIAGNOSIS — H57.09 RELATIVE AFFERENT PUPILLARY DEFECT: ICD-10-CM

## 2018-11-30 DIAGNOSIS — H40.043 STEROID RESPONDERS, BILATERAL: ICD-10-CM

## 2018-11-30 DIAGNOSIS — H40.1122 PRIMARY OPEN ANGLE GLAUCOMA OF LEFT EYE, MODERATE STAGE: Primary | ICD-10-CM

## 2018-11-30 DIAGNOSIS — Z96.1 PSEUDOPHAKIA, BOTH EYES: ICD-10-CM

## 2018-11-30 PROCEDURE — 92133 CPTRZD OPH DX IMG PST SGM ON: CPT | Mod: S$GLB,,, | Performed by: OPHTHALMOLOGY

## 2018-11-30 PROCEDURE — 92012 INTRM OPH EXAM EST PATIENT: CPT | Mod: S$GLB,,, | Performed by: OPHTHALMOLOGY

## 2018-11-30 PROCEDURE — 99999 PR PBB SHADOW E&M-EST. PATIENT-LVL II: CPT | Mod: PBBFAC,,, | Performed by: OPHTHALMOLOGY

## 2018-12-14 ENCOUNTER — IMMUNIZATION (OUTPATIENT)
Dept: INTERNAL MEDICINE | Facility: CLINIC | Age: 73
End: 2018-12-14
Payer: COMMERCIAL

## 2018-12-14 ENCOUNTER — OFFICE VISIT (OUTPATIENT)
Dept: INTERNAL MEDICINE | Facility: CLINIC | Age: 73
End: 2018-12-14
Payer: COMMERCIAL

## 2018-12-14 VITALS
WEIGHT: 293 LBS | SYSTOLIC BLOOD PRESSURE: 130 MMHG | BODY MASS INDEX: 41.02 KG/M2 | HEIGHT: 71 IN | HEART RATE: 60 BPM | DIASTOLIC BLOOD PRESSURE: 66 MMHG

## 2018-12-14 DIAGNOSIS — M25.551 RIGHT HIP PAIN: Primary | ICD-10-CM

## 2018-12-14 DIAGNOSIS — M54.50 CHRONIC BILATERAL LOW BACK PAIN WITHOUT SCIATICA: ICD-10-CM

## 2018-12-14 DIAGNOSIS — I10 ESSENTIAL HYPERTENSION: ICD-10-CM

## 2018-12-14 DIAGNOSIS — G89.29 CHRONIC BILATERAL LOW BACK PAIN WITHOUT SCIATICA: ICD-10-CM

## 2018-12-14 DIAGNOSIS — R06.09 DOE (DYSPNEA ON EXERTION): ICD-10-CM

## 2018-12-14 DIAGNOSIS — E78.5 HYPERLIPIDEMIA, UNSPECIFIED HYPERLIPIDEMIA TYPE: ICD-10-CM

## 2018-12-14 DIAGNOSIS — Z23 NEED FOR INFLUENZA VACCINATION: ICD-10-CM

## 2018-12-14 DIAGNOSIS — K21.9 GASTROESOPHAGEAL REFLUX DISEASE, ESOPHAGITIS PRESENCE NOT SPECIFIED: ICD-10-CM

## 2018-12-14 PROCEDURE — 1101F PT FALLS ASSESS-DOCD LE1/YR: CPT | Mod: CPTII,S$GLB,, | Performed by: INTERNAL MEDICINE

## 2018-12-14 PROCEDURE — 99999 PR PBB SHADOW E&M-EST. PATIENT-LVL III: CPT | Mod: PBBFAC,,, | Performed by: INTERNAL MEDICINE

## 2018-12-14 PROCEDURE — 3078F DIAST BP <80 MM HG: CPT | Mod: CPTII,S$GLB,, | Performed by: INTERNAL MEDICINE

## 2018-12-14 PROCEDURE — 99214 OFFICE O/P EST MOD 30 MIN: CPT | Mod: S$GLB,,, | Performed by: INTERNAL MEDICINE

## 2018-12-14 PROCEDURE — 3075F SYST BP GE 130 - 139MM HG: CPT | Mod: CPTII,S$GLB,, | Performed by: INTERNAL MEDICINE

## 2018-12-14 PROCEDURE — 90662 IIV NO PRSV INCREASED AG IM: CPT | Mod: S$GLB,,, | Performed by: INTERNAL MEDICINE

## 2018-12-14 PROCEDURE — 90471 IMMUNIZATION ADMIN: CPT | Mod: S$GLB,,, | Performed by: INTERNAL MEDICINE

## 2018-12-14 RX ORDER — CYCLOBENZAPRINE HCL 10 MG
5-10 TABLET ORAL 3 TIMES DAILY PRN
Qty: 30 TABLET | Refills: 0 | Status: SHIPPED | OUTPATIENT
Start: 2018-12-14 | End: 2018-12-24

## 2018-12-14 NOTE — PROGRESS NOTES
"Subjective:       Patient ID: Edis Huggins Jr. is a 73 y.o. male.    Chief Complaint: Follow-up    HPI    Last visit with me August 2018.  Since then seen by Hematology, Wound Care, and Ophthalmology.  Upcoming appointment with Ophthalmology and health .    Reports dyspnea on exertion still present but not changing much.     "I've been doing terrible on my wt". Doing more travel for work, eating more at restaurants. Wife traveling more as well. No significant side effects however.    Has some pain in low back. Also now with pain in the R lateral leg. Sometimes hard to sleep as result. Had used Flexeril in past that helped. No pain with laying on the hip. Uncomfortable in the right hip when laying on his back. Reports probably bursitis in hip in the past. Knee and foot on right is okay. Doesn't feel like bursitis. Longstanding osteoarthritis of lumbar spine.    About 1-2x/mo having GERD with sleep.     Review of Systems   Constitutional: Positive for activity change and unexpected weight change.   HENT: Positive for hearing loss. Negative for rhinorrhea and trouble swallowing.    Eyes: Negative for discharge and visual disturbance.   Respiratory: Negative for chest tightness and wheezing.    Cardiovascular: Negative for chest pain and palpitations.   Gastrointestinal: Negative for blood in stool, constipation, diarrhea and vomiting.   Endocrine: Negative for polydipsia and polyuria.   Genitourinary: Positive for urgency. Negative for difficulty urinating and hematuria.   Musculoskeletal: Positive for neck pain. Negative for arthralgias and joint swelling.   Neurological: Positive for weakness. Negative for headaches.   Psychiatric/Behavioral: Negative for confusion and dysphoric mood.       Objective:      Physical Exam   Constitutional: No distress.    man whose Body mass index is 40.87 kg/m².    Musculoskeletal:        Right hip: He exhibits normal range of motion (BRYNN negative), no tenderness " "and no bony tenderness.        Lumbar back: He exhibits normal range of motion, no tenderness, no bony tenderness, no swelling and no pain.   Neurological: He is alert. Gait normal.   Straight leg raise negative on R   Nursing note and vitals reviewed.      Vitals:    12/14/18 1603   BP: 130/66   BP Location: Right arm   Patient Position: Sitting   BP Method: Large (Manual)   Pulse: 60   Weight: 132.9 kg (293 lb)   Height: 5' 11" (1.803 m)     Body mass index is 40.87 kg/m².    RESULTS: Reviewed labs from last 6 months    Assessment:       1. Right hip pain    2. Chronic bilateral low back pain without sciatica    3. Hyperlipidemia, unspecified hyperlipidemia type    4. Essential hypertension        Plan:   Edis was seen today for follow-up.    Diagnoses and all orders for this visit:    Right hip pain:  New problem.  Possibly bursitis, IT band syndrome.  Possibly radicular nerve impingement.  Recommend gentle stretching, demonstrated in clinic.  Use of Flexeril PRN. Please notify the office if the symptoms persist or worsen.   -     cyclobenzaprine (FLEXERIL) 10 MG tablet; Take 0.5-1 tablets (5-10 mg total) by mouth 3 (three) times daily as needed for Muscle spasms.    Chronic bilateral low back pain without sciatica: prior dx, chronic problem likely related to weight gain and osteoarthritis of lumbar spine. Use of flexeril PRN, stretching and continue attempts at weight loss.  -     cyclobenzaprine (FLEXERIL) 10 MG tablet; Take 0.5-1 tablets (5-10 mg total) by mouth 3 (three) times daily as needed for Muscle spasms.    GERD:  Prior diagnosis, recently more symptomatic, likely due to weight gain and less healthy diet.  No alarm symptoms. Treatment with over-the-counters as below:  "Use Zantac 15-30 minutes before meals if suspecting reflux will be a problem. If this is ineffective, then please increase to a more potent acid reducer before dinner like Nexium or Prilosec. Usually after reflux starts I recommend " "Tums or Rolaids or Gaviscon as needed."    dyspnea on exertion: Prior dx, persistent problem, all workup has been negative for cardiac, hematologic, or pulmonary etiology.  Most likely due to excess weight gain and deconditioning.  Patient will continue to work with health  on weight loss.    Hyperlipidemia, unspecified hyperlipidemia type  -     Lipid panel; Future    Essential hypertension  -     Comprehensive metabolic panel; Future  -     CBC Without Differential; Future  -     TSH; Future  -     Magnesium; Future    Follow-up in about 4 months (around 4/14/2019) for EPP annual exam, fasting labs 1 week prior. See if should change from Lopid to statin given evidence of atherosclerosis on imaging.  Forest Miles MD  Internal Medicine    Portions of this note were completed using medical dictation software. Please excuse typographical or syntax errors that were missed on review.    "

## 2018-12-14 NOTE — PATIENT INSTRUCTIONS
Use Zantac 15-30 minutes before meals if suspecting reflux will be a problem. If this is ineffective, then please increase to a more potent acid reducer before dinner like Nexium or Prilosec. Usually after reflux starts I recommend Tums or Rolaids or Gaviscon as needed.      Back Exercises: Lower Back Stretch    To start, sit in a chair with your feet flat on the floor. Shift your weight slightly forward. Relax, and keep your ears, shoulders, and hips aligned.  · Sit with your feet well apart.  · Bend forward and touch the floor with the backs of your hands. Relax and let your body drop.  · Hold for 20 seconds. Return to starting position.  · Repeat 2 times.   Date Last Reviewed: 8/16/2015  © 7764-1175 [employer]. [address] [place] [address]. All rights reserved. This information is not intended as a substitute for professional medical care. Always follow your healthcare professional's instructions.          Iliotibial Band Stretch (Flexibility)     1. Stand next to a chair. Hold onto the chair with your right hand for support. Cross your right leg behind your left leg.  2. Lean your right hip toward the right. Feel the stretch at the outside of your hip.  3. Hold for 30 to 60 seconds. Then relax.  4. Repeat 2 times, or as instructed.  5. Switch sides and repeat.  6. Do this 3 times a day, or as instructed.     Tip: Dont bend forward or twist at the waist.   Date Last Reviewed: 3/29/2016  © 5200-0932 [employer]. [address] [place] [id number]. All rights reserved. This information is not intended as a substitute for professional medical care. Always follow your healthcare professional's instructions.

## 2019-01-07 ENCOUNTER — TELEPHONE (OUTPATIENT)
Dept: OTOLARYNGOLOGY | Facility: CLINIC | Age: 74
End: 2019-01-07

## 2019-01-07 RX ORDER — AZELASTINE 1 MG/ML
1 SPRAY, METERED NASAL 2 TIMES DAILY
Qty: 30 ML | Refills: 0 | Status: SHIPPED | OUTPATIENT
Start: 2019-01-07 | End: 2019-03-25 | Stop reason: SDUPTHER

## 2019-01-07 NOTE — TELEPHONE ENCOUNTER
----- Message from Nkechi Montoya sent at 1/7/2019  9:56 AM CST -----  Contact: Nancy [phone]  Pharmacy would like to receive an authorization on a refill for azelastine (ASTELIN) 137 mcg (0.1 %) nasal spray. Please advise.

## 2019-01-20 ENCOUNTER — PATIENT MESSAGE (OUTPATIENT)
Dept: INTERNAL MEDICINE | Facility: CLINIC | Age: 74
End: 2019-01-20

## 2019-01-20 DIAGNOSIS — M25.551 RIGHT HIP PAIN: ICD-10-CM

## 2019-01-20 DIAGNOSIS — M54.50 CHRONIC BILATERAL LOW BACK PAIN WITHOUT SCIATICA: ICD-10-CM

## 2019-01-20 DIAGNOSIS — G89.29 CHRONIC BILATERAL LOW BACK PAIN WITHOUT SCIATICA: ICD-10-CM

## 2019-01-21 RX ORDER — CYCLOBENZAPRINE HCL 10 MG
10 TABLET ORAL 3 TIMES DAILY PRN
COMMUNITY
End: 2019-01-21 | Stop reason: SDUPTHER

## 2019-01-21 RX ORDER — CYCLOBENZAPRINE HCL 10 MG
TABLET ORAL
Qty: 30 TABLET | Refills: 0 | OUTPATIENT
Start: 2019-01-21

## 2019-01-21 RX ORDER — CYCLOBENZAPRINE HCL 10 MG
10 TABLET ORAL 3 TIMES DAILY PRN
Qty: 30 TABLET | Refills: 3 | Status: SHIPPED | OUTPATIENT
Start: 2019-01-21 | End: 2019-05-09 | Stop reason: SDUPTHER

## 2019-02-07 DIAGNOSIS — N40.0 BENIGN NON-NODULAR PROSTATIC HYPERPLASIA WITHOUT LOWER URINARY TRACT SYMPTOMS: ICD-10-CM

## 2019-02-07 RX ORDER — FINASTERIDE 5 MG/1
TABLET, FILM COATED ORAL
Qty: 90 TABLET | Refills: 3 | Status: SHIPPED | OUTPATIENT
Start: 2019-02-07 | End: 2020-01-21

## 2019-02-07 RX ORDER — TAMSULOSIN HYDROCHLORIDE 0.4 MG/1
CAPSULE ORAL
Qty: 90 CAPSULE | Refills: 3 | Status: SHIPPED | OUTPATIENT
Start: 2019-02-07 | End: 2020-01-21

## 2019-02-22 ENCOUNTER — OFFICE VISIT (OUTPATIENT)
Dept: CARDIOLOGY | Facility: CLINIC | Age: 74
End: 2019-02-22
Payer: COMMERCIAL

## 2019-02-22 VITALS
BODY MASS INDEX: 40.54 KG/M2 | SYSTOLIC BLOOD PRESSURE: 128 MMHG | HEART RATE: 70 BPM | WEIGHT: 290.69 LBS | DIASTOLIC BLOOD PRESSURE: 80 MMHG | OXYGEN SATURATION: 95 %

## 2019-02-22 DIAGNOSIS — N52.01 ERECTILE DYSFUNCTION DUE TO ARTERIAL INSUFFICIENCY: ICD-10-CM

## 2019-02-22 DIAGNOSIS — M79.606 PAIN OF LOWER EXTREMITY, UNSPECIFIED LATERALITY: ICD-10-CM

## 2019-02-22 DIAGNOSIS — E66.9 OBESITY (BMI 35.0-39.9 WITHOUT COMORBIDITY): ICD-10-CM

## 2019-02-22 DIAGNOSIS — I10 ESSENTIAL HYPERTENSION: Primary | ICD-10-CM

## 2019-02-22 DIAGNOSIS — I70.0 AORTIC ATHEROSCLEROSIS: ICD-10-CM

## 2019-02-22 DIAGNOSIS — D64.9 NORMOCYTIC ANEMIA: ICD-10-CM

## 2019-02-22 DIAGNOSIS — I48.91 LONE ATRIAL FIBRILLATION: ICD-10-CM

## 2019-02-22 DIAGNOSIS — I65.29 CAROTID ATHEROSCLEROSIS, UNSPECIFIED LATERALITY: ICD-10-CM

## 2019-02-22 PROCEDURE — 3079F PR MOST RECENT DIASTOLIC BLOOD PRESSURE 80-89 MM HG: ICD-10-PCS | Mod: CPTII,S$GLB,, | Performed by: INTERNAL MEDICINE

## 2019-02-22 PROCEDURE — 1101F PR PT FALLS ASSESS DOC 0-1 FALLS W/OUT INJ PAST YR: ICD-10-PCS | Mod: CPTII,S$GLB,, | Performed by: INTERNAL MEDICINE

## 2019-02-22 PROCEDURE — 99214 PR OFFICE/OUTPT VISIT, EST, LEVL IV, 30-39 MIN: ICD-10-PCS | Mod: S$GLB,,, | Performed by: INTERNAL MEDICINE

## 2019-02-22 PROCEDURE — 99999 PR PBB SHADOW E&M-EST. PATIENT-LVL III: ICD-10-PCS | Mod: PBBFAC,,, | Performed by: INTERNAL MEDICINE

## 2019-02-22 PROCEDURE — 3079F DIAST BP 80-89 MM HG: CPT | Mod: CPTII,S$GLB,, | Performed by: INTERNAL MEDICINE

## 2019-02-22 PROCEDURE — 3074F SYST BP LT 130 MM HG: CPT | Mod: CPTII,S$GLB,, | Performed by: INTERNAL MEDICINE

## 2019-02-22 PROCEDURE — 99999 PR PBB SHADOW E&M-EST. PATIENT-LVL III: CPT | Mod: PBBFAC,,, | Performed by: INTERNAL MEDICINE

## 2019-02-22 PROCEDURE — 3074F PR MOST RECENT SYSTOLIC BLOOD PRESSURE < 130 MM HG: ICD-10-PCS | Mod: CPTII,S$GLB,, | Performed by: INTERNAL MEDICINE

## 2019-02-22 PROCEDURE — 99214 OFFICE O/P EST MOD 30 MIN: CPT | Mod: S$GLB,,, | Performed by: INTERNAL MEDICINE

## 2019-02-22 PROCEDURE — 1101F PT FALLS ASSESS-DOCD LE1/YR: CPT | Mod: CPTII,S$GLB,, | Performed by: INTERNAL MEDICINE

## 2019-02-22 NOTE — PROGRESS NOTES
Subjective:    Patient ID:  Edis Huggins Jr. is a 73 y.o. male who presents for follow-up of Shortness of Breath      HPI     74 y/o male with hx of HTN, HLD, obesity who presents for f/u.   Has had extensive w/u for RESENDEZ including normal nuclear SPECT, normal 30 day monitor, normal PFT's. RESENDEZ has somewhat improved. Did have a previous Holter monitor which showed a single run of Paroxysmal atrial fibrillation lasting for 13 sec. Had bilateral LE edema which has improved with chlorthalidone. Denies CP, orthopnea, PND, syncope, palps. Compliant with meds and follows a low salt diet. Has developed left thigh ambulatory discomfort that improves with rest. Has a Cardia heart rhythm monitor on smart phone.    Review of Systems   Constitution: Negative for weakness and malaise/fatigue.   HENT: Negative for congestion.    Eyes: Negative for blurred vision.   Cardiovascular: Positive for dyspnea on exertion. Negative for chest pain, claudication, cyanosis, irregular heartbeat, leg swelling, near-syncope, orthopnea, palpitations, paroxysmal nocturnal dyspnea and syncope.   Respiratory: Negative for shortness of breath.    Endocrine: Negative for polyuria.   Hematologic/Lymphatic: Negative for bleeding problem.   Skin: Negative for itching and rash.   Musculoskeletal: Negative for joint swelling, muscle cramps and muscle weakness.   Gastrointestinal: Negative for abdominal pain, hematemesis, hematochezia, melena, nausea and vomiting.   Genitourinary: Negative for dysuria and hematuria.   Neurological: Negative for dizziness, focal weakness, headaches, light-headedness and loss of balance.   Psychiatric/Behavioral: Negative for depression. The patient is not nervous/anxious.         Objective:    Physical Exam   Constitutional: He is oriented to person, place, and time. He appears well-developed and well-nourished.   HENT:   Head: Normocephalic and atraumatic.   Neck: Neck supple. No JVD present.   Cardiovascular: Normal  rate, regular rhythm and normal heart sounds.   Pulses:       Carotid pulses are 2+ on the right side, and 2+ on the left side.       Radial pulses are 2+ on the right side, and 2+ on the left side.        Femoral pulses are 2+ on the right side, and 2+ on the left side.       Dorsalis pedis pulses are 2+ on the right side, and 2+ on the left side.        Posterior tibial pulses are 2+ on the right side, and 2+ on the left side.   Pulmonary/Chest: Effort normal and breath sounds normal.   Abdominal: Soft. Bowel sounds are normal.   Musculoskeletal: He exhibits no edema.   Neurological: He is alert and oriented to person, place, and time.   Skin: Skin is warm and dry.   Psychiatric: He has a normal mood and affect. His behavior is normal. Thought content normal.         Assessment:       1. Essential hypertension    2. Lone atrial fibrillation    3. Aortic atherosclerosis -- CTA 06/2018    4. Carotid atherosclerosis, unspecified laterality    5. Erectile dysfunction due to arterial insufficiency    6. Normocytic anemia    7. Obesity (BMI 35.0-39.9 without comorbidity)      74 y/o pt with hx and presentation as above. Doing well from a cardiac perspective and compensated from a HF perspective. Will evaluate leg symptoms with exercise LUIZ. Discussed the importance of med compliance, heart healthy diet, and regular exercise. Weight loss.        Plan:       -Exercise LUIZ  -Continue current medical management  -f/u in 6 months

## 2019-03-08 ENCOUNTER — HOSPITAL ENCOUNTER (OUTPATIENT)
Dept: CARDIOLOGY | Facility: HOSPITAL | Age: 74
Discharge: HOME | End: 2019-03-08
Attending: INTERNAL MEDICINE
Payer: COMMERCIAL

## 2019-03-08 DIAGNOSIS — M79.606 PAIN OF LOWER EXTREMITY, UNSPECIFIED LATERALITY: ICD-10-CM

## 2019-03-08 LAB
IMMEDIATE ARM BP: 135 MMHG
IMMEDIATE LEFT ABI: 1.41
IMMEDIATE LEFT TIBIAL: 190 MMHG
IMMEDIATE RIGHT ABI: 1.29
IMMEDIATE RIGHT TIBIAL: 174 MMHG
LEFT ABI: 1.15
LEFT ARM BP: 141 MMHG
LEFT DORSALIS PEDIS: 151 MMHG
LEFT POSTERIOR TIBIAL: 162 MMHG
LEFT TBI: 0.96
LEFT TOE PRESSURE: 135 MMHG
RIGHT ABI: 1.09
RIGHT ARM BP: 125 MMHG
RIGHT DORSALIS PEDIS: 150 MMHG
RIGHT POSTERIOR TIBIAL: 154 MMHG
RIGHT TBI: 0.94
RIGHT TOE PRESSURE: 132 MMHG
TREADMILL GRADE: 10 %
TREADMILL SPEED: 1.5 MPH
TREADMILL TIME: 3.29 MIN

## 2019-03-08 PROCEDURE — 93924 LWR XTR VASC STDY BILAT: CPT | Mod: 26,,, | Performed by: INTERNAL MEDICINE

## 2019-03-08 PROCEDURE — 93924 CV US ANKLE BRACHIAL INDICES W STRESS W TOE TRACINGS (CUPID ONLY): ICD-10-PCS | Mod: 26,,, | Performed by: INTERNAL MEDICINE

## 2019-03-08 PROCEDURE — 93924 LWR XTR VASC STDY BILAT: CPT | Mod: 50

## 2019-03-11 ENCOUNTER — TELEPHONE (OUTPATIENT)
Dept: CARDIOLOGY | Facility: CLINIC | Age: 74
End: 2019-03-11

## 2019-03-11 NOTE — TELEPHONE ENCOUNTER
----- Message from Shakeel Bello MD sent at 3/11/2019  3:50 PM CDT -----  Your LUIZ's were normal indicating no evidence of blockages.

## 2019-03-20 NOTE — PROGRESS NOTES
Assessment /Plan     For exam results, see Encounter Report.    Primary open angle glaucoma of left eye, moderate stage    Primary open-angle glaucoma, right eye, mild stage    Steroid responders, bilateral    Pupil irregular of left eye    Relative afferent pupillary defect - Left Eye    Pseudophakia, both eyes        Pt is switching from  [place] -  Back to [facility] - bring photo file over   Lost to F/u from 11/4/2013 to 8/11/2015   Knows Sharlene Mo and MIKHAIL - use to work with them     1. POAG (primary open-angle glaucoma) - Both Eyes   Old pt of [facility] [id number]  Began at [facility] 2012       POAG OS>OD           Family history    neg        Glaucoma meds   T1/2 GFS ou (use to use travatan- off post CE)         H/O adverse rxn to glaucoma drops    none        LASERS    SLT os 9/26/2013 -( good resp 17 --> 14)        GLAUCOMA SURGERIES    trabectome os (combined with phaco - 2/8/2017        OTHER EYE SURGERIES    Combined - phaco/IOL / goniotomy - trabectome os 2/8/2017 - +heme and increae in IOP)                                                 PC IOL OD - 5/10/2017         CDR    0.6/0.7-0.8        Tbase    20-23/21-24          Tmax    23/24            Ttarget    17/17           HVF    5 tests 1502-8152 OD essentially full; OS Sup paracentral defect, IAD/INS (([facility]))             (([facility])) - 4 test 2012 to 2019 - full od // sup paracentral defect / inferior paracentral defect         Gonio    +4 ou        CCT    598/598        OCT    4 test 2012 to 2019  - RNFL - nl od // bord G/TS/T         HRT    5 test-2012 to 2018  -MR -  nl od // dec. T, bord I os /// CDR 0.51 od // 0.64 os        Disc photos    2012 - IOS    - Ttoday   16/14  - Test done today   IOP / HVF / DFE / OCT       2. Steroid responders - Both Eyes  2/2 nasacort      3. ? Trace APD os      4. S/P Rt knee replacement Feb 2013  -doing well      5.  Myopia / presbyopia ou     6. Irregular pupil - w/ PAS   -2/2 very high  "IOP POD # 1 post phaco     7.  PC IOL OU  OD 5/10/2017 - PCB00 14.5  OS 2/8/2017 - + hyphema and high IOP 2/2 trabectome - irregular pupil post op - PCB00 14.0    8. PCO od - monitor       Plan  CSM  IOP below target OU  SLT done OS 9/26/2013 - good initial resp 24-->17    -( if responds well consider SLT od - but full VF and helathy ON still od)  HVF stable OU    Cont timolol gfs ou q day     If the glare from the fixed dilated pupil bothers him a lot post 2nd eye getting done - consider pupil surgery with dr Peacock to "purse string it " smaller - pt is doing ok for now - glare is not bothering him too much     F/U 4 months with  Gonio     I have seen and personally examined the patient.  I agree with the findings, assessment and plan of the resident and/or fellow.     Deborah Khan MD  "

## 2019-03-22 ENCOUNTER — OFFICE VISIT (OUTPATIENT)
Dept: OPHTHALMOLOGY | Facility: CLINIC | Age: 74
End: 2019-03-22
Payer: COMMERCIAL

## 2019-03-22 ENCOUNTER — CLINICAL SUPPORT (OUTPATIENT)
Dept: OPHTHALMOLOGY | Facility: CLINIC | Age: 74
End: 2019-03-22
Payer: COMMERCIAL

## 2019-03-22 DIAGNOSIS — H40.1122 PRIMARY OPEN ANGLE GLAUCOMA OF LEFT EYE, MODERATE STAGE: Primary | ICD-10-CM

## 2019-03-22 DIAGNOSIS — H40.043 STEROID RESPONDERS, BILATERAL: ICD-10-CM

## 2019-03-22 DIAGNOSIS — Z96.1 PSEUDOPHAKIA, BOTH EYES: ICD-10-CM

## 2019-03-22 DIAGNOSIS — H57.09 RELATIVE AFFERENT PUPILLARY DEFECT: ICD-10-CM

## 2019-03-22 DIAGNOSIS — H40.1111 PRIMARY OPEN-ANGLE GLAUCOMA, RIGHT EYE, MILD STAGE: ICD-10-CM

## 2019-03-22 DIAGNOSIS — H21.562 PUPIL IRREGULAR OF LEFT EYE: ICD-10-CM

## 2019-03-22 PROCEDURE — 92133 POSTERIOR SEGMENT OCT OPTIC NERVE(OCULAR COHERENCE TOMOGRAPHY) - OU - BOTH EYES: ICD-10-PCS | Mod: S$GLB,,, | Performed by: OPHTHALMOLOGY

## 2019-03-22 PROCEDURE — 92083 HUMPHREY VISUAL FIELD - OU - BOTH EYES: ICD-10-PCS | Mod: S$GLB,,, | Performed by: OPHTHALMOLOGY

## 2019-03-22 PROCEDURE — 99999 PR PBB SHADOW E&M-EST. PATIENT-LVL II: ICD-10-PCS | Mod: PBBFAC,,, | Performed by: OPHTHALMOLOGY

## 2019-03-22 PROCEDURE — 99999 PR PBB SHADOW E&M-EST. PATIENT-LVL I: ICD-10-PCS | Mod: PBBFAC,,,

## 2019-03-22 PROCEDURE — 99999 PR PBB SHADOW E&M-EST. PATIENT-LVL II: CPT | Mod: PBBFAC,,, | Performed by: OPHTHALMOLOGY

## 2019-03-22 PROCEDURE — 99999 PR PBB SHADOW E&M-EST. PATIENT-LVL I: CPT | Mod: PBBFAC,,,

## 2019-03-22 PROCEDURE — 92083 EXTENDED VISUAL FIELD XM: CPT | Mod: S$GLB,,, | Performed by: OPHTHALMOLOGY

## 2019-03-22 PROCEDURE — 92133 CPTRZD OPH DX IMG PST SGM ON: CPT | Mod: S$GLB,,, | Performed by: OPHTHALMOLOGY

## 2019-03-22 PROCEDURE — 92014 PR EYE EXAM, EST PATIENT,COMPREHESV: ICD-10-PCS | Mod: S$GLB,,, | Performed by: OPHTHALMOLOGY

## 2019-03-22 PROCEDURE — 92014 COMPRE OPH EXAM EST PT 1/>: CPT | Mod: S$GLB,,, | Performed by: OPHTHALMOLOGY

## 2019-03-22 RX ORDER — TIMOLOL MALEATE 5 MG/ML
1 SOLUTION OPHTHALMIC EVERY MORNING
Qty: 5 ML | Refills: 12 | Status: SHIPPED | OUTPATIENT
Start: 2019-03-22 | End: 2020-04-14

## 2019-03-23 ENCOUNTER — PATIENT MESSAGE (OUTPATIENT)
Dept: INTERNAL MEDICINE | Facility: CLINIC | Age: 74
End: 2019-03-23

## 2019-03-23 ENCOUNTER — PATIENT MESSAGE (OUTPATIENT)
Dept: OTOLARYNGOLOGY | Facility: CLINIC | Age: 74
End: 2019-03-23

## 2019-03-25 RX ORDER — AZELASTINE 1 MG/ML
1 SPRAY, METERED NASAL 2 TIMES DAILY
Qty: 30 ML | Refills: 3 | Status: SHIPPED | OUTPATIENT
Start: 2019-03-25 | End: 2019-10-29 | Stop reason: SDUPTHER

## 2019-03-29 ENCOUNTER — CLINICAL SUPPORT (OUTPATIENT)
Dept: INTERNAL MEDICINE | Facility: CLINIC | Age: 74
End: 2019-03-29
Payer: COMMERCIAL

## 2019-03-29 VITALS — WEIGHT: 288.56 LBS | BODY MASS INDEX: 40.25 KG/M2

## 2019-03-29 NOTE — PROGRESS NOTES
Health  Follow-Up Note   [x] Office  [] Phone  Notes from previous session reviewed.   [x] Previous Session Goals unchanged.   [] Patient/Caregiver Change in Goals.  Goals added or changed by Patient/Caregiver since program participation:  1. Continue same plan       Additional/Changed support that patient/caregiver has experienced/sought?  (Indicate readiness, support from family, friends, others, community groups, etc)  1.  wife    Additional/Changed obstacles that could prevent patient/caregiver from reaching their goals?  1.  Eating out or wife not cooking    Feedback provided:  1.  Praised for continued effort and determination    Diagnostic values/Desriptors for follow-up as needed for chronic condition(s)   Weight: 130.9 kg 288.58 lb down 3.31 lb    Interventions:   1. Health  listened reflectively, validated thoughts and feelings, offered support and encouragement.   2. Allowed patient to express themselves in a non-biased atmosphere.  3. Health  assisted pt to problem-solve obstacles such as being in a challenging environment and dealing with these challenges.   4. Motivational Interviewed interventions utilized (OARS).   5. Patient responded favorably to interventions and remained actively engaged in the session.   6. Health  will remain available and connected for patient by phone and/or office visits.   7. Positive reinforcement, emotional support and encouragement provided.   8. Focused Education: MI    Plan:  [x] Pt will work on goals as stated above.   [x] Pt will contact Health  for any questions, concerns or needs.  [x] Pt will follow up with Health  in office on  7/19/19 at 1530.  [] Pt will follow up with Health  on phone in:        [x] Health  will remain available.   [] Health  will contact patient by phone in:        [] Health  will consult:        [] Health  will inform Provider via EPIC messaging.     Impression:  1. Behavior is  consistent with Action Stage of Change.   2. Participation level:       [x] Receptive      [x] Interactive      [] Guarded and Resistant      [x] Self Motivated      [] Refused/Declined to participate   3. [x] Pt voiced understanding of all information presented.       [] Pt voiced needing further information/education. This will be arranged.       [x] Pt would benefit from further education/information as identified by this health . This will be arranged.     Sandy Moses RN HC

## 2019-04-05 ENCOUNTER — LAB VISIT (OUTPATIENT)
Dept: LAB | Facility: HOSPITAL | Age: 74
End: 2019-04-05
Attending: INTERNAL MEDICINE
Payer: COMMERCIAL

## 2019-04-05 DIAGNOSIS — E29.1 MALE HYPOGONADISM: ICD-10-CM

## 2019-04-05 DIAGNOSIS — I10 ESSENTIAL HYPERTENSION: ICD-10-CM

## 2019-04-05 DIAGNOSIS — E78.5 HYPERLIPIDEMIA, UNSPECIFIED HYPERLIPIDEMIA TYPE: ICD-10-CM

## 2019-04-05 LAB
ALBUMIN SERPL BCP-MCNC: 3.9 G/DL (ref 3.5–5.2)
ALP SERPL-CCNC: 50 U/L (ref 55–135)
ALT SERPL W/O P-5'-P-CCNC: 19 U/L (ref 10–44)
ANION GAP SERPL CALC-SCNC: 8 MMOL/L (ref 8–16)
AST SERPL-CCNC: 14 U/L (ref 10–40)
BILIRUB SERPL-MCNC: 0.4 MG/DL (ref 0.1–1)
BUN SERPL-MCNC: 19 MG/DL (ref 8–23)
CALCIUM SERPL-MCNC: 10.1 MG/DL (ref 8.7–10.5)
CHLORIDE SERPL-SCNC: 103 MMOL/L (ref 95–110)
CHOLEST SERPL-MCNC: 141 MG/DL (ref 120–199)
CHOLEST/HDLC SERPL: 3.7 {RATIO} (ref 2–5)
CO2 SERPL-SCNC: 29 MMOL/L (ref 23–29)
COMPLEXED PSA SERPL-MCNC: 1 NG/ML (ref 0–4)
CREAT SERPL-MCNC: 0.8 MG/DL (ref 0.5–1.4)
ERYTHROCYTE [DISTWIDTH] IN BLOOD BY AUTOMATED COUNT: 13 % (ref 11.5–14.5)
EST. GFR  (AFRICAN AMERICAN): >60 ML/MIN/1.73 M^2
EST. GFR  (NON AFRICAN AMERICAN): >60 ML/MIN/1.73 M^2
GLUCOSE SERPL-MCNC: 110 MG/DL (ref 70–110)
HCT VFR BLD AUTO: 37.6 % (ref 40–54)
HDLC SERPL-MCNC: 38 MG/DL (ref 40–75)
HDLC SERPL: 27 % (ref 20–50)
HGB BLD-MCNC: 12.2 G/DL (ref 14–18)
LDLC SERPL CALC-MCNC: 61 MG/DL (ref 63–159)
MAGNESIUM SERPL-MCNC: 2.1 MG/DL (ref 1.6–2.6)
MCH RBC QN AUTO: 30.9 PG (ref 27–31)
MCHC RBC AUTO-ENTMCNC: 32.4 G/DL (ref 32–36)
MCV RBC AUTO: 95 FL (ref 82–98)
NONHDLC SERPL-MCNC: 103 MG/DL
PLATELET # BLD AUTO: 209 K/UL (ref 150–350)
PMV BLD AUTO: 10.9 FL (ref 9.2–12.9)
POTASSIUM SERPL-SCNC: 3.6 MMOL/L (ref 3.5–5.1)
PROT SERPL-MCNC: 7.2 G/DL (ref 6–8.4)
RBC # BLD AUTO: 3.95 M/UL (ref 4.6–6.2)
SODIUM SERPL-SCNC: 140 MMOL/L (ref 136–145)
TESTOST SERPL-MCNC: 292 NG/DL (ref 304–1227)
TRIGL SERPL-MCNC: 210 MG/DL (ref 30–150)
TSH SERPL DL<=0.005 MIU/L-ACNC: 1.25 UIU/ML (ref 0.4–4)
WBC # BLD AUTO: 6.75 K/UL (ref 3.9–12.7)

## 2019-04-05 PROCEDURE — 80053 COMPREHEN METABOLIC PANEL: CPT

## 2019-04-05 PROCEDURE — 84403 ASSAY OF TOTAL TESTOSTERONE: CPT

## 2019-04-05 PROCEDURE — 83735 ASSAY OF MAGNESIUM: CPT

## 2019-04-05 PROCEDURE — 85027 COMPLETE CBC AUTOMATED: CPT

## 2019-04-05 PROCEDURE — 80061 LIPID PANEL: CPT

## 2019-04-05 PROCEDURE — 36415 COLL VENOUS BLD VENIPUNCTURE: CPT

## 2019-04-05 PROCEDURE — 84443 ASSAY THYROID STIM HORMONE: CPT

## 2019-04-05 PROCEDURE — 84153 ASSAY OF PSA TOTAL: CPT

## 2019-04-08 ENCOUNTER — TELEPHONE (OUTPATIENT)
Dept: UROLOGY | Facility: CLINIC | Age: 74
End: 2019-04-08

## 2019-04-08 DIAGNOSIS — E29.1 MALE HYPOGONADISM: Primary | ICD-10-CM

## 2019-04-08 NOTE — TELEPHONE ENCOUNTER
Please notify T is still low.  Verify that he used androgel correctly and on the day of his draw.  If so, increase to 3 pumps/day.  Recheck T in 1 month.

## 2019-04-08 NOTE — TELEPHONE ENCOUNTER
Spoke to pt. Pt questioned on how he uses Androgel. Pt reported he has not used the androgel and he never started it. His insurance company denied prior auth and appeal.

## 2019-04-09 NOTE — TELEPHONE ENCOUNTER
Spoke to pt about testosterone therapy. Pt did not wish to continue trying to have insurance authorize androgel. He asked if there were any other otc treatment that he could use. Informed pt  does not recommend any otc meds advertised to increase levels. Pt does want to continue checking testosterone annually.

## 2019-04-12 ENCOUNTER — OFFICE VISIT (OUTPATIENT)
Dept: INTERNAL MEDICINE | Facility: CLINIC | Age: 74
End: 2019-04-12
Payer: COMMERCIAL

## 2019-04-12 VITALS
HEIGHT: 71 IN | WEIGHT: 288 LBS | DIASTOLIC BLOOD PRESSURE: 66 MMHG | HEART RATE: 62 BPM | SYSTOLIC BLOOD PRESSURE: 120 MMHG | OXYGEN SATURATION: 97 % | BODY MASS INDEX: 40.32 KG/M2

## 2019-04-12 DIAGNOSIS — Z00.00 ANNUAL PHYSICAL EXAM: Primary | ICD-10-CM

## 2019-04-12 DIAGNOSIS — M25.551 CHRONIC RIGHT HIP PAIN: ICD-10-CM

## 2019-04-12 DIAGNOSIS — G89.29 CHRONIC RIGHT HIP PAIN: ICD-10-CM

## 2019-04-12 DIAGNOSIS — N40.1 BPH WITH URINARY OBSTRUCTION: ICD-10-CM

## 2019-04-12 DIAGNOSIS — R73.09 ELEVATED RANDOM BLOOD GLUCOSE LEVEL: ICD-10-CM

## 2019-04-12 DIAGNOSIS — E78.5 HYPERLIPIDEMIA, UNSPECIFIED HYPERLIPIDEMIA TYPE: ICD-10-CM

## 2019-04-12 DIAGNOSIS — E29.1 MALE HYPOGONADISM: ICD-10-CM

## 2019-04-12 DIAGNOSIS — N13.8 BPH WITH URINARY OBSTRUCTION: ICD-10-CM

## 2019-04-12 DIAGNOSIS — I10 ESSENTIAL HYPERTENSION: ICD-10-CM

## 2019-04-12 PROCEDURE — 99999 PR PBB SHADOW E&M-EST. PATIENT-LVL III: ICD-10-PCS | Mod: PBBFAC,,, | Performed by: INTERNAL MEDICINE

## 2019-04-12 PROCEDURE — 3074F PR MOST RECENT SYSTOLIC BLOOD PRESSURE < 130 MM HG: ICD-10-PCS | Mod: CPTII,S$GLB,, | Performed by: INTERNAL MEDICINE

## 2019-04-12 PROCEDURE — 3078F PR MOST RECENT DIASTOLIC BLOOD PRESSURE < 80 MM HG: ICD-10-PCS | Mod: CPTII,S$GLB,, | Performed by: INTERNAL MEDICINE

## 2019-04-12 PROCEDURE — 3074F SYST BP LT 130 MM HG: CPT | Mod: CPTII,S$GLB,, | Performed by: INTERNAL MEDICINE

## 2019-04-12 PROCEDURE — 99397 PR PREVENTIVE VISIT,EST,65 & OVER: ICD-10-PCS | Mod: S$GLB,,, | Performed by: INTERNAL MEDICINE

## 2019-04-12 PROCEDURE — 99999 PR PBB SHADOW E&M-EST. PATIENT-LVL III: CPT | Mod: PBBFAC,,, | Performed by: INTERNAL MEDICINE

## 2019-04-12 PROCEDURE — 99397 PER PM REEVAL EST PAT 65+ YR: CPT | Mod: S$GLB,,, | Performed by: INTERNAL MEDICINE

## 2019-04-12 PROCEDURE — 3078F DIAST BP <80 MM HG: CPT | Mod: CPTII,S$GLB,, | Performed by: INTERNAL MEDICINE

## 2019-04-12 NOTE — PROGRESS NOTES
"Subjective:       Patient ID: Edis Huggins Jr. is a 73 y.o. male.    Chief Complaint: Annual Exam    HPI    Last visit with me 12/2018. Since then seen by Cardiology and Ophthalmology and health .    Reports things are going well, Flexeril helping with walking better, less pain. Using 1 tab QHS. Still having problems with right leg during the day. Lying in bed it is hard to get comfortable, pain 1 out of 10. Feels "knot" in lateral thigh, also with some tenderness over trochanter and thinks bursitis recurring. Using Tylenol QHS with good results.     Has been eating more sugar recently. Had OGTT in past. Noted higher levels of sugars in the past.     Was having TGs in past, has been on Lopid for a while.    Tried getting on Androgel with Dr Kitchen. Plan to continue to monitor and if symptoms develop then maybe retry. currently only symptoms are loss of pubic hair.    Reviewed PMH, PSH, SH, FH, allergies, and medications.     Review of Systems   All other systems reviewed and are negative.      Objective:      Physical Exam   Constitutional: He is oriented to person, place, and time. No distress.   HENT:   Head: Atraumatic.   Right Ear: Tympanic membrane normal. No tenderness.   Left Ear: Tympanic membrane normal. No tenderness.   Mouth/Throat: Oropharynx is clear and moist. No oropharyngeal exudate.   Eyes: Pupils are equal, round, and reactive to light. Right eye exhibits no discharge. Left eye exhibits no discharge.   Neck: Normal range of motion. No thyromegaly present.   Cardiovascular: Normal rate, regular rhythm and normal heart sounds.   Pulmonary/Chest: Effort normal and breath sounds normal. No stridor. He has no wheezes. He has no rales.   Abdominal: Soft. He exhibits no distension and no mass. There is no tenderness. There is no guarding.   Musculoskeletal: He exhibits no edema or tenderness.   No significant tenderness over R trochanter   Lymphadenopathy:     He has no cervical adenopathy. " "  Neurological: He is alert and oriented to person, place, and time.   Skin: Skin is warm and dry. No rash noted.   Psychiatric: He has a normal mood and affect. His behavior is normal.   Nursing note and vitals reviewed.      Vitals:    04/12/19 1601   BP: 120/66   BP Location: Right arm   Patient Position: Sitting   BP Method: Large (Manual)   Pulse: 62   SpO2: 97%   Weight: 130.6 kg (288 lb)   Height: 5' 11" (1.803 m)     Body mass index is 40.17 kg/m².    RESULTS: Reviewed labs from last 12 months. Reviewed CT scan 06/2018 and ABIs 03/2019.    Assessment:       1. Annual physical exam    2. Chronic right hip pain    3. Elevated random blood glucose level    4. Essential hypertension    5. Male hypogonadism    6. BPH with urinary obstruction    7. Hyperlipidemia, unspecified hyperlipidemia type        Plan:   Edis was seen today for annual exam.    Diagnoses and all orders for this visit:    Annual physical exam:  Age-appropriate health screening reviewed, indicated tests ordered.   -     Hemoglobin A1c; Future  -     Basic metabolic panel; Future  -     Lipid panel; Future    Chronic right hip pain:  Prior diagnosis, persistent problem, but not too severe.  Continue Tylenol and Flexeril at night for symptomatic relief.  Refer to Orthopedics for any other evaluation and recommendations.  -     Ambulatory referral to Orthopedics    Elevated random blood glucose level:  Prior dx, recheck 2-3 mo with A1c.    Essential hypertension:  Prior diagnosis, stable, well controlled on current management. No changes at this time, will continue to monitor.     Male hypogonadism:  Prior dx, persists, not overly symptomatic. Insurance doesn't cover   Androgel. continue follow up with Urology.    BPH with urinary obstruction:  Prior dx, stable. continue follow up with Urology. Pt wants to continue PSA tracking.    Hyperlipidemia, unspecified hyperlipidemia type:  Prior diagnosis, stable, well controlled on current management. " Discussed changing to statin given evidence of mortality benefit, pt defers at this time. Encourage continued healthy diet for improved cholesterol. No changes at this time, will continue to monitor.     Follow up in about 6 months (around 10/12/2019) for follow up visit. fasting labs 2-3 mo.  Forest Miles MD  Internal Medicine    Portions of this note were completed using medical dictation software. Please excuse typographical or syntax errors that were missed on review.

## 2019-04-17 ENCOUNTER — TELEPHONE (OUTPATIENT)
Dept: ORTHOPEDICS | Facility: CLINIC | Age: 74
End: 2019-04-17

## 2019-04-17 DIAGNOSIS — M25.551 RIGHT HIP PAIN: Primary | ICD-10-CM

## 2019-04-18 ENCOUNTER — HOSPITAL ENCOUNTER (OUTPATIENT)
Dept: RADIOLOGY | Facility: HOSPITAL | Age: 74
Discharge: HOME | End: 2019-04-18
Attending: ORTHOPAEDIC SURGERY
Payer: COMMERCIAL

## 2019-04-18 ENCOUNTER — OFFICE VISIT (OUTPATIENT)
Dept: ORTHOPEDICS | Facility: CLINIC | Age: 74
End: 2019-04-18
Payer: COMMERCIAL

## 2019-04-18 VITALS — WEIGHT: 288 LBS | HEIGHT: 71 IN | BODY MASS INDEX: 40.32 KG/M2

## 2019-04-18 DIAGNOSIS — M25.551 RIGHT HIP PAIN: Primary | ICD-10-CM

## 2019-04-18 DIAGNOSIS — M48.062 SPINAL STENOSIS OF LUMBAR REGION WITH NEUROGENIC CLAUDICATION: ICD-10-CM

## 2019-04-18 DIAGNOSIS — M25.551 RIGHT HIP PAIN: ICD-10-CM

## 2019-04-18 PROCEDURE — 99202 OFFICE O/P NEW SF 15 MIN: CPT | Mod: S$GLB,,, | Performed by: ORTHOPAEDIC SURGERY

## 2019-04-18 PROCEDURE — 1101F PT FALLS ASSESS-DOCD LE1/YR: CPT | Mod: CPTII,S$GLB,, | Performed by: ORTHOPAEDIC SURGERY

## 2019-04-18 PROCEDURE — 1101F PR PT FALLS ASSESS DOC 0-1 FALLS W/OUT INJ PAST YR: ICD-10-PCS | Mod: CPTII,S$GLB,, | Performed by: ORTHOPAEDIC SURGERY

## 2019-04-18 PROCEDURE — 73502 XR HIP 2 VIEW RIGHT: ICD-10-PCS | Mod: 26,RT,, | Performed by: RADIOLOGY

## 2019-04-18 PROCEDURE — 99999 PR PBB SHADOW E&M-EST. PATIENT-LVL III: CPT | Mod: PBBFAC,,, | Performed by: ORTHOPAEDIC SURGERY

## 2019-04-18 PROCEDURE — 73502 X-RAY EXAM HIP UNI 2-3 VIEWS: CPT | Mod: 26,RT,, | Performed by: RADIOLOGY

## 2019-04-18 PROCEDURE — 73502 X-RAY EXAM HIP UNI 2-3 VIEWS: CPT | Mod: TC,PN,RT

## 2019-04-18 PROCEDURE — 99999 PR PBB SHADOW E&M-EST. PATIENT-LVL III: ICD-10-PCS | Mod: PBBFAC,,, | Performed by: ORTHOPAEDIC SURGERY

## 2019-04-18 PROCEDURE — 99202 PR OFFICE/OUTPT VISIT, NEW, LEVL II, 15-29 MIN: ICD-10-PCS | Mod: S$GLB,,, | Performed by: ORTHOPAEDIC SURGERY

## 2019-04-18 NOTE — LETTER
April 18, 2019      Forest Miles MD  [address]  [facility] [address]           [facility]  [address] [place] [address] [address]  [facility] [address]  Phone: [phone]          Patient: Edis Huggins Jr.   MR Number: [id number]   YOB: 1945   Date of Visit: 4/18/2019       Dear Dr. Forest Miles:    Thank you for referring Edis Huggins to me for evaluation. Attached you will find relevant portions of my assessment and plan of care.    If you have questions, please do not hesitate to call me. I look forward to following Edis Huggins along with you.    Sincerely,    Ernesto Riley MD    Enclosure  CC:  No Recipients    If you would like to receive this communication electronically, please contact [email or web address] or [phone] to request more information on [employer] Link access.    For providers and/or their staff who would like to refer a patient to [facility], please contact us through our one-stop-shop provider referral line, [facility], at [phone].    If you feel you have received this communication in error or would no longer like to receive these types of communications, please e-mail [email or web address]

## 2019-04-18 NOTE — PROGRESS NOTES
Subjective:      Patient ID: Edis Huggins Jr. is a 73 y.o. male.    Chief Complaint: Pain of the Right Hip    HPI      Edis Huggins Jr. is seen for evaluation and treatment of hip pain.  They have experienced problems with their right hip over the past 1 year Pain is located Starts the lower back and radiates to the buttock and lateral thigh.  He denies distal symptoms, numbness, weakness and incontinence.. They have been treated with NA.   Symptoms have recently stayed the same. Ambulation reportedly has not been impaired. Self care ADLs are not painful.     Review of Systems   Constitution: Negative for fever and weight loss.   HENT: Negative for congestion.    Eyes: Negative for visual disturbance.   Cardiovascular: Negative for chest pain.   Respiratory: Negative for shortness of breath.    Hematologic/Lymphatic: Negative for bleeding problem. Does not bruise/bleed easily.   Skin: Negative for poor wound healing.   Musculoskeletal: Positive for back pain and joint pain.   Gastrointestinal: Negative for abdominal pain.   Genitourinary: Negative for dysuria.   Neurological: Negative for seizures.   Psychiatric/Behavioral: Negative for altered mental status.   Allergic/Immunologic: Negative for persistent infections.         Objective:            Ortho/SPM Exam        Right hip    The patient is not in acute distress.   Body habitus is:obese.   Sclerae normal  The patient walks without a limp.   Respiratory distress:  none  The skin over the hip is:intact.   There is:tenderness anteriorly.   Range of motion- Flexion 85, External rotation 25, internal rotation 20.  Resisted SLR negative.  Pain with rotation negative  Sciatic tension findings positive.  Shortening/lengthening compared to the contralateral side exam deferred.  Pulses DP present, PT present.  Motor normal 5/5 strength in all tested muscle groups.   Sensory normal.    I reviewed the relevant radiographic images for the patient's condition:   Recent right hip films show mild joint space narrowing with small osteophytes. No interval change        Assessment:       Encounter Diagnoses   Name Primary?    Right hip pain Yes    Spinal stenosis of lumbar region with neurogenic claudication           Based on history and examination the patient's hip seems to be secondary.  Plan:       Edis was seen today for pain.    Diagnoses and all orders for this visit:    Right hip pain    Spinal stenosis of lumbar region with neurogenic claudication        I explained my diagnostic impression and the reasoning behind it in detail, using layman's terms.  Models and/or pictures were used to help in the explanation.    MRI of the lumbar spine requested    Further intervention likely based upon the results.    The meantime I recommend weight loss, over-the-counter analgesics, avoidance of prolonged sitting and avoidance of lifting over 25 lb.

## 2019-05-02 ENCOUNTER — OFFICE VISIT (OUTPATIENT)
Dept: ORTHOPEDICS | Facility: CLINIC | Age: 74
End: 2019-05-02
Payer: COMMERCIAL

## 2019-05-02 VITALS — HEIGHT: 71 IN | BODY MASS INDEX: 40.32 KG/M2 | WEIGHT: 288 LBS

## 2019-05-02 DIAGNOSIS — M16.11 PRIMARY OSTEOARTHRITIS OF RIGHT HIP: ICD-10-CM

## 2019-05-02 DIAGNOSIS — M48.062 SPINAL STENOSIS OF LUMBAR REGION WITH NEUROGENIC CLAUDICATION: Primary | ICD-10-CM

## 2019-05-02 PROCEDURE — 1101F PR PT FALLS ASSESS DOC 0-1 FALLS W/OUT INJ PAST YR: ICD-10-PCS | Mod: CPTII,S$GLB,, | Performed by: ORTHOPAEDIC SURGERY

## 2019-05-02 PROCEDURE — 99999 PR PBB SHADOW E&M-EST. PATIENT-LVL III: ICD-10-PCS | Mod: PBBFAC,,, | Performed by: ORTHOPAEDIC SURGERY

## 2019-05-02 PROCEDURE — 99214 OFFICE O/P EST MOD 30 MIN: CPT | Mod: S$GLB,,, | Performed by: ORTHOPAEDIC SURGERY

## 2019-05-02 PROCEDURE — 99214 PR OFFICE/OUTPT VISIT, EST, LEVL IV, 30-39 MIN: ICD-10-PCS | Mod: S$GLB,,, | Performed by: ORTHOPAEDIC SURGERY

## 2019-05-02 PROCEDURE — 99999 PR PBB SHADOW E&M-EST. PATIENT-LVL III: CPT | Mod: PBBFAC,,, | Performed by: ORTHOPAEDIC SURGERY

## 2019-05-02 PROCEDURE — 1101F PT FALLS ASSESS-DOCD LE1/YR: CPT | Mod: CPTII,S$GLB,, | Performed by: ORTHOPAEDIC SURGERY

## 2019-05-02 NOTE — PROGRESS NOTES
Subjective:      Patient ID: Edis Huggins Jr. is a 73 y.o. male.    Chief Complaint: Follow-up of the Spine and Follow-up (MRI)    HPI follow-up for low back and right hip pain. The patient reports no change in symptoms since his last visit.    Review of Systems   Constitution: Negative for fever and weight loss.   HENT: Negative for congestion.    Eyes: Negative for visual disturbance.   Cardiovascular: Negative for chest pain.   Respiratory: Negative for shortness of breath.    Hematologic/Lymphatic: Negative for bleeding problem. Does not bruise/bleed easily.   Skin: Negative for poor wound healing.   Musculoskeletal: Positive for back pain and joint pain.   Gastrointestinal: Negative for abdominal pain.   Genitourinary: Negative for dysuria.   Neurological: Negative for seizures.   Psychiatric/Behavioral: Negative for altered mental status.   Allergic/Immunologic: Negative for persistent infections.         Objective:            Ortho/SPM Exam    Right hip     The patient is not in acute distress.   Body habitus is:obese.   Sclerae normal  The patient walks without a limp.   Respiratory distress:  none  The skin over the hip is:intact.   There is:tenderness anteriorly.   Range of motion- Flexion 85, External rotation 25, internal rotation 20.  Resisted SLR negative.  Pain with rotation negative  Sciatic tension findings positive.  Shortening/lengthening compared to the contralateral side exam deferred.  Pulses DP present, PT present.  Motor normal 5/5 strength in all tested muscle groups.   Sensory normal.      I reviewed the relevant radiographic images for the patient's condition:  I reviewed the patient's outside MRI.  The lumbar spine has multi level disc protrusions of most notable at L4-5.      Assessment:       Encounter Diagnoses   Name Primary?    Spinal stenosis of lumbar region with neurogenic claudication Yes    Primary osteoarthritis of right hip           Although there is some osteoarthritis  the right hip the patient's symptom pattern is more indicative of spinal stenosis with claudication.  Plan:       Edis was seen today for follow-up and follow-up.    Diagnoses and all orders for this visit:    Spinal stenosis of lumbar region with neurogenic claudication    Primary osteoarthritis of right hip        I explained my diagnostic impression and the reasoning behind it in detail, using layman's terms.  Models and/or pictures were used to help in the explanation.    Pain clinic treatment recommended    Process explained the patient agrees    I explained the potential role of surgery in the treatment of this condition to the patient.  They understand that if nonsurgical measures do not adequately control symptoms, surgery will be considered in the future.

## 2019-05-08 RX ORDER — CYCLOBENZAPRINE HCL 10 MG
TABLET ORAL
Qty: 30 TABLET | Refills: 0 | OUTPATIENT
Start: 2019-05-08

## 2019-05-09 ENCOUNTER — PATIENT MESSAGE (OUTPATIENT)
Dept: INTERNAL MEDICINE | Facility: CLINIC | Age: 74
End: 2019-05-09

## 2019-05-09 RX ORDER — CYCLOBENZAPRINE HCL 10 MG
10 TABLET ORAL 3 TIMES DAILY PRN
Qty: 30 TABLET | Refills: 3 | Status: SHIPPED | OUTPATIENT
Start: 2019-05-09 | End: 2019-08-23 | Stop reason: SDUPTHER

## 2019-05-14 ENCOUNTER — PATIENT MESSAGE (OUTPATIENT)
Dept: INTERNAL MEDICINE | Facility: CLINIC | Age: 74
End: 2019-05-14

## 2019-05-27 ENCOUNTER — TELEPHONE (OUTPATIENT)
Dept: ORTHOPEDICS | Facility: CLINIC | Age: 74
End: 2019-05-27

## 2019-05-27 NOTE — TELEPHONE ENCOUNTER
----- Message from Evita Rosario sent at 5/27/2019  1:45 PM CDT -----  Contact: [phone]/self  Patient is returning your call.   Please call back to assist at [phone]

## 2019-05-30 DIAGNOSIS — F41.9 ANXIETY: ICD-10-CM

## 2019-06-03 RX ORDER — CITALOPRAM 20 MG/1
TABLET, FILM COATED ORAL
Qty: 90 TABLET | Refills: 3 | Status: SHIPPED | OUTPATIENT
Start: 2019-06-03 | End: 2019-08-29 | Stop reason: SDUPTHER

## 2019-06-06 NOTE — PROGRESS NOTES
[facility] New Patient Evaluation    Referred by: Ernesto Riley MD   Reason for referral: Spinal stenosis of lumbar region with neurogenic claudication M16.11 (ICD-10-CM) - Primary osteoarthritis of right hip     CC: No chief complaint on file.    No flowsheet data found.    HPI:   Edis Huggins Jr. is a 74 y.o. male who complains of chronic low back pain radiating in to the outside of the right thigh.  Patient states increasing pain in the leg with standing and walking; sitting for 5-10 minutes partially relieves the pain in the leg.  Lying in bed supine also increases pain, which is relieved by elevating the legs.    Location: low back   Onset: ~ 2016 of gradual onset and increasing intensity  Current Pain Score: 3/10  Daily Pain of Range: 3-8/10  Quality: Aching and Throbbing  Radiation: Back -> Right lateral thigh toward knee  Worsened by: standing for more than 10 minutes and walking for more than 5 minutes  Improved by: rest, sitting and lying in bed with legs elevated    Previous Therapies:  PT/OT: Denies  HEP: Denies  Interventions: Denies  Surgery: Denies back surgery.  + Hx of right TKA.  Medications:   - NSAIDS:   - MSK Relaxants:   - TCAs:   - SNRIs:   - Topicals:   - Anticonvulsants:  - Opioids:     Current Pain Medications:  1. Flexeril  2. OTC Pain Medications     Review of Systems:  Review of Systems   Constitutional: Negative for chills and fever.   HENT: Negative for nosebleeds.    Eyes: Negative for blurred vision and pain.   Respiratory: Negative for hemoptysis.    Cardiovascular: Negative for chest pain and palpitations.   Gastrointestinal: Negative for heartburn, nausea and vomiting.   Genitourinary: Negative for dysuria and hematuria.   Musculoskeletal: Positive for back pain and joint pain. Negative for falls and myalgias.   Skin: Negative for rash.   Neurological: Negative for tingling, focal weakness, seizures and loss of consciousness.   Endo/Heme/Allergies: Does not  bruise/bleed easily.   Psychiatric/Behavioral: Negative for depression and hallucinations. The patient has insomnia (due to back pain). The patient is not nervous/anxious.        History:    Current Outpatient Medications:     acyclovir (ZOVIRAX) 400 MG tablet, TAKE 1 TABLET BY MOUTH THREE TIMES DAILY FOR 5 DAYS AT SIGN OF FEVER BLISTER, Disp: 30 tablet, Rfl: 2    amLODIPine (NORVASC) 10 MG tablet, TAKE 1 TABLET(10 MG) BY MOUTH EVERY DAY, Disp: 90 tablet, Rfl: 3    azelastine (ASTELIN) 137 mcg (0.1 %) nasal spray, 1 spray (137 mcg total) by Nasal route 2 (two) times daily., Disp: 30 mL, Rfl: 3    Bifidobacterium infantis (ALIGN) 4 mg Cap, Take 4 mg by mouth once daily. , Disp: , Rfl:     cetirizine (ZYRTEC) 10 MG tablet, Take 10 mg by mouth once daily., Disp: , Rfl:     chlorthalidone (HYGROTEN) 50 MG Tab, TAKE 1 TABLET(50 MG) BY MOUTH EVERY DAY, Disp: 90 tablet, Rfl: 3    citalopram (CELEXA) 20 MG tablet, TAKE 1 TABLET BY MOUTH DAILY, Disp: 90 tablet, Rfl: 3    cyclobenzaprine (FLEXERIL) 10 MG tablet, Take 1 tablet (10 mg total) by mouth 3 (three) times daily as needed., Disp: 30 tablet, Rfl: 3    finasteride (PROSCAR) 5 mg tablet, TAKE 1 TABLET(5 MG) BY MOUTH EVERY DAY, Disp: 90 tablet, Rfl: 3    fish oil-omega-3 fatty acids 300-1,000 mg capsule, Take 2 g by mouth 3 (three) times daily. , Disp: , Rfl:     gemfibrozil (LOPID) 600 MG tablet, TAKE 1 TABLET BY MOUTH TWICE DAILY, Disp: 60 tablet, Rfl: 5    irbesartan (AVAPRO) 300 MG tablet, Take 1 tablet (300 mg total) by mouth once daily., Disp: 90 tablet, Rfl: 3    tamsulosin (FLOMAX) 0.4 mg Cap, TAKE 1 CAPSULE(0.4 MG) BY MOUTH EVERY DAY, Disp: 90 capsule, Rfl: 3    timolol maleate 0.5% (TIMOPTIC-XE) 0.5 % SolG, Place 1 drop into both eyes every morning., Disp: 5 mL, Rfl: 12    vitamin E 1000 UNIT capsule, Take 1,000 Units by mouth once daily., Disp: , Rfl:     Past Medical History:   Diagnosis Date    Allergic rhinitis due to animal (cat) (dog) hair  and dander 3/20/2014    Allergy     Anemia     Anxiety     Basal cell carcinoma     BPH (benign prostatic hypertrophy)     Family history of colon cancer 3/20/2014    Family history of ischemic heart disease 3/20/2014    HLD (hyperlipidemia)     HTN (hypertension)     IFG (impaired fasting glucose)     Primary open angle glaucoma     Status post total knee replacement 3/20/2014       Past Surgical History:   Procedure Laterality Date    ARTHROPLASTY, KNEE, TOTAL Right 2/26/2013    Performed [facility] 2ND FLR    CATARACT EXTRACTION W/  INTRAOCULAR LENS IMPLANT Left 02/08/2017    WITH KAHOOK GONIOTOMY (DR. WHITLEY)    CATARACT EXTRACTION W/  INTRAOCULAR LENS IMPLANT Right 05/10/2017        GONIOTOMY Left 2/8/2017    Performed by Deborah [facility] at [facility] OR 1ST [facility]    HERNIA REPAIR      x 2    INSERTION-INTRAOCULAR LENS (IOL) Right 5/10/2017    Performed by [facility] at [facility] OR 1ST FLR    INSERTION-INTRAOCULAR LENS (IOL) Left 2/8/2017    Performed by [facility] at [facility] OR 1ST FLR    PHACOEMULSIFICATION-ASPIRATION-CATARACT Left 2/8/2017    Performed by [facility] at [facility] OR 1ST FLR    PHACOEMULSIFICATION-ASPIRATION-CATARACT/COMPLEX/M RING/TRYPAN BLUE Right 5/10/2017    Performed by [facility] at [facility] OR 1ST FLR    TONSILLECTOMY      TOTAL KNEE ARTHROPLASTY Right        Family History   Problem Relation Age of Onset    Colon cancer Maternal Grandmother         70s    Cataracts Mother     Atrial fibrillation Mother     Cataracts Father     Heart disease Father 85        Valve disorder    Glaucoma Brother     Prostate cancer Neg Hx     Amblyopia Neg Hx     Blindness Neg Hx     Macular degeneration Neg Hx     Retinal detachment Neg Hx     Strabismus Neg Hx     Diabetes Neg Hx     Melanoma Neg Hx     Psoriasis Neg Hx     Lupus Neg Hx     Eczema Neg Hx     Acne Neg Hx        Social History      Socioeconomic History    Marital status:      Spouse name: Not on file    Number of children: Not on file    Years of education: Not on file    Highest education level: Not on file   Occupational History    Occupation:      Employer: [employer]   Social Needs    Financial resource strain: Not on file    Food insecurity:     Worry: Not on file     Inability: Not on file    Transportation needs:     Medical: Not on file     Non-medical: Not on file   Tobacco Use    Smoking status: Former Smoker     Packs/day: 1.00     Years: 10.00     Pack years: 10.00     Last attempt to quit: 1974     Years since quittin.4    Smokeless tobacco: Never Used   Substance and Sexual Activity    Alcohol use: Yes     Alcohol/week: 0.0 oz     Comment: one drink a month    Drug use: No    Sexual activity: Not on file   Lifestyle    Physical activity:     Days per week: Not on file     Minutes per session: Not on file    Stress: Not on file   Relationships    Social connections:     Talks on phone: Not on file     Gets together: Not on file     Attends Yazidism service: Not on file     Active member of club or organization: Not on file     Attends meetings of clubs or organizations: Not on file     Relationship status: Not on file   Other Topics Concern    Not on file   Social History Narrative    Not on file       Review of patient's allergies indicates:  No Known Allergies    Physical Exam:  There were no vitals filed for this visit.  General    Nursing note and vitals reviewed.  Constitutional: He is oriented to person, place, and time. He appears well-developed and well-nourished. No distress.   HENT:   Head: Normocephalic and atraumatic.   Nose: Nose normal.   Eyes: Conjunctivae and EOM are normal. Pupils are equal, round, and reactive to light. Right eye exhibits no discharge. Left eye exhibits no discharge. No scleral icterus.   Neck: No JVD present.   Cardiovascular: Intact  distal pulses.    Pulmonary/Chest: Effort normal. No respiratory distress.   Abdominal: He exhibits no distension.   Neurological: He is alert and oriented to person, place, and time. Coordination normal.   Psychiatric: He has a normal mood and affect. His behavior is normal. Judgment and thought content normal.     General Musculoskeletal Exam   Gait: normal     Back (L-Spine & T-Spine) / Neck (C-Spine) Exam     Tenderness Right paramedian tenderness of the Lower L-Spine. Left paramedian tenderness of the Lower L-Spine.     Back (L-Spine & T-Spine) Range of Motion   Back extension: facet loading is positive and exacerabtes/reproduces the patient's typical low back pain    Back flexion: limited ROM but partial relief of low back pain noted.     Spinal Sensation   Right Side Sensation  L-Spine Level: normal  Left Side Sensation  L-Spine Level: normal    Other He has no scoliosis .      Muscle Strength   Right Lower Extremity   Hip Flexion: 5/5   Hip Extensors: 5/5  Quadriceps:  5/5   Hamstrin/5   Gastrocsoleus:  5/5/5  Left Lower Extremity   Hip Flexion: 5/5   Hip Extensors: 5/5  Quadriceps:  5/5   Hamstrin/5   Gastrocsoleus:  5/5/5    Reflexes     Left Side  Quadriceps:  2+  Achilles:  2+    Right Side   Quadriceps:  2+  Achilles:  2+      Imaging:  X-Ray Hip 2 View Right   Order: [id number]   Status:  Final result   Visible to patient:  Yes (Patient Portal) Next appt:  2019 at 08:00 AM in Pain Medicine (Eduard Mukherjee Jr, MD) Dx:  Right hip pain   Details     Reading Physician Reading Date Result Priority   Bryan Mckeon MD 2019       Narrative     EXAMINATION:  XR HIP 2 VIEW RIGHT    CLINICAL HISTORY:  Pain in right hip    TECHNIQUE:  AP view of the pelvis and frog leg lateral view of the right hip were performed.    COMPARISON:  2016    FINDINGS:  Two views of the right hip demonstrate narrowing of the hip joint.  There is some remodeling of femoral head from chronic degenerative  change.  Findings are similar to the prior study.      Impression       No change compared with the prior study.  Degenerative changes right hip joint.      Electronically signed by: Bryan Mckeon MD  Date: 04/18/2019  Time: 14:28             Last Resulted: 04/18/19 14:28               Labs:  BMP  Lab Results   Component Value Date     04/05/2019    K 3.6 04/05/2019     04/05/2019    CO2 29 04/05/2019    BUN 19 04/05/2019    CREATININE 0.8 04/05/2019    CALCIUM 10.1 04/05/2019    ANIONGAP 8 04/05/2019    ESTGFRAFRICA >60 04/05/2019    EGFRNONAA >60 04/05/2019     Lab Results   Component Value Date    ALT 19 04/05/2019    AST 14 04/05/2019    ALKPHOS 50 (L) 04/05/2019    BILITOT 0.4 04/05/2019       Assessment:  Edis Huggins Jr. is a 74 y.o. male with the following diagnoses based on history, exam, and imaging:    Problem List Items Addressed This Visit     Lumbar radiculopathy - Primary    Class 3 severe obesity due to excess calories with body mass index (BMI) of 40.0 to 44.9 in adult    Chronic bilateral low back pain with right-sided sciatica    Neuroforaminal stenosis of lumbar spine    Lumbar spondylosis        : Not applicable    Treatment Plan:   Procedures:    - Right L3-4 and L4-5 TFESI for symptomatic control of pain and for diagnostic purposes.     - The risks and benefits of this intervention, and alternative therapies were discussed with the patient.  The discussion of risks included infection, bleeding, need for additional procedures or surgery, nerve damage, paralysis, adverse medication reaction(s), stroke, and/or death.  Questions regarding the procedure, risks, expected outcome, and possible side effects were solicited and answered to the patient's satisfaction.  Edis wishes to proceed with the injection.  Verbal and written consent were obtained in clinic today.  PT/OT/HEP:  Medications: No changes recommended at this time.  Labs: Reviewed and medications are appropriately  dosed for current hepatorenal function.  Imaging: No additional recommended at this time.    Follow Up: RTC 2-3 weeks    Eduard Mukherjee Jr, MD  Interventional Pain Medicine / Anesthesiology    Disclaimer: This note was partly generated using dictation software which may occasionally result in transcription errors.

## 2019-06-06 NOTE — H&P (VIEW-ONLY)
[facility] New Patient Evaluation    Referred by: Ernesto Riley MD   Reason for referral: Spinal stenosis of lumbar region with neurogenic claudication M16.11 (ICD-10-CM) - Primary osteoarthritis of right hip     CC: No chief complaint on file.    No flowsheet data found.    HPI:   Edis Huggins Jr. is a 74 y.o. male who complains of chronic low back pain radiating in to the outside of the right thigh.  Patient states increasing pain in the leg with standing and walking; sitting for 5-10 minutes partially relieves the pain in the leg.  Lying in bed supine also increases pain, which is relieved by elevating the legs.    Location: low back   Onset: ~ 2016 of gradual onset and increasing intensity  Current Pain Score: 3/10  Daily Pain of Range: 3-8/10  Quality: Aching and Throbbing  Radiation: Back -> Right lateral thigh toward knee  Worsened by: standing for more than 10 minutes and walking for more than 5 minutes  Improved by: rest, sitting and lying in bed with legs elevated    Previous Therapies:  PT/OT: Denies  HEP: Denies  Interventions: Denies  Surgery: Denies back surgery.  + Hx of right TKA.  Medications:   - NSAIDS:   - MSK Relaxants:   - TCAs:   - SNRIs:   - Topicals:   - Anticonvulsants:  - Opioids:     Current Pain Medications:  1. Flexeril  2. OTC Pain Medications     Review of Systems:  Review of Systems   Constitutional: Negative for chills and fever.   HENT: Negative for nosebleeds.    Eyes: Negative for blurred vision and pain.   Respiratory: Negative for hemoptysis.    Cardiovascular: Negative for chest pain and palpitations.   Gastrointestinal: Negative for heartburn, nausea and vomiting.   Genitourinary: Negative for dysuria and hematuria.   Musculoskeletal: Positive for back pain and joint pain. Negative for falls and myalgias.   Skin: Negative for rash.   Neurological: Negative for tingling, focal weakness, seizures and loss of consciousness.   Endo/Heme/Allergies: Does not  bruise/bleed easily.   Psychiatric/Behavioral: Negative for depression and hallucinations. The patient has insomnia (due to back pain). The patient is not nervous/anxious.        History:    Current Outpatient Medications:     acyclovir (ZOVIRAX) 400 MG tablet, TAKE 1 TABLET BY MOUTH THREE TIMES DAILY FOR 5 DAYS AT SIGN OF FEVER BLISTER, Disp: 30 tablet, Rfl: 2    amLODIPine (NORVASC) 10 MG tablet, TAKE 1 TABLET(10 MG) BY MOUTH EVERY DAY, Disp: 90 tablet, Rfl: 3    azelastine (ASTELIN) 137 mcg (0.1 %) nasal spray, 1 spray (137 mcg total) by Nasal route 2 (two) times daily., Disp: 30 mL, Rfl: 3    Bifidobacterium infantis (ALIGN) 4 mg Cap, Take 4 mg by mouth once daily. , Disp: , Rfl:     cetirizine (ZYRTEC) 10 MG tablet, Take 10 mg by mouth once daily., Disp: , Rfl:     chlorthalidone (HYGROTEN) 50 MG Tab, TAKE 1 TABLET(50 MG) BY MOUTH EVERY DAY, Disp: 90 tablet, Rfl: 3    citalopram (CELEXA) 20 MG tablet, TAKE 1 TABLET BY MOUTH DAILY, Disp: 90 tablet, Rfl: 3    cyclobenzaprine (FLEXERIL) 10 MG tablet, Take 1 tablet (10 mg total) by mouth 3 (three) times daily as needed., Disp: 30 tablet, Rfl: 3    finasteride (PROSCAR) 5 mg tablet, TAKE 1 TABLET(5 MG) BY MOUTH EVERY DAY, Disp: 90 tablet, Rfl: 3    fish oil-omega-3 fatty acids 300-1,000 mg capsule, Take 2 g by mouth 3 (three) times daily. , Disp: , Rfl:     gemfibrozil (LOPID) 600 MG tablet, TAKE 1 TABLET BY MOUTH TWICE DAILY, Disp: 60 tablet, Rfl: 5    irbesartan (AVAPRO) 300 MG tablet, Take 1 tablet (300 mg total) by mouth once daily., Disp: 90 tablet, Rfl: 3    tamsulosin (FLOMAX) 0.4 mg Cap, TAKE 1 CAPSULE(0.4 MG) BY MOUTH EVERY DAY, Disp: 90 capsule, Rfl: 3    timolol maleate 0.5% (TIMOPTIC-XE) 0.5 % SolG, Place 1 drop into both eyes every morning., Disp: 5 mL, Rfl: 12    vitamin E 1000 UNIT capsule, Take 1,000 Units by mouth once daily., Disp: , Rfl:     Past Medical History:   Diagnosis Date    Allergic rhinitis due to animal (cat) (dog) hair  and dander 3/20/2014    Allergy     Anemia     Anxiety     Basal cell carcinoma     BPH (benign prostatic hypertrophy)     Family history of colon cancer 3/20/2014    Family history of ischemic heart disease 3/20/2014    HLD (hyperlipidemia)     HTN (hypertension)     IFG (impaired fasting glucose)     Primary open angle glaucoma     Status post total knee replacement 3/20/2014       Past Surgical History:   Procedure Laterality Date    ARTHROPLASTY, KNEE, TOTAL Right 2/26/2013    [facility]    CATARACT EXTRACTION W/  INTRAOCULAR LENS IMPLANT Left 02/08/2017    WITH KAHOOK GONIOTOMY (DR. WHITLEY)    CATARACT EXTRACTION W/  INTRAOCULAR LENS IMPLANT Right 05/10/2017        GONIOTOMY Left 2/8/2017    [facility]    HERNIA REPAIR      x 2    INSERTION-INTRAOCULAR LENS (IOL) Right 5/10/2017    [facility]    INSERTION-INTRAOCULAR LENS (IOL) Left 2/8/2017    [facility]    PHACOEMULSIFICATION-ASPIRATION-CATARACT Left 2/8/2017    [facility]ST [facility]    PHACOEMULSIFICATION-ASPIRATION-CATARACT/COMPLEX/M RING/TRYPAN BLUE Right 5/10/2017    [facility] 1ST [facility]    TONSILLECTOMY      TOTAL KNEE ARTHROPLASTY Right        Family History   Problem Relation Age of Onset    Colon cancer Maternal Grandmother         70s    Cataracts Mother     Atrial fibrillation Mother     Cataracts Father     Heart disease Father 85        Valve disorder    Glaucoma Brother     Prostate cancer Neg Hx     Amblyopia Neg Hx     Blindness Neg Hx     Macular degeneration Neg Hx     Retinal detachment Neg Hx     Strabismus Neg Hx     Diabetes Neg Hx     Melanoma Neg Hx     Psoriasis Neg Hx     Lupus Neg Hx     Eczema Neg Hx     Acne Neg Hx        Social History      Socioeconomic History    Marital status:      Spouse name: Not on file    Number of children: Not on file    Years of education: Not on file    Highest education level: Not on file   Occupational History    Occupation:      Employer: [employer]   Social Needs    Financial resource strain: Not on file    Food insecurity:     Worry: Not on file     Inability: Not on file    Transportation needs:     Medical: Not on file     Non-medical: Not on file   Tobacco Use    Smoking status: Former Smoker     Packs/day: 1.00     Years: 10.00     Pack years: 10.00     Last attempt to quit: 1974     Years since quittin.4    Smokeless tobacco: Never Used   Substance and Sexual Activity    Alcohol use: Yes     Alcohol/week: 0.0 oz     Comment: one drink a month    Drug use: No    Sexual activity: Not on file   Lifestyle    Physical activity:     Days per week: Not on file     Minutes per session: Not on file    Stress: Not on file   Relationships    Social connections:     Talks on phone: Not on file     Gets together: Not on file     Attends [employer] service: Not on file     Active member of club or organization: Not on file     Attends meetings of clubs or organizations: Not on file     Relationship status: Not on file   Other Topics Concern    Not on file   Social History Narrative    Not on file       Review of patient's allergies indicates:  No Known Allergies    Physical Exam:  There were no vitals filed for this visit.  General    Nursing note and vitals reviewed.  Constitutional: He is oriented to person, place, and time. He appears well-developed and well-nourished. No distress.   HENT:   Head: Normocephalic and atraumatic.   Nose: Nose normal.   Eyes: Conjunctivae and EOM are normal. Pupils are equal, round, and reactive to light. Right eye exhibits no discharge. Left eye exhibits no discharge. No scleral icterus.   Neck: No JVD present.   Cardiovascular: Intact  distal pulses.    Pulmonary/Chest: Effort normal. No respiratory distress.   Abdominal: He exhibits no distension.   Neurological: He is alert and oriented to person, place, and time. Coordination normal.   Psychiatric: He has a normal mood and affect. His behavior is normal. Judgment and thought content normal.     General Musculoskeletal Exam   Gait: normal     Back (L-Spine & T-Spine) / Neck (C-Spine) Exam     Tenderness Right paramedian tenderness of the Lower L-Spine. Left paramedian tenderness of the Lower L-Spine.     Back (L-Spine & T-Spine) Range of Motion   Back extension: facet loading is positive and exacerabtes/reproduces the patient's typical low back pain    Back flexion: limited ROM but partial relief of low back pain noted.     Spinal Sensation   Right Side Sensation  L-Spine Level: normal  Left Side Sensation  L-Spine Level: normal    Other He has no scoliosis .      Muscle Strength   Right Lower Extremity   Hip Flexion: 5/5   Hip Extensors: 5/5  Quadriceps:  5/5   Hamstrin/5   Gastrocsoleus:  5/5/5  Left Lower Extremity   Hip Flexion: 5/5   Hip Extensors: 5/5  Quadriceps:  5/5   Hamstrin/5   Gastrocsoleus:  5/5/5    Reflexes     Left Side  Quadriceps:  2+  Achilles:  2+    Right Side   Quadriceps:  2+  Achilles:  2+      Imaging:  X-Ray Hip 2 View Right   Order: [id number]   Status:  Final result   Visible to patient:  Yes (Patient Portal) Next appt:  2019 at 08:00 AM in Pain Medicine (Eduard Mukherjee Jr, MD) Dx:  Right hip pain   Details     Reading Physician Reading Date Result Priority   Bryan Mckeon MD 2019       Narrative     EXAMINATION:  XR HIP 2 VIEW RIGHT    CLINICAL HISTORY:  Pain in right hip    TECHNIQUE:  AP view of the pelvis and frog leg lateral view of the right hip were performed.    COMPARISON:  2016    FINDINGS:  Two views of the right hip demonstrate narrowing of the hip joint.  There is some remodeling of femoral head from chronic degenerative  change.  Findings are similar to the prior study.      Impression       No change compared with the prior study.  Degenerative changes right hip joint.      Electronically signed by: Bryan Mckeon MD  Date: 04/18/2019  Time: 14:28             Last Resulted: 04/18/19 14:28               Labs:  BMP  Lab Results   Component Value Date     04/05/2019    K 3.6 04/05/2019     04/05/2019    CO2 29 04/05/2019    BUN 19 04/05/2019    CREATININE 0.8 04/05/2019    CALCIUM 10.1 04/05/2019    ANIONGAP 8 04/05/2019    ESTGFRAFRICA >60 04/05/2019    EGFRNONAA >60 04/05/2019     Lab Results   Component Value Date    ALT 19 04/05/2019    AST 14 04/05/2019    ALKPHOS 50 (L) 04/05/2019    BILITOT 0.4 04/05/2019       Assessment:  Edis Huggins Jr. is a 74 y.o. male with the following diagnoses based on history, exam, and imaging:    Problem List Items Addressed This Visit     Lumbar radiculopathy - Primary    Class 3 severe obesity due to excess calories with body mass index (BMI) of 40.0 to 44.9 in adult    Chronic bilateral low back pain with right-sided sciatica    Neuroforaminal stenosis of lumbar spine    Lumbar spondylosis        : Not applicable    Treatment Plan:   Procedures:    - Right L3-4 and L4-5 TFESI for symptomatic control of pain and for diagnostic purposes.     - The risks and benefits of this intervention, and alternative therapies were discussed with the patient.  The discussion of risks included infection, bleeding, need for additional procedures or surgery, nerve damage, paralysis, adverse medication reaction(s), stroke, and/or death.  Questions regarding the procedure, risks, expected outcome, and possible side effects were solicited and answered to the patient's satisfaction.  Edis wishes to proceed with the injection.  Verbal and written consent were obtained in clinic today.  PT/OT/HEP:  Medications: No changes recommended at this time.  Labs: Reviewed and medications are appropriately  dosed for current hepatorenal function.  Imaging: No additional recommended at this time.    Follow Up: RTC 2-3 weeks    Eduard Mukherjee Jr, MD  Interventional Pain Medicine / Anesthesiology    Disclaimer: This note was partly generated using dictation software which may occasionally result in transcription errors.

## 2019-06-07 ENCOUNTER — TELEPHONE (OUTPATIENT)
Dept: PAIN MEDICINE | Facility: CLINIC | Age: 74
End: 2019-06-07

## 2019-06-07 ENCOUNTER — OFFICE VISIT (OUTPATIENT)
Dept: PAIN MEDICINE | Facility: CLINIC | Age: 74
End: 2019-06-07
Payer: COMMERCIAL

## 2019-06-07 VITALS
DIASTOLIC BLOOD PRESSURE: 68 MMHG | SYSTOLIC BLOOD PRESSURE: 111 MMHG | HEIGHT: 71 IN | WEIGHT: 288 LBS | HEART RATE: 65 BPM | BODY MASS INDEX: 40.32 KG/M2

## 2019-06-07 DIAGNOSIS — M54.16 LUMBAR RADICULOPATHY: Primary | ICD-10-CM

## 2019-06-07 DIAGNOSIS — G89.29 CHRONIC BILATERAL LOW BACK PAIN WITH RIGHT-SIDED SCIATICA: ICD-10-CM

## 2019-06-07 DIAGNOSIS — E66.01 CLASS 3 SEVERE OBESITY DUE TO EXCESS CALORIES WITH BODY MASS INDEX (BMI) OF 40.0 TO 44.9 IN ADULT, UNSPECIFIED WHETHER SERIOUS COMORBIDITY PRESENT: ICD-10-CM

## 2019-06-07 DIAGNOSIS — M48.061 NEUROFORAMINAL STENOSIS OF LUMBAR SPINE: ICD-10-CM

## 2019-06-07 DIAGNOSIS — M47.816 LUMBAR SPONDYLOSIS: ICD-10-CM

## 2019-06-07 DIAGNOSIS — M54.41 CHRONIC BILATERAL LOW BACK PAIN WITH RIGHT-SIDED SCIATICA: ICD-10-CM

## 2019-06-07 PROBLEM — E66.813 CLASS 3 SEVERE OBESITY DUE TO EXCESS CALORIES WITH BODY MASS INDEX (BMI) OF 40.0 TO 44.9 IN ADULT: Status: ACTIVE | Noted: 2019-06-07

## 2019-06-07 PROCEDURE — 99999 PR PBB SHADOW E&M-EST. PATIENT-LVL III: ICD-10-PCS | Mod: PBBFAC,,, | Performed by: PAIN MEDICINE

## 2019-06-07 PROCEDURE — 99244 PR OFFICE CONSULTATION,LEVEL IV: ICD-10-PCS | Mod: S$GLB,,, | Performed by: PAIN MEDICINE

## 2019-06-07 PROCEDURE — 99244 OFF/OP CNSLTJ NEW/EST MOD 40: CPT | Mod: S$GLB,,, | Performed by: PAIN MEDICINE

## 2019-06-07 PROCEDURE — 99999 PR PBB SHADOW E&M-EST. PATIENT-LVL III: CPT | Mod: PBBFAC,,, | Performed by: PAIN MEDICINE

## 2019-06-07 NOTE — LETTER
June 7, 2019      Ernesto Riley MD  [address]  [address]  [place] [address]           [facility]  [address]  [place] [address]  Phone: [phone]          Patient: Edis Huggins Jr.   MR Number: [id number]   YOB: 1945   Date of Visit: 6/7/2019       Dear Dr. Ernesto Riley:    Thank you for referring Edis Huggins to me for evaluation. Attached you will find relevant portions of my assessment and plan of care.    If you have questions, please do not hesitate to call me. I look forward to following Edis Huggins along with you.    Sincerely,    Eduard Mukherjee Jr., MD    Enclosure  CC:  No Recipients    If you would like to receive this communication electronically, please contact [email or web address] or [phone] to request more information on [employer] access.    For providers and/or their staff who would like to refer a patient to [facility], please contact us through our one-stop-shop provider referral line, [facility], at [phone].    If you feel you have received this communication in error or would no longer like to receive these types of communications, please e-mail [email or web address]

## 2019-06-15 ENCOUNTER — PATIENT MESSAGE (OUTPATIENT)
Dept: OPHTHALMOLOGY | Facility: CLINIC | Age: 74
End: 2019-06-15

## 2019-06-21 NOTE — DISCHARGE INSTRUCTIONS
Home Care Instructions Pain Management:    1.  DIET:    You may resume your normal diet today.    2.  BATHING:    You may shower with luke warm water.    3.  DRESSING:    You may remove your bandage today.    4.  ACTIVITY LEVEL:      You may resume your normal activities 24 hours after your procedure.    5.  MEDICATIONS:    You may resume your normal medications today.    6.  SPECIAL INSTRUCTIONS:    No heat to the injection site for 24 hours including bath or shower, heating pad, moist heat or hot tubs.    Use an ice pack to the injection site for any pain or discomfort.  Apply ice packs for 20 minute intervals as needed.    If you have received any sedatives by mouth today, you can not drive for 12 hours.    If you have received sedation through an IV, you can not drive for 24 hours.    PLEASE CALL YOUR DOCTOR FOR THE FOLLOWIN.  Redness or swelling around the injection site.  2.  Fever of 101 degrees.  3.  Drainage (pus) from the injection site.  4.  For any continuous bleeding (some dried blood over the incision is normal.)    FOR EMERGENCIES:    If any unusual problems or difficulties occur during clinic hours, call [phone] or dial [phone].    Follow up with with your physician in 2-3 weeks.

## 2019-06-24 ENCOUNTER — TELEPHONE (OUTPATIENT)
Dept: PAIN MEDICINE | Facility: CLINIC | Age: 74
End: 2019-06-24

## 2019-06-24 ENCOUNTER — TELEPHONE (OUTPATIENT)
Dept: INTERNAL MEDICINE | Facility: CLINIC | Age: 74
End: 2019-06-24

## 2019-06-24 ENCOUNTER — LAB VISIT (OUTPATIENT)
Dept: LAB | Facility: HOSPITAL | Age: 74
End: 2019-06-24
Attending: INTERNAL MEDICINE
Payer: COMMERCIAL

## 2019-06-24 DIAGNOSIS — R73.01 IMPAIRED FASTING GLUCOSE: Primary | ICD-10-CM

## 2019-06-24 DIAGNOSIS — Z00.00 ANNUAL PHYSICAL EXAM: ICD-10-CM

## 2019-06-24 LAB
ANION GAP SERPL CALC-SCNC: 11 MMOL/L (ref 8–16)
BUN SERPL-MCNC: 22 MG/DL (ref 8–23)
CALCIUM SERPL-MCNC: 9.7 MG/DL (ref 8.7–10.5)
CHLORIDE SERPL-SCNC: 102 MMOL/L (ref 95–110)
CHOLEST SERPL-MCNC: 145 MG/DL (ref 120–199)
CHOLEST/HDLC SERPL: 4.4 {RATIO} (ref 2–5)
CO2 SERPL-SCNC: 24 MMOL/L (ref 23–29)
CREAT SERPL-MCNC: 0.8 MG/DL (ref 0.5–1.4)
EST. GFR  (AFRICAN AMERICAN): >60 ML/MIN/1.73 M^2
EST. GFR  (NON AFRICAN AMERICAN): >60 ML/MIN/1.73 M^2
ESTIMATED AVG GLUCOSE: 114 MG/DL (ref 68–131)
GLUCOSE SERPL-MCNC: 130 MG/DL (ref 70–110)
HBA1C MFR BLD HPLC: 5.6 % (ref 4–5.6)
HDLC SERPL-MCNC: 33 MG/DL (ref 40–75)
HDLC SERPL: 22.8 % (ref 20–50)
LDLC SERPL CALC-MCNC: 71.8 MG/DL (ref 63–159)
NONHDLC SERPL-MCNC: 112 MG/DL
POTASSIUM SERPL-SCNC: 3.6 MMOL/L (ref 3.5–5.1)
SODIUM SERPL-SCNC: 137 MMOL/L (ref 136–145)
TRIGL SERPL-MCNC: 201 MG/DL (ref 30–150)

## 2019-06-24 PROCEDURE — 83036 HEMOGLOBIN GLYCOSYLATED A1C: CPT

## 2019-06-24 PROCEDURE — 36415 COLL VENOUS BLD VENIPUNCTURE: CPT

## 2019-06-24 PROCEDURE — 80048 BASIC METABOLIC PNL TOTAL CA: CPT

## 2019-06-24 PROCEDURE — 80061 LIPID PANEL: CPT

## 2019-06-24 NOTE — TELEPHONE ENCOUNTER
Pt labs stable, I have sent the patient a [facility] message. He will follow up in 3 months as planned. Please schedule the patient for this follow up with labs 1 week prior.

## 2019-06-25 ENCOUNTER — HOSPITAL ENCOUNTER (OUTPATIENT)
Facility: HOSPITAL | Age: 74
Discharge: HOME | End: 2019-06-25
Attending: PAIN MEDICINE | Admitting: PAIN MEDICINE
Payer: COMMERCIAL

## 2019-06-25 VITALS
DIASTOLIC BLOOD PRESSURE: 58 MMHG | OXYGEN SATURATION: 94 % | WEIGHT: 280 LBS | TEMPERATURE: 99 F | SYSTOLIC BLOOD PRESSURE: 111 MMHG | RESPIRATION RATE: 16 BRPM | HEIGHT: 71 IN | HEART RATE: 60 BPM | BODY MASS INDEX: 39.2 KG/M2

## 2019-06-25 DIAGNOSIS — M54.16 LUMBAR RADICULOPATHY: Primary | ICD-10-CM

## 2019-06-25 DIAGNOSIS — G89.29 CHRONIC PAIN: ICD-10-CM

## 2019-06-25 PROCEDURE — 64483 NJX AA&/STRD TFRM EPI L/S 1: CPT | Performed by: PAIN MEDICINE

## 2019-06-25 PROCEDURE — 99152 PR MOD CONSCIOUS SEDATION, SAME PHYS, 5+ YRS, FIRST 15 MIN: ICD-10-PCS | Mod: ,,, | Performed by: PAIN MEDICINE

## 2019-06-25 PROCEDURE — 99152 MOD SED SAME PHYS/QHP 5/>YRS: CPT | Performed by: PAIN MEDICINE

## 2019-06-25 PROCEDURE — 64483 PR EPIDURAL INJ, ANES/STEROID, TRANSFORAMINAL, LUMB/SACR, SNGL LEVL: ICD-10-PCS | Mod: RT,,, | Performed by: PAIN MEDICINE

## 2019-06-25 PROCEDURE — 64484 NJX AA&/STRD TFRM EPI L/S EA: CPT | Mod: RT,,, | Performed by: PAIN MEDICINE

## 2019-06-25 PROCEDURE — 64483 NJX AA&/STRD TFRM EPI L/S 1: CPT | Mod: RT,,, | Performed by: PAIN MEDICINE

## 2019-06-25 PROCEDURE — 63600175 PHARM REV CODE 636 W HCPCS: Performed by: PAIN MEDICINE

## 2019-06-25 PROCEDURE — 64484 PRA INJECT ANES/STEROID FORAMEN LUMBAR/SACRAL W IMG GUIDE ,EA ADD LEVEL: ICD-10-PCS | Mod: RT,,, | Performed by: PAIN MEDICINE

## 2019-06-25 PROCEDURE — 25000003 PHARM REV CODE 250: Performed by: PAIN MEDICINE

## 2019-06-25 PROCEDURE — 64484 NJX AA&/STRD TFRM EPI L/S EA: CPT | Performed by: PAIN MEDICINE

## 2019-06-25 PROCEDURE — 99152 MOD SED SAME PHYS/QHP 5/>YRS: CPT | Mod: ,,, | Performed by: PAIN MEDICINE

## 2019-06-25 PROCEDURE — 25500020 PHARM REV CODE 255: Performed by: PAIN MEDICINE

## 2019-06-25 RX ORDER — DEXAMETHASONE SODIUM PHOSPHATE 10 MG/ML
INJECTION INTRAMUSCULAR; INTRAVENOUS
Status: DISCONTINUED | OUTPATIENT
Start: 2019-06-25 | End: 2019-06-25 | Stop reason: HOSPADM

## 2019-06-25 RX ORDER — SODIUM CHLORIDE 9 MG/ML
500 INJECTION, SOLUTION INTRAVENOUS CONTINUOUS
Status: DISCONTINUED | OUTPATIENT
Start: 2019-06-25 | End: 2019-06-25 | Stop reason: HOSPADM

## 2019-06-25 RX ORDER — ROPIVACAINE HYDROCHLORIDE 2 MG/ML
INJECTION, SOLUTION EPIDURAL; INFILTRATION; PERINEURAL
Status: COMPLETED | OUTPATIENT
Start: 2019-06-25 | End: 2019-06-25

## 2019-06-25 RX ORDER — FENTANYL CITRATE 50 UG/ML
INJECTION, SOLUTION INTRAMUSCULAR; INTRAVENOUS
Status: DISCONTINUED | OUTPATIENT
Start: 2019-06-25 | End: 2019-06-25 | Stop reason: HOSPADM

## 2019-06-25 RX ORDER — MIDAZOLAM HYDROCHLORIDE 1 MG/ML
INJECTION, SOLUTION INTRAMUSCULAR; INTRAVENOUS
Status: DISCONTINUED | OUTPATIENT
Start: 2019-06-25 | End: 2019-06-25 | Stop reason: HOSPADM

## 2019-06-25 RX ORDER — LIDOCAINE HYDROCHLORIDE 10 MG/ML
INJECTION, SOLUTION EPIDURAL; INFILTRATION; INTRACAUDAL; PERINEURAL
Status: DISCONTINUED | OUTPATIENT
Start: 2019-06-25 | End: 2019-06-25 | Stop reason: HOSPADM

## 2019-06-25 RX ORDER — LIDOCAINE HYDROCHLORIDE 10 MG/ML
1 INJECTION, SOLUTION EPIDURAL; INFILTRATION; INTRACAUDAL; PERINEURAL ONCE
Status: DISCONTINUED | OUTPATIENT
Start: 2019-06-25 | End: 2019-06-25 | Stop reason: HOSPADM

## 2019-06-25 RX ORDER — INDOMETHACIN 25 MG/1
CAPSULE ORAL
Status: DISCONTINUED | OUTPATIENT
Start: 2019-06-25 | End: 2019-06-25 | Stop reason: HOSPADM

## 2019-06-25 RX ADMIN — SODIUM CHLORIDE 500 ML: 0.9 INJECTION, SOLUTION INTRAVENOUS at 09:06

## 2019-06-25 NOTE — DISCHARGE SUMMARY
[facility]  Discharge Note  Short Stay     Admit Date: 6/25/2019    Discharge Date: 6/25/2019     Attending Physician: Eduard Mukherjee Jr, MD    Diagnoses:  Active Hospital Problems    Diagnosis  POA    *Neuroforaminal stenosis of lumbar spine [M99.83]  Yes    Chronic pain [G89.29]  Yes      Resolved Hospital Problems   No resolved problems to display.     Discharged Condition: Good     Hospital Course: Patient was admitted for an outpatient interventional pain management procedure and tolerated the procedure well with no complications.     Final Diagnoses: Same as principal problem.     Disposition: Home or Self Care     Follow up/Patient Instructions:    Follow-up Information     Eduard Mukherjee Jr, MD. Go in 2 weeks.    Specialty:  Pain Medicine  Why:  Post-procedural Follow Up As Scheduled  Contact information:  [address]  [address]  [place] [address] [phone]                   Reconciled Medications:     Medication List      CONTINUE taking these medications    acyclovir 400 MG tablet  Commonly known as:  ZOVIRAX  TAKE 1 TABLET BY MOUTH THREE TIMES DAILY FOR 5 DAYS AT SIGN OF FEVER BLISTER     ALIGN 4 mg Cap  Generic drug:  Bifidobacterium infantis  Take 4 mg by mouth once daily.     amLODIPine 10 MG tablet  Commonly known as:  NORVASC  TAKE 1 TABLET(10 MG) BY MOUTH EVERY DAY     azelastine 137 mcg (0.1 %) nasal spray  Commonly known as:  ASTELIN  1 spray (137 mcg total) by Nasal route 2 (two) times daily.     cetirizine 10 MG tablet  Commonly known as:  ZYRTEC  Take 10 mg by mouth once daily.     chlorthalidone 50 MG Tab  Commonly known as:  HYGROTEN  TAKE 1 TABLET(50 MG) BY MOUTH EVERY DAY     citalopram 20 MG tablet  Commonly known as:  CELEXA  TAKE 1 TABLET BY MOUTH DAILY     cyclobenzaprine 10 MG tablet  Commonly known as:  FLEXERIL  Take 1 tablet (10 mg total) by mouth 3 (three) times daily as needed.     finasteride 5 mg tablet  Commonly known as:  PROSCAR  TAKE 1 TABLET(5  MG) BY MOUTH EVERY DAY     fish oil-omega-3 fatty acids 300-1,000 mg capsule  Take 2 g by mouth 3 (three) times daily.     gemfibrozil 600 MG tablet  Commonly known as:  LOPID  TAKE 1 TABLET BY MOUTH TWICE DAILY     irbesartan 300 MG tablet  Commonly known as:  AVAPRO  Take 1 tablet (300 mg total) by mouth once daily.     tamsulosin 0.4 mg Cap  Commonly known as:  FLOMAX  TAKE 1 CAPSULE(0.4 MG) BY MOUTH EVERY DAY     timolol maleate 0.5% 0.5 % Solg  Commonly known as:  TIMOPTIC-XE  Place 1 drop into both eyes every morning.     vitamin E 1000 UNIT capsule  Take 1,000 Units by mouth once daily.           Discharge Procedure Orders (must include Diet, Follow-up, Activity)   Call MD for:  temperature >100.4     Call MD for:  severe uncontrolled pain     Call MD for:  redness, tenderness, or signs of infection (pain, swelling, redness, odor or green/yellow discharge around incision site)     Call MD for:  difficulty breathing or increased cough     Call MD for:  severe persistent headache     Call MD for:  worsening rash     Remove dressing in 24 hours       Eduard Mukherjee Jr, MD  Interventional Pain Medicine / Anesthesiology

## 2019-06-25 NOTE — PROGRESS NOTES
Pt. Denied any present discomforts 0/10 pain scale.  Reviewed discharge instructions, pt. Verbalized understanding.

## 2019-06-27 DIAGNOSIS — I10 ESSENTIAL HYPERTENSION: ICD-10-CM

## 2019-06-27 RX ORDER — IRBESARTAN 150 MG/1
TABLET ORAL
Qty: 180 TABLET | Refills: 3 | Status: SHIPPED | OUTPATIENT
Start: 2019-06-27 | End: 2020-10-05 | Stop reason: SDUPTHER

## 2019-07-01 ENCOUNTER — PATIENT MESSAGE (OUTPATIENT)
Dept: PAIN MEDICINE | Facility: CLINIC | Age: 74
End: 2019-07-01

## 2019-07-19 ENCOUNTER — TELEPHONE (OUTPATIENT)
Dept: INTERNAL MEDICINE | Facility: CLINIC | Age: 74
End: 2019-07-19

## 2019-07-19 ENCOUNTER — CLINICAL SUPPORT (OUTPATIENT)
Dept: INTERNAL MEDICINE | Facility: CLINIC | Age: 74
End: 2019-07-19
Payer: COMMERCIAL

## 2019-07-19 VITALS — WEIGHT: 283.31 LBS | BODY MASS INDEX: 39.51 KG/M2

## 2019-07-19 PROCEDURE — 99999 PR PBB SHADOW E&M-EST. PATIENT-LVL I: CPT | Mod: PBBFAC,,,

## 2019-07-19 PROCEDURE — 99999 PR PBB SHADOW E&M-EST. PATIENT-LVL I: ICD-10-PCS | Mod: PBBFAC,,,

## 2019-07-19 NOTE — TELEPHONE ENCOUNTER
----- Message from Yue Bermeo sent at 7/19/2019 11:10 AM CDT -----  Contact: pt  Pt would like to be called back regarding his appt    Pt can be reached at [phone]

## 2019-07-19 NOTE — PROGRESS NOTES
Health  Follow-Up Note   [x] Office  [] Phone  Notes from previous session reviewed.   [x] Previous Session Goals unchanged.   [] Patient/Caregiver Change in Goals.  Goals added or changed by Patient/Caregiver since program participation:   1. Decrease added sugar  2. Improve cholesterol and triglycerides, Eat more fish       Additional/Changed support that patient/caregiver has experienced/sought?  (Indicate readiness, support from family, friends, others, community groups, etc)  1.  Wife    Additional/Changed obstacles that could prevent patient/caregiver from reaching their goals?  1.  Travel    Feedback provided:  1.  Praised for continued effort and determination    Diagnostic values/Desriptors for follow-up as needed for chronic condition(s)   Weight: 128.5 kg 283.29 lb down 5.29 lbs total now 10 lbs since 12/2018    Interventions:   1. Health  listened reflectively, validated thoughts and feelings, offered support and encouragement.   2. Allowed patient to express themselves in a non-biased atmosphere.  3. Health  assisted pt to problem-solve obstacles such as being in a challenging environment and dealing with these challenges.   4. Motivational Interviewed interventions utilized (OARS).   5. Patient responded favorably to interventions and remained actively engaged in the session.   6. Health  will remain available and connected for patient by phone and/or office visits.   7. Positive reinforcement, emotional support and encouragement provided.   8. Focused Education: MI    Plan:  [x] Pt will work on goals as stated above.   [x] Pt will contact Health  for any questions, concerns or needs.   [x] Pt will follow up with Health  in office on  10.25.19 at 1530.  [] Pt will follow up with Health  on phone in:        [x] Health  will remain available.   [] Health  will contact patient by phone in:        [] Health  will consult:        [] Health  will inform  Provider via EPIC messaging.     Impression:  1. Behavior is consistent with MI Stage of Change.   2. Participation level:       [x] Receptive      [x] Interactive      [] Guarded and Resistant      [x] Self Motivated      [] Refused/Declined to participate   3. [x] Pt voiced understanding of all information presented.       [] Pt voiced needing further information/education. This will be arranged.       [x] Pt would benefit from further education/information as identified by this health . This will be arranged.     Sandy Moses RN HC

## 2019-07-31 ENCOUNTER — PATIENT MESSAGE (OUTPATIENT)
Dept: INTERNAL MEDICINE | Facility: CLINIC | Age: 74
End: 2019-07-31

## 2019-07-31 DIAGNOSIS — I10 ESSENTIAL HYPERTENSION: ICD-10-CM

## 2019-07-31 RX ORDER — ACYCLOVIR 400 MG/1
TABLET ORAL
Qty: 30 TABLET | Refills: 0 | Status: SHIPPED | OUTPATIENT
Start: 2019-07-31 | End: 2020-04-16

## 2019-07-31 RX ORDER — AMLODIPINE BESYLATE 10 MG/1
TABLET ORAL
Qty: 90 TABLET | Refills: 0 | Status: SHIPPED | OUTPATIENT
Start: 2019-07-31 | End: 2019-11-13 | Stop reason: SDUPTHER

## 2019-07-31 NOTE — PROGRESS NOTES
HPI     DLS: 3/22/19    Pt here for 4 month check;    Meds; T1/2 GFS QAM OU     1) POAG   2) PCIOL OU   3) Steroid Responder   4) APD OS   5) PAS OS     Last edited by Millie Lakhani on 8/1/2019  2:58 PM. (History)            Assessment /Plan     For exam results, see Encounter Report.    Primary open angle glaucoma of left eye, moderate stage    Primary open-angle glaucoma, right eye, mild stage    Steroid responders, bilateral    Pupil irregular of left eye    Relative afferent pupillary defect - Left Eye    Pseudophakia, both eyes          Pt is switching from  [place] -  Back to [facility] - bring photo file over   Lost to F/u from 11/4/2013 to 8/11/2015   Knows Sharlene Mo and MIKHAIL - use to work with them     1. POAG (primary open-angle glaucoma) - Both Eyes   Old pt of [employer] [id number]  Began at [facility] 2012       POAG OS>OD           Family history    neg        Glaucoma meds   T1/2 GFS ou (use to use travatan- off post CE)         H/O adverse rxn to glaucoma drops    none        LASERS    SLT os 9/26/2013 -( good resp 17 --> 14)        GLAUCOMA SURGERIES    trabectome os (combined with phaco - 2/8/2017        OTHER EYE SURGERIES    Combined - phaco/IOL / goniotomy - trabectome os 2/8/2017 - +heme and increae in IOP)                                                 PC IOL OD - 5/10/2017         CDR    0.6/0.7-0.8        Tbase    20-23/21-24          Tmax    23/24            Ttarget    17/17           HVF    5 tests 5033-9475 OD essentially full; OS [facility]acent[facility] defect, [facility]             (([facility])) - 4 test 2012 to 2019 - full od // sup paracentral defect / inferior paracentral defect         Gonio    +4 ou        CCT    598/598        OCT    4 test 2012 to 2019  - RNFL - nl od // bord G/TS/T         HRT    5 test-2012 to 2018  -MR -  nl od // dec. T, bord I os /// CDR 0.51 od // 0.64 os        Disc photos    2012 - IOS    - Ttoday   15/13  - Test done today   IOP / gonio  "      2. Steroid responders - Both Eyes  2/2 nasacort      3. ? Trace APD os      4. S/P Rt knee replacement Feb 2013  -doing well      5.  Myopia / presbyopia ou     6. Irregular pupil - w/ PAS   -2/2 very high IOP POD # 1 post phaco     7.  PC IOL OU  OD 5/10/2017 - PCB00 14.5  OS 2/8/2017 - + hyphema and high IOP 2/2 trabectome - irregular pupil post op - PCB00 14.0    8. PCO od - monitor       Plan  CSM  IOP below target OU  SLT done OS 9/26/2013 - good initial resp 24-->17    -( if responds well consider SLT od - but full VF and helathy ON still od)  HVF stable OU    Cont timolol gfs ou q day     If the glare from the fixed dilated pupil bothers him a lot post 2nd eye getting done - consider pupil surgery with dr Peacock to "purse string it " smaller - pt is doing ok for now - glare is not bothering him too much     F/U 4 months with  HRT     I have seen and personally examined the patient.  I agree with the findings, assessment and plan of the resident and/or fellow.     Deborah Khan MD  "

## 2019-08-01 ENCOUNTER — OFFICE VISIT (OUTPATIENT)
Dept: OPHTHALMOLOGY | Facility: CLINIC | Age: 74
End: 2019-08-01
Payer: COMMERCIAL

## 2019-08-01 DIAGNOSIS — H57.09 RELATIVE AFFERENT PUPILLARY DEFECT: ICD-10-CM

## 2019-08-01 DIAGNOSIS — H21.562 PUPIL IRREGULAR OF LEFT EYE: ICD-10-CM

## 2019-08-01 DIAGNOSIS — H40.043 STEROID RESPONDERS, BILATERAL: ICD-10-CM

## 2019-08-01 DIAGNOSIS — H40.1111 PRIMARY OPEN-ANGLE GLAUCOMA, RIGHT EYE, MILD STAGE: ICD-10-CM

## 2019-08-01 DIAGNOSIS — H40.1122 PRIMARY OPEN ANGLE GLAUCOMA OF LEFT EYE, MODERATE STAGE: Primary | ICD-10-CM

## 2019-08-01 DIAGNOSIS — Z96.1 PSEUDOPHAKIA, BOTH EYES: ICD-10-CM

## 2019-08-01 PROCEDURE — 92012 PR EYE EXAM, EST PATIENT,INTERMED: ICD-10-PCS | Mod: S$GLB,,, | Performed by: OPHTHALMOLOGY

## 2019-08-01 PROCEDURE — 99999 PR PBB SHADOW E&M-EST. PATIENT-LVL II: ICD-10-PCS | Mod: PBBFAC,,, | Performed by: OPHTHALMOLOGY

## 2019-08-01 PROCEDURE — 92020 GONIOSCOPY: CPT | Mod: S$GLB,,, | Performed by: OPHTHALMOLOGY

## 2019-08-01 PROCEDURE — 92020 PR SPECIAL EYE EVAL,GONIOSCOPY: ICD-10-PCS | Mod: S$GLB,,, | Performed by: OPHTHALMOLOGY

## 2019-08-01 PROCEDURE — 92012 INTRM OPH EXAM EST PATIENT: CPT | Mod: S$GLB,,, | Performed by: OPHTHALMOLOGY

## 2019-08-01 PROCEDURE — 99999 PR PBB SHADOW E&M-EST. PATIENT-LVL II: CPT | Mod: PBBFAC,,, | Performed by: OPHTHALMOLOGY

## 2019-08-06 ENCOUNTER — PATIENT MESSAGE (OUTPATIENT)
Dept: INTERNAL MEDICINE | Facility: CLINIC | Age: 74
End: 2019-08-06

## 2019-08-09 DIAGNOSIS — F41.9 ANXIETY: ICD-10-CM

## 2019-08-10 RX ORDER — CITALOPRAM 20 MG/1
TABLET, FILM COATED ORAL
Qty: 90 TABLET | Refills: 0 | OUTPATIENT
Start: 2019-08-10

## 2019-08-23 RX ORDER — CYCLOBENZAPRINE HCL 10 MG
TABLET ORAL
Qty: 30 TABLET | Refills: 0 | Status: SHIPPED | OUTPATIENT
Start: 2019-08-23 | End: 2019-08-30 | Stop reason: SDUPTHER

## 2019-08-29 ENCOUNTER — PATIENT MESSAGE (OUTPATIENT)
Dept: INTERNAL MEDICINE | Facility: CLINIC | Age: 74
End: 2019-08-29

## 2019-08-29 ENCOUNTER — TELEPHONE (OUTPATIENT)
Dept: INTERNAL MEDICINE | Facility: CLINIC | Age: 74
End: 2019-08-29

## 2019-08-29 DIAGNOSIS — F41.9 ANXIETY: ICD-10-CM

## 2019-08-29 RX ORDER — CITALOPRAM 20 MG/1
20 TABLET, FILM COATED ORAL DAILY
Qty: 90 TABLET | Refills: 3 | Status: SHIPPED | OUTPATIENT
Start: 2019-08-29 | End: 2020-08-13

## 2019-08-29 RX ORDER — CITALOPRAM 20 MG/1
TABLET, FILM COATED ORAL
Qty: 90 TABLET | Refills: 0 | OUTPATIENT
Start: 2019-08-29

## 2019-08-30 RX ORDER — CYCLOBENZAPRINE HCL 10 MG
TABLET ORAL
Qty: 30 TABLET | Refills: 0 | Status: SHIPPED | OUTPATIENT
Start: 2019-08-30 | End: 2020-04-14

## 2019-09-03 ENCOUNTER — PATIENT MESSAGE (OUTPATIENT)
Dept: INTERNAL MEDICINE | Facility: CLINIC | Age: 74
End: 2019-09-03

## 2019-09-04 ENCOUNTER — OFFICE VISIT (OUTPATIENT)
Dept: PAIN MEDICINE | Facility: CLINIC | Age: 74
End: 2019-09-04
Payer: COMMERCIAL

## 2019-09-04 VITALS — SYSTOLIC BLOOD PRESSURE: 106 MMHG | DIASTOLIC BLOOD PRESSURE: 61 MMHG | HEART RATE: 70 BPM

## 2019-09-04 DIAGNOSIS — M48.061 NEUROFORAMINAL STENOSIS OF LUMBAR SPINE: ICD-10-CM

## 2019-09-04 DIAGNOSIS — G89.29 CHRONIC BILATERAL LOW BACK PAIN WITH RIGHT-SIDED SCIATICA: ICD-10-CM

## 2019-09-04 DIAGNOSIS — M54.41 CHRONIC BILATERAL LOW BACK PAIN WITH RIGHT-SIDED SCIATICA: ICD-10-CM

## 2019-09-04 DIAGNOSIS — M47.816 LUMBAR SPONDYLOSIS: ICD-10-CM

## 2019-09-04 DIAGNOSIS — M54.16 LUMBAR RADICULOPATHY: Primary | ICD-10-CM

## 2019-09-04 PROCEDURE — 3078F PR MOST RECENT DIASTOLIC BLOOD PRESSURE < 80 MM HG: ICD-10-PCS | Mod: CPTII,S$GLB,, | Performed by: PAIN MEDICINE

## 2019-09-04 PROCEDURE — 1101F PR PT FALLS ASSESS DOC 0-1 FALLS W/OUT INJ PAST YR: ICD-10-PCS | Mod: CPTII,S$GLB,, | Performed by: PAIN MEDICINE

## 2019-09-04 PROCEDURE — 99213 PR OFFICE/OUTPT VISIT, EST, LEVL III, 20-29 MIN: ICD-10-PCS | Mod: S$GLB,,, | Performed by: PAIN MEDICINE

## 2019-09-04 PROCEDURE — 3074F SYST BP LT 130 MM HG: CPT | Mod: CPTII,S$GLB,, | Performed by: PAIN MEDICINE

## 2019-09-04 PROCEDURE — 99213 OFFICE O/P EST LOW 20 MIN: CPT | Mod: S$GLB,,, | Performed by: PAIN MEDICINE

## 2019-09-04 PROCEDURE — 99999 PR PBB SHADOW E&M-EST. PATIENT-LVL III: CPT | Mod: PBBFAC,,, | Performed by: PAIN MEDICINE

## 2019-09-04 PROCEDURE — 1101F PT FALLS ASSESS-DOCD LE1/YR: CPT | Mod: CPTII,S$GLB,, | Performed by: PAIN MEDICINE

## 2019-09-04 PROCEDURE — 3078F DIAST BP <80 MM HG: CPT | Mod: CPTII,S$GLB,, | Performed by: PAIN MEDICINE

## 2019-09-04 PROCEDURE — 99999 PR PBB SHADOW E&M-EST. PATIENT-LVL III: ICD-10-PCS | Mod: PBBFAC,,, | Performed by: PAIN MEDICINE

## 2019-09-04 PROCEDURE — 3074F PR MOST RECENT SYSTOLIC BLOOD PRESSURE < 130 MM HG: ICD-10-PCS | Mod: CPTII,S$GLB,, | Performed by: PAIN MEDICINE

## 2019-09-04 NOTE — PROGRESS NOTES
[facility] Established Patient Evaluation    Chief Complaint  Chief Complaint   Patient presents with    Back Pain    Low-back Pain       Last 3 PDI Scores 9/4/2019 6/7/2019   Pain Disability Index (PDI) 11 12       Interval Update  Edis Huggins Jr. presents for follow-up of chronic low back pain with radiculopathy after receiving a right L3-4 and L4-5 transforaminal epidural steroid injection on 06/25/2019.  He reports 85% improvement and lists numerous benefits from the injection including the ability to walk farther and faster; improved sleep; and ability to better handle some of his job responsibilities such as moving boxes.  He reports a pain intensity of 2/10 at this time along with a complete absence of radicular pain.  He notes occasional twinges of pain in the lumbar spine, specifically on the right side, but also states that he is very happy with the injection.      Treatment History  PT/OT:   HEP:   TENS:  Injections:    06/25/2019 - right L3-4 and L4-5 TFESI with 85% improvement in pain  Surgery:  None  Medications:   - NSAIDS:   - MSK Relaxants:  Flexeril  - TCAs:   - SNRIs:   - Topicals:   - Opioids:   - Anticonvulsants:     Current Pain Medications  1. None at this time     Full Medication List    Current Outpatient Medications:     acyclovir (ZOVIRAX) 400 MG tablet, TAKE 1 TABLET BY MOUTH THREE TIMES DAILY FOR 5 DAYS AT SIGN OF FEVER BLISTER, Disp: 30 tablet, Rfl: 0    amLODIPine (NORVASC) 10 MG tablet, TAKE 1 TABLET(10 MG) BY MOUTH EVERY DAY, Disp: 90 tablet, Rfl: 0    azelastine (ASTELIN) 137 mcg (0.1 %) nasal spray, 1 spray (137 mcg total) by Nasal route 2 (two) times daily., Disp: 30 mL, Rfl: 3    Bifidobacterium infantis (ALIGN) 4 mg Cap, Take 4 mg by mouth once daily. , Disp: , Rfl:     cetirizine (ZYRTEC) 10 MG tablet, Take 10 mg by mouth once daily., Disp: , Rfl:     chlorthalidone (HYGROTEN) 50 MG Tab, TAKE 1 TABLET(50 MG) BY MOUTH EVERY DAY, Disp: 90 tablet, Rfl:  3    citalopram (CELEXA) 20 MG tablet, Take 1 tablet (20 mg total) by mouth once daily., Disp: 90 tablet, Rfl: 3    cyclobenzaprine (FLEXERIL) 10 MG tablet, TAKE 1 TABLET(10 MG) BY MOUTH THREE TIMES DAILY AS NEEDED, Disp: 30 tablet, Rfl: 0    finasteride (PROSCAR) 5 mg tablet, TAKE 1 TABLET(5 MG) BY MOUTH EVERY DAY, Disp: 90 tablet, Rfl: 3    fish oil-omega-3 fatty acids 300-1,000 mg capsule, Take 2 g by mouth 3 (three) times daily. , Disp: , Rfl:     gemfibrozil (LOPID) 600 MG tablet, TAKE 1 TABLET BY MOUTH TWICE DAILY, Disp: 60 tablet, Rfl: 5    irbesartan (AVAPRO) 150 MG tablet, TAKE 2 TABLETS(300MG) BY MOUTH EVERY DAY, Disp: 180 tablet, Rfl: 3    tamsulosin (FLOMAX) 0.4 mg Cap, TAKE 1 CAPSULE(0.4 MG) BY MOUTH EVERY DAY, Disp: 90 capsule, Rfl: 3    timolol maleate 0.5% (TIMOPTIC-XE) 0.5 % SolG, Place 1 drop into both eyes every morning., Disp: 5 mL, Rfl: 12    vitamin E 1000 UNIT capsule, Take 1,000 Units by mouth once daily., Disp: , Rfl:      Review of Systems  Review of Systems   Constitutional: Negative for chills and fever.   HENT: Negative for nosebleeds.    Eyes: Negative for blurred vision.   Respiratory: Negative for hemoptysis.    Cardiovascular: Negative for chest pain and palpitations.   Gastrointestinal: Negative for heartburn and vomiting.   Genitourinary: Negative for hematuria.   Musculoskeletal: Positive for back pain (Mild). Negative for myalgias.   Skin: Negative for rash.   Neurological: Negative for tingling, seizures and loss of consciousness.   Endo/Heme/Allergies: Does not bruise/bleed easily.   Psychiatric/Behavioral: Negative for hallucinations. The patient does not have insomnia.        Medical History  Past Medical History:   Diagnosis Date    Allergic rhinitis due to animal (cat) (dog) hair and dander 3/20/2014    Allergy     Anemia     Anxiety     Basal cell carcinoma     BPH (benign prostatic hypertrophy)     Family history of colon cancer 3/20/2014    Family  history of ischemic heart disease 3/20/2014    HLD (hyperlipidemia)     HTN (hypertension)     IFG (impaired fasting glucose)     Lumbar spondylosis 6/7/2019    Primary open angle glaucoma     Status post total knee replacement 3/20/2014        Surgical History  Past Surgical History:   Procedure Laterality Date    ARTHROPLASTY, KNEE, TOTAL Right 2/26/2013    Performed by Alfie Liriano MD at [facility] OR 2ND FLR    CATARACT EXTRACTION W/  INTRAOCULAR LENS IMPLANT Left 02/08/2017    WITH KAHOOK GONIOTOMY (DR. WHITLEY)    CATARACT EXTRACTION W/  INTRAOCULAR LENS IMPLANT Right 05/10/2017        GONIOTOMY Left 2/8/2017    Performed by Deborah Whitley MD at [facility] OR 1ST FLR    HERNIA REPAIR      x 2    INSERTION-INTRAOCULAR LENS (IOL) Right 5/10/2017    Performed by Deborah Whitley MD at [facility] OR 1ST FLR    INSERTION-INTRAOCULAR LENS (IOL) Left 2/8/2017    Performed by Deborah Whitley MD at [facility] OR 1ST FLR    PHACOEMULSIFICATION-ASPIRATION-CATARACT Left 2/8/2017    Performed by Deborah Whitley MD at [facility] OR 1ST FLR    PHACOEMULSIFICATION-ASPIRATION-CATARACT/COMPLEX/M RING/TRYPAN BLUE Right 5/10/2017    Performed by Deborah Whitley MD at [facility] OR 1ST FLR    TONSILLECTOMY      TOTAL KNEE ARTHROPLASTY Right     Transforaminal Epidural Steroid Injection, Right, L3-4, L4-5 Right 6/25/2019    Performed by Eduard Mukherjee Jr., MD at [facility] PAIN MGT        Physical Exam  Vitals:    09/04/19 1526   BP: 106/61   Pulse: 70   PainSc:   2     General    Nursing note and vitals reviewed.  Constitutional: He is oriented to person, place, and time. He appears well-developed and well-nourished. No distress.   HENT:   Head: Normocephalic and atraumatic.   Nose: Nose normal.   Eyes: Conjunctivae and EOM are normal. Pupils are equal, round, and reactive to light. Right eye exhibits no discharge. Left eye exhibits no discharge. No scleral icterus.   Neck: No JVD present.   Cardiovascular:  Intact distal pulses.    Pulmonary/Chest: Effort normal. No respiratory distress.   Abdominal: He exhibits no distension.   Neurological: He is alert and oriented to person, place, and time. Coordination normal.   Psychiatric: He has a normal mood and affect. His behavior is normal. Judgment and thought content normal.         Back (L-Spine & T-Spine) / Neck (C-Spine) Exam     Back (L-Spine & T-Spine) Range of Motion   Extension: abnormal Back extension: There is decreased range of motion in the lumbar spine with extension and flexion; however, there is no association with increased pain at this.   Flexion: abnormal         Imaging  MRI lumbar spine 04/26/2019  There is no accompanying radiology report.  My review of the images is significant for advanced degenerative disc disease in the lumbar spine most notably at L5-S1, L4-5, and L3-4.  There is associated  neural foraminal stenosis at these levels.  Lumbar facet arthropathy also contributes to neural foraminal stenosis.  Neural foraminal stenosis is worst at right L3-4 and L4-5.    Labs:  BMP  Lab Results   Component Value Date     06/24/2019    K 3.6 06/24/2019     06/24/2019    CO2 24 06/24/2019    BUN 22 06/24/2019    CREATININE 0.8 06/24/2019    CALCIUM 9.7 06/24/2019    ANIONGAP 11 06/24/2019    ESTGFRAFRICA >60 06/24/2019    EGFRNONAA >60 06/24/2019     Lab Results   Component Value Date    ALT 19 04/05/2019    AST 14 04/05/2019    ALKPHOS 50 (L) 04/05/2019    BILITOT 0.4 04/05/2019       Assessment:  Problem List Items Addressed This Visit     Lumbar radiculopathy - Primary    Chronic bilateral low back pain with right-sided sciatica    Neuroforaminal stenosis of lumbar spine    Lumbar spondylosis          09/04/2019 - Edis Huggins Jr. is a 74 y.o. male with chronic low back pain with right-sided radiculopathy which responded extremely well to a right L3-4 and L4-5 TFESI on 06/25/2019.  Patient experienced 85% relief.  Patient was  instructed to contact our clinic when the pain relief against to wear off as we can schedule him directly for another injection given that he is already experienced 10 weeks of relief.        Plan & Recommendations  Procedures:  No additional procedures indicated at this time. Patient was directed to contact our office if he would like to repeat the right L3-4 and L4-5 TFESI if his pain begins to return in the future.  Medications: No medications changes recommended at this time.  Imaging: No additional imaging required at this time.  PT/OT:  No additional physical therapy is recommended at this time, but the patient is interested.  I will give him a handout of exercises for the back to taken today.  If he has difficulty completing those exercises, I will submit a referral to physical therapy to assist him with establishing an appropriate home exercise regimen  HEP: I encouraged the patient to maintain a home exercise regimen that includes daily, moderate cardiovascular exercise lasting at least 30 minutes.  This may include yoga, orly chi, walking, swimming, aqua aerobics, or other exercises that maintain a heart rate of 50-70% of the calculated maximum heart rate.  I also encouraged light, daily stretching focused on the target area.  Follow Up: RTC as needed    Eduard Mukherjee Jr, MD  Interventional Pain Medicine / Anesthesiology    Disclaimer: This note was partly generated using dictation software which may occasionally result in transcription errors.

## 2019-09-05 ENCOUNTER — OFFICE VISIT (OUTPATIENT)
Dept: ORTHOPEDICS | Facility: CLINIC | Age: 74
End: 2019-09-05
Payer: COMMERCIAL

## 2019-09-05 VITALS — HEIGHT: 71 IN | WEIGHT: 283 LBS | BODY MASS INDEX: 39.62 KG/M2

## 2019-09-05 DIAGNOSIS — M25.551 RIGHT HIP PAIN: Primary | ICD-10-CM

## 2019-09-05 DIAGNOSIS — M16.11 PRIMARY OSTEOARTHRITIS OF RIGHT HIP: ICD-10-CM

## 2019-09-05 DIAGNOSIS — M48.062 SPINAL STENOSIS OF LUMBAR REGION WITH NEUROGENIC CLAUDICATION: ICD-10-CM

## 2019-09-05 PROCEDURE — 99999 PR PBB SHADOW E&M-EST. PATIENT-LVL III: ICD-10-PCS | Mod: PBBFAC,,, | Performed by: ORTHOPAEDIC SURGERY

## 2019-09-05 PROCEDURE — 99213 OFFICE O/P EST LOW 20 MIN: CPT | Mod: S$GLB,,, | Performed by: ORTHOPAEDIC SURGERY

## 2019-09-05 PROCEDURE — 1101F PT FALLS ASSESS-DOCD LE1/YR: CPT | Mod: CPTII,S$GLB,, | Performed by: ORTHOPAEDIC SURGERY

## 2019-09-05 PROCEDURE — 1101F PR PT FALLS ASSESS DOC 0-1 FALLS W/OUT INJ PAST YR: ICD-10-PCS | Mod: CPTII,S$GLB,, | Performed by: ORTHOPAEDIC SURGERY

## 2019-09-05 PROCEDURE — 99999 PR PBB SHADOW E&M-EST. PATIENT-LVL III: CPT | Mod: PBBFAC,,, | Performed by: ORTHOPAEDIC SURGERY

## 2019-09-05 PROCEDURE — 99213 PR OFFICE/OUTPT VISIT, EST, LEVL III, 20-29 MIN: ICD-10-PCS | Mod: S$GLB,,, | Performed by: ORTHOPAEDIC SURGERY

## 2019-09-05 NOTE — PROGRESS NOTES
Subjective:      Patient ID: Edis Huggins Jr. is a 74 y.o. male.    Chief Complaint: Follow-up of the Spine    HPI follow-up for lumbar disc disorder.  The patient presented with right hip pain. This is attributed to his degenerative disc disease in his lumbar spine. He reports that epidural injections have made him essentially asymptomatic.    Review of Systems   Constitution: Negative for fever and weight loss.   HENT: Negative for congestion.    Eyes: Negative for visual disturbance.   Cardiovascular: Negative for chest pain.   Respiratory: Negative for shortness of breath.    Hematologic/Lymphatic: Negative for bleeding problem. Does not bruise/bleed easily.   Skin: Negative for poor wound healing.   Gastrointestinal: Negative for abdominal pain.   Genitourinary: Negative for dysuria.   Neurological: Negative for seizures.   Psychiatric/Behavioral: Negative for altered mental status.   Allergic/Immunologic: Negative for persistent infections.         Objective:      Ortho/SPM Exam      The patient is not in acute distress.   Body habitus is:obese.  Posture slightly forward flexed  Sclerae normal  The patient walks without a limp.   Respiratory distress:  none  The skin over the hip is:intact.   There is:tenderness anteriorly.   Range of motion- Flexion 85, External rotation 25, internal rotation 20.  Resisted SLR negative.  Pain with rotation negative  Sciatic tension findings negative  Shortening/lengthening compared to the contralateral side exam deferred.  Pulses DP present, PT present.  Motor normal 5/5 strength in all tested muscle groups.   Sensory normal              Assessment:       Encounter Diagnoses   Name Primary?    Right hip pain Yes    Primary osteoarthritis of right hip     Spinal stenosis of lumbar region with neurogenic claudication         The condition is controlled with the current measures.  There is mild residual osteoarthritis the right hip which is essentially asymptomatic at  this time.          Plan:       Edis was seen today for follow-up.    Diagnoses and all orders for this visit:    Right hip pain    Primary osteoarthritis of right hip    Spinal stenosis of lumbar region with neurogenic claudication          I explained my diagnostic impression and the reasoning behind it in detail, using layman's terms.  Models and/or pictures were used to help in the explanation.    Detailed home exercise program explained in detail

## 2019-09-12 ENCOUNTER — LAB VISIT (OUTPATIENT)
Dept: LAB | Facility: HOSPITAL | Age: 74
End: 2019-09-12
Attending: INTERNAL MEDICINE
Payer: COMMERCIAL

## 2019-09-12 ENCOUNTER — TELEPHONE (OUTPATIENT)
Dept: INTERNAL MEDICINE | Facility: CLINIC | Age: 74
End: 2019-09-12

## 2019-09-12 DIAGNOSIS — R73.01 IMPAIRED FASTING GLUCOSE: ICD-10-CM

## 2019-09-12 LAB
ALBUMIN SERPL BCP-MCNC: 4.1 G/DL (ref 3.5–5.2)
ALP SERPL-CCNC: 46 U/L (ref 55–135)
ALT SERPL W/O P-5'-P-CCNC: 21 U/L (ref 10–44)
ANION GAP SERPL CALC-SCNC: 12 MMOL/L (ref 8–16)
AST SERPL-CCNC: 20 U/L (ref 10–40)
BILIRUB SERPL-MCNC: 0.4 MG/DL (ref 0.1–1)
BUN SERPL-MCNC: 22 MG/DL (ref 8–23)
CALCIUM SERPL-MCNC: 10 MG/DL (ref 8.7–10.5)
CHLORIDE SERPL-SCNC: 101 MMOL/L (ref 95–110)
CO2 SERPL-SCNC: 27 MMOL/L (ref 23–29)
CREAT SERPL-MCNC: 0.9 MG/DL (ref 0.5–1.4)
EST. GFR  (AFRICAN AMERICAN): >60 ML/MIN/1.73 M^2
EST. GFR  (NON AFRICAN AMERICAN): >60 ML/MIN/1.73 M^2
ESTIMATED AVG GLUCOSE: 120 MG/DL (ref 68–131)
GLUCOSE SERPL-MCNC: 113 MG/DL (ref 70–110)
HBA1C MFR BLD HPLC: 5.8 % (ref 4–5.6)
MAGNESIUM SERPL-MCNC: 2 MG/DL (ref 1.6–2.6)
POTASSIUM SERPL-SCNC: 3.4 MMOL/L (ref 3.5–5.1)
PROT SERPL-MCNC: 7.4 G/DL (ref 6–8.4)
SODIUM SERPL-SCNC: 140 MMOL/L (ref 136–145)

## 2019-09-12 PROCEDURE — 83036 HEMOGLOBIN GLYCOSYLATED A1C: CPT

## 2019-09-12 PROCEDURE — 36415 COLL VENOUS BLD VENIPUNCTURE: CPT

## 2019-09-12 PROCEDURE — 83735 ASSAY OF MAGNESIUM: CPT

## 2019-09-12 PROCEDURE — 80053 COMPREHEN METABOLIC PANEL: CPT

## 2019-09-30 ENCOUNTER — IMMUNIZATION (OUTPATIENT)
Dept: INTERNAL MEDICINE | Facility: CLINIC | Age: 74
End: 2019-09-30
Payer: COMMERCIAL

## 2019-09-30 ENCOUNTER — OFFICE VISIT (OUTPATIENT)
Dept: INTERNAL MEDICINE | Facility: CLINIC | Age: 74
End: 2019-09-30
Payer: COMMERCIAL

## 2019-09-30 ENCOUNTER — CLINICAL SUPPORT (OUTPATIENT)
Dept: INTERNAL MEDICINE | Facility: CLINIC | Age: 74
End: 2019-09-30
Payer: COMMERCIAL

## 2019-09-30 VITALS
SYSTOLIC BLOOD PRESSURE: 116 MMHG | DIASTOLIC BLOOD PRESSURE: 64 MMHG | HEART RATE: 62 BPM | BODY MASS INDEX: 40.32 KG/M2 | HEIGHT: 71 IN | WEIGHT: 288 LBS

## 2019-09-30 DIAGNOSIS — M79.602 LEFT ARM PAIN: Primary | ICD-10-CM

## 2019-09-30 DIAGNOSIS — R06.09 DYSPNEA ON EXERTION: ICD-10-CM

## 2019-09-30 DIAGNOSIS — R29.6 FREQUENT FALLS: ICD-10-CM

## 2019-09-30 DIAGNOSIS — E78.1 HYPERTRIGLYCERIDEMIA: ICD-10-CM

## 2019-09-30 DIAGNOSIS — E87.6 HYPOKALEMIA: ICD-10-CM

## 2019-09-30 PROCEDURE — 90471 IMMUNIZATION ADMIN: CPT | Mod: S$GLB,,, | Performed by: INTERNAL MEDICINE

## 2019-09-30 PROCEDURE — 3074F PR MOST RECENT SYSTOLIC BLOOD PRESSURE < 130 MM HG: ICD-10-PCS | Mod: CPTII,S$GLB,, | Performed by: INTERNAL MEDICINE

## 2019-09-30 PROCEDURE — 90662 IIV NO PRSV INCREASED AG IM: CPT | Mod: S$GLB,,, | Performed by: INTERNAL MEDICINE

## 2019-09-30 PROCEDURE — 99999 PR PBB SHADOW E&M-EST. PATIENT-LVL III: ICD-10-PCS | Mod: PBBFAC,,, | Performed by: INTERNAL MEDICINE

## 2019-09-30 PROCEDURE — 3078F DIAST BP <80 MM HG: CPT | Mod: CPTII,S$GLB,, | Performed by: INTERNAL MEDICINE

## 2019-09-30 PROCEDURE — 99999 PR PBB SHADOW E&M-EST. PATIENT-LVL I: ICD-10-PCS | Mod: PBBFAC,,,

## 2019-09-30 PROCEDURE — 90732 PNEUMOCOCCAL POLYSACCHARIDE VACCINE 23-VALENT =>2YO SQ IM: ICD-10-PCS | Mod: S$GLB,,, | Performed by: INTERNAL MEDICINE

## 2019-09-30 PROCEDURE — 90471 FLU VACCINE - HIGH DOSE (65+) PRESERVATIVE FREE IM: ICD-10-PCS | Mod: S$GLB,,, | Performed by: INTERNAL MEDICINE

## 2019-09-30 PROCEDURE — 90732 PPSV23 VACC 2 YRS+ SUBQ/IM: CPT | Mod: S$GLB,,, | Performed by: INTERNAL MEDICINE

## 2019-09-30 PROCEDURE — 99214 OFFICE O/P EST MOD 30 MIN: CPT | Mod: 25,S$GLB,, | Performed by: INTERNAL MEDICINE

## 2019-09-30 PROCEDURE — 99214 PR OFFICE/OUTPT VISIT, EST, LEVL IV, 30-39 MIN: ICD-10-PCS | Mod: 25,S$GLB,, | Performed by: INTERNAL MEDICINE

## 2019-09-30 PROCEDURE — 90472 PNEUMOCOCCAL POLYSACCHARIDE VACCINE 23-VALENT =>2YO SQ IM: ICD-10-PCS | Mod: S$GLB,,, | Performed by: INTERNAL MEDICINE

## 2019-09-30 PROCEDURE — 1101F PT FALLS ASSESS-DOCD LE1/YR: CPT | Mod: CPTII,S$GLB,, | Performed by: INTERNAL MEDICINE

## 2019-09-30 PROCEDURE — 3078F PR MOST RECENT DIASTOLIC BLOOD PRESSURE < 80 MM HG: ICD-10-PCS | Mod: CPTII,S$GLB,, | Performed by: INTERNAL MEDICINE

## 2019-09-30 PROCEDURE — 90662 FLU VACCINE - HIGH DOSE (65+) PRESERVATIVE FREE IM: ICD-10-PCS | Mod: S$GLB,,, | Performed by: INTERNAL MEDICINE

## 2019-09-30 PROCEDURE — 99999 PR PBB SHADOW E&M-EST. PATIENT-LVL III: CPT | Mod: PBBFAC,,, | Performed by: INTERNAL MEDICINE

## 2019-09-30 PROCEDURE — 3074F SYST BP LT 130 MM HG: CPT | Mod: CPTII,S$GLB,, | Performed by: INTERNAL MEDICINE

## 2019-09-30 PROCEDURE — 1101F PR PT FALLS ASSESS DOC 0-1 FALLS W/OUT INJ PAST YR: ICD-10-PCS | Mod: CPTII,S$GLB,, | Performed by: INTERNAL MEDICINE

## 2019-09-30 PROCEDURE — 90472 IMMUNIZATION ADMIN EACH ADD: CPT | Mod: S$GLB,,, | Performed by: INTERNAL MEDICINE

## 2019-09-30 PROCEDURE — 99999 PR PBB SHADOW E&M-EST. PATIENT-LVL I: CPT | Mod: PBBFAC,,,

## 2019-09-30 RX ORDER — GEMFIBROZIL 600 MG/1
600 TABLET, FILM COATED ORAL 2 TIMES DAILY
Qty: 60 TABLET | Refills: 5 | Status: SHIPPED | OUTPATIENT
Start: 2019-09-30 | End: 2020-03-24

## 2019-09-30 RX ORDER — IRBESARTAN 300 MG/1
300 TABLET ORAL DAILY
Refills: 7 | COMMUNITY
Start: 2019-09-03 | End: 2020-10-05

## 2019-09-30 NOTE — PROGRESS NOTES
Subjective:       Patient ID: Edis Huggins Jr. is a 74 y.o. male.    Chief Complaint: Follow-up    Last visit with me April 2019.  Since then seen by Orthopedics, Pain Management, and Ophthalmology.  Had visit with health  in July.  Upcoming appointment with lab, Hematology, Cardiology, health , and Ophthalmology.    Patient reports frequent falls over the last few weeks.  Twice he tripped over a curb and fell, however once had on new sunglasses in the other time was looking for something while he was going to the car.  Also recently slipped when getting into the bathtub because the bath mat used for stability was not stable and had slipped from under him.    Patient reports pain in the left arm, triggered by reaching out at certain positions.  No numbness or tingling.  No dropping things from the hand.  No new neck pain. He has had cortisone shots in the lumbar spine that have been helping with his pain.    Patient occasionally has cramping in the calves at night, but this is sporadic about once a month.  Generally quickly resolves with getting out of bed and stretching out the muscle.    shortness of breath is not changed. No new symptoms or problems. Pt reports is likely weight related.    Hypertension   This is a chronic problem. The current episode started more than 1 year ago. The problem has been gradually improving since onset. The problem is controlled. Associated symptoms include anxiety, malaise/fatigue, peripheral edema and shortness of breath. Pertinent negatives include no blurred vision, chest pain, headaches, neck pain, orthopnea, palpitations, PND or sweats. Agents associated with hypertension include NSAIDs. Risk factors for coronary artery disease include family history. Past treatments include diuretics. The current treatment provides significant improvement. There are no compliance problems.      Review of Systems   Constitutional: Positive for malaise/fatigue.   Eyes: Negative  "for blurred vision.   Respiratory: Positive for shortness of breath.    Cardiovascular: Negative for chest pain, palpitations, orthopnea and PND.   Musculoskeletal: Negative for neck pain.   Neurological: Negative for headaches.   All other systems reviewed and are negative.      Objective:      Physical Exam   Constitutional: He is oriented to person, place, and time.   Cardiovascular: Normal rate, regular rhythm and normal heart sounds.   Pulses:       Radial pulses are 2+ on the right side, and 2+ on the left side.   Pulmonary/Chest: Effort normal and breath sounds normal. No stridor. He has no wheezes. He has no rales.   Musculoskeletal:        Right shoulder: He exhibits normal range of motion, no tenderness, no bony tenderness and no swelling.        Left shoulder: He exhibits normal range of motion, no tenderness, no bony tenderness and no swelling.        Left elbow: He exhibits normal range of motion and no swelling. No tenderness found.   Neurological: He is alert and oriented to person, place, and time.   Reflex Scores:       Bicep reflexes are 0 on the right side and 0 on the left side.  Nursing note and vitals reviewed.      Vitals:    09/30/19 1048   BP: 116/64   BP Location: Right arm   Patient Position: Sitting   BP Method: Large (Manual)   Pulse: 62   Weight: 130.6 kg (288 lb)   Height: 5' 11" (1.803 m)     Body mass index is 40.17 kg/m².    RESULTS: Reviewed labs from last 12 months    Assessment:       1. Left arm pain    2. Hypertriglyceridemia    3. Dyspnea on exertion    4. Frequent falls    5. Hypokalemia        Plan:   Edis was seen today for follow-up.    Diagnoses and all orders for this visit:    Left arm pain:  New problem, not severe, likely triceps muscle strain. Try gentle stretching. Please notify the office if the symptoms persist or worsen.    Hypertriglyceridemia:  Prior diagnosis, stable, well controlled on current management. No changes at this time, will continue to monitor. "   -     gemfibrozil (LOPID) 600 MG tablet; Take 1 tablet (600 mg total) by mouth 2 (two) times daily.    Dyspnea on exertion:  Prior diagnosis, persists but no new features, patient attributes to weight gain.  Continue to monitor.    Frequent falls:  Reason problem that is new, due to tripping or slipping on unsecured surfaces.  If  falls persist or new features developed, please notify the office.    Hypokalemia:  New problem, not severe, only slightly lower than baseline.  Likely due to chlorthalidone use.  No significant myalgias.  If myalgias developed start over-the-counter potassium supplement, if they do not resolve notify the office.      Follow up in about 4 months (around 1/30/2020) for follow up visit.  Forest Miles MD  Internal Medicine    Portions of this note were completed using medical dictation software. Please excuse typographical or syntax errors that were missed on review.

## 2019-10-04 ENCOUNTER — LAB VISIT (OUTPATIENT)
Dept: LAB | Facility: HOSPITAL | Age: 74
End: 2019-10-04
Attending: INTERNAL MEDICINE
Payer: COMMERCIAL

## 2019-10-04 ENCOUNTER — PATIENT MESSAGE (OUTPATIENT)
Dept: INTERNAL MEDICINE | Facility: CLINIC | Age: 74
End: 2019-10-04

## 2019-10-04 DIAGNOSIS — E87.6 HYPOKALEMIA: Primary | ICD-10-CM

## 2019-10-04 DIAGNOSIS — D64.9 NORMOCYTIC ANEMIA: ICD-10-CM

## 2019-10-04 LAB
25(OH)D3+25(OH)D2 SERPL-MCNC: 37 NG/ML (ref 30–96)
ALBUMIN SERPL BCP-MCNC: 4.1 G/DL (ref 3.5–5.2)
ALP SERPL-CCNC: 46 U/L (ref 55–135)
ALT SERPL W/O P-5'-P-CCNC: 27 U/L (ref 10–44)
ANION GAP SERPL CALC-SCNC: 12 MMOL/L (ref 8–16)
AST SERPL-CCNC: 27 U/L (ref 10–40)
BASOPHILS # BLD AUTO: 0.01 K/UL (ref 0–0.2)
BASOPHILS NFR BLD: 0.2 % (ref 0–1.9)
BILIRUB SERPL-MCNC: 0.4 MG/DL (ref 0.1–1)
BUN SERPL-MCNC: 23 MG/DL (ref 8–23)
CALCIUM SERPL-MCNC: 10.1 MG/DL (ref 8.7–10.5)
CHLORIDE SERPL-SCNC: 103 MMOL/L (ref 95–110)
CO2 SERPL-SCNC: 27 MMOL/L (ref 23–29)
CREAT SERPL-MCNC: 0.9 MG/DL (ref 0.5–1.4)
DIFFERENTIAL METHOD: ABNORMAL
EOSINOPHIL # BLD AUTO: 0.3 K/UL (ref 0–0.5)
EOSINOPHIL NFR BLD: 5.9 % (ref 0–8)
ERYTHROCYTE [DISTWIDTH] IN BLOOD BY AUTOMATED COUNT: 13.1 % (ref 11.5–14.5)
ERYTHROCYTE [SEDIMENTATION RATE] IN BLOOD BY WESTERGREN METHOD: 61 MM/HR (ref 0–10)
EST. GFR  (AFRICAN AMERICAN): >60 ML/MIN/1.73 M^2
EST. GFR  (NON AFRICAN AMERICAN): >60 ML/MIN/1.73 M^2
GLUCOSE SERPL-MCNC: 116 MG/DL (ref 70–110)
HCT VFR BLD AUTO: 36.5 % (ref 40–54)
HGB BLD-MCNC: 11.7 G/DL (ref 14–18)
LYMPHOCYTES # BLD AUTO: 2.5 K/UL (ref 1–4.8)
LYMPHOCYTES NFR BLD: 43.7 % (ref 18–48)
MCH RBC QN AUTO: 30.7 PG (ref 27–31)
MCHC RBC AUTO-ENTMCNC: 32.1 G/DL (ref 32–36)
MCV RBC AUTO: 96 FL (ref 82–98)
MONOCYTES # BLD AUTO: 0.5 K/UL (ref 0.3–1)
MONOCYTES NFR BLD: 8.8 % (ref 4–15)
NEUTROPHILS # BLD AUTO: 2.4 K/UL (ref 1.8–7.7)
NEUTROPHILS NFR BLD: 41.4 % (ref 38–73)
PLATELET # BLD AUTO: 234 K/UL (ref 150–350)
PMV BLD AUTO: 11.3 FL (ref 9.2–12.9)
POTASSIUM SERPL-SCNC: 3.8 MMOL/L (ref 3.5–5.1)
PROT SERPL-MCNC: 7.7 G/DL (ref 6–8.4)
RBC # BLD AUTO: 3.81 M/UL (ref 4.6–6.2)
SODIUM SERPL-SCNC: 142 MMOL/L (ref 136–145)
WBC # BLD AUTO: 5.77 K/UL (ref 3.9–12.7)

## 2019-10-04 PROCEDURE — 85025 COMPLETE CBC W/AUTO DIFF WBC: CPT

## 2019-10-04 PROCEDURE — 36415 COLL VENOUS BLD VENIPUNCTURE: CPT

## 2019-10-04 PROCEDURE — 85652 RBC SED RATE AUTOMATED: CPT

## 2019-10-04 PROCEDURE — 82306 VITAMIN D 25 HYDROXY: CPT

## 2019-10-04 PROCEDURE — 80053 COMPREHEN METABOLIC PANEL: CPT

## 2019-10-04 RX ORDER — POTASSIUM CHLORIDE 20 MEQ/1
20 TABLET, EXTENDED RELEASE ORAL DAILY
Qty: 90 TABLET | Refills: 3 | Status: SHIPPED | OUTPATIENT
Start: 2019-10-04 | End: 2021-04-14

## 2019-10-10 DIAGNOSIS — I10 ESSENTIAL HYPERTENSION: ICD-10-CM

## 2019-10-10 RX ORDER — CHLORTHALIDONE 50 MG/1
TABLET ORAL
Qty: 90 TABLET | Refills: 3 | Status: SHIPPED | OUTPATIENT
Start: 2019-10-10 | End: 2020-03-17

## 2019-10-15 DIAGNOSIS — D64.9 NORMOCYTIC ANEMIA: Primary | ICD-10-CM

## 2019-10-15 NOTE — PROGRESS NOTES
Reviewed labs pertaining to chronic anemia.    They are stable.    Reviewed with patient over the telephone.    Will continue to follow and see him back in 1 year

## 2019-10-29 ENCOUNTER — OFFICE VISIT (OUTPATIENT)
Dept: CARDIOLOGY | Facility: CLINIC | Age: 74
End: 2019-10-29
Payer: COMMERCIAL

## 2019-10-29 VITALS
OXYGEN SATURATION: 97 % | HEART RATE: 65 BPM | DIASTOLIC BLOOD PRESSURE: 72 MMHG | HEIGHT: 71 IN | WEIGHT: 287.69 LBS | SYSTOLIC BLOOD PRESSURE: 136 MMHG | BODY MASS INDEX: 40.27 KG/M2

## 2019-10-29 DIAGNOSIS — I65.29 CAROTID ATHEROSCLEROSIS, UNSPECIFIED LATERALITY: ICD-10-CM

## 2019-10-29 DIAGNOSIS — E78.5 HYPERLIPIDEMIA, UNSPECIFIED HYPERLIPIDEMIA TYPE: ICD-10-CM

## 2019-10-29 DIAGNOSIS — E66.9 OBESITY (BMI 35.0-39.9 WITHOUT COMORBIDITY): ICD-10-CM

## 2019-10-29 DIAGNOSIS — I48.91 LONE ATRIAL FIBRILLATION: ICD-10-CM

## 2019-10-29 DIAGNOSIS — M48.061 NEUROFORAMINAL STENOSIS OF LUMBAR SPINE: ICD-10-CM

## 2019-10-29 DIAGNOSIS — M54.41 CHRONIC BILATERAL LOW BACK PAIN WITH RIGHT-SIDED SCIATICA: ICD-10-CM

## 2019-10-29 DIAGNOSIS — I70.0 AORTIC ATHEROSCLEROSIS: ICD-10-CM

## 2019-10-29 DIAGNOSIS — M47.816 LUMBAR SPONDYLOSIS: ICD-10-CM

## 2019-10-29 DIAGNOSIS — M54.16 LUMBAR RADICULOPATHY: ICD-10-CM

## 2019-10-29 DIAGNOSIS — G89.29 CHRONIC BILATERAL LOW BACK PAIN WITH RIGHT-SIDED SCIATICA: ICD-10-CM

## 2019-10-29 DIAGNOSIS — D64.9 NORMOCYTIC ANEMIA: ICD-10-CM

## 2019-10-29 DIAGNOSIS — R06.09 DYSPNEA ON EXERTION: ICD-10-CM

## 2019-10-29 DIAGNOSIS — I10 ESSENTIAL HYPERTENSION: Primary | ICD-10-CM

## 2019-10-29 DIAGNOSIS — E29.1 MALE HYPOGONADISM: ICD-10-CM

## 2019-10-29 DIAGNOSIS — E66.01 CLASS 3 SEVERE OBESITY DUE TO EXCESS CALORIES WITH BODY MASS INDEX (BMI) OF 40.0 TO 44.9 IN ADULT, UNSPECIFIED WHETHER SERIOUS COMORBIDITY PRESENT: ICD-10-CM

## 2019-10-29 DIAGNOSIS — F41.9 ANXIETY: ICD-10-CM

## 2019-10-29 PROCEDURE — 3078F DIAST BP <80 MM HG: CPT | Mod: CPTII,S$GLB,, | Performed by: INTERNAL MEDICINE

## 2019-10-29 PROCEDURE — 99999 PR PBB SHADOW E&M-EST. PATIENT-LVL III: ICD-10-PCS | Mod: PBBFAC,,, | Performed by: INTERNAL MEDICINE

## 2019-10-29 PROCEDURE — 3075F SYST BP GE 130 - 139MM HG: CPT | Mod: CPTII,S$GLB,, | Performed by: INTERNAL MEDICINE

## 2019-10-29 PROCEDURE — 99999 PR PBB SHADOW E&M-EST. PATIENT-LVL III: CPT | Mod: PBBFAC,,, | Performed by: INTERNAL MEDICINE

## 2019-10-29 PROCEDURE — 99214 OFFICE O/P EST MOD 30 MIN: CPT | Mod: S$GLB,,, | Performed by: INTERNAL MEDICINE

## 2019-10-29 PROCEDURE — 1101F PR PT FALLS ASSESS DOC 0-1 FALLS W/OUT INJ PAST YR: ICD-10-PCS | Mod: CPTII,S$GLB,, | Performed by: INTERNAL MEDICINE

## 2019-10-29 PROCEDURE — 1101F PT FALLS ASSESS-DOCD LE1/YR: CPT | Mod: CPTII,S$GLB,, | Performed by: INTERNAL MEDICINE

## 2019-10-29 PROCEDURE — 99214 PR OFFICE/OUTPT VISIT, EST, LEVL IV, 30-39 MIN: ICD-10-PCS | Mod: S$GLB,,, | Performed by: INTERNAL MEDICINE

## 2019-10-29 PROCEDURE — 3075F PR MOST RECENT SYSTOLIC BLOOD PRESS GE 130-139MM HG: ICD-10-PCS | Mod: CPTII,S$GLB,, | Performed by: INTERNAL MEDICINE

## 2019-10-29 PROCEDURE — 3078F PR MOST RECENT DIASTOLIC BLOOD PRESSURE < 80 MM HG: ICD-10-PCS | Mod: CPTII,S$GLB,, | Performed by: INTERNAL MEDICINE

## 2019-10-30 RX ORDER — AZELASTINE 1 MG/ML
1 SPRAY, METERED NASAL 2 TIMES DAILY
Qty: 30 ML | Refills: 3 | Status: SHIPPED | OUTPATIENT
Start: 2019-10-30 | End: 2020-03-16 | Stop reason: SDUPTHER

## 2019-11-07 NOTE — PROGRESS NOTES
Subjective:    Patient ID:  Edis Huggins Jr. is a 74 y.o. male who presents for follow-up of Hypertension      HPI    75 y/o male with hx of HTN, HLD, obesity who presents for f/u.   Has had extensive w/u for RESENDEZ including normal nuclear SPECT, normal 30 day monitor, normal PFT's. RESENDEZ has somewhat improved. Did have a previous Holter monitor which showed a single run of Paroxysmal atrial fibrillation lasting for 13 sec. Had bilateral LE edema which has improved with chlorthalidone. Denies CP, orthopnea, PND, syncope, palps. Compliant with meds and follows a low salt diet. Has a Cardia heart rhythm monitor on smart phone.    Review of Systems   Constitution: Negative for malaise/fatigue.   HENT: Negative for congestion.    Eyes: Negative for blurred vision.   Cardiovascular: Positive for dyspnea on exertion and leg swelling. Negative for chest pain, claudication, cyanosis, irregular heartbeat, near-syncope, orthopnea, palpitations, paroxysmal nocturnal dyspnea and syncope.   Respiratory: Negative for shortness of breath.    Endocrine: Negative for polyuria.   Hematologic/Lymphatic: Negative for bleeding problem.   Skin: Negative for itching and rash.   Musculoskeletal: Positive for back pain and joint pain. Negative for joint swelling, muscle cramps and muscle weakness.   Gastrointestinal: Negative for abdominal pain, hematemesis, hematochezia, melena, nausea and vomiting.   Genitourinary: Negative for dysuria and hematuria.   Neurological: Negative for dizziness, focal weakness, headaches, light-headedness, loss of balance and weakness.   Psychiatric/Behavioral: Negative for depression. The patient is not nervous/anxious.         Objective:    Physical Exam   Constitutional: He is oriented to person, place, and time. He appears well-developed and well-nourished.   HENT:   Head: Normocephalic and atraumatic.   Neck: Neck supple. No JVD present.   Cardiovascular: Normal rate, regular rhythm and normal heart  sounds.   Pulses:       Carotid pulses are 2+ on the right side, and 2+ on the left side.       Radial pulses are 2+ on the right side, and 2+ on the left side.        Femoral pulses are 2+ on the right side, and 2+ on the left side.       Dorsalis pedis pulses are 2+ on the right side, and 2+ on the left side.        Posterior tibial pulses are 2+ on the right side, and 2+ on the left side.   Pulmonary/Chest: Effort normal and breath sounds normal.   Abdominal: Soft. Bowel sounds are normal.   Musculoskeletal: He exhibits no edema.   Neurological: He is alert and oriented to person, place, and time.   Skin: Skin is warm and dry.   Psychiatric: He has a normal mood and affect. His behavior is normal. Thought content normal.         Assessment:       1. Essential hypertension    2. Hyperlipidemia, unspecified hyperlipidemia type    3. Lone atrial fibrillation    4. Aortic atherosclerosis -- CTA 06/2018    5. Carotid atherosclerosis, unspecified laterality    6. Lumbar spondylosis    7. Lumbar radiculopathy    8. Anxiety    9. Normocytic anemia -- iron and B12 normal    10. Male hypogonadism    11. Obesity (BMI 35.0-39.9 without comorbidity)    12. Chronic bilateral low back pain with right-sided sciatica    13. Neuroforaminal stenosis of lumbar spine    14. Class 3 severe obesity due to excess calories with body mass index (BMI) of 40.0 to 44.9 in adult, unspecified whether serious comorbidity present    15. Dyspnea on exertion      73 y/o pt with hx and presentation as above. Doing well from a cardiac perspective and compensated from a HF perspective. Discussed the etiology, evaluation, and management of HTN, HLD, Afib, obesity, LBP. Discussed the importance of med compliance, heart healthy diet, and regular exercise.      Plan:       -Continue current medical management  -f/u in 6 months

## 2019-11-13 DIAGNOSIS — I10 ESSENTIAL HYPERTENSION: ICD-10-CM

## 2019-11-13 RX ORDER — AMLODIPINE BESYLATE 10 MG/1
10 TABLET ORAL DAILY
Qty: 90 TABLET | Refills: 3 | Status: SHIPPED | OUTPATIENT
Start: 2019-11-13 | End: 2020-08-06 | Stop reason: DRUGHIGH

## 2019-12-16 NOTE — PROGRESS NOTES
HPI     Glaucoma      Additional comments: HRT review today              Blurred Vision      Additional comments: Pt c/o fuzzy vision after working on his computer              Comments     DLS: 8/1/19    1) POAG  2) PCIOL OU  3) Steroid Responder  4) APD OS  5) PAS OS    MEDS:  T1/2 GFS QAM OU          Last edited by Lili Booker MA on 12/19/2019  4:05 PM. (History)            Assessment /Plan     For exam results, see Encounter Report.    Primary open angle glaucoma of left eye, moderate stage    Primary open-angle glaucoma, right eye, mild stage    Steroid responders, bilateral    Pupil irregular of left eye    Relative afferent pupillary defect - Left Eye    Pseudophakia, both eyes            [facility]  [facility] - bring photo file over   Lost to F/u from 11/4/2013 to 8/11/2015   Knows Sharlene Mo and MIKHAIL - use to work with them     1. POAG (primary open-angle glaucoma) - Both Eyes   Old pt of [facility] [phone]  Began at [facility] 2012       POAG OS>OD           Family history    neg        Glaucoma meds   T1/2 GFS ou (use to use travatan- off post CE)         H/O adverse rxn to glaucoma drops    none        LASERS    SLT os 9/26/2013 -( good resp 17 --> 14)        GLAUCOMA SURGERIES    trabectome os (combined with phaco - 2/8/2017        OTHER EYE SURGERIES    Combined - phaco/IOL / goniotomy - trabectome os 2/8/2017 - +heme and increae in IOP)                                                 PC IOL OD - 5/10/2017         CDR    0.6/0.7-0.8        Tbase    20-23/21-24          Tmax    23/24            Ttarget    17/17           HVF    5 tests 7561-9429 OD essentially full; OS Sup paracentral defect, IAD/INS (([place]))             (([facility])) - 4 test 2012 to 2019 - full od // sup paracentral defect / inferior paracentral defect         Gonio    +4 ou        CCT    598/598        OCT    4 test 2012 to 2019  - RNFL - nl od // bord G/TS/T         HRT    6  "test-2012 to 2019  - MR -  [place] off  od // dec T, bord I os /// CDR xxx od // 0.64 os        Disc photos    2012 - IOS    - Ttoday   16/16  - Test done today   IOP / HRT       2. Steroid responders - Both Eyes  2/2 nasacort      3. ? Trace APD os      4. S/P Rt knee replacement Feb 2013  -doing well      5.  Myopia / presbyopia ou     6. Irregular pupil - w/ PAS   -2/2 very high IOP POD # 1 post phaco     7.  PC IOL OU  OD 5/10/2017 - PCB00 14.5  OS 2/8/2017 - + hyphema and high IOP 2/2 trabectome - irregular pupil post op - PCB00 14.0    8. PCO od - vis sign   rec yag cap       Plan  CSM  IOP below target OU  SLT done OS 9/26/2013 - good initial resp 24-->17    -( if responds well consider SLT od - but full VF and helathy ON still od)  HVF stable OU    Cont timolol gfs ou q day     C/O decrease in vision od - rec yag cap (use to be 20/25 post phaco, - now 20/40 )    After yag cap od cna go back to optom for new glasses - ? Sees an outside optom yearly     If the glare from the fixed dilated pupil bothers him a lot post 2nd eye getting done - consider pupil surgery with dr Peacock to "purse string it " smaller - pt is doing ok for now - glare is not bothering him too much     F/U 4 months with  HVF / DFE / OCT (? If photo file is still at [facility] - not here at [facility] - 12/19/2019) -  Try and bring it over     I have seen and personally examined the patient.  I agree with the findings, assessment and plan of the resident and/or fellow.     Deborah Khan MD  "

## 2019-12-19 ENCOUNTER — OFFICE VISIT (OUTPATIENT)
Dept: OPHTHALMOLOGY | Facility: CLINIC | Age: 74
End: 2019-12-19
Payer: COMMERCIAL

## 2019-12-19 DIAGNOSIS — H40.1111 PRIMARY OPEN-ANGLE GLAUCOMA, RIGHT EYE, MILD STAGE: ICD-10-CM

## 2019-12-19 DIAGNOSIS — H40.043 STEROID RESPONDERS, BILATERAL: ICD-10-CM

## 2019-12-19 DIAGNOSIS — H21.562 PUPIL IRREGULAR OF LEFT EYE: ICD-10-CM

## 2019-12-19 DIAGNOSIS — H40.1122 PRIMARY OPEN ANGLE GLAUCOMA OF LEFT EYE, MODERATE STAGE: Primary | ICD-10-CM

## 2019-12-19 DIAGNOSIS — Z96.1 PSEUDOPHAKIA, BOTH EYES: ICD-10-CM

## 2019-12-19 DIAGNOSIS — H57.09 RELATIVE AFFERENT PUPILLARY DEFECT: ICD-10-CM

## 2019-12-19 PROCEDURE — 92133 HEIDELBERG RETINA TOMOGRAPHY (HRT) - OU - BOTH EYES: ICD-10-PCS | Mod: S$GLB,,, | Performed by: OPHTHALMOLOGY

## 2019-12-19 PROCEDURE — 99999 PR PBB SHADOW E&M-EST. PATIENT-LVL II: ICD-10-PCS | Mod: PBBFAC,,, | Performed by: OPHTHALMOLOGY

## 2019-12-19 PROCEDURE — 92012 INTRM OPH EXAM EST PATIENT: CPT | Mod: S$GLB,,, | Performed by: OPHTHALMOLOGY

## 2019-12-19 PROCEDURE — 99999 PR PBB SHADOW E&M-EST. PATIENT-LVL II: CPT | Mod: PBBFAC,,, | Performed by: OPHTHALMOLOGY

## 2019-12-19 PROCEDURE — 92012 PR EYE EXAM, EST PATIENT,INTERMED: ICD-10-PCS | Mod: S$GLB,,, | Performed by: OPHTHALMOLOGY

## 2019-12-19 PROCEDURE — 92133 CPTRZD OPH DX IMG PST SGM ON: CPT | Mod: S$GLB,,, | Performed by: OPHTHALMOLOGY

## 2020-01-13 ENCOUNTER — PATIENT OUTREACH (OUTPATIENT)
Dept: ADMINISTRATIVE | Facility: HOSPITAL | Age: 75
End: 2020-01-13

## 2020-01-16 ENCOUNTER — OFFICE VISIT (OUTPATIENT)
Dept: OPHTHALMOLOGY | Facility: CLINIC | Age: 75
End: 2020-01-16
Payer: COMMERCIAL

## 2020-01-16 VITALS — DIASTOLIC BLOOD PRESSURE: 64 MMHG | SYSTOLIC BLOOD PRESSURE: 115 MMHG | HEART RATE: 62 BPM

## 2020-01-16 DIAGNOSIS — H40.1111 PRIMARY OPEN-ANGLE GLAUCOMA, RIGHT EYE, MILD STAGE: ICD-10-CM

## 2020-01-16 DIAGNOSIS — H26.491 PCO (POSTERIOR CAPSULAR OPACIFICATION), RIGHT: Primary | ICD-10-CM

## 2020-01-16 DIAGNOSIS — H40.043 STEROID RESPONDERS, BILATERAL: ICD-10-CM

## 2020-01-16 DIAGNOSIS — H21.562 PUPIL IRREGULAR OF LEFT EYE: ICD-10-CM

## 2020-01-16 DIAGNOSIS — Z96.1 PSEUDOPHAKIA, BOTH EYES: ICD-10-CM

## 2020-01-16 DIAGNOSIS — H57.09 RELATIVE AFFERENT PUPILLARY DEFECT: ICD-10-CM

## 2020-01-16 DIAGNOSIS — H40.1122 PRIMARY OPEN ANGLE GLAUCOMA OF LEFT EYE, MODERATE STAGE: ICD-10-CM

## 2020-01-16 PROCEDURE — 66821 YAG CAPSULOTOMY - OD - RIGHT EYE: ICD-10-PCS | Mod: RT,S$GLB,, | Performed by: OPHTHALMOLOGY

## 2020-01-16 PROCEDURE — 66821 AFTER CATARACT LASER SURGERY: CPT | Mod: RT,S$GLB,, | Performed by: OPHTHALMOLOGY

## 2020-01-16 PROCEDURE — 99999 PR PBB SHADOW E&M-EST. PATIENT-LVL III: CPT | Mod: PBBFAC,,, | Performed by: OPHTHALMOLOGY

## 2020-01-16 PROCEDURE — 99499 NO LOS: ICD-10-PCS | Mod: S$GLB,,, | Performed by: OPHTHALMOLOGY

## 2020-01-16 PROCEDURE — 99499 UNLISTED E&M SERVICE: CPT | Mod: S$GLB,,, | Performed by: OPHTHALMOLOGY

## 2020-01-16 PROCEDURE — 99999 PR PBB SHADOW E&M-EST. PATIENT-LVL III: ICD-10-PCS | Mod: PBBFAC,,, | Performed by: OPHTHALMOLOGY

## 2020-01-16 NOTE — PROGRESS NOTES
HPI     DLS: 12/19/19    1) POAG  2) PCIOL OU  3) Steroid Responder  4) APD OS  5) PAS OS    MEDS:  T1/2 GFS QAM OU    Pt here for YAG Cap OD today.  Pt states cloudy VA OD. Pt denies eye pain   OD.     Last edited by Christelle Jo MA on 1/16/2020 10:05 AM.   (History)            Assessment /Plan     For exam results, see Encounter Report.    PCO (posterior capsular opacification), right    Primary open angle glaucoma of left eye, moderate stage    Primary open-angle glaucoma, right eye, mild stage    Steroid responders, bilateral    Pupil irregular of left eye    Relative afferent pupillary defect - Left Eye    Pseudophakia, both eyes        Pt is switching from  [place] -  Back to [facility] - bring photo file over   Lost to F/u from 11/4/2013 to 8/11/2015   Knows Sharlene Mo and MIKHAIL - use to work with them     1. POAG (primary open-angle glaucoma) - Both Eyes   Old pt of [facility] [id number]  Began at [facility] 2012       POAG OS>OD           Family history    neg        Glaucoma meds   T1/2 GFS ou (use to use travatan- off post CE)         H/O adverse rxn to glaucoma drops    none        LASERS    SLT os 9/26/2013 -( good resp 17 --> 14)        GLAUCOMA SURGERIES    trabectome os (combined with phaco - 2/8/2017        OTHER EYE SURGERIES    Combined - phaco/IOL / goniotomy - trabectome os 2/8/2017 - +heme and increae in IOP)                                                 PC IOL OD - 5/10/2017         CDR    0.6/0.7-0.8        Tbase    20-23/21-24          Tmax    23/24            Ttarget    17/17           HVF    5 tests 6304-5284 OD essentially full; OS Sup paracentral defect, IAD/INS (([place]))             (([facility])) - 4 test 2012 to 2019 - full od // sup paracentral defect / inferior paracentral defect         Gonio    +4 ou        CCT    598/598        OCT    4 test 2012 to 2019  - RNFL - nl od // bord G/TS/T         HRT    6 test-2012 to 2019  - MR -  [place] off  od // dec T,  "bord I os /// CDR xxx od // 0.64 os        Disc photos    2012 - IOS    - Ttoday   16/16  - Test done today   IOP / HRT       2. Steroid responders - Both Eyes  2/2 nasacort      3. ? Trace APD os      4. S/P Rt knee replacement Feb 2013  -doing well      5.  Myopia / presbyopia ou     6. Irregular pupil - w/ PAS   -2/2 very high IOP POD # 1 post phaco     7.  PC IOL OU  OD 5/10/2017 - PCB00 14.5  OS 2/8/2017 - + hyphema and high IOP 2/2 trabectome - irregular pupil post op - PCB00 14.0    8. PCO od - vis sign   rec yag cap       Plan  CSM  IOP below target OU  SLT done OS 9/26/2013 - good initial resp 24-->17    -( if responds well consider SLT od - but full VF and helathy ON still od)  HVF stable OU    Cont timolol gfs ou q day     C/O decrease in vision od - rec yag cap (use to be 20/25 post phaco, - now 20/40 )    After yag cap od cna go back to optom for new glasses - ? Sees an outside optom yearly     If the glare from the fixed dilated pupil bothers him a lot post 2nd eye getting done - consider pupil surgery with dr Peacock to "purse string it " smaller - pt is doing ok for now - glare is not bothering him too much     YAG CAP OD - 1/16/2020 - STEROID TAPER - MAXITROL     BACK TO OUTSIDE OPTM FOR REFRACTION     F/U 4 months with  HVF / DFE / OCT (? If photo file is still at [facility] - not here at [facility] - 12/19/2019) -  Try and bring it over     I have seen and personally examined the patient.  I agree with the findings, assessment and plan of the resident and/or fellow.     Deborah Khan MD  "

## 2020-01-19 DIAGNOSIS — N40.0 BENIGN NON-NODULAR PROSTATIC HYPERPLASIA WITHOUT LOWER URINARY TRACT SYMPTOMS: ICD-10-CM

## 2020-01-21 RX ORDER — FINASTERIDE 5 MG/1
TABLET, FILM COATED ORAL
Qty: 90 TABLET | Refills: 2 | Status: SHIPPED | OUTPATIENT
Start: 2020-01-21 | End: 2020-08-13

## 2020-01-21 RX ORDER — TAMSULOSIN HYDROCHLORIDE 0.4 MG/1
CAPSULE ORAL
Qty: 90 CAPSULE | Refills: 2 | Status: SHIPPED | OUTPATIENT
Start: 2020-01-21 | End: 2020-09-14

## 2020-01-21 NOTE — TELEPHONE ENCOUNTER
Refill Authorization Note     is requesting a refill authorization.    Brief assessment and rationale for refill: APPROVE: prr                                         Comments:     Requested Prescriptions   Signed Prescriptions Disp Refills    finasteride (PROSCAR) 5 mg tablet 90 tablet 2     Sig: TAKE 1 TABLET(5 MG) BY MOUTH EVERY DAY       Urology: 5-alpha Reductase Inhibitors Passed - 1/19/2020  3:55 AM        Passed - Patient is at least 18 years old        Passed - Office visit in past 12 months or future 90 days     Recent Outpatient Visits            5 days ago PCO (posterior capsular opacification), [facility] Khan [facility]    1 month ago Primary open angle glaucoma of left eye, moderate stage    [facility] - Ophthalmology Deborah Khan MD    2 months ago Essential hypertension    [facility] - Cardiology Shakeel Bello MD    3 months ago Left arm pain    [facility] - Internal Medicine Forest Miles MD    4 months ago Right hip pain    [facility] Orthopedics Ernesto Riley MD          Future Appointments              In 6 days Forest Miles MD [facility] - Internal Medicine, [facility]    In 3 months PERIMETRY, [facility] - Ophthalmology, [facility]    In 3 months MD [facility] - Ophthalmology, [facility]    In 8 months APPOINTMENT LAB, [facility], [facility]    In 8 months Asher Auguste MD [facility] - Hematology Oncology, [facility]               tamsulosin (FLOMAX) 0.4 mg Cap 90 capsule 2     Sig: TAKE 1 CAPSULE(0.4 MG) BY MOUTH EVERY DAY       Urology: Alpha-Adrenergic Blocker Passed - 1/19/2020  3:55 AM        Passed - Patient is at least 18 years old        Passed - Last BP in normal range within 360 days     BP Readings from Last 3 Encounters:   01/16/20 115/64   10/29/19 136/72   09/30/19 116/64              Passed - Office visit in past 12 months or future 90 days     Recent Outpatient Visits             5 days ago PCO (posterior capsular opacification), right    [facility] - Ophthalmology Deborah Khan MD    1 month ago Primary open angle glaucoma of left eye, moderate stage    [facility] - Ophthalmology Deborah Khan MD    2 months ago Essential hypertension    [facility] - Cardiology Shakeel Bello MD    3 months ago Left arm pain    [facility] - Internal Medicine Forest Milse MD    4 months ago Right hip pain    [facility] - Orthopedics Ernesto Riley MD          Future Appointments              In 6 days MD Rohan Perez [facility] - Internal Medicine, [facility] PCW    In 3 months PERIMETRY, [facility] - Ophthalmology, [facility]an [facility]ares    In 3 months MD Rohan Jeffries [facility] - Ophthalmology, Rohan june    In 8 months APPOINTMENT LAB, [facility], [facility]    In 8 months MD Jeanna Menendez - Hematology Oncology, [facility]

## 2020-01-27 ENCOUNTER — OFFICE VISIT (OUTPATIENT)
Dept: INTERNAL MEDICINE | Facility: CLINIC | Age: 75
End: 2020-01-27
Payer: COMMERCIAL

## 2020-01-27 ENCOUNTER — PATIENT MESSAGE (OUTPATIENT)
Dept: OPHTHALMOLOGY | Facility: CLINIC | Age: 75
End: 2020-01-27

## 2020-01-27 VITALS
WEIGHT: 293 LBS | HEART RATE: 59 BPM | HEIGHT: 71 IN | OXYGEN SATURATION: 97 % | SYSTOLIC BLOOD PRESSURE: 122 MMHG | DIASTOLIC BLOOD PRESSURE: 70 MMHG | BODY MASS INDEX: 41.02 KG/M2

## 2020-01-27 DIAGNOSIS — Z23 NEED FOR SHINGLES VACCINE: ICD-10-CM

## 2020-01-27 DIAGNOSIS — E29.1 MALE HYPOGONADISM: Primary | ICD-10-CM

## 2020-01-27 DIAGNOSIS — R06.09 DYSPNEA ON EXERTION: ICD-10-CM

## 2020-01-27 DIAGNOSIS — G47.30 OBSERVED SLEEP APNEA: ICD-10-CM

## 2020-01-27 PROCEDURE — 1101F PR PT FALLS ASSESS DOC 0-1 FALLS W/OUT INJ PAST YR: ICD-10-PCS | Mod: CPTII,S$GLB,, | Performed by: INTERNAL MEDICINE

## 2020-01-27 PROCEDURE — 1126F AMNT PAIN NOTED NONE PRSNT: CPT | Mod: S$GLB,,, | Performed by: INTERNAL MEDICINE

## 2020-01-27 PROCEDURE — 3078F PR MOST RECENT DIASTOLIC BLOOD PRESSURE < 80 MM HG: ICD-10-PCS | Mod: CPTII,S$GLB,, | Performed by: INTERNAL MEDICINE

## 2020-01-27 PROCEDURE — 3074F PR MOST RECENT SYSTOLIC BLOOD PRESSURE < 130 MM HG: ICD-10-PCS | Mod: CPTII,S$GLB,, | Performed by: INTERNAL MEDICINE

## 2020-01-27 PROCEDURE — 3078F DIAST BP <80 MM HG: CPT | Mod: CPTII,S$GLB,, | Performed by: INTERNAL MEDICINE

## 2020-01-27 PROCEDURE — 99214 OFFICE O/P EST MOD 30 MIN: CPT | Mod: 25,S$GLB,, | Performed by: INTERNAL MEDICINE

## 2020-01-27 PROCEDURE — 1126F PR PAIN SEVERITY QUANTIFIED, NO PAIN PRESENT: ICD-10-PCS | Mod: S$GLB,,, | Performed by: INTERNAL MEDICINE

## 2020-01-27 PROCEDURE — 99999 PR PBB SHADOW E&M-EST. PATIENT-LVL IV: CPT | Mod: PBBFAC,,, | Performed by: INTERNAL MEDICINE

## 2020-01-27 PROCEDURE — 99999 PR PBB SHADOW E&M-EST. PATIENT-LVL IV: ICD-10-PCS | Mod: PBBFAC,,, | Performed by: INTERNAL MEDICINE

## 2020-01-27 PROCEDURE — 1159F PR MEDICATION LIST DOCUMENTED IN MEDICAL RECORD: ICD-10-PCS | Mod: S$GLB,,, | Performed by: INTERNAL MEDICINE

## 2020-01-27 PROCEDURE — 3074F SYST BP LT 130 MM HG: CPT | Mod: CPTII,S$GLB,, | Performed by: INTERNAL MEDICINE

## 2020-01-27 PROCEDURE — 99214 PR OFFICE/OUTPT VISIT, EST, LEVL IV, 30-39 MIN: ICD-10-PCS | Mod: 25,S$GLB,, | Performed by: INTERNAL MEDICINE

## 2020-01-27 PROCEDURE — 1101F PT FALLS ASSESS-DOCD LE1/YR: CPT | Mod: CPTII,S$GLB,, | Performed by: INTERNAL MEDICINE

## 2020-01-27 PROCEDURE — 1159F MED LIST DOCD IN RCRD: CPT | Mod: S$GLB,,, | Performed by: INTERNAL MEDICINE

## 2020-01-27 NOTE — PATIENT INSTRUCTIONS
I have ordered a home sleep study. The team should call to get you scheduled, but if you miss the call or haven't heard from them in a few days, please call to schedule an appointment to  the equipment for your home sleep study at [phone] (ext [phone]). The  may require prior authorization approval before making your appointment.

## 2020-01-27 NOTE — PROGRESS NOTES
Subjective:       Patient ID: Edis Huggins Jr. is a 74 y.o. male.    Chief Complaint: Follow-up      Last visit with me 9/30/2019.  Since that time patient has been seen by cardiology and ophthalmology.  Upcoming appointment with Ophthalmology, lab, and Hematology.    Still with dyspnea on exertion. Not overly limiting. Hgb has been low since 2008, slightly lower now between 11-12.     Using nasal irrigation and Astelin PRN that helps with sleeping. Not too much snoring. generally wakes up 2-3 hrs/night for urination but goes right back to sleep. On wknd can sleep 12 hrs at a time, keeping balanced.     Shortness of Breath   This is a recurrent problem. The current episode started more than 1 year ago. The problem occurs daily. The problem has been unchanged. The average episode lasts 5 minutes. Associated symptoms include leg swelling. Pertinent negatives include no abdominal pain, chest pain, claudication, coryza, ear pain, fever, headaches, hemoptysis, leg pain, neck pain, orthopnea, PND, rash, rhinorrhea, sore throat, sputum production, swollen glands, syncope, vomiting or wheezing. The symptoms are aggravated by exercise. The patient has no known risk factors for DVT/PE. He has tried rest for the symptoms. The treatment provided no relief. His past medical history is significant for allergies and bronchiolitis. There is no history of aspirin allergies, asthma, CAD, chronic lung disease, COPD, DVT, a heart failure, PE, pneumonia or a recent surgery.     Review of Systems   Constitutional: Negative for fever.   HENT: Negative for ear pain, rhinorrhea and sore throat.    Respiratory: Positive for shortness of breath. Negative for hemoptysis, sputum production and wheezing.    Cardiovascular: Positive for leg swelling. Negative for chest pain, orthopnea, claudication, syncope and PND.   Gastrointestinal: Negative for abdominal pain and vomiting.   Musculoskeletal: Negative for neck pain.   Skin: Negative for  "rash.   Neurological: Negative for headaches.       Objective:      Physical Exam   Constitutional: He is oriented to person, place, and time. No distress.   Cardiovascular: Normal rate, regular rhythm and normal heart sounds.   Pulmonary/Chest: Effort normal and breath sounds normal. No stridor. He has no wheezes. He has no rales.   Musculoskeletal: He exhibits edema (mild pitting edema distal to midshin b/L LE). He exhibits no tenderness.   Neurological: He is alert and oriented to person, place, and time.   Nursing note and vitals reviewed.      Vitals:    01/27/20 0911   BP: 122/70   BP Location: Right arm   Patient Position: Sitting   BP Method: Large (Manual)   Pulse: (!) 59   SpO2: 97%   Weight: 132.9 kg (293 lb)   Height: 5' 11" (1.803 m)     Body mass index is 40.87 kg/m².    RESULTS: Reviewed labs from last 12 months    Assessment:       1. Male hypogonadism    2. Observed sleep apnea    3. Dyspnea on exertion    4. Need for shingles vaccine        Plan:   Edis was seen today for follow-up.    Diagnoses and all orders for this visit:    Male hypogonadism:  Prior dx, Androgel not covered. Try treatment with Androderm to see if this improves dyspnea and red blood cell count.  -     testosterone (ANDRODERM) 2 mg/24 hour PT24; Place 2 mg onto the skin once daily.    Observed sleep apnea:  New dx, check on home sleep study for evaluation of clinical CARMELA.  -     Home Sleep Studies; Future    Dyspnea on exertion:  Prior dx, persists. possibly mechanical related to obesity. No sign of pulmonary or cardiac problem at this time.    Need for shingles vaccine  -     Ambulatory referral to Infectious Disease Injection Dept  -     (In Office Administered) Zoster Recombinant Vaccine    Follow up in about 3 months (around 4/27/2020) for EPP annual exam.  Forest Miles MD  Internal Medicine    Portions of this note were completed using medical dictation software. Please excuse typographical or syntax errors that were " missed on review.

## 2020-01-28 ENCOUNTER — CLINICAL SUPPORT (OUTPATIENT)
Dept: INFECTIOUS DISEASES | Facility: CLINIC | Age: 75
End: 2020-01-28
Payer: COMMERCIAL

## 2020-01-28 PROCEDURE — 90471 ZOSTER RECOMBINANT VACCINE: ICD-10-PCS | Mod: S$GLB,,, | Performed by: INTERNAL MEDICINE

## 2020-01-28 PROCEDURE — 90750 ZOSTER RECOMBINANT VACCINE: ICD-10-PCS | Mod: S$GLB,,, | Performed by: INTERNAL MEDICINE

## 2020-01-28 PROCEDURE — 90750 HZV VACC RECOMBINANT IM: CPT | Mod: S$GLB,,, | Performed by: INTERNAL MEDICINE

## 2020-01-28 PROCEDURE — 90471 IMMUNIZATION ADMIN: CPT | Mod: S$GLB,,, | Performed by: INTERNAL MEDICINE

## 2020-02-04 ENCOUNTER — TELEPHONE (OUTPATIENT)
Dept: PHARMACY | Facility: CLINIC | Age: 75
End: 2020-02-04

## 2020-02-05 ENCOUNTER — PATIENT MESSAGE (OUTPATIENT)
Dept: CARDIOLOGY | Facility: CLINIC | Age: 75
End: 2020-02-05

## 2020-02-05 ENCOUNTER — NURSE TRIAGE (OUTPATIENT)
Dept: ADMINISTRATIVE | Facility: CLINIC | Age: 75
End: 2020-02-05

## 2020-02-05 NOTE — TELEPHONE ENCOUNTER
Would you like for me to schedule pt for a sooner clinical ute?    Pt is due for a 6 month f/u in April

## 2020-02-05 NOTE — TELEPHONE ENCOUNTER
Patient states his machine at home shows he is in afib and would like to have an appt today to check this.     Reason for Disposition   New or worsened shortness of breath with activity (dyspnea on exertion)    Additional Information   Negative: Passed out (i.e., fainted, collapsed and was not responding)   Negative: Shock suspected (e.g., cold/pale/clammy skin, too weak to stand, low BP, rapid pulse)   Negative: Difficult to awaken or acting confused (e.g., disoriented, slurred speech)   Negative: Visible sweat on face or sweat dripping down face   Negative: Unable to walk, or can only walk with assistance (e.g., requires support)   Negative: Received SHOCK from implantable cardiac defibrillator and has persisting symptoms (i.e., palpitations, lightheadedness)   Negative: Sounds like a life-threatening emergency to the triager   Negative: Chest pain   Negative: Difficulty breathing   Negative: Dizziness, lightheadedness, or weakness   Negative: Heart beating very rapidly (e.g., > 140 / minute) and present now (EXCEPTION: during exercise)   Negative: Heart beating very slowly (e.g., < 50 / minute) (EXCEPTION: athlete)    Protocols used: HEART RATE AND HEARTBEAT [id number]

## 2020-02-11 ENCOUNTER — PATIENT MESSAGE (OUTPATIENT)
Dept: INTERNAL MEDICINE | Facility: CLINIC | Age: 75
End: 2020-02-11

## 2020-02-12 ENCOUNTER — PATIENT MESSAGE (OUTPATIENT)
Dept: INTERNAL MEDICINE | Facility: CLINIC | Age: 75
End: 2020-02-12

## 2020-02-12 DIAGNOSIS — Z79.890 LONG-TERM CURRENT USE OF TESTOSTERONE REPLACEMENT THERAPY: Primary | ICD-10-CM

## 2020-02-12 RX ORDER — TESTOSTERONE 25 MG/2.5G
GEL TRANSDERMAL
COMMUNITY
End: 2021-12-16

## 2020-02-12 RX ORDER — TESTOSTERONE 20.25 MG/1.25G
GEL TOPICAL
Qty: 75 G | Refills: 4 | Status: SHIPPED | OUTPATIENT
Start: 2020-02-12 | End: 2020-02-14

## 2020-02-12 NOTE — TELEPHONE ENCOUNTER
Printed order for Androgel placed. Please notify the patient once ready for pickup and can use [employer] ute to have filled. I would like for the patient to have labs done 1 wk before his visit with me in April so we can confirm the dose is appropriate; Please schedule this appointment with the patient or provide the number for the scheduling desk.

## 2020-02-14 RX ORDER — TESTOSTERONE 25 MG/2.5G
GEL TRANSDERMAL
OUTPATIENT
Start: 2020-02-14

## 2020-02-14 RX ORDER — TESTOSTERONE 20.25 MG/1.25G
GEL TOPICAL
Qty: 75 G | Refills: 4 | Status: SHIPPED | OUTPATIENT
Start: 2020-02-14 | End: 2020-08-05

## 2020-02-17 ENCOUNTER — TELEPHONE (OUTPATIENT)
Dept: SLEEP MEDICINE | Facility: OTHER | Age: 75
End: 2020-02-17

## 2020-02-21 ENCOUNTER — PATIENT MESSAGE (OUTPATIENT)
Dept: INTERNAL MEDICINE | Facility: CLINIC | Age: 75
End: 2020-02-21

## 2020-02-26 ENCOUNTER — TELEPHONE (OUTPATIENT)
Dept: SLEEP MEDICINE | Facility: OTHER | Age: 75
End: 2020-02-26

## 2020-02-27 ENCOUNTER — HOSPITAL ENCOUNTER (OUTPATIENT)
Dept: SLEEP MEDICINE | Facility: OTHER | Age: 75
Discharge: HOME | End: 2020-02-27
Attending: INTERNAL MEDICINE
Payer: COMMERCIAL

## 2020-02-27 DIAGNOSIS — G47.33 OSA (OBSTRUCTIVE SLEEP APNEA): Primary | ICD-10-CM

## 2020-02-27 DIAGNOSIS — G47.30 OBSERVED SLEEP APNEA: ICD-10-CM

## 2020-02-27 PROCEDURE — 95800 SLP STDY UNATTENDED: CPT

## 2020-02-27 PROCEDURE — 95800 PR SLEEP STUDY, UNATTENDED, RECORD HEART RATE/O2 SAT/RESP ANAL/SLEEP TIME: ICD-10-PCS | Mod: 26,,, | Performed by: INTERNAL MEDICINE

## 2020-02-27 PROCEDURE — 95800 SLP STDY UNATTENDED: CPT | Mod: 26,,, | Performed by: INTERNAL MEDICINE

## 2020-03-02 NOTE — PROCEDURES
"The sleep study that you ordered is complete.  You have ordered sleep LAB services to perform the sleep study for Edis Huggins JrFrank.     Please find Sleep Study result in  the "Media tab" of Chart Review menu.         You can look  for the report in the  Media as a procedure document type.    As the ordering provider, you are responsible for reviewing the results and implementing a treatment plan with your patient.     If you need a Sleep Medicine provider to explain the sleep study findings and arrange treatment for the patient, please refer patient for consultation to our Sleep Clinic via [facility] with Ambulatory Consult Sleep.     To do that please place an order for an  "Ambulatory Consult Sleep" - it will go to our clinic work queue for our Medical Assistant to contact the patient for an appointment.     For any questions, please contact our clinic staff at [phone] to talk to clinical staff.     "

## 2020-03-03 ENCOUNTER — PATIENT MESSAGE (OUTPATIENT)
Dept: CARDIOLOGY | Facility: CLINIC | Age: 75
End: 2020-03-03

## 2020-03-03 ENCOUNTER — TELEPHONE (OUTPATIENT)
Dept: INTERNAL MEDICINE | Facility: CLINIC | Age: 75
End: 2020-03-03

## 2020-03-03 DIAGNOSIS — G47.33 OBSTRUCTIVE SLEEP APNEA: Primary | ICD-10-CM

## 2020-03-03 NOTE — TELEPHONE ENCOUNTER
CPAP ordered, please leave on desk for signature.  Once complete please fax over to [facility] and notify the patient.  Have sent the patient a message

## 2020-03-05 ENCOUNTER — PATIENT MESSAGE (OUTPATIENT)
Dept: INTERNAL MEDICINE | Facility: CLINIC | Age: 75
End: 2020-03-05

## 2020-03-05 ENCOUNTER — TELEPHONE (OUTPATIENT)
Dept: INTERNAL MEDICINE | Facility: CLINIC | Age: 75
End: 2020-03-05

## 2020-03-06 ENCOUNTER — PATIENT MESSAGE (OUTPATIENT)
Dept: INTERNAL MEDICINE | Facility: CLINIC | Age: 75
End: 2020-03-06

## 2020-03-06 ENCOUNTER — TELEPHONE (OUTPATIENT)
Dept: INTERNAL MEDICINE | Facility: CLINIC | Age: 75
End: 2020-03-06

## 2020-03-06 NOTE — TELEPHONE ENCOUNTER
According to Patient Messages through the Portal in Feb 2020, pt is already taking this medication and is using [facility]. Unsure as to why he would need a PA now? I tried using the CoverMy Meds to see who is sending this information and the information provided in the message does not match.

## 2020-03-06 NOTE — TELEPHONE ENCOUNTER
"----- Message from Sylwia Cerrato sent at 3/6/2020  7:30 AM CST -----  Prior Authorization Needed    Rx: testosterone (ANDROGEL) 20.25 mg/1.25 gram (1.62 %) GlPm    To submit the PA:    1: Go to " [email or web address] " and click "Enter a Key"    2. Enter the patient's last name and date of birth and the key.      KEY: [id number]    3. Complete the forms and click "send to Plan"    Note chart when prior authorization has been submitted.    Please notify pharmacy when prior authorization has been approved.    Thank You    "

## 2020-03-10 ENCOUNTER — TELEPHONE (OUTPATIENT)
Dept: INTERNAL MEDICINE | Facility: CLINIC | Age: 75
End: 2020-03-10

## 2020-03-10 NOTE — TELEPHONE ENCOUNTER
See message from Friday.  According to notes about this medication, pt is obtaining this medication from [employer].the original denial came in 2/2020. I have several calls and a Portal message to find out if this pt is getting this medication through [employer] and as to why Nancy continues to call to obtain a PA. I sent the PA request anyway.  Edis Huggins Jr.   to Forest Miles MD            3:44 PM   Dr. miles, I have been cummicationg with Flor on this. The insurance would not pay for the androderm. However, I think I can get androgel though [facility]. Flor is up to date on this. Alexi

## 2020-03-10 NOTE — TELEPHONE ENCOUNTER
"----- Message from Sylwia Cerrato sent at 3/10/2020  8:25 AM CDT -----  Prior Authorization Needed    Rx: testosterone (ANDROGEL) 20.25 mg/1.25 gram (1.62 %) GlPm    To submit the PA:    1: Go to " [email or web address] " and click "Enter a Key"    2. Enter the patient's last name and date of birth and the key.      KEY: [id number]    3. Complete the forms and click "send to Plan"    Note chart when prior authorization has been submitted.    Please notify pharmacy when prior authorization has been approved.    Thank You    "

## 2020-03-10 NOTE — TELEPHONE ENCOUNTER
Edis Huggins (Key: [id number])     Need help? Call us at [phone]     Outcome    N/A  Next Steps  The plan will fax you a determination, typically within 1 to 5 business days.  How do I follow up?      Drug  Testosterone 20.25 MG/ACT(1.62%) gel  Form  [facility] Prescription Drug Medication Request Form  Prescription Drug Medication Prior Authorization Request Form for [facility] members  [phone]  [phone]  Original Claim Info  75 CALL HELP DESK

## 2020-03-16 RX ORDER — AZELASTINE 1 MG/ML
1 SPRAY, METERED NASAL 2 TIMES DAILY
Qty: 30 ML | Refills: 3 | Status: SHIPPED | OUTPATIENT
Start: 2020-03-16 | End: 2020-10-15 | Stop reason: SDUPTHER

## 2020-03-17 ENCOUNTER — OFFICE VISIT (OUTPATIENT)
Dept: CARDIOLOGY | Facility: CLINIC | Age: 75
End: 2020-03-17
Payer: COMMERCIAL

## 2020-03-17 VITALS
OXYGEN SATURATION: 97 % | HEIGHT: 71 IN | WEIGHT: 284.38 LBS | BODY MASS INDEX: 39.81 KG/M2 | HEART RATE: 66 BPM | DIASTOLIC BLOOD PRESSURE: 68 MMHG | SYSTOLIC BLOOD PRESSURE: 128 MMHG

## 2020-03-17 DIAGNOSIS — E66.9 OBESITY (BMI 35.0-39.9 WITHOUT COMORBIDITY): ICD-10-CM

## 2020-03-17 DIAGNOSIS — I48.91 A-FIB: ICD-10-CM

## 2020-03-17 DIAGNOSIS — I65.29 CAROTID ATHEROSCLEROSIS, UNSPECIFIED LATERALITY: ICD-10-CM

## 2020-03-17 DIAGNOSIS — F41.9 ANXIETY: ICD-10-CM

## 2020-03-17 DIAGNOSIS — D64.9 NORMOCYTIC ANEMIA: ICD-10-CM

## 2020-03-17 DIAGNOSIS — I70.0 AORTIC ATHEROSCLEROSIS: ICD-10-CM

## 2020-03-17 DIAGNOSIS — M48.061 NEUROFORAMINAL STENOSIS OF LUMBAR SPINE: ICD-10-CM

## 2020-03-17 DIAGNOSIS — E78.5 HYPERLIPIDEMIA, UNSPECIFIED HYPERLIPIDEMIA TYPE: ICD-10-CM

## 2020-03-17 DIAGNOSIS — I48.0 PAROXYSMAL ATRIAL FIBRILLATION: Primary | ICD-10-CM

## 2020-03-17 DIAGNOSIS — I10 ESSENTIAL HYPERTENSION: ICD-10-CM

## 2020-03-17 DIAGNOSIS — E66.01 CLASS 3 SEVERE OBESITY DUE TO EXCESS CALORIES WITH BODY MASS INDEX (BMI) OF 40.0 TO 44.9 IN ADULT, UNSPECIFIED WHETHER SERIOUS COMORBIDITY PRESENT: ICD-10-CM

## 2020-03-17 DIAGNOSIS — G47.33 OSA (OBSTRUCTIVE SLEEP APNEA): ICD-10-CM

## 2020-03-17 DIAGNOSIS — M54.16 LUMBAR RADICULOPATHY: ICD-10-CM

## 2020-03-17 PROCEDURE — 3074F SYST BP LT 130 MM HG: CPT | Mod: CPTII,S$GLB,, | Performed by: INTERNAL MEDICINE

## 2020-03-17 PROCEDURE — 1101F PR PT FALLS ASSESS DOC 0-1 FALLS W/OUT INJ PAST YR: ICD-10-PCS | Mod: CPTII,S$GLB,, | Performed by: INTERNAL MEDICINE

## 2020-03-17 PROCEDURE — 99999 PR PBB SHADOW E&M-EST. PATIENT-LVL V: CPT | Mod: PBBFAC,,, | Performed by: INTERNAL MEDICINE

## 2020-03-17 PROCEDURE — 99999 PR PBB SHADOW E&M-EST. PATIENT-LVL V: ICD-10-PCS | Mod: PBBFAC,,, | Performed by: INTERNAL MEDICINE

## 2020-03-17 PROCEDURE — 93000 ELECTROCARDIOGRAM COMPLETE: CPT | Mod: S$GLB,,, | Performed by: INTERNAL MEDICINE

## 2020-03-17 PROCEDURE — 93000 EKG 12-LEAD: ICD-10-PCS | Mod: S$GLB,,, | Performed by: INTERNAL MEDICINE

## 2020-03-17 PROCEDURE — 1101F PT FALLS ASSESS-DOCD LE1/YR: CPT | Mod: CPTII,S$GLB,, | Performed by: INTERNAL MEDICINE

## 2020-03-17 PROCEDURE — 1159F MED LIST DOCD IN RCRD: CPT | Mod: S$GLB,,, | Performed by: INTERNAL MEDICINE

## 2020-03-17 PROCEDURE — 1126F PR PAIN SEVERITY QUANTIFIED, NO PAIN PRESENT: ICD-10-PCS | Mod: S$GLB,,, | Performed by: INTERNAL MEDICINE

## 2020-03-17 PROCEDURE — 3074F PR MOST RECENT SYSTOLIC BLOOD PRESSURE < 130 MM HG: ICD-10-PCS | Mod: CPTII,S$GLB,, | Performed by: INTERNAL MEDICINE

## 2020-03-17 PROCEDURE — 1126F AMNT PAIN NOTED NONE PRSNT: CPT | Mod: S$GLB,,, | Performed by: INTERNAL MEDICINE

## 2020-03-17 PROCEDURE — 3078F DIAST BP <80 MM HG: CPT | Mod: CPTII,S$GLB,, | Performed by: INTERNAL MEDICINE

## 2020-03-17 PROCEDURE — 99214 PR OFFICE/OUTPT VISIT, EST, LEVL IV, 30-39 MIN: ICD-10-PCS | Mod: S$GLB,,, | Performed by: INTERNAL MEDICINE

## 2020-03-17 PROCEDURE — 1159F PR MEDICATION LIST DOCUMENTED IN MEDICAL RECORD: ICD-10-PCS | Mod: S$GLB,,, | Performed by: INTERNAL MEDICINE

## 2020-03-17 PROCEDURE — 99214 OFFICE O/P EST MOD 30 MIN: CPT | Mod: S$GLB,,, | Performed by: INTERNAL MEDICINE

## 2020-03-17 PROCEDURE — 3078F PR MOST RECENT DIASTOLIC BLOOD PRESSURE < 80 MM HG: ICD-10-PCS | Mod: CPTII,S$GLB,, | Performed by: INTERNAL MEDICINE

## 2020-03-17 RX ORDER — METOPROLOL TARTRATE AND HYDROCHLOROTHIAZIDE 50; 25 MG/1; MG/1
1 TABLET ORAL DAILY
Qty: 30 TABLET | Refills: 11 | Status: SHIPPED | OUTPATIENT
Start: 2020-03-17 | End: 2020-08-06 | Stop reason: ALTCHOICE

## 2020-03-17 NOTE — PROGRESS NOTES
Subjective:    Patient ID:  Edis Huggins Jr. is a 74 y.o. male who presents for follow-up of Atrial Fibrillation      HPI    73 y/o male with hx of HTN, HLD, obesity, lone afib, CARMELA on CPAP, who presents for f/u.   Has had extensive w/u for RESENDEZ including normal nuclear SPECT, normal 30 day monitor, normal PFT's. Did have a previous Holter monitor which showed a single run of Paroxysmal atrial fibrillation lasting for 13 sec. Had bilateral LE edema which has improved with chlorthalidone.   Has Calabrioa device for detection of afib, felt symptoms which woke him from sleep, checked device, and found to be in afib (strips available in media section). Resolved shortly after symptoms. Ne recurrence. Recently fitted for CPAP and using. ECG in clinic today with NSR.   Denies CP, orthopnea, PND, syncope, palps. Compliant with meds and follows a low salt diet.     Review of Systems   Constitution: Negative for malaise/fatigue.   HENT: Negative for congestion.    Eyes: Negative for blurred vision.   Cardiovascular: Positive for leg swelling and palpitations. Negative for chest pain, claudication, cyanosis, dyspnea on exertion, irregular heartbeat, near-syncope, orthopnea, paroxysmal nocturnal dyspnea and syncope.   Respiratory: Negative for shortness of breath.    Endocrine: Negative for polyuria.   Hematologic/Lymphatic: Negative for bleeding problem.   Skin: Negative for itching and rash.   Musculoskeletal: Negative for joint swelling, muscle cramps and muscle weakness.   Gastrointestinal: Negative for abdominal pain, hematemesis, hematochezia, melena, nausea and vomiting.   Genitourinary: Negative for dysuria and hematuria.   Neurological: Negative for dizziness, focal weakness, headaches, light-headedness, loss of balance and weakness.   Psychiatric/Behavioral: Negative for depression. The patient is not nervous/anxious.         Objective:    Physical Exam   Constitutional: He is oriented to person, place, and time. He  appears well-developed and well-nourished.   HENT:   Head: Normocephalic and atraumatic.   Neck: Neck supple. No JVD present.   Cardiovascular: Normal rate, regular rhythm and normal heart sounds.   Pulses:       Carotid pulses are 2+ on the right side, and 2+ on the left side.       Radial pulses are 2+ on the right side, and 2+ on the left side.        Femoral pulses are 2+ on the right side, and 2+ on the left side.       Dorsalis pedis pulses are 2+ on the right side, and 2+ on the left side.        Posterior tibial pulses are 2+ on the right side, and 2+ on the left side.   Pulmonary/Chest: Effort normal and breath sounds normal.   Abdominal: Soft. Bowel sounds are normal.   Musculoskeletal: He exhibits edema.   Neurological: He is alert and oriented to person, place, and time.   Skin: Skin is warm and dry.   Psychiatric: He has a normal mood and affect. His behavior is normal. Thought content normal.         Assessment:       1. Paroxysmal atrial fibrillation    2. Essential hypertension    3. Hyperlipidemia, unspecified hyperlipidemia type    4. Carotid atherosclerosis, unspecified laterality    5. Aortic atherosclerosis -- CTA 06/2018    6. Anxiety    7. Lumbar radiculopathy    8. Normocytic anemia -- iron and B12 normal    9. Obesity (BMI 35.0-39.9 without comorbidity)    10. Neuroforaminal stenosis of lumbar spine    11. Class 3 severe obesity due to excess calories with body mass index (BMI) of 40.0 to 44.9 in adult, unspecified whether serious comorbidity present    12. CARMELA (obstructive sleep apnea)      75 y/o pt with hx and presentation as above. Doing well from a cardiac perspective and compensated from a HF perspective. Regarding afib, now second documented episode. Has elevated CHADSVasc of 2 for HTN and age and indications for CVA prevention with DOAC. Discussed this with pt and is in agreement. Will start BB. Limit caffeine consumption. Discussed the etiology, evaluation, and management of Afib,  CVA, DOAC, HTN, obesity, CARMELA. Discussed the importance of med compliance, heart healthy diet, and regular exercise.      Plan:       -D/C chlorthalidone   -start metoprolol/HCTZ 50/25  -start Xarelto 20 mg daily  -f/u in 6 months

## 2020-03-18 ENCOUNTER — TELEPHONE (OUTPATIENT)
Dept: INTERNAL MEDICINE | Facility: CLINIC | Age: 75
End: 2020-03-18

## 2020-03-18 NOTE — TELEPHONE ENCOUNTER
Called patient for phone visit.   States he would like to lose 30 lbs to start.   Since our last visit he is now on CPAP machine. He also had a atrial fib attack a few months ago and saw the cardiologist yesterday and Doctor started him on Xarelto. His wife had shoulder surgery 4 weeks ago, so his food has been limited eating a lot of can soups Progressive or [employer] Chicken noodle soup (caution of high sodium contents, monitor) states his BP has been good range. He would like to follow a Diabetic diet or something like that.  He is considering starting to walk the neighborhood since he is home based for now with the coronavirus, they have shut down his office.   He wont be eating lunch out with the guys so should have some improvement there.   He will continue his same plan to eat real food, more nuts and fish and less processed carb foods.   Consider increase in exercise since at home and will be doing less walking then at work.  Will f/u in a month with a phone visit if virus still going on.   appt set 4.15.20.  Sandy Moses RN HC

## 2020-03-19 ENCOUNTER — PATIENT MESSAGE (OUTPATIENT)
Dept: INTERNAL MEDICINE | Facility: CLINIC | Age: 75
End: 2020-03-19

## 2020-03-20 DIAGNOSIS — E78.1 HYPERTRIGLYCERIDEMIA: ICD-10-CM

## 2020-03-24 RX ORDER — GEMFIBROZIL 600 MG/1
TABLET, FILM COATED ORAL
Qty: 180 TABLET | Refills: 1 | Status: SHIPPED | OUTPATIENT
Start: 2020-03-24 | End: 2021-01-04

## 2020-03-24 NOTE — PROGRESS NOTES
Refill Authorization Note     is requesting a refill authorization.    Brief assessment and rationale for refill: APPROVE: prr  Name and strength of medication: gemfibroziL (LOPID) 600 MG tablet  Medication-related problems identified: Drug-drug interaction    Medication Therapy Plan: approve 6 more    Medication reconciliation completed: No                         Comments:   Refill Center Care Gap Closure protocols temporarily suspended.   Requested Prescriptions   Pending Prescriptions Disp Refills    gemfibroziL (LOPID) 600 MG tablet [Pharmacy Med Name: GEMFIBROZIL 600MG TABLETS] 180 tablet 1     Sig: TAKE 1 TABLET(600 MG) BY MOUTH TWICE DAILY       Cardiovascular:  Antilipid - Fibric Acid Derivatives Passed - 3/20/2020  3:37 PM        Passed - Patient is at least 18 years old        Passed - Office visit in past 12 months or future 90 days.     Recent Outpatient Visits            1 week ago Paroxysmal atrial fibrillation    [facility] Cardiology Shakeel Bello MD    1 month ago Male hypogonadism    [facility] - Internal Medicine Forest Miles MD    2 months ago PCO (posterior capsular opacification), right    [facility] - Ophthalmology Deborah Khan MD    3 months ago Primary open angle glaucoma of left eye, moderate stage    [facility] - Ophthalmology Deborah Khan MD    4 months ago Essential hypertension    [facility] Cardiology Shakeel Bello MD          Future Appointments              In 3 weeks [facility] - Internal Medicine, Rohan Juares PCW    In 1 month MD [facility] - Internal Medicine, Rohan Juares PCW    In 1 month PERIMETRY, [facility] - Ophthalmology, Rohan Juares    In 1 month MD Rohan [facility] - Ophthalmology, Rohan Juares    In 6 months APPOINTMENT LAB, [facility]    In 6 months Asher Auguste MD [place] - Hematology Oncology, [facility]                Passed - Lipid Panel completed  in last 360 days     Lab Results   Component Value Date    CHOL 145 06/24/2019    HDL 33 (L) 06/24/2019    LDLCALC 71.8 06/24/2019    TRIG 201 (H) 06/24/2019             Passed - ALT is 94 or below and within 360 days     ALT   Date Value Ref Range Status   10/04/2019 27 10 - 44 U/L Final   09/12/2019 21 10 - 44 U/L Final   04/05/2019 19 10 - 44 U/L Final              Passed - AST is 54 or below and within 360 days     AST   Date Value Ref Range Status   10/04/2019 27 10 - 40 U/L Final   09/12/2019 20 10 - 40 U/L Final   04/05/2019 14 10 - 40 U/L Final              Passed - Cr is 1.3 or below and within 360 days     Creatinine   Date Value Ref Range Status   10/04/2019 0.9 0.5 - 1.4 mg/dL Final   09/12/2019 0.9 0.5 - 1.4 mg/dL Final   06/24/2019 0.8 0.5 - 1.4 mg/dL Final              Passed - eGFR is 30 or above and within 360 days     eGFR if non    Date Value Ref Range Status   10/04/2019 >60 >60 mL/min/1.73 m^2 Final     Comment:     Calculation used to obtain the estimated glomerular filtration  rate (eGFR) is the CKD-EPI equation.      09/12/2019 >60 >60 mL/min/1.73 m^2 Final     Comment:     Calculation used to obtain the estimated glomerular filtration  rate (eGFR) is the CKD-EPI equation.      06/24/2019 >60 >60 mL/min/1.73 m^2 Final     Comment:     Calculation used to obtain the estimated glomerular filtration  rate (eGFR) is the CKD-EPI equation.        eGFR if    Date Value Ref Range Status   10/04/2019 >60 >60 mL/min/1.73 m^2 Final   09/12/2019 >60 >60 mL/min/1.73 m^2 Final   06/24/2019 >60 >60 mL/min/1.73 m^2 Final              Passed - WBC in normal range and within 360 days     WBC   Date Value Ref Range Status   10/04/2019 5.77 3.90 - 12.70 K/uL Final   04/05/2019 6.75 3.90 - 12.70 K/uL Final   10/15/2018 7.15 3.90 - 12.70 K/uL Final               Appointments  past 12m or future 3m with PCP    Date Provider   Last Visit   1/27/2020 Forest Miles MD   Next Visit    4/30/2020 Forest Miles MD   .  ED visits in past 90 days: 0       Note composed:9:51 AM 03/24/2020

## 2020-04-14 DIAGNOSIS — H40.1122 PRIMARY OPEN ANGLE GLAUCOMA OF LEFT EYE, MODERATE STAGE: ICD-10-CM

## 2020-04-14 RX ORDER — TIMOLOL MALEATE 5 MG/ML
SOLUTION OPHTHALMIC
Qty: 5 ML | Refills: 12 | Status: SHIPPED | OUTPATIENT
Start: 2020-04-14 | End: 2020-05-21 | Stop reason: SDUPTHER

## 2020-04-14 RX ORDER — CYCLOBENZAPRINE HCL 10 MG
TABLET ORAL
Qty: 30 TABLET | Refills: 0 | Status: SHIPPED | OUTPATIENT
Start: 2020-04-14 | End: 2020-08-12

## 2020-04-14 NOTE — PROGRESS NOTES
Refill Routing Note     Medication(s) are not appropriate for processing by [facility]:    Medication Outside of Protocol    Appointments  past 12m or future 3m with PCP    Date Provider   Last Visit   1/27/2020 Forest Miles MD   Next Visit   4/30/2020 Forest Miles MD           Automatic Epic Protocol Generated Data:    Requested Prescriptions   Pending Prescriptions Disp Refills    cyclobenzaprine (FLEXERIL) 10 MG tablet [Pharmacy Med Name: CYCLOBENZAPRINE 10MG TABLETS] 30 tablet 0     Sig: TAKE 1 TABLET(10 MG) BY MOUTH THREE TIMES DAILY AS NEEDED       There is no refill protocol information for this order           Note composed:7:08 AM 04/14/2020

## 2020-04-16 RX ORDER — ACYCLOVIR 400 MG/1
TABLET ORAL
Qty: 90 TABLET | Refills: 0 | Status: SHIPPED | OUTPATIENT
Start: 2020-04-16

## 2020-04-16 NOTE — PROGRESS NOTES
Refill Authorization Note     is requesting a refill authorization.    Brief assessment and rationale for refill: APPROVE: prr                Medication reconciliation completed: No                         Comments:     Refill Center Care Gap Closure protocols temporarily suspended.   Requested Prescriptions   Pending Prescriptions Disp Refills    acyclovir (ZOVIRAX) 400 MG tablet [Pharmacy Med Name: ACYCLOVIR 400MG TABLETS] 90 tablet 0     Sig: TAKE 1 TABLET BY MOUTH THREE TIMES DAILY FOR 5 DAYS AT SIGN OF FEVER BLISTER       Antimicrobials:  Oral Antiviral Agents - Anti-Herpetic Passed - 4/16/2020 12:31 PM        Passed - Patient is at least 18 years old        Passed - Office visit in past 12 months or future 90 days.     Recent Outpatient Visits            1 month ago Paroxysmal atrial fibrillation    [facility] Cardiology Shakeel Bello MD    2 months ago Male hypogonadism    [facility] - Internal Medicine Forest Miles MD    3 months ago PCO (posterior capsular opacification), right    [facility] - Ophthalmology Deborah Khan MD    3 months ago Primary open angle glaucoma of left eye, moderate stage    [facility] - Ophthalmology Deborah Khna MD    5 months ago Essential hypertension    [facility] Cardiology Shakeel Bello MD          Future Appointments              In 2 weeks [facility] - Internal Medicine, Rohan Juares PCW    In 1 month PERIMETRY, [facility] - Ophthalmology, Rohan Juares    In 1 month Deborah Khan MD [facility] - Ophthalmology, [facility]    In 1 month Forest Miles MD [facility] - Internal Medicine, Rohan june PCW    In 5 months APPOINTMENT LAB, [facility]    In 6 months [facility] - Hematology Oncology, [facility]                Passed - Cr is 1.3 or below and within 360 days     Creatinine   Date Value Ref Range Status   10/04/2019 0.9 0.5 - 1.4 mg/dL Final   09/12/2019 0.9 0.5 -  1.4 mg/dL Final   06/24/2019 0.8 0.5 - 1.4 mg/dL Final              Passed - WBC in normal range and within 360 days     WBC   Date Value Ref Range Status   10/04/2019 5.77 3.90 - 12.70 K/uL Final   04/05/2019 6.75 3.90 - 12.70 K/uL Final   10/15/2018 7.15 3.90 - 12.70 K/uL Final              Passed - eGFR within 360 days     eGFR if non    Date Value Ref Range Status   10/04/2019 >60 >60 mL/min/1.73 m^2 Final     Comment:     Calculation used to obtain the estimated glomerular filtration  rate (eGFR) is the CKD-EPI equation.      09/12/2019 >60 >60 mL/min/1.73 m^2 Final     Comment:     Calculation used to obtain the estimated glomerular filtration  rate (eGFR) is the CKD-EPI equation.      06/24/2019 >60 >60 mL/min/1.73 m^2 Final     Comment:     Calculation used to obtain the estimated glomerular filtration  rate (eGFR) is the CKD-EPI equation.        eGFR if    Date Value Ref Range Status   10/04/2019 >60 >60 mL/min/1.73 m^2 Final   09/12/2019 >60 >60 mL/min/1.73 m^2 Final   06/24/2019 >60 >60 mL/min/1.73 m^2 Final               Appointments  past 12m or future 3m with PCP    Date Provider   Last Visit   Visit date not found Halley Beckwith MD   Next Visit   4/16/2020 Halley Beckwith MD   .  ED visits in past 90 days: 0       Note composed:12:31 PM 04/16/2020

## 2020-04-20 ENCOUNTER — PATIENT MESSAGE (OUTPATIENT)
Dept: PAIN MEDICINE | Facility: CLINIC | Age: 75
End: 2020-04-20

## 2020-04-21 RX ORDER — ACYCLOVIR 400 MG/1
TABLET ORAL
Qty: 30 TABLET | Refills: 0 | OUTPATIENT
Start: 2020-04-21

## 2020-04-21 NOTE — PROGRESS NOTES
Quick DC. Duplicate Request  Refill Authorization Note     is requesting a refill authorization.    Brief assessment and rationale for refill: QUICK DC: handled in a previous encounter               Medication reconciliation completed: No                         Comments:   Pended Medication(s)   Requested Prescriptions     Pending Prescriptions Disp Refills    acyclovir (ZOVIRAX) 400 MG tablet [Pharmacy Med Name: ACYCLOVIR 400MG TABLETS] 30 tablet 0     Sig: TAKE 1 TABLET BY MOUTH THREE TIMES DAILY FOR 5 DAYS AT SIGN OF FEVER BLISTER        Duplicate Pended Encounter(s)/ Last Prescribed Details:    Ordering Encounter Report     Associated Reports   View Encounter                  Note composed:1:05 PM 04/21/2020

## 2020-05-06 ENCOUNTER — OFFICE VISIT (OUTPATIENT)
Dept: PAIN MEDICINE | Facility: CLINIC | Age: 75
End: 2020-05-06
Payer: COMMERCIAL

## 2020-05-06 ENCOUNTER — TELEPHONE (OUTPATIENT)
Dept: PAIN MEDICINE | Facility: CLINIC | Age: 75
End: 2020-05-06

## 2020-05-06 VITALS
WEIGHT: 282 LBS | SYSTOLIC BLOOD PRESSURE: 123 MMHG | BODY MASS INDEX: 39.35 KG/M2 | DIASTOLIC BLOOD PRESSURE: 74 MMHG | HEART RATE: 64 BPM

## 2020-05-06 DIAGNOSIS — Z01.818 PREOP TESTING: Primary | ICD-10-CM

## 2020-05-06 DIAGNOSIS — M54.16 LUMBAR RADICULOPATHY: Primary | ICD-10-CM

## 2020-05-06 DIAGNOSIS — M48.061 NEUROFORAMINAL STENOSIS OF LUMBAR SPINE: ICD-10-CM

## 2020-05-06 DIAGNOSIS — G89.29 CHRONIC BILATERAL LOW BACK PAIN WITH RIGHT-SIDED SCIATICA: ICD-10-CM

## 2020-05-06 DIAGNOSIS — E66.01 CLASS 3 SEVERE OBESITY DUE TO EXCESS CALORIES WITH BODY MASS INDEX (BMI) OF 40.0 TO 44.9 IN ADULT, UNSPECIFIED WHETHER SERIOUS COMORBIDITY PRESENT: ICD-10-CM

## 2020-05-06 DIAGNOSIS — M47.816 LUMBAR SPONDYLOSIS: ICD-10-CM

## 2020-05-06 DIAGNOSIS — M54.41 CHRONIC BILATERAL LOW BACK PAIN WITH RIGHT-SIDED SCIATICA: ICD-10-CM

## 2020-05-06 PROCEDURE — 1159F PR MEDICATION LIST DOCUMENTED IN MEDICAL RECORD: ICD-10-PCS | Mod: S$GLB,,, | Performed by: NURSE PRACTITIONER

## 2020-05-06 PROCEDURE — 99999 PR PBB SHADOW E&M-EST. PATIENT-LVL IV: CPT | Mod: PBBFAC,,, | Performed by: NURSE PRACTITIONER

## 2020-05-06 PROCEDURE — 99213 PR OFFICE/OUTPT VISIT, EST, LEVL III, 20-29 MIN: ICD-10-PCS | Mod: S$GLB,,, | Performed by: NURSE PRACTITIONER

## 2020-05-06 PROCEDURE — 1101F PR PT FALLS ASSESS DOC 0-1 FALLS W/OUT INJ PAST YR: ICD-10-PCS | Mod: CPTII,S$GLB,, | Performed by: NURSE PRACTITIONER

## 2020-05-06 PROCEDURE — 3074F PR MOST RECENT SYSTOLIC BLOOD PRESSURE < 130 MM HG: ICD-10-PCS | Mod: CPTII,S$GLB,, | Performed by: NURSE PRACTITIONER

## 2020-05-06 PROCEDURE — 1125F AMNT PAIN NOTED PAIN PRSNT: CPT | Mod: S$GLB,,, | Performed by: NURSE PRACTITIONER

## 2020-05-06 PROCEDURE — 1125F PR PAIN SEVERITY QUANTIFIED, PAIN PRESENT: ICD-10-PCS | Mod: S$GLB,,, | Performed by: NURSE PRACTITIONER

## 2020-05-06 PROCEDURE — 3078F PR MOST RECENT DIASTOLIC BLOOD PRESSURE < 80 MM HG: ICD-10-PCS | Mod: CPTII,S$GLB,, | Performed by: NURSE PRACTITIONER

## 2020-05-06 PROCEDURE — 1101F PT FALLS ASSESS-DOCD LE1/YR: CPT | Mod: CPTII,S$GLB,, | Performed by: NURSE PRACTITIONER

## 2020-05-06 PROCEDURE — 99999 PR PBB SHADOW E&M-EST. PATIENT-LVL IV: ICD-10-PCS | Mod: PBBFAC,,, | Performed by: NURSE PRACTITIONER

## 2020-05-06 PROCEDURE — 3078F DIAST BP <80 MM HG: CPT | Mod: CPTII,S$GLB,, | Performed by: NURSE PRACTITIONER

## 2020-05-06 PROCEDURE — 99213 OFFICE O/P EST LOW 20 MIN: CPT | Mod: S$GLB,,, | Performed by: NURSE PRACTITIONER

## 2020-05-06 PROCEDURE — 1159F MED LIST DOCD IN RCRD: CPT | Mod: S$GLB,,, | Performed by: NURSE PRACTITIONER

## 2020-05-06 PROCEDURE — 3074F SYST BP LT 130 MM HG: CPT | Mod: CPTII,S$GLB,, | Performed by: NURSE PRACTITIONER

## 2020-05-06 NOTE — H&P (VIEW-ONLY)
[facility] Establ[facility] Patient Evaluation    Chief Complaint  Chief Complaint   Patient presents with    Low-back Pain       Last 3 PDI Scores 5/6/2020 9/4/2019 6/7/2019   Pain Disability Index (PDI) 6 11 12       Interval Update  05/06/2020 - Mr. Huggins returns to clinic for follow up visit for lower right back pain radiating down right side of leg. Pain intensity is currently 1/10.  Patient reporting only having pain when walking long distances patient states sleeping and sitting his pain is at a minimal or 0/10.  He reports pain starting in the low back radiating into his thigh stopping just above his knee.  The patient is established with our office he has previously had a right L3-4 and L4-5 TF SEB with 85% relief for approximately 6 months.      Edis Huggins Jr. presents for follow-up of chronic low back pain with radiculopathy after receiving a right L3-4 and L4-5 transforaminal epidural steroid injection on 06/25/2019.  He reports 85% improvement and lists numerous benefits from the injection including the ability to walk farther and faster; improved sleep; and ability to better handle some of his job responsibilities such as moving boxes.  He reports a pain intensity of 2/10 at this time along with a complete absence of radicular pain.  He notes occasional twinges of pain in the lumbar spine, specifically on the right side, but also states that he is very happy with the injection.      Treatment History  PT/OT:   HEP:   TENS:  Injections:    06/25/2019 - right L3-4 and L4-5 TFESI with 85% improvement in pain  Surgery:  None  Medications:   - NSAIDS:   - MSK Relaxants:  Flexeril  - TCAs:   - SNRIs:   - Topicals:   - Opioids:   - Anticonvulsants:     Current Pain Medications  1. None at this time     Full Medication List    Current Outpatient Medications:     acyclovir (ZOVIRAX) 400 MG tablet, TAKE 1 TABLET BY MOUTH THREE TIMES DAILY FOR 5 DAYS AT SIGN OF FEVER BLISTER, Disp: 90  tablet, Rfl: 0    amLODIPine (NORVASC) 10 MG tablet, Take 1 tablet (10 mg total) by mouth once daily., Disp: 90 tablet, Rfl: 3    azelastine (ASTELIN) 137 mcg (0.1 %) nasal spray, 1 spray (137 mcg total) by Nasal route 2 (two) times daily., Disp: 30 mL, Rfl: 3    Bifidobacterium infantis (ALIGN) 4 mg Cap, Take 4 mg by mouth once daily. , Disp: , Rfl:     cetirizine (ZYRTEC) 10 MG tablet, Take 10 mg by mouth once daily., Disp: , Rfl:     citalopram (CELEXA) 20 MG tablet, Take 1 tablet (20 mg total) by mouth once daily., Disp: 90 tablet, Rfl: 3    cyclobenzaprine (FLEXERIL) 10 MG tablet, TAKE 1 TABLET(10 MG) BY MOUTH THREE TIMES DAILY AS NEEDED, Disp: 30 tablet, Rfl: 0    finasteride (PROSCAR) 5 mg tablet, TAKE 1 TABLET(5 MG) BY MOUTH EVERY DAY, Disp: 90 tablet, Rfl: 2    fish oil-omega-3 fatty acids 300-1,000 mg capsule, Take 2 g by mouth 3 (three) times daily. , Disp: , Rfl:     gemfibroziL (LOPID) 600 MG tablet, TAKE 1 TABLET(600 MG) BY MOUTH TWICE DAILY, Disp: 180 tablet, Rfl: 1    irbesartan (AVAPRO) 150 MG tablet, TAKE 2 TABLETS(300MG) BY MOUTH EVERY DAY, Disp: 180 tablet, Rfl: 3    irbesartan (AVAPRO) 300 MG tablet, Take 300 mg by mouth once daily. , Disp: , Rfl: 7    metoprolol ta-hydrochlorothiaz (LOPRESSOR HCT) 50-25 mg per tablet, Take 1 tablet by mouth once daily., Disp: 30 tablet, Rfl: 11    potassium chloride SA (K-DUR,KLOR-CON) 20 MEQ tablet, Take 1 tablet (20 mEq total) by mouth once daily., Disp: 90 tablet, Rfl: 3    rivaroxaban (XARELTO) 20 mg Tab, Take 1 tablet (20 mg total) by mouth daily with dinner or evening meal., Disp: 90 tablet, Rfl: 3    tamsulosin (FLOMAX) 0.4 mg Cap, TAKE 1 CAPSULE(0.4 MG) BY MOUTH EVERY DAY, Disp: 90 capsule, Rfl: 2    testosterone (ANDROGEL) 20.25 mg/1.25 gram (1.62 %) GlPm, Apply 2 pumps once daily in the morning to the shoulder and upper arms, Disp: 75 g, Rfl: 4    testosterone 1 % (25 mg/2.5gram) GlPk, Place onto the skin., Disp: , Rfl:     timolol  maleate 0.5% (TIMOPTIC-XE) 0.5 % SolG, INSTILL 1 DROP IN BOTH EYES EVERY MORNING, Disp: 5 mL, Rfl: 12    vitamin E 1000 UNIT capsule, Take 1,000 Units by mouth once daily., Disp: , Rfl:     pneumococcal 23-gary ps (PNEUMOVAX 23) 25 mcg/0.5 mL, Inject 0.5 mLs into the muscle. (Patient not taking: Reported on 1/27/2020), Disp: 0.5 mL, Rfl: 0     Review of Systems  Review of Systems   Constitutional: Negative for chills and fever.   HENT: Negative for nosebleeds.    Eyes: Negative for blurred vision.   Respiratory: Negative for hemoptysis.    Cardiovascular: Negative for chest pain and palpitations.   Gastrointestinal: Negative for heartburn and vomiting.   Genitourinary: Negative for hematuria.   Musculoskeletal: Positive for back pain (Mild). Negative for myalgias.   Skin: Negative for rash.   Neurological: Negative for tingling, seizures and loss of consciousness.   Endo/Heme/Allergies: Does not bruise/bleed easily.   Psychiatric/Behavioral: Negative for hallucinations. The patient does not have insomnia.        Medical History  Past Medical History:   Diagnosis Date    Allergic rhinitis due to animal (cat) (dog) hair and dander 3/20/2014    Allergy     Anemia     Anxiety     Basal cell carcinoma     BPH (benign prostatic hypertrophy)     Family history of colon cancer 3/20/2014    Family history of ischemic heart disease 3/20/2014    HLD (hyperlipidemia)     HTN (hypertension)     IFG (impaired fasting glucose)     Lumbar spondylosis 6/7/2019    Primary open angle glaucoma     Status post total knee replacement 3/20/2014        Surgical History  Past Surgical History:   Procedure Laterality Date    CATARACT EXTRACTION W/  INTRAOCULAR LENS IMPLANT Left 02/08/2017    WITH KAHOOK GONIOTOMY (DR. WHITLEY)    CATARACT EXTRACTION W/  INTRAOCULAR LENS IMPLANT Right 05/10/2017        EYE SURGERY      HERNIA REPAIR      x 2    JOINT REPLACEMENT      TONSILLECTOMY      TOTAL KNEE  ARTHROPLASTY Right     TRANSFORAMINAL EPIDURAL INJECTION OF STEROID Right 6/25/2019    Procedure: Transforaminal Epidural Steroid Injection, Right, L3-4, L4-5;  Surgeon: Eduard Mukherjee Jr., MD;  Location: [facility];  Service: Pain Management;  Laterality: Right;        Physical Exam  Vitals:    05/06/20 0938   BP: 123/74   Pulse: 64   Weight: 127.9 kg (282 lb)   PainSc:   1     General    Nursing note and vitals reviewed.  Constitutional: He is oriented to person, place, and time. He appears well-developed and well-nourished. No distress.   HENT:   Head: Normocephalic and atraumatic.   Nose: Nose normal.   Eyes: Conjunctivae and EOM are normal. Pupils are equal, round, and reactive to light. Right eye exhibits no discharge. Left eye exhibits no discharge. No scleral icterus.   Neck: No JVD present.   Cardiovascular: Intact distal pulses.    Pulmonary/Chest: Effort normal. No respiratory distress.   Abdominal: He exhibits no distension.   Neurological: He is alert and oriented to person, place, and time. Coordination normal.   Psychiatric: He has a normal mood and affect. His behavior is normal. Judgment and thought content normal.         Back (L-Spine & T-Spine) / Neck (C-Spine) Exam     Back (L-Spine & T-Spine) Range of Motion   Extension: abnormal Back extension: There is decreased range of motion in the lumbar spine with extension and flexion; however, there is no association with increased pain at this.   Flexion: abnormal         Imaging  MRI lumbar spine 04/26/2019  There is no accompanying radiology report.  My review of the images is significant for advanced degenerative disc disease in the lumbar spine most notably at L5-S1, L4-5, and L3-4.  There is associated  neural foraminal stenosis at these levels.  Lumbar facet arthropathy also contributes to neural foraminal stenosis.  Neural foraminal stenosis is worst at right L3-4 and L4-5.    Labs:  BMP  Lab Results   Component Value Date      10/04/2019    K 3.8 10/04/2019     10/04/2019    CO2 27 10/04/2019    BUN 23 10/04/2019    CREATININE 0.9 10/04/2019    CALCIUM 10.1 10/04/2019    ANIONGAP 12 10/04/2019    ESTGFRAFRICA >60 10/04/2019    EGFRNONAA >60 10/04/2019     Lab Results   Component Value Date    ALT 27 10/04/2019    AST 27 10/04/2019    ALKPHOS 46 (L) 10/04/2019    BILITOT 0.4 10/04/2019       Assessment:  Problem List Items Addressed This Visit        Neuro    Lumbar radiculopathy - Primary    Lumbar spondylosis       Orthopedic    Chronic bilateral low back pain with right-sided sciatica    Neuroforaminal stenosis of lumbar spine       Other    Class 3 severe obesity due to excess calories with body mass index (BMI) of 40.0 to 44.9 in adult          09/04/2019 - Edis Huggins Jr. is a 74 y.o. male with chronic low back pain with right-sided radiculopathy which responded extremely well to a right L3-4 and L4-5 TFESI on 06/25/2019.  Patient experienced 85% relief.  Patient was instructed to contact our clinic when the pain relief against to wear off as we can schedule him directly for another injection given that he is already experienced 10 weeks of relief.    05/06/20200525-19-chdt-old male presents with chronic low back and right-sided radiculopathy, he is status post right L3-4 and L4-5 TF SEB with 85% relief for approximately 6 months of relief, he presents today requesting to repeat his injection, he informed me that his right low back and leg pain increased over the last few weeks, pain is worse when walking he reports no pain while sleeping or sitting,   previous imaging was reviewed and discussed with the patient today, recommended repeating a right L3/4 and L4/5 TFESI, pt agreed and understood.         Plan & Recommendations  Procedures: s/f a repeat the right L3-4 and L4-5 TFESI   Medications: No medications changes recommended at this time.  Imaging: No additional imaging required at this time.  PT/OT:  No additional  physical therapy is recommended at this time, but the patient is interested.  I will give him a handout of exercises for the back to taken today.  If he has difficulty completing those exercises, I will submit a referral to physical therapy to assist him with establishing an appropriate home exercise regimen  HEP: I encouraged the patient to maintain a home exercise regimen that includes daily, moderate cardiovascular exercise lasting at least 30 minutes.  This may include yoga, orly chi, walking, swimming, aqua aerobics, or other exercises that maintain a heart rate of 50-70% of the calculated maximum heart rate.  I also encouraged light, daily stretching focused on the target area.  Follow Up: RTC 2-3 weeks     DIANNE Bermudez  Interventional Pain Management    Disclaimer: This note was partly generated using dictation software which may occasionally result in transcription errors.

## 2020-05-06 NOTE — TELEPHONE ENCOUNTER
Pt scheduled for Right Transforaminal SEB on 5/26/20. Requesting medication clearance to hold Xarelto 3 days prior to procedure. Please advise.

## 2020-05-06 NOTE — PROGRESS NOTES
[facility] Establ[facility] Patient Evaluation    Chief Complaint  Chief Complaint   Patient presents with    Low-back Pain       Last 3 PDI Scores 5/6/2020 9/4/2019 6/7/2019   Pain Disability Index (PDI) 6 11 12       Interval Update  05/06/2020 - Mr. Huggins returns to clinic for follow up visit for lower right back pain radiating down right side of leg. Pain intensity is currently 1/10.  Patient reporting only having pain when walking long distances patient states sleeping and sitting his pain is at a minimal or 0/10.  He reports pain starting in the low back radiating into his thigh stopping just above his knee.  The patient is established with our office he has previously had a right L3-4 and L4-5 TF SEB with 85% relief for approximately 6 months.      Edis Huggins Jr. presents for follow-up of chronic low back pain with radiculopathy after receiving a right L3-4 and L4-5 transforaminal epidural steroid injection on 06/25/2019.  He reports 85% improvement and lists numerous benefits from the injection including the ability to walk farther and faster; improved sleep; and ability to better handle some of his job responsibilities such as moving boxes.  He reports a pain intensity of 2/10 at this time along with a complete absence of radicular pain.  He notes occasional twinges of pain in the lumbar spine, specifically on the right side, but also states that he is very happy with the injection.      Treatment History  PT/OT:   HEP:   TENS:  Injections:    06/25/2019 - right L3-4 and L4-5 TFESI with 85% improvement in pain  Surgery:  None  Medications:   - NSAIDS:   - MSK Relaxants:  Flexeril  - TCAs:   - SNRIs:   - Topicals:   - Opioids:   - Anticonvulsants:     Current Pain Medications  1. None at this time     Full Medication List    Current Outpatient Medications:     acyclovir (ZOVIRAX) 400 MG tablet, TAKE 1 TABLET BY MOUTH THREE TIMES DAILY FOR 5 DAYS AT SIGN OF FEVER BLISTER, Disp: 90  tablet, Rfl: 0    amLODIPine (NORVASC) 10 MG tablet, Take 1 tablet (10 mg total) by mouth once daily., Disp: 90 tablet, Rfl: 3    azelastine (ASTELIN) 137 mcg (0.1 %) nasal spray, 1 spray (137 mcg total) by Nasal route 2 (two) times daily., Disp: 30 mL, Rfl: 3    Bifidobacterium infantis (ALIGN) 4 mg Cap, Take 4 mg by mouth once daily. , Disp: , Rfl:     cetirizine (ZYRTEC) 10 MG tablet, Take 10 mg by mouth once daily., Disp: , Rfl:     citalopram (CELEXA) 20 MG tablet, Take 1 tablet (20 mg total) by mouth once daily., Disp: 90 tablet, Rfl: 3    cyclobenzaprine (FLEXERIL) 10 MG tablet, TAKE 1 TABLET(10 MG) BY MOUTH THREE TIMES DAILY AS NEEDED, Disp: 30 tablet, Rfl: 0    finasteride (PROSCAR) 5 mg tablet, TAKE 1 TABLET(5 MG) BY MOUTH EVERY DAY, Disp: 90 tablet, Rfl: 2    fish oil-omega-3 fatty acids 300-1,000 mg capsule, Take 2 g by mouth 3 (three) times daily. , Disp: , Rfl:     gemfibroziL (LOPID) 600 MG tablet, TAKE 1 TABLET(600 MG) BY MOUTH TWICE DAILY, Disp: 180 tablet, Rfl: 1    irbesartan (AVAPRO) 150 MG tablet, TAKE 2 TABLETS(300MG) BY MOUTH EVERY DAY, Disp: 180 tablet, Rfl: 3    irbesartan (AVAPRO) 300 MG tablet, Take 300 mg by mouth once daily. , Disp: , Rfl: 7    metoprolol ta-hydrochlorothiaz (LOPRESSOR HCT) 50-25 mg per tablet, Take 1 tablet by mouth once daily., Disp: 30 tablet, Rfl: 11    potassium chloride SA (K-DUR,KLOR-CON) 20 MEQ tablet, Take 1 tablet (20 mEq total) by mouth once daily., Disp: 90 tablet, Rfl: 3    rivaroxaban (XARELTO) 20 mg Tab, Take 1 tablet (20 mg total) by mouth daily with dinner or evening meal., Disp: 90 tablet, Rfl: 3    tamsulosin (FLOMAX) 0.4 mg Cap, TAKE 1 CAPSULE(0.4 MG) BY MOUTH EVERY DAY, Disp: 90 capsule, Rfl: 2    testosterone (ANDROGEL) 20.25 mg/1.25 gram (1.62 %) GlPm, Apply 2 pumps once daily in the morning to the shoulder and upper arms, Disp: 75 g, Rfl: 4    testosterone 1 % (25 mg/2.5gram) GlPk, Place onto the skin., Disp: , Rfl:     timolol  maleate 0.5% (TIMOPTIC-XE) 0.5 % SolG, INSTILL 1 DROP IN BOTH EYES EVERY MORNING, Disp: 5 mL, Rfl: 12    vitamin E 1000 UNIT capsule, Take 1,000 Units by mouth once daily., Disp: , Rfl:     pneumococcal 23-gary ps (PNEUMOVAX 23) 25 mcg/0.5 mL, Inject 0.5 mLs into the muscle. (Patient not taking: Reported on 1/27/2020), Disp: 0.5 mL, Rfl: 0     Review of Systems  Review of Systems   Constitutional: Negative for chills and fever.   HENT: Negative for nosebleeds.    Eyes: Negative for blurred vision.   Respiratory: Negative for hemoptysis.    Cardiovascular: Negative for chest pain and palpitations.   Gastrointestinal: Negative for heartburn and vomiting.   Genitourinary: Negative for hematuria.   Musculoskeletal: Positive for back pain (Mild). Negative for myalgias.   Skin: Negative for rash.   Neurological: Negative for tingling, seizures and loss of consciousness.   Endo/Heme/Allergies: Does not bruise/bleed easily.   Psychiatric/Behavioral: Negative for hallucinations. The patient does not have insomnia.        Medical History  Past Medical History:   Diagnosis Date    Allergic rhinitis due to animal (cat) (dog) hair and dander 3/20/2014    Allergy     Anemia     Anxiety     Basal cell carcinoma     BPH (benign prostatic hypertrophy)     Family history of colon cancer 3/20/2014    Family history of ischemic heart disease 3/20/2014    HLD (hyperlipidemia)     HTN (hypertension)     IFG (impaired fasting glucose)     Lumbar spondylosis 6/7/2019    Primary open angle glaucoma     Status post total knee replacement 3/20/2014        Surgical History  Past Surgical History:   Procedure Laterality Date    CATARACT EXTRACTION W/  INTRAOCULAR LENS IMPLANT Left 02/08/2017    WITH KAHOOK GONIOTOMY (DR. WHITLEY)    CATARACT EXTRACTION W/  INTRAOCULAR LENS IMPLANT Right 05/10/2017        EYE SURGERY      HERNIA REPAIR      x 2    JOINT REPLACEMENT      TONSILLECTOMY      TOTAL KNEE  ARTHROPLASTY Right     TRANSFORAMINAL EPIDURAL INJECTION OF STEROID Right 6/25/2019    Procedure: Transforaminal Epidural Steroid Injection, Right, L3-4, L4-5;  Surgeon: Eduard Mukherjee Jr., MD;  Location: [facility];  Service: Pain Management;  Laterality: Right;        Physical Exam  Vitals:    05/06/20 0938   BP: 123/74   Pulse: 64   Weight: 127.9 kg (282 lb)   PainSc:   1     General    Nursing note and vitals reviewed.  Constitutional: He is oriented to person, place, and time. He appears well-developed and well-nourished. No distress.   HENT:   Head: Normocephalic and atraumatic.   Nose: Nose normal.   Eyes: Conjunctivae and EOM are normal. Pupils are equal, round, and reactive to light. Right eye exhibits no discharge. Left eye exhibits no discharge. No scleral icterus.   Neck: No JVD present.   Cardiovascular: Intact distal pulses.    Pulmonary/Chest: Effort normal. No respiratory distress.   Abdominal: He exhibits no distension.   Neurological: He is alert and oriented to person, place, and time. Coordination normal.   Psychiatric: He has a normal mood and affect. His behavior is normal. Judgment and thought content normal.         Back (L-Spine & T-Spine) / Neck (C-Spine) Exam     Back (L-Spine & T-Spine) Range of Motion   Extension: abnormal Back extension: There is decreased range of motion in the lumbar spine with extension and flexion; however, there is no association with increased pain at this.   Flexion: abnormal         Imaging  MRI lumbar spine 04/26/2019  There is no accompanying radiology report.  My review of the images is significant for advanced degenerative disc disease in the lumbar spine most notably at L5-S1, L4-5, and L3-4.  There is associated  neural foraminal stenosis at these levels.  Lumbar facet arthropathy also contributes to neural foraminal stenosis.  Neural foraminal stenosis is worst at right L3-4 and L4-5.    Labs:  BMP  Lab Results   Component Value Date      10/04/2019    K 3.8 10/04/2019     10/04/2019    CO2 27 10/04/2019    BUN 23 10/04/2019    CREATININE 0.9 10/04/2019    CALCIUM 10.1 10/04/2019    ANIONGAP 12 10/04/2019    ESTGFRAFRICA >60 10/04/2019    EGFRNONAA >60 10/04/2019     Lab Results   Component Value Date    ALT 27 10/04/2019    AST 27 10/04/2019    ALKPHOS 46 (L) 10/04/2019    BILITOT 0.4 10/04/2019       Assessment:  Problem List Items Addressed This Visit        Neuro    Lumbar radiculopathy - Primary    Lumbar spondylosis       Orthopedic    Chronic bilateral low back pain with right-sided sciatica    Neuroforaminal stenosis of lumbar spine       Other    Class 3 severe obesity due to excess calories with body mass index (BMI) of 40.0 to 44.9 in adult          09/04/2019 - Edis Huggins Jr. is a 74 y.o. male with chronic low back pain with right-sided radiculopathy which responded extremely well to a right L3-4 and L4-5 TFESI on 06/25/2019.  Patient experienced 85% relief.  Patient was instructed to contact our clinic when the pain relief against to wear off as we can schedule him directly for another injection given that he is already experienced 10 weeks of relief.    05/06/[id number]-jowh-old male presents with chronic low back and right-sided radiculopathy, he is status post right L3-4 and L4-5 TF SEB with 85% relief for approximately 6 months of relief, he presents today requesting to repeat his injection, he informed me that his right low back and leg pain increased over the last few weeks, pain is worse when walking he reports no pain while sleeping or sitting,   previous imaging was reviewed and discussed with the patient today, recommended repeating a right L3/4 and L4/5 TFESI, pt agreed and understood.         Plan & Recommendations  Procedures: s/f a repeat the right L3-4 and L4-5 TFESI   Medications: No medications changes recommended at this time.  Imaging: No additional imaging required at this time.  PT/OT:  No additional  physical therapy is recommended at this time, but the patient is interested.  I will give him a handout of exercises for the back to taken today.  If he has difficulty completing those exercises, I will submit a referral to physical therapy to assist him with establishing an appropriate home exercise regimen  HEP: I encouraged the patient to maintain a home exercise regimen that includes daily, moderate cardiovascular exercise lasting at least 30 minutes.  This may include yoga, orly chi, walking, swimming, aqua aerobics, or other exercises that maintain a heart rate of 50-70% of the calculated maximum heart rate.  I also encouraged light, daily stretching focused on the target area.  Follow Up: RTC 2-3 weeks     DIANNE Bermudez  Interventional Pain Management    Disclaimer: This note was partly generated using dictation software which may occasionally result in transcription errors.

## 2020-05-06 NOTE — TELEPHONE ENCOUNTER
Pt scheduled for 5/26/20 at 0815 for Right TFESI. Pt aware to check in at registration desk on the first floor of the hospital for 0715 am. Pt is taking Xarelto. Sent med clearance request to Dr. Bello. Pt aware we will contact him to advise him when to hold medications.'

## 2020-05-08 ENCOUNTER — PATIENT MESSAGE (OUTPATIENT)
Dept: INTERNAL MEDICINE | Facility: CLINIC | Age: 75
End: 2020-05-08

## 2020-05-09 ENCOUNTER — TELEPHONE (OUTPATIENT)
Dept: INTERNAL MEDICINE | Facility: CLINIC | Age: 75
End: 2020-05-09

## 2020-05-09 DIAGNOSIS — I10 ESSENTIAL HYPERTENSION: ICD-10-CM

## 2020-05-09 DIAGNOSIS — Z12.5 PROSTATE CANCER SCREENING: Primary | ICD-10-CM

## 2020-05-09 DIAGNOSIS — R73.01 IMPAIRED FASTING GLUCOSE: ICD-10-CM

## 2020-05-09 DIAGNOSIS — E78.5 HYPERLIPIDEMIA, UNSPECIFIED HYPERLIPIDEMIA TYPE: ICD-10-CM

## 2020-05-09 DIAGNOSIS — E29.1 MALE HYPOGONADISM: ICD-10-CM

## 2020-05-10 NOTE — TELEPHONE ENCOUNTER
Orders placed for labs. Please call to schedule patient fasting labs 3-7 days prior to his visit with me later this month. I have sent the patient a [facility] message.

## 2020-05-12 ENCOUNTER — LAB VISIT (OUTPATIENT)
Dept: LAB | Facility: HOSPITAL | Age: 75
End: 2020-05-12
Attending: INTERNAL MEDICINE
Payer: COMMERCIAL

## 2020-05-12 DIAGNOSIS — E29.1 MALE HYPOGONADISM: ICD-10-CM

## 2020-05-12 DIAGNOSIS — I10 ESSENTIAL HYPERTENSION: ICD-10-CM

## 2020-05-12 DIAGNOSIS — R73.01 IMPAIRED FASTING GLUCOSE: ICD-10-CM

## 2020-05-12 DIAGNOSIS — Z12.5 PROSTATE CANCER SCREENING: ICD-10-CM

## 2020-05-12 DIAGNOSIS — E78.5 HYPERLIPIDEMIA, UNSPECIFIED HYPERLIPIDEMIA TYPE: ICD-10-CM

## 2020-05-12 LAB
ALBUMIN SERPL BCP-MCNC: 4 G/DL (ref 3.5–5.2)
ALP SERPL-CCNC: 55 U/L (ref 55–135)
ALT SERPL W/O P-5'-P-CCNC: 20 U/L (ref 10–44)
ANION GAP SERPL CALC-SCNC: 10 MMOL/L (ref 8–16)
AST SERPL-CCNC: 18 U/L (ref 10–40)
BILIRUB SERPL-MCNC: 0.4 MG/DL (ref 0.1–1)
BUN SERPL-MCNC: 14 MG/DL (ref 8–23)
CALCIUM SERPL-MCNC: 9.7 MG/DL (ref 8.7–10.5)
CHLORIDE SERPL-SCNC: 104 MMOL/L (ref 95–110)
CHOLEST SERPL-MCNC: 150 MG/DL (ref 120–199)
CHOLEST/HDLC SERPL: 4.2 {RATIO} (ref 2–5)
CO2 SERPL-SCNC: 26 MMOL/L (ref 23–29)
COMPLEXED PSA SERPL-MCNC: 1.2 NG/ML (ref 0–4)
CREAT SERPL-MCNC: 0.9 MG/DL (ref 0.5–1.4)
ERYTHROCYTE [DISTWIDTH] IN BLOOD BY AUTOMATED COUNT: 12.8 % (ref 11.5–14.5)
EST. GFR  (AFRICAN AMERICAN): >60 ML/MIN/1.73 M^2
EST. GFR  (NON AFRICAN AMERICAN): >60 ML/MIN/1.73 M^2
ESTIMATED AVG GLUCOSE: 114 MG/DL (ref 68–131)
GLUCOSE SERPL-MCNC: 107 MG/DL (ref 70–110)
HBA1C MFR BLD HPLC: 5.6 % (ref 4–5.6)
HCT VFR BLD AUTO: 38.3 % (ref 40–54)
HDLC SERPL-MCNC: 36 MG/DL (ref 40–75)
HDLC SERPL: 24 % (ref 20–50)
HGB BLD-MCNC: 12.5 G/DL (ref 14–18)
LDLC SERPL CALC-MCNC: 71.2 MG/DL (ref 63–159)
MCH RBC QN AUTO: 31.2 PG (ref 27–31)
MCHC RBC AUTO-ENTMCNC: 32.6 G/DL (ref 32–36)
MCV RBC AUTO: 96 FL (ref 82–98)
NONHDLC SERPL-MCNC: 114 MG/DL
PLATELET # BLD AUTO: 207 K/UL (ref 150–350)
PMV BLD AUTO: 11.5 FL (ref 9.2–12.9)
POTASSIUM SERPL-SCNC: 4.1 MMOL/L (ref 3.5–5.1)
PROT SERPL-MCNC: 7.7 G/DL (ref 6–8.4)
RBC # BLD AUTO: 4.01 M/UL (ref 4.6–6.2)
SODIUM SERPL-SCNC: 140 MMOL/L (ref 136–145)
TESTOST SERPL-MCNC: 467 NG/DL (ref 304–1227)
TRIGL SERPL-MCNC: 214 MG/DL (ref 30–150)
WBC # BLD AUTO: 8.01 K/UL (ref 3.9–12.7)

## 2020-05-12 PROCEDURE — 85027 COMPLETE CBC AUTOMATED: CPT

## 2020-05-12 PROCEDURE — 80053 COMPREHEN METABOLIC PANEL: CPT

## 2020-05-12 PROCEDURE — 84153 ASSAY OF PSA TOTAL: CPT

## 2020-05-12 PROCEDURE — 84403 ASSAY OF TOTAL TESTOSTERONE: CPT

## 2020-05-12 PROCEDURE — 80061 LIPID PANEL: CPT

## 2020-05-12 PROCEDURE — 36415 COLL VENOUS BLD VENIPUNCTURE: CPT

## 2020-05-12 PROCEDURE — 83036 HEMOGLOBIN GLYCOSYLATED A1C: CPT

## 2020-05-13 ENCOUNTER — PATIENT MESSAGE (OUTPATIENT)
Dept: INTERNAL MEDICINE | Facility: CLINIC | Age: 75
End: 2020-05-13

## 2020-05-19 ENCOUNTER — CLINICAL SUPPORT (OUTPATIENT)
Dept: OPHTHALMOLOGY | Facility: CLINIC | Age: 75
End: 2020-05-19
Payer: COMMERCIAL

## 2020-05-19 ENCOUNTER — OFFICE VISIT (OUTPATIENT)
Dept: OPHTHALMOLOGY | Facility: CLINIC | Age: 75
End: 2020-05-19
Payer: COMMERCIAL

## 2020-05-19 DIAGNOSIS — H40.1122 PRIMARY OPEN ANGLE GLAUCOMA OF LEFT EYE, MODERATE STAGE: ICD-10-CM

## 2020-05-19 DIAGNOSIS — H40.1111 PRIMARY OPEN-ANGLE GLAUCOMA, RIGHT EYE, MILD STAGE: ICD-10-CM

## 2020-05-19 DIAGNOSIS — H40.043 STEROID RESPONDERS, BILATERAL: ICD-10-CM

## 2020-05-19 DIAGNOSIS — H40.1122 PRIMARY OPEN ANGLE GLAUCOMA OF LEFT EYE, MODERATE STAGE: Primary | ICD-10-CM

## 2020-05-19 DIAGNOSIS — H21.562 PUPIL IRREGULAR OF LEFT EYE: ICD-10-CM

## 2020-05-19 DIAGNOSIS — Z96.1 PSEUDOPHAKIA, BOTH EYES: ICD-10-CM

## 2020-05-19 DIAGNOSIS — H26.491 PCO (POSTERIOR CAPSULAR OPACIFICATION), RIGHT: ICD-10-CM

## 2020-05-19 PROCEDURE — 99999 PR PBB SHADOW E&M-EST. PATIENT-LVL I: CPT | Mod: PBBFAC,,,

## 2020-05-19 PROCEDURE — 92133 POSTERIOR SEGMENT OCT OPTIC NERVE(OCULAR COHERENCE TOMOGRAPHY) - OU - BOTH EYES: ICD-10-PCS | Mod: S$GLB,,, | Performed by: OPHTHALMOLOGY

## 2020-05-19 PROCEDURE — 99999 PR PBB SHADOW E&M-EST. PATIENT-LVL II: ICD-10-PCS | Mod: PBBFAC,,, | Performed by: OPHTHALMOLOGY

## 2020-05-19 PROCEDURE — 92083 EXTENDED VISUAL FIELD XM: CPT | Mod: S$GLB,,, | Performed by: OPHTHALMOLOGY

## 2020-05-19 PROCEDURE — 92083 HUMPHREY VISUAL FIELD - OU - BOTH EYES: ICD-10-PCS | Mod: S$GLB,,, | Performed by: OPHTHALMOLOGY

## 2020-05-19 PROCEDURE — 92133 CPTRZD OPH DX IMG PST SGM ON: CPT | Mod: S$GLB,,, | Performed by: OPHTHALMOLOGY

## 2020-05-19 PROCEDURE — 92014 COMPRE OPH EXAM EST PT 1/>: CPT | Mod: S$GLB,,, | Performed by: OPHTHALMOLOGY

## 2020-05-19 PROCEDURE — 99999 PR PBB SHADOW E&M-EST. PATIENT-LVL I: ICD-10-PCS | Mod: PBBFAC,,,

## 2020-05-19 PROCEDURE — 92014 PR EYE EXAM, EST PATIENT,COMPREHESV: ICD-10-PCS | Mod: S$GLB,,, | Performed by: OPHTHALMOLOGY

## 2020-05-19 PROCEDURE — 99999 PR PBB SHADOW E&M-EST. PATIENT-LVL II: CPT | Mod: PBBFAC,,, | Performed by: OPHTHALMOLOGY

## 2020-05-19 NOTE — PROGRESS NOTES
HPI     DLS: 1/16/20    Pt here for HVF review;  S/P YAG CAP OD- 1/16/20    Meds:   T1/2 GFS QAM OU   AT's PRN OU    1) POAG   2) PCIOL OU   3) Steroid Responder   4) APD OS   5) PAS OS     Last edited by Millie Lakhani on 5/19/2020  3:54 PM. (History)            Assessment /Plan     For exam results, see Encounter Report.    Primary open angle glaucoma of left eye, moderate stage  -     Posterior Segment OCT Optic Nerve- Both eyes    Primary open-angle glaucoma, right eye, mild stage  -     Posterior Segment OCT Optic Nerve- Both eyes    PCO (posterior capsular opacification), right    Steroid responders, bilateral    Pupil irregular of left eye    Pseudophakia, both eyes            Pt is switching from  [place] -  Back to [facility] - bring photo file over   Lost to F/u from 11/4/2013 to 8/11/2015   Knows Sharlene Mo and MIKHAIL - use to work with them     1. POAG (primary open-angle glaucoma) - Both Eyes   Old pt of [facility] [id number]  Began at [facility] 2012       POAG OS>OD           Family history    neg        Glaucoma meds   T1/2 GFS ou (use to use travatan- off post CE)         H/O adverse rxn to glaucoma drops    none        LASERS    SLT os 9/26/2013 -( good resp 17 --> 14)        GLAUCOMA SURGERIES    trabectome os (combined with phaco - 2/8/2017        OTHER EYE SURGERIES    Combined - phaco/IOL / goniotomy - trabectome os 2/8/2017 - +heme and increae in IOP)                                                 PC IOL OD - 5/10/2017         CDR    0.6/0.7-0.8        Tbase    20-23/21-24          Tmax    23/24            Ttarget    17/17           HVF   6 tests 9448-2215 OD essentially full; OS Sup paracentral defect, [facility]             (([facility])) - 5 test 2012 to 2020 - full od // sup paracentral defect / inferior paracentral defect         Gonio    +4 ou        CCT    598/598        OCT    5 test 2012 to 2020  - RNFL - nl od // bord G/TS/T         HRT    7 test-2012 to 2020  - MR -   "[place] off  od // dec T, bord I os /// CDR xxx od // 0.64 os        Disc photos    2012 - IOS    - Ttoday  15//14  - Test done today   IOP / OCT and HVF       2. Steroid responders - Both Eyes  2/2 nasacort      3. ? Trace APD os      4. S/P Rt knee replacement Feb 2013  -doing well      5.  Myopia / presbyopia ou     6. Irregular pupil - w/ PAS   -2/2 very high IOP POD # 1 post phaco     7.  PC IOL OU  OD 5/10/2017 - PCB00 14.5  OS 2/8/2017 - + hyphema and high IOP 2/2 trabectome - irregular pupil post op - PCB00 14.0    8. PCO od - vis sign   rec yag cap       Plan  CSM  IOP below target OU  SLT done OS 9/26/2013 - good initial resp 24-->17    -( if responds well consider SLT od - but full VF and helathy ON still od)  HVF stable OU    Cont timolol gfs ou q day     If the glare from the fixed dilated pupil bothers him a lot post 2nd eye getting done - consider pupil surgery with dr Peacock to "purse string it " smaller - pt is doing ok for now - glare is not bothering him too much     YAG CAP OD - 1/16/2020 - STEROID TAPER - MAXITROL     F/U 4 months with [facility] or [facility] (? Photo file at [facility] still)     I have seen and personally examined the patient.  I agree with the findings, assessment and plan of the resident and/or fellow.     Deborah Khan MD                 "

## 2020-05-21 ENCOUNTER — OFFICE VISIT (OUTPATIENT)
Dept: INTERNAL MEDICINE | Facility: CLINIC | Age: 75
End: 2020-05-21
Payer: COMMERCIAL

## 2020-05-21 ENCOUNTER — CLINICAL SUPPORT (OUTPATIENT)
Dept: INTERNAL MEDICINE | Facility: CLINIC | Age: 75
End: 2020-05-21
Payer: COMMERCIAL

## 2020-05-21 VITALS — WEIGHT: 289.88 LBS | BODY MASS INDEX: 40.45 KG/M2

## 2020-05-21 VITALS
HEART RATE: 61 BPM | SYSTOLIC BLOOD PRESSURE: 124 MMHG | BODY MASS INDEX: 40.58 KG/M2 | DIASTOLIC BLOOD PRESSURE: 74 MMHG | OXYGEN SATURATION: 98 % | WEIGHT: 289.88 LBS | HEIGHT: 71 IN

## 2020-05-21 DIAGNOSIS — G47.33 OSA ON CPAP: ICD-10-CM

## 2020-05-21 DIAGNOSIS — Z23 NEED FOR SHINGLES VACCINE: ICD-10-CM

## 2020-05-21 DIAGNOSIS — Z00.00 ANNUAL PHYSICAL EXAM: Primary | ICD-10-CM

## 2020-05-21 DIAGNOSIS — H40.1122 PRIMARY OPEN ANGLE GLAUCOMA OF LEFT EYE, MODERATE STAGE: ICD-10-CM

## 2020-05-21 DIAGNOSIS — E29.1 MALE HYPOGONADISM: ICD-10-CM

## 2020-05-21 PROCEDURE — 99999 PR PBB SHADOW E&M-EST. PATIENT-LVL I: CPT | Mod: PBBFAC,,,

## 2020-05-21 PROCEDURE — 3078F PR MOST RECENT DIASTOLIC BLOOD PRESSURE < 80 MM HG: ICD-10-PCS | Mod: CPTII,S$GLB,, | Performed by: INTERNAL MEDICINE

## 2020-05-21 PROCEDURE — 99397 PR PREVENTIVE VISIT,EST,65 & OVER: ICD-10-PCS | Mod: S$GLB,,, | Performed by: INTERNAL MEDICINE

## 2020-05-21 PROCEDURE — 3074F SYST BP LT 130 MM HG: CPT | Mod: CPTII,S$GLB,, | Performed by: INTERNAL MEDICINE

## 2020-05-21 PROCEDURE — 99999 PR PBB SHADOW E&M-EST. PATIENT-LVL IV: ICD-10-PCS | Mod: PBBFAC,,, | Performed by: INTERNAL MEDICINE

## 2020-05-21 PROCEDURE — 99999 PR PBB SHADOW E&M-EST. PATIENT-LVL I: ICD-10-PCS | Mod: PBBFAC,,,

## 2020-05-21 PROCEDURE — 99999 PR PBB SHADOW E&M-EST. PATIENT-LVL IV: CPT | Mod: PBBFAC,,, | Performed by: INTERNAL MEDICINE

## 2020-05-21 PROCEDURE — 3078F DIAST BP <80 MM HG: CPT | Mod: CPTII,S$GLB,, | Performed by: INTERNAL MEDICINE

## 2020-05-21 PROCEDURE — 3074F PR MOST RECENT SYSTOLIC BLOOD PRESSURE < 130 MM HG: ICD-10-PCS | Mod: CPTII,S$GLB,, | Performed by: INTERNAL MEDICINE

## 2020-05-21 PROCEDURE — 99397 PER PM REEVAL EST PAT 65+ YR: CPT | Mod: S$GLB,,, | Performed by: INTERNAL MEDICINE

## 2020-05-21 RX ORDER — TIMOLOL MALEATE 5 MG/ML
1 SOLUTION OPHTHALMIC EVERY MORNING
Qty: 5 ML | Refills: 12 | Status: SHIPPED | OUTPATIENT
Start: 2020-05-21 | End: 2021-08-12 | Stop reason: SDUPTHER

## 2020-05-21 NOTE — PROGRESS NOTES
Health  Follow-Up Note   [x] Office  [] Phone  Notes from previous session reviewed.   [x] Previous Session Goals unchanged.   [] Patient/Caregiver Change in Goals.  Goals added or changed by Patient/Caregiver since program participation:  1. Started using CPAP machine  2. Using testosterone gel to help raise testosterone  3. Stay away from covid and not get       Additional/Changed support that patient/caregiver has experienced/sought?  (Indicate readiness, support from family, friends, others, community groups, etc)  1.  Wife    Additional/Changed obstacles that could prevent patient/caregiver from reaching their goals?  1.  Staying home and trying to stay away from the corona virus not getting out   2. Pain from sciatica got after walking getting shots next week    Feedback provided:  1.  Praised for continued effort and determination.    Diagnostic values/Desriptors for follow-up as needed for chronic condition(s)   Weight: 131.5 kg 289.91 lb gain 6.5 lbs  Pain: Sciatica pain in hips    Interventions:   1. Health  listened reflectively, validated thoughts and feelings, offered support and encouragement.   2. Allowed patient to express themselves in a non-biased atmosphere.  3. Health  assisted pt to problem-solve obstacles such as being in a challenging environment and dealing with these challenges.   4. Motivational Interviewed interventions utilized (OARS).   5. Patient responded favorably to interventions and remained actively engaged in the session.   6. Health  will remain available and connected for patient by phone and/or office visits.   7. Positive reinforcement, emotional support and encouragement provided.   8. Focused Education: MI    Plan:  [x] Pt will work on goals as stated above.   [x] Pt will contact Health  for any questions, concerns or needs.  [x] Pt will follow up with Health  in office on  Will call for appt  [] Pt will follow up with Health  on phone in:         [x] Health  will remain available.   [] Health  will contact patient by phone in:        [] Health  will consult:        [] Health  will inform Provider via EPIC messaging.     Impression:  1. Behavior is consistent with Action Stage of Change.   2. Participation level:       [x] Receptive      [x] Interactive      [] Guarded and Resistant      [x] Self Motivated      [] Refused/Declined to participate   3. [x] Pt voiced understanding of all information presented.       [] Pt voiced needing further information/education. This will be arranged.       [x] Pt would benefit from further education/information as identified by this health . This will be arranged.     Sandy Moses RN HC

## 2020-05-21 NOTE — PROGRESS NOTES
Subjective:       Patient ID: Edis Huggins Jr. is a 75 y.o. male.    Chief Complaint: Follow-up    Last visit with me 1/27/2020. Since then seen by Cardiology, Pain Management, and Ophthalmology. Has upcoming appointment for Pain Management, Hematology.    Working from home. CPAP working well. Used to using it, uses every night for over 4 hrs. Reports sleeping more, 9 hrs/night.     The testosterone is working well. No adverse side effects.    Shortness of Breath   This is a chronic problem. The current episode started more than 1 year ago. The problem occurs intermittently. The problem has been gradually improving. The average episode lasts 10 minutes. Associated symptoms include claudication, leg swelling and neck pain. Pertinent negatives include no abdominal pain, chest pain, coryza, ear pain, fever, headaches, hemoptysis, leg pain, orthopnea, PND, rash, rhinorrhea, sore throat, sputum production, swollen glands, syncope, vomiting or wheezing. The symptoms are aggravated by exercise. The patient has no known risk factors for DVT/PE. He has tried nothing for the symptoms. The treatment provided mild relief. There is no history of allergies, aspirin allergies, asthma, bronchiolitis, CAD, chronic lung disease, COPD, DVT, a heart failure, PE, pneumonia or a recent surgery.     Review of Systems   Constitutional: Negative for fever.   HENT: Negative for ear pain, rhinorrhea and sore throat.    Respiratory: Positive for shortness of breath. Negative for hemoptysis, sputum production and wheezing.    Cardiovascular: Positive for claudication and leg swelling. Negative for chest pain, orthopnea, syncope and PND.   Gastrointestinal: Negative for abdominal pain and vomiting.   Musculoskeletal: Positive for neck pain.   Skin: Negative for rash.   Neurological: Negative for headaches.       Objective:      Physical Exam   Constitutional: He is oriented to person, place, and time. No distress.   HENT:   Head:  "Atraumatic.   Right Ear: External ear normal.   Left Ear: External ear normal.   Wearing hearing aids bilaterally    Eyes: Right eye exhibits no discharge. Left eye exhibits no discharge.   Pupil irregularity on L eye    Neck: Normal range of motion. No thyromegaly present.   Cardiovascular: Normal rate and regular rhythm.   Murmur heard.   Systolic (LUSB) murmur is present with a grade of 1/6.  Pulmonary/Chest: Effort normal and breath sounds normal. No stridor. He has no wheezes. He has no rales.   Abdominal: Soft. There is no tenderness. There is no guarding.   Musculoskeletal: He exhibits edema (soft mild pitting edema in b/L LE near ankles). He exhibits no tenderness.   Lymphadenopathy:     He has no cervical adenopathy.   Neurological: He is alert and oriented to person, place, and time.   Skin: Skin is warm and dry. No rash noted.   Psychiatric: He has a normal mood and affect. His behavior is normal.   Nursing note and vitals reviewed.      Vitals:    05/21/20 1614   BP: 124/74   BP Location: Right arm   Patient Position: Sitting   BP Method: Medium (Manual)   Pulse: 61   SpO2: 98%   Weight: 131.5 kg (289 lb 14.5 oz)   Height: 5' 11" (1.803 m)     Body mass index is 40.43 kg/m².    RESULTS: Reviewed labs from last 12 months    Assessment:       1. Annual physical exam    2. Need for shingles vaccine    3. Male hypogonadism    4. CARMELA on CPAP        Plan:   Edis was seen today for follow-up.    Diagnoses and all orders for this visit:    Annual physical exam:  Age-appropriate health screening reviewed, indicated tests ordered.     Need for shingles vaccine  -     Ambulatory referral/consult to Infectious Disease Injection Dept; Future  -     varicella-zoster [id number], PF, (SHINGRIX) 50 mcg/0.5 mL injection; Inject 0.5 mLs into the muscle once. for 1 dose    Male hypogonadism:  Prior diagnosis, stable, well controlled on current management. No changes at this time, will continue to monitor.   -     PSA, " Screening; Future  -     Testosterone; Future    CARMELA on CPAP:  Prior diagnosis, started CPAP with good results and is now using regularly. well controlled on current management. No changes at this time, will continue to monitor.     Follow up in about 6 months (around 11/21/2020) for follow up visit.   Forest Miles MD  Internal Medicine    Portions of this note were completed using medical dictation software. Please excuse typographical or syntax errors that were missed on review.

## 2020-05-21 NOTE — PATIENT INSTRUCTIONS
Please call the DME company to see about making sure the data collected is being appropriately reviewed, and making sure that they will be sending orders for new supplies to my office to sign.

## 2020-05-25 ENCOUNTER — LAB VISIT (OUTPATIENT)
Dept: FAMILY MEDICINE | Facility: CLINIC | Age: 75
End: 2020-05-25
Payer: COMMERCIAL

## 2020-05-25 DIAGNOSIS — Z01.818 PREOP TESTING: ICD-10-CM

## 2020-05-25 PROCEDURE — U0003 INFECTIOUS AGENT DETECTION BY NUCLEIC ACID (DNA OR RNA); SEVERE ACUTE RESPIRATORY SYNDROME CORONAVIRUS 2 (SARS-COV-2) (CORONAVIRUS DISEASE [COVID-19]), AMPLIFIED PROBE TECHNIQUE, MAKING USE OF HIGH THROUGHPUT TECHNOLOGIES AS DESCRIBED BY CMS-2020-01-R: HCPCS

## 2020-05-25 NOTE — DISCHARGE INSTRUCTIONS
Home Care Instructions Pain Management:    1.  DIET:    You may resume your normal diet today.    2.  BATHING:    You may shower with luke warm water.    3.  DRESSING:    You may remove your bandage today.    4.  ACTIVITY LEVEL:      You may resume your normal activities 24 hours after your procedure.    5.  MEDICATIONS:    You may resume your normal medications today.    6.  SPECIAL INSTRUCTIONS:    No heat to the injection site for 24 hours including bath or shower, heating pad, moist heat or hot tubs.    Use an ice pack to the injection site for any pain or discomfort.  Apply ice packs for 20 minute intervals as needed.    If you have received any sedatives by mouth today, you can not drive for 12 hours.    If you have received sedation through an IV, you can not drive for 24 hours.    PLEASE CALL YOUR DOCTOR FOR THE FOLLOWIN.  Redness or swelling around the injection site.  2.  Fever of 101 degrees.  3.  Drainage (pus) from the injection site.  4.  For any continuous bleeding (some dried blood over the incision is normal.)    FOR EMERGENCIES:    If any unusual problems or difficulties occur during clinic hours, call [phone] or dial [phone].    Follow up with with your physician in 2-3 weeks.

## 2020-05-26 ENCOUNTER — HOSPITAL ENCOUNTER (OUTPATIENT)
Facility: HOSPITAL | Age: 75
Discharge: HOME | End: 2020-05-26
Attending: PAIN MEDICINE | Admitting: PAIN MEDICINE
Payer: COMMERCIAL

## 2020-05-26 VITALS
OXYGEN SATURATION: 95 % | HEIGHT: 71 IN | SYSTOLIC BLOOD PRESSURE: 103 MMHG | RESPIRATION RATE: 15 BRPM | WEIGHT: 288 LBS | TEMPERATURE: 98 F | DIASTOLIC BLOOD PRESSURE: 55 MMHG | BODY MASS INDEX: 40.32 KG/M2 | HEART RATE: 46 BPM

## 2020-05-26 DIAGNOSIS — M54.16 LUMBAR RADICULOPATHY: Primary | ICD-10-CM

## 2020-05-26 DIAGNOSIS — G89.29 CHRONIC PAIN: ICD-10-CM

## 2020-05-26 LAB
SARS-COV-2 RDRP RESP QL NAA+PROBE: NEGATIVE
SARS-COV-2 RNA RESP QL NAA+PROBE: NOT DETECTED

## 2020-05-26 PROCEDURE — 64483 NJX AA&/STRD TFRM EPI L/S 1: CPT | Performed by: PAIN MEDICINE

## 2020-05-26 PROCEDURE — U0002 COVID-19 LAB TEST NON-CDC: HCPCS

## 2020-05-26 PROCEDURE — 64484 NJX AA&/STRD TFRM EPI L/S EA: CPT | Mod: RT,,, | Performed by: PAIN MEDICINE

## 2020-05-26 PROCEDURE — 64483 PR EPIDURAL INJ, ANES/STEROID, TRANSFORAMINAL, LUMB/SACR, SNGL LEVL: ICD-10-PCS | Mod: RT,,, | Performed by: PAIN MEDICINE

## 2020-05-26 PROCEDURE — 99152 MOD SED SAME PHYS/QHP 5/>YRS: CPT | Performed by: PAIN MEDICINE

## 2020-05-26 PROCEDURE — 25500020 PHARM REV CODE 255: Performed by: PAIN MEDICINE

## 2020-05-26 PROCEDURE — 64484 NJX AA&/STRD TFRM EPI L/S EA: CPT | Performed by: PAIN MEDICINE

## 2020-05-26 PROCEDURE — 25000003 PHARM REV CODE 250: Performed by: PAIN MEDICINE

## 2020-05-26 PROCEDURE — 64483 NJX AA&/STRD TFRM EPI L/S 1: CPT | Mod: RT,,, | Performed by: PAIN MEDICINE

## 2020-05-26 PROCEDURE — 64484 PRA INJECT ANES/STEROID FORAMEN LUMBAR/SACRAL W IMG GUIDE ,EA ADD LEVEL: ICD-10-PCS | Mod: RT,,, | Performed by: PAIN MEDICINE

## 2020-05-26 PROCEDURE — 63600175 PHARM REV CODE 636 W HCPCS: Performed by: PAIN MEDICINE

## 2020-05-26 RX ORDER — INDOMETHACIN 25 MG/1
CAPSULE ORAL
Status: DISCONTINUED | OUTPATIENT
Start: 2020-05-26 | End: 2020-05-26 | Stop reason: HOSPADM

## 2020-05-26 RX ORDER — FENTANYL CITRATE 50 UG/ML
INJECTION, SOLUTION INTRAMUSCULAR; INTRAVENOUS
Status: DISCONTINUED | OUTPATIENT
Start: 2020-05-26 | End: 2020-05-26 | Stop reason: HOSPADM

## 2020-05-26 RX ORDER — BUPIVACAINE HYDROCHLORIDE 2.5 MG/ML
INJECTION, SOLUTION EPIDURAL; INFILTRATION; INTRACAUDAL
Status: DISCONTINUED | OUTPATIENT
Start: 2020-05-26 | End: 2020-05-26 | Stop reason: HOSPADM

## 2020-05-26 RX ORDER — LIDOCAINE HYDROCHLORIDE 10 MG/ML
INJECTION INFILTRATION; PERINEURAL
Status: DISCONTINUED | OUTPATIENT
Start: 2020-05-26 | End: 2020-05-26 | Stop reason: HOSPADM

## 2020-05-26 RX ORDER — SODIUM CHLORIDE 9 MG/ML
500 INJECTION, SOLUTION INTRAVENOUS CONTINUOUS
Status: DISCONTINUED | OUTPATIENT
Start: 2020-05-26 | End: 2021-04-14

## 2020-05-26 RX ORDER — MIDAZOLAM HYDROCHLORIDE 1 MG/ML
INJECTION, SOLUTION INTRAMUSCULAR; INTRAVENOUS
Status: DISCONTINUED | OUTPATIENT
Start: 2020-05-26 | End: 2020-05-26 | Stop reason: HOSPADM

## 2020-05-26 RX ORDER — LIDOCAINE HYDROCHLORIDE 10 MG/ML
1 INJECTION, SOLUTION EPIDURAL; INFILTRATION; INTRACAUDAL; PERINEURAL ONCE
Status: DISCONTINUED | OUTPATIENT
Start: 2020-05-26 | End: 2021-04-14

## 2020-05-26 RX ORDER — DEXAMETHASONE SODIUM PHOSPHATE 10 MG/ML
INJECTION INTRAMUSCULAR; INTRAVENOUS
Status: DISCONTINUED | OUTPATIENT
Start: 2020-05-26 | End: 2020-05-26 | Stop reason: HOSPADM

## 2020-05-26 NOTE — PROGRESS NOTES
MD Arrival Time:0917  Sedation Start Time:0917  Sedation End Time:0926  MD Departure Time:0926

## 2020-05-26 NOTE — DISCHARGE SUMMARY
[facility]  Discharge Note  Short Stay     Admit Date: 5/26/2020    Discharge Date: 5/26/2020     Attending Physician: Eduard Mukherjee Jr, MD    Diagnoses:  Active Hospital Problems    Diagnosis  POA    Chronic pain [G89.29]  Yes      Resolved Hospital Problems   No resolved problems to display.     Discharged Condition: Good     Hospital Course: Patient was admitted for an outpatient interventional pain management procedure and tolerated the procedure well with no complications.     Final Diagnoses: Same as principal problem.     Disposition: Home or Self Care     Follow up/Patient Instructions:    Follow-up Information     Eduard Mukherjee Jr, MD. Go in 2 weeks.    Specialty:  Pain Medicine  Why:  Post-procedural Follow Up As Scheduled, Call to make an appointment if you do not have one  Contact information:  [address]  [address]  [place] [id number] [phone]                   Reconciled Medications:     Medication List      CONTINUE taking these medications    acyclovir 400 MG tablet  Commonly known as:  ZOVIRAX  TAKE 1 TABLET BY MOUTH THREE TIMES DAILY FOR 5 DAYS AT SIGN OF FEVER BLISTER     ALIGN 4 mg Cap  Generic drug:  Bifidobacterium infantis  Take 4 mg by mouth once daily.     amLODIPine 10 MG tablet  Commonly known as:  NORVASC  Take 1 tablet (10 mg total) by mouth once daily.     azelastine 137 mcg (0.1 %) nasal spray  Commonly known as:  ASTELIN  1 spray (137 mcg total) by Nasal route 2 (two) times daily.     cetirizine 10 MG tablet  Commonly known as:  ZYRTEC  Take 10 mg by mouth once daily.     citalopram 20 MG tablet  Commonly known as:  CELEXA  Take 1 tablet (20 mg total) by mouth once daily.     cyclobenzaprine 10 MG tablet  Commonly known as:  FLEXERIL  TAKE 1 TABLET(10 MG) BY MOUTH THREE TIMES DAILY AS NEEDED     finasteride 5 mg tablet  Commonly known as:  PROSCAR  TAKE 1 TABLET(5 MG) BY MOUTH EVERY DAY     fish oil-omega-3 fatty acids 300-1,000 mg capsule  Take 2 g by  mouth 3 (three) times daily.     gemfibroziL 600 MG tablet  Commonly known as:  LOPID  TAKE 1 TABLET(600 MG) BY MOUTH TWICE DAILY     * irbesartan 150 MG tablet  Commonly known as:  AVAPRO  TAKE 2 TABLETS(300MG) BY MOUTH EVERY DAY     * irbesartan 300 MG tablet  Commonly known as:  AVAPRO  Take 300 mg by mouth once daily.     metoprolol ta-hydrochlorothiaz 50-25 mg per tablet  Commonly known as:  LOPRESSOR HCT  Take 1 tablet by mouth once daily.     potassium chloride SA 20 MEQ tablet  Commonly known as:  K-DUR,KLOR-CON  Take 1 tablet (20 mEq total) by mouth once daily.     rivaroxaban 20 mg Tab  Commonly known as:  XARELTO  Take 1 tablet (20 mg total) by mouth daily with dinner or evening meal.     tamsulosin 0.4 mg Cap  Commonly known as:  FLOMAX  TAKE 1 CAPSULE(0.4 MG) BY MOUTH EVERY DAY     * testosterone 1 % (25 mg/2.5gram) Glpk  Place onto the skin.     * testosterone 20.25 mg/1.25 gram (1.62 %) Glpm  Commonly known as:  AndroGeL  Apply 2 pumps once daily in the morning to the shoulder and upper arms     timolol maleate 0.5% 0.5 % Solg  Commonly known as:  TIMOPTIC-XE  Place 1 drop into both eyes every morning.     vitamin E 1000 UNIT capsule  Take 1,000 Units by mouth once daily.         * This list has 4 medication(s) that are the same as other medications prescribed for you. Read the directions carefully, and ask your doctor or other care provider to review them with you.               Discharge Procedure Orders (must include Diet, Follow-up, Activity)   Call MD for:  temperature >100.4     Call MD for:  severe uncontrolled pain     Call MD for:  redness, tenderness, or signs of infection (pain, swelling, redness, odor or green/yellow discharge around incision site)     Call MD for:  difficulty breathing or increased cough     Call MD for:  severe persistent headache     Call MD for:  worsening rash     Remove dressing in 24 hours       Eduard Mukherjee Jr, MD  Interventional Pain Medicine /  Anesthesiology

## 2020-05-26 NOTE — INTERVAL H&P NOTE
The patient has been examined and the H&P has been reviewed:    I concur with the findings and no changes have occurred since H&P was written.    Anesthesia/Surgery risks, benefits and alternative options discussed and understood by patient/family.    The procedure is deemed medically urgent to treat severe pain and is performed in accordance with [place] Department of Health guidelines employing appropriate PPE and social distancing.        Active Hospital Problems    Diagnosis  POA    Chronic pain [G89.29]  Yes      Resolved Hospital Problems   No resolved problems to display.

## 2020-05-26 NOTE — OP NOTE
"Procedure Note    Pre-operative Diagnosis: Lumbar Radiculopathy  Post-operative Diagnosis: Lumbar Radiculopathy  Procedure Date: 05/26/2020  Procedure:  (1) Lumbar Transforaminal Epidural Steroid Injection, Two Levels, RIGHT L3-4 and L4-5    (2) Intraoperative fluoroscopy          Indications: To alleviate pain and suffering, and reduce functional impairment associated with Lumbar radiculopathy.      The patients history and physical exam were reviewed. The risks, benefits and alternatives to the procedure were discussed, and all questions were answered to the patients satisfaction. The patient agreed to proceed, and written informed consent was verified.    Procedure in Detail: Using a fenestrated drape and Chloro-prep, the skin was prepared and draped in sterile fashion. The Right L3-4 and L4-5 neural foramena were identified by fluoroscopy by Right lateral oblique angle. A mixture of Lidocaine 1% 4 mL + Sodium Bicarbonate 1 mL was used to anesthetize the skin at the skin entry point and subcutaneous tissue with 25G 1.5" in needle. Then a 22G 5" spinal needle was advanced under intermittent fluoroscopy toward each target point until Kambin's triangle was entered anterolateral to the superior articular process. Fluoroscopy was then inspected from lateral and AP view and needle adjusted appropriately. There was no paresthesia with needle placement. Aspiration was negative for blood and CSF. Omnipaque contrast was injected live in an AP fluoroscopic view at each level demonstrating appropriate needle position with contrast spread into the nerve root sheath and medially into the epidural space without intravascular or intrathecal spread. Next, a mixture 1.5 mL PF Bupivacaine 0.25% and 15 mg dexamethasone (total 3 mL) was divided evenly between the two levels and injected slowly and incrementally. The patient tolerated the procedure without complaint and was transported to the recovery room in stable condition. "     Disposition: The patient tolerated the procedure well, and there were no apparent complications. Vital signs remained stable throughout the procedure. The patient was taken to the recovery area where written discharge instructions for the procedure were given.     Follow-up: RTC as scheduled      Eduard Mukherjee Jr, MD  Interventional Pain Medicine / Anesthesiology

## 2020-06-02 ENCOUNTER — OFFICE VISIT (OUTPATIENT)
Dept: PAIN MEDICINE | Facility: CLINIC | Age: 75
End: 2020-06-02
Payer: COMMERCIAL

## 2020-06-02 VITALS — BODY MASS INDEX: 40.32 KG/M2 | HEIGHT: 71 IN | WEIGHT: 288 LBS

## 2020-06-02 DIAGNOSIS — M54.41 CHRONIC BILATERAL LOW BACK PAIN WITH RIGHT-SIDED SCIATICA: ICD-10-CM

## 2020-06-02 DIAGNOSIS — M48.061 NEUROFORAMINAL STENOSIS OF LUMBAR SPINE: ICD-10-CM

## 2020-06-02 DIAGNOSIS — M54.16 LUMBAR RADICULOPATHY: Primary | ICD-10-CM

## 2020-06-02 DIAGNOSIS — E66.01 CLASS 3 SEVERE OBESITY DUE TO EXCESS CALORIES WITH BODY MASS INDEX (BMI) OF 40.0 TO 44.9 IN ADULT, UNSPECIFIED WHETHER SERIOUS COMORBIDITY PRESENT: ICD-10-CM

## 2020-06-02 DIAGNOSIS — G89.29 CHRONIC BILATERAL LOW BACK PAIN WITH RIGHT-SIDED SCIATICA: ICD-10-CM

## 2020-06-02 DIAGNOSIS — G89.4 CHRONIC PAIN SYNDROME: ICD-10-CM

## 2020-06-02 DIAGNOSIS — M47.816 LUMBAR SPONDYLOSIS: ICD-10-CM

## 2020-06-02 PROCEDURE — 1126F AMNT PAIN NOTED NONE PRSNT: CPT | Mod: S$GLB,,, | Performed by: NURSE PRACTITIONER

## 2020-06-02 PROCEDURE — 99999 PR PBB SHADOW E&M-EST. PATIENT-LVL IV: ICD-10-PCS | Mod: PBBFAC,,, | Performed by: NURSE PRACTITIONER

## 2020-06-02 PROCEDURE — 1159F PR MEDICATION LIST DOCUMENTED IN MEDICAL RECORD: ICD-10-PCS | Mod: S$GLB,,, | Performed by: NURSE PRACTITIONER

## 2020-06-02 PROCEDURE — 99213 OFFICE O/P EST LOW 20 MIN: CPT | Mod: S$GLB,,, | Performed by: NURSE PRACTITIONER

## 2020-06-02 PROCEDURE — 99999 PR PBB SHADOW E&M-EST. PATIENT-LVL IV: CPT | Mod: PBBFAC,,, | Performed by: NURSE PRACTITIONER

## 2020-06-02 PROCEDURE — 1159F MED LIST DOCD IN RCRD: CPT | Mod: S$GLB,,, | Performed by: NURSE PRACTITIONER

## 2020-06-02 PROCEDURE — 99213 PR OFFICE/OUTPT VISIT, EST, LEVL III, 20-29 MIN: ICD-10-PCS | Mod: S$GLB,,, | Performed by: NURSE PRACTITIONER

## 2020-06-02 PROCEDURE — 1126F PR PAIN SEVERITY QUANTIFIED, NO PAIN PRESENT: ICD-10-PCS | Mod: S$GLB,,, | Performed by: NURSE PRACTITIONER

## 2020-06-02 PROCEDURE — 1101F PT FALLS ASSESS-DOCD LE1/YR: CPT | Mod: CPTII,S$GLB,, | Performed by: NURSE PRACTITIONER

## 2020-06-02 PROCEDURE — 1101F PR PT FALLS ASSESS DOC 0-1 FALLS W/OUT INJ PAST YR: ICD-10-PCS | Mod: CPTII,S$GLB,, | Performed by: NURSE PRACTITIONER

## 2020-06-02 NOTE — PROGRESS NOTES
[facility] Established Patient Evaluation    Chief Complaint  Chief Complaint   Patient presents with    Back Pain       Last 3 PDI Scores 6/2/2020 5/6/2020 9/4/2019   Pain Disability Index (PDI) 0 6 11       Interval Update    6/2/20 - Mr. Huggins returns to clinic for follow up visit reporting improved back pain.  Patient is s/p RIGHT L3-4 and L4-5TFESI on 5/26/20 with 100%  continued relief of his low back and right leg pain.   Pain intensity is currently 0/10.      05/06/2020 - Mr. Huggins returns to clinic for follow up visit for lower right back pain radiating down right side of leg. Pain intensity is currently 1/10.  Patient reporting only having pain when walking long distances patient states sleeping and sitting his pain is at a minimal or 0/10.  He reports pain starting in the low back radiating into his thigh stopping just above his knee.  The patient is established with our office he has previously had a right L3-4 and L4-5 TF SEB with 85% relief for approximately 6 months.      Edis Huggins Jr. presents for follow-up of chronic low back pain with radiculopathy after receiving a right L3-4 and L4-5 transforaminal epidural steroid injection on 06/25/2019.  He reports 85% improvement and lists numerous benefits from the injection including the ability to walk farther and faster; improved sleep; and ability to better handle some of his job responsibilities such as moving boxes.  He reports a pain intensity of 2/10 at this time along with a complete absence of radicular pain.  He notes occasional twinges of pain in the lumbar spine, specifically on the right side, but also states that he is very happy with the injection.      Treatment History  PT/OT:   HEP:   TENS:  Injections:    06/25/2019 - right L3-4 and L4-5 TFESI with 85% improvement in pain  Surgery:  None  Medications:   - NSAIDS:   - MSK Relaxants:  Flexeril  - TCAs:   - SNRIs:   - Topicals:   - Opioids:   - Anticonvulsants:      Current Pain Medications  1. None at this time     Full Medication List    Current Outpatient Medications:     acyclovir (ZOVIRAX) 400 MG tablet, TAKE 1 TABLET BY MOUTH THREE TIMES DAILY FOR 5 DAYS AT SIGN OF FEVER BLISTER, Disp: 90 tablet, Rfl: 0    amLODIPine (NORVASC) 10 MG tablet, Take 1 tablet (10 mg total) by mouth once daily., Disp: 90 tablet, Rfl: 3    azelastine (ASTELIN) 137 mcg (0.1 %) nasal spray, 1 spray (137 mcg total) by Nasal route 2 (two) times daily., Disp: 30 mL, Rfl: 3    Bifidobacterium infantis (ALIGN) 4 mg Cap, Take 4 mg by mouth once daily. , Disp: , Rfl:     cetirizine (ZYRTEC) 10 MG tablet, Take 10 mg by mouth once daily., Disp: , Rfl:     citalopram (CELEXA) 20 MG tablet, Take 1 tablet (20 mg total) by mouth once daily., Disp: 90 tablet, Rfl: 3    cyclobenzaprine (FLEXERIL) 10 MG tablet, TAKE 1 TABLET(10 MG) BY MOUTH THREE TIMES DAILY AS NEEDED, Disp: 30 tablet, Rfl: 0    finasteride (PROSCAR) 5 mg tablet, TAKE 1 TABLET(5 MG) BY MOUTH EVERY DAY, Disp: 90 tablet, Rfl: 2    fish oil-omega-3 fatty acids 300-1,000 mg capsule, Take 2 g by mouth 3 (three) times daily. , Disp: , Rfl:     gemfibroziL (LOPID) 600 MG tablet, TAKE 1 TABLET(600 MG) BY MOUTH TWICE DAILY, Disp: 180 tablet, Rfl: 1    irbesartan (AVAPRO) 150 MG tablet, TAKE 2 TABLETS(300MG) BY MOUTH EVERY DAY, Disp: 180 tablet, Rfl: 3    irbesartan (AVAPRO) 300 MG tablet, Take 300 mg by mouth once daily. , Disp: , Rfl: 7    metoprolol ta-hydrochlorothiaz (LOPRESSOR HCT) 50-25 mg per tablet, Take 1 tablet by mouth once daily., Disp: 30 tablet, Rfl: 11    potassium chloride SA (K-DUR,KLOR-CON) 20 MEQ tablet, Take 1 tablet (20 mEq total) by mouth once daily., Disp: 90 tablet, Rfl: 3    rivaroxaban (XARELTO) 20 mg Tab, Take 1 tablet (20 mg total) by mouth daily with dinner or evening meal., Disp: 90 tablet, Rfl: 3    tamsulosin (FLOMAX) 0.4 mg Cap, TAKE 1 CAPSULE(0.4 MG) BY MOUTH EVERY DAY, Disp: 90 capsule, Rfl: 2     testosterone (ANDROGEL) 20.25 mg/1.25 gram (1.62 %) GlPm, Apply 2 pumps once daily in the morning to the shoulder and upper arms, Disp: 75 g, Rfl: 4    testosterone 1 % (25 mg/2.5gram) GlPk, Place onto the skin., Disp: , Rfl:     timolol maleate 0.5% (TIMOPTIC-XE) 0.5 % SolG, Place 1 drop into both eyes every morning., Disp: 5 mL, Rfl: 12    vitamin E 1000 UNIT capsule, Take 1,000 Units by mouth once daily., Disp: , Rfl:   No current facility-administered medications for this visit.     Facility-Administered Medications Ordered in Other Visits:     0.9%  NaCl infusion, 500 mL, Intravenous, Continuous, Eduard Mukherjee Jr., MD    lidocaine (PF) 10 mg/ml (1%) injection 10 mg, 1 mL, Intradermal, Once, Eduard Mukherjee Jr., MD     Review of Systems  Review of Systems   Constitutional: Negative for chills and fever.   HENT: Negative for nosebleeds.    Eyes: Negative for blurred vision.   Respiratory: Negative for hemoptysis.    Cardiovascular: Negative for chest pain and palpitations.   Gastrointestinal: Negative for heartburn and vomiting.   Genitourinary: Negative for hematuria.   Musculoskeletal: Positive for back pain (Mild). Negative for myalgias.   Skin: Negative for rash.   Neurological: Negative for tingling, seizures and loss of consciousness.   Endo/Heme/Allergies: Does not bruise/bleed easily.   Psychiatric/Behavioral: Negative for hallucinations. The patient does not have insomnia.        Medical History  Past Medical History:   Diagnosis Date    Allergic rhinitis due to animal (cat) (dog) hair and dander 3/20/2014    Allergy     Anemia     Anxiety     Basal cell carcinoma     BPH (benign prostatic hypertrophy)     Family history of colon cancer 3/20/2014    Family history of ischemic heart disease 3/20/2014    HLD (hyperlipidemia)     HTN (hypertension)     IFG (impaired fasting glucose)     Lumbar spondylosis 6/7/2019    Primary open angle glaucoma     Status post total knee replacement  "3/20/2014        Surgical History  Past Surgical History:   Procedure Laterality Date    CATARACT EXTRACTION W/  INTRAOCULAR LENS IMPLANT Left 02/08/2017    WITH KAHOOK GONIOTOMY (DR. WHITLEY)    CATARACT EXTRACTION W/  INTRAOCULAR LENS IMPLANT Right 05/10/2017        EYE SURGERY      HERNIA REPAIR      x 2    JOINT REPLACEMENT      TONSILLECTOMY      TOTAL KNEE ARTHROPLASTY Right     TRANSFORAMINAL EPIDURAL INJECTION OF STEROID Right 6/25/2019    Procedure: Transforaminal Epidural Steroid Injection, Right, L3-4, L4-5;  Surgeon: Eduard Mukherjee Jr., MD;  [facility] [facility];  Service: Pain Management;  Laterality: Right;    TRANSFORAMINAL EPIDURAL INJECTION OF STEROID Right 5/26/2020    Procedure: Injection,steroid,epidural,transforaminal approach--Right L3-4, L4-5;  Surgeon: Eduard Mukherjee Jr., MD;  Location: [facility];  Service: Pain Management;  Laterality: Right;        Physical Exam  Vitals:    06/02/20 1554   Weight: 130.6 kg (288 lb)   Height: 5' 11" (1.803 m)   PainSc: 0-No pain     General    Nursing note and vitals reviewed.  Constitutional: He is oriented to person, place, and time. He appears well-developed and well-nourished. No distress.   HENT:   Head: Normocephalic and atraumatic.   Nose: Nose normal.   Eyes: Conjunctivae and EOM are normal. Pupils are equal, round, and reactive to light. Right eye exhibits no discharge. Left eye exhibits no discharge. No scleral icterus.   Neck: No JVD present.   Cardiovascular: Intact distal pulses.    Pulmonary/Chest: Effort normal. No respiratory distress.   Abdominal: He exhibits no distension.   Neurological: He is alert and oriented to person, place, and time. Coordination normal.   Psychiatric: He has a normal mood and affect. His behavior is normal. Judgment and thought content normal.         Back (L-Spine & T-Spine) / Neck (C-Spine) Exam     Back (L-Spine & T-Spine) Range of Motion   Extension: abnormal Back extension: " There is decreased range of motion in the lumbar spine with extension and flexion; however, there is no association with increased pain at this.   Flexion: abnormal         Imaging  MRI lumbar spine 04/26/2019  There is no accompanying radiology report.  My review of the images is significant for advanced degenerative disc disease in the lumbar spine most notably at L5-S1, L4-5, and L3-4.  There is associated  neural foraminal stenosis at these levels.  Lumbar facet arthropathy also contributes to neural foraminal stenosis.  Neural foraminal stenosis is worst at right L3-4 and L4-5.    Labs:  BMP  Lab Results   Component Value Date     05/12/2020    K 4.1 05/12/2020     05/12/2020    CO2 26 05/12/2020    BUN 14 05/12/2020    CREATININE 0.9 05/12/2020    CALCIUM 9.7 05/12/2020    ANIONGAP 10 05/12/2020    ESTGFRAFRICA >60 05/12/2020    EGFRNONAA >60 05/12/2020     Lab Results   Component Value Date    ALT 20 05/12/2020    AST 18 05/12/2020    ALKPHOS 55 05/12/2020    BILITOT 0.4 05/12/2020       Assessment:  Problem List Items Addressed This Visit        Neuro    Lumbar radiculopathy - Primary    Lumbar spondylosis    Chronic pain       Orthopedic    Chronic bilateral low back pain with right-sided sciatica    Neuroforaminal stenosis of lumbar spine       Other    Class 3 severe obesity due to excess calories with body mass index (BMI) of 40.0 to 44.9 in adult          09/04/2019 - Edis Huggins . is a 75 y.o. male with chronic low back pain with right-sided radiculopathy which responded extremely well to a right L3-4 and L4-5 TFESI on 06/25/2019.  Patient experienced 85% relief.  Patient was instructed to contact our clinic when the pain relief against to wear off as we can schedule him directly for another injection given that he is already experienced 10 weeks of relief.    05/06/[id number]-ipum-old male presents with chronic low back and right-sided radiculopathy, he is status post right L3-4 and  L4-5 TF SEB with 85% relief for approximately 6 months of relief, he presents today requesting to repeat his injection, he informed me that his right low back and leg pain increased over the last few weeks, pain is worse when walking he reports no pain while sleeping or sitting,   previous imaging was reviewed and discussed with the patient today, recommended repeating a right L3/4 and L4/5 TFESI, pt agreed and understood.     06/02/[id number]-gvet-old male presents status post right L3-4 and L4-5 TFESI reported 100% relief of his low back and right leg pain.  Patient is very please with his injection and overall relief.  He reports having no pain while performing his ADLs, sleeping and or daily functioning.  Recommended he continue his home exercise plan that involves stretching and muscular strengthening to help with lumbar stabilization, patient agreed understood.    Plan & Recommendations  Procedures:  None at this time  Medications: No medications changes recommended at this time.  Imaging: No additional imaging required at this time.  PT/OT:  No additional physical therapy is recommended at this time.   HEP: I encouraged the patient to maintain a home exercise regimen that includes daily, moderate cardiovascular exercise lasting at least 30 minutes.  This may include yoga, orly chi, walking, swimming, aqua aerobics, or other exercises that maintain a heart rate of 50-70% of the calculated maximum heart rate.  I also encouraged light, daily stretching focused on the target area.  Follow Up: RTC aziza Samayoa, NP-C  [facility]    Disclaimer: This note was partly generated using dictation software which may occasionally result in transcription errors.

## 2020-06-05 ENCOUNTER — CLINICAL SUPPORT (OUTPATIENT)
Dept: INFECTIOUS DISEASES | Facility: CLINIC | Age: 75
End: 2020-06-05
Payer: COMMERCIAL

## 2020-06-05 DIAGNOSIS — Z23 NEED FOR SHINGLES VACCINE: ICD-10-CM

## 2020-06-05 PROCEDURE — 90750 HZV VACC RECOMBINANT IM: CPT | Mod: S$GLB,,, | Performed by: INTERNAL MEDICINE

## 2020-06-05 PROCEDURE — 90750 ZOSTER RECOMBINANT VACCINE: ICD-10-PCS | Mod: S$GLB,,, | Performed by: INTERNAL MEDICINE

## 2020-06-05 PROCEDURE — 99999 PR PBB SHADOW E&M-EST. PATIENT-LVL I: CPT | Mod: PBBFAC,,,

## 2020-06-05 PROCEDURE — 90471 IMMUNIZATION ADMIN: CPT | Mod: S$GLB,,, | Performed by: INTERNAL MEDICINE

## 2020-06-05 PROCEDURE — 99999 PR PBB SHADOW E&M-EST. PATIENT-LVL I: ICD-10-PCS | Mod: PBBFAC,,,

## 2020-06-05 PROCEDURE — 90471 ZOSTER RECOMBINANT VACCINE: ICD-10-PCS | Mod: S$GLB,,, | Performed by: INTERNAL MEDICINE

## 2020-06-05 NOTE — PROGRESS NOTES
Pt received the final dose of his Shingrix vaccination. Pt tolerated well. Pt left the unit in NAD.

## 2020-07-24 ENCOUNTER — PATIENT MESSAGE (OUTPATIENT)
Dept: INTERNAL MEDICINE | Facility: CLINIC | Age: 75
End: 2020-07-24

## 2020-07-27 ENCOUNTER — PATIENT MESSAGE (OUTPATIENT)
Dept: INTERNAL MEDICINE | Facility: CLINIC | Age: 75
End: 2020-07-27

## 2020-07-27 DIAGNOSIS — E78.1 HYPERTRIGLYCERIDEMIA: ICD-10-CM

## 2020-07-27 NOTE — TELEPHONE ENCOUNTER
No new care gaps identified.  Powered by [employer]. Reference number: [id number]. 7/27/2020 4:00:39 AM CDT

## 2020-07-29 DIAGNOSIS — E78.1 HYPERTRIGLYCERIDEMIA: ICD-10-CM

## 2020-07-29 NOTE — TELEPHONE ENCOUNTER
Please schedule the patient for a visit with me sometime within the next week for evaluation of leg swelling and blood pressure. I have sent the patient a [facility] message.

## 2020-07-29 NOTE — TELEPHONE ENCOUNTER
No new care gaps identified.  Powered by [employer]. Reference number: [id number]. 7/29/2020 4:00:39 AM CDT

## 2020-07-29 NOTE — PROGRESS NOTES
Refill Routing Note   Medication(s) are not appropriate for processing by [facility]:       - Drug-Disease Interaction (Normocytic anemia)     Medication-related problems identified: Drug-disease interaction  Medication Therapy Plan: CDMR. Your patient has a diagnosis of Normocytic anemia. Patients with anemia should be carefully evaluated before initiating therapy and monitored closely while taking GEMFIBROZIL.; please advise; defer  Medication reconciliation completed: No      Automatic Epic Generated Protocol Data:        Requested Prescriptions   Pending Prescriptions Disp Refills    gemfibroziL (LOPID) 600 MG tablet [Pharmacy Med Name: GEMFIBROZIL 600MG TABLETS] 180 tablet 3     Sig: TAKE 1 TABLET(600 MG) BY MOUTH TWICE DAILY       Cardiovascular:  Antilipid - Fibric Acid Derivatives Passed - 7/29/2020  6:30 PM        Passed - Patient is at least 18 years old        Passed - Office visit in past 12 months or future 90 days.     Recent Outpatient Visits            1 month ago Lumbar radiculopathy    [facility] BILL [facility]    2 months ago Annual physical exam    [facility] - Internal Medicine Forest Miles MD    2 months ago Primary open angle glaucoma of left eye, moderate stage    [facility] - Ophthalmology Deborah Khan MD    2 months ago Lumbar radiculopathy    [facility] BILL [facility]    4 months ago Paroxysmal atrial fibrillation    [facility] Shakeel Bello MD          Future Appointments              In 1 week MD Rohan May - Dermatology, [facility]    In 1 month MD [facility] - Cardiology, [facility]    In 2 months [facility]    In 2 months MD [facility] Menendez - Hematology Oncology, [facility]    In 3 months MD Rohan Perez - Internal Medicine, [facility]                Passed - Lipid Panel completed in last 360 days     Lab  Results   Component Value Date    CHOL 150 05/12/2020    HDL 36 (L) 05/12/2020    LDLCALC 71.2 05/12/2020    TRIG 214 (H) 05/12/2020             Passed - ALT is 94 or below and within 360 days     ALT   Date Value Ref Range Status   05/12/2020 20 10 - 44 U/L Final   10/04/2019 27 10 - 44 U/L Final   09/12/2019 21 10 - 44 U/L Final              Passed - AST is 54 or below and within 360 days     AST   Date Value Ref Range Status   05/12/2020 18 10 - 40 U/L Final     Comment:     Specimen slightly hemolyzed   10/04/2019 27 10 - 40 U/L Final   09/12/2019 20 10 - 40 U/L Final              Passed - Cr is 1.3 or below and within 360 days     Creatinine   Date Value Ref Range Status   05/12/2020 0.9 0.5 - 1.4 mg/dL Final   10/04/2019 0.9 0.5 - 1.4 mg/dL Final   09/12/2019 0.9 0.5 - 1.4 mg/dL Final              Passed - eGFR is 30 or above and within 360 days     eGFR if non    Date Value Ref Range Status   05/12/2020 >60 >60 mL/min/1.73 m^2 Final     Comment:     Calculation used to obtain the estimated glomerular filtration  rate (eGFR) is the CKD-EPI equation.      10/04/2019 >60 >60 mL/min/1.73 m^2 Final     Comment:     Calculation used to obtain the estimated glomerular filtration  rate (eGFR) is the CKD-EPI equation.      09/12/2019 >60 >60 mL/min/1.73 m^2 Final     Comment:     Calculation used to obtain the estimated glomerular filtration  rate (eGFR) is the CKD-EPI equation.        eGFR if    Date Value Ref Range Status   05/12/2020 >60 >60 mL/min/1.73 m^2 Final   10/04/2019 >60 >60 mL/min/1.73 m^2 Final   09/12/2019 >60 >60 mL/min/1.73 m^2 Final              Passed - WBC in normal range and within 360 days     WBC   Date Value Ref Range Status   05/12/2020 8.01 3.90 - 12.70 K/uL Final   10/04/2019 5.77 3.90 - 12.70 K/uL Final   04/05/2019 6.75 3.90 - 12.70 K/uL Final                    Appointments  past 12m or future 3m with PCP    Date Provider   Last Visit   5/21/2020 Forest PRATT  MD Jd   Next Visit   7/29/2020 Forest Miles MD   ED visits in past 90 days: 0     Note composed:6:33 PM 07/29/2020

## 2020-07-30 ENCOUNTER — PATIENT MESSAGE (OUTPATIENT)
Dept: INTERNAL MEDICINE | Facility: CLINIC | Age: 75
End: 2020-07-30

## 2020-07-30 RX ORDER — GEMFIBROZIL 600 MG/1
TABLET, FILM COATED ORAL
Qty: 180 TABLET | Refills: 3 | OUTPATIENT
Start: 2020-07-30

## 2020-07-30 NOTE — PROGRESS NOTES
Refill Routing Note   Medication(s) are not appropriate for processing by [facility]:       - Drug-Disease Interaction (NORMOCYTIC)     Medication-related problems identified: Drug-disease interaction  Medication Therapy Plan: CDMR. Your patient has a diagnosis of Normocytic anemia. Patients with anemia should be carefully evaluated before initiating therapy and monitored closely while taking GEMFIBROZIL.; please advise; defer   Medication reconciliation completed: No      Automatic Epic Generated Protocol Data:        Requested Prescriptions   Pending Prescriptions Disp Refills    gemfibroziL (LOPID) 600 MG tablet [Pharmacy Med Name: GEMFIBROZIL 600MG TABLETS] 180 tablet 3     Sig: TAKE 1 TABLET(600 MG) BY MOUTH TWICE DAILY       Cardiovascular:  Antilipid - Fibric Acid Derivatives Passed - 7/30/2020 11:02 AM        Passed - Patient is at least 18 years old        Passed - Office visit in past 12 months or future 90 days.     Recent Outpatient Visits            1 month ago Lumbar radiculopathy    [facility] B[facility]    2 months ago Annual physical exam    [facility] - Internal Medicine Forest Miles MD    2 months ago Primary open angle glaucoma of left eye, moderate stage    [facility] - Ophthalmology Deborah Khan MD    2 months ago Lumbar radiculopathy    [facility] BILL aMrtinez    4 months ago Paroxysmal atrial fibrillation    [facility] Shakeel Bello MD          Future Appointments              In 1 week MD Rohan May - Dermatology, [facility]    In 1 month MD [facility] - Cardiology, [facility]    In 2 months [facility]    In 2 months MD [facility] - Hematology Oncology, [facility]    In 3 months MD [facility]an Perez - Internal Medicine, [facility]                Passed - Lipid Panel completed in last 360 days     Lab Results    Component Value Date    CHOL 150 05/12/2020    HDL 36 (L) 05/12/2020    LDLCALC 71.2 05/12/2020    TRIG 214 (H) 05/12/2020             Passed - ALT is 94 or below and within 360 days     ALT   Date Value Ref Range Status   05/12/2020 20 10 - 44 U/L Final   10/04/2019 27 10 - 44 U/L Final   09/12/2019 21 10 - 44 U/L Final              Passed - AST is 54 or below and within 360 days     AST   Date Value Ref Range Status   05/12/2020 18 10 - 40 U/L Final     Comment:     Specimen slightly hemolyzed   10/04/2019 27 10 - 40 U/L Final   09/12/2019 20 10 - 40 U/L Final              Passed - Cr is 1.3 or below and within 360 days     Creatinine   Date Value Ref Range Status   05/12/2020 0.9 0.5 - 1.4 mg/dL Final   10/04/2019 0.9 0.5 - 1.4 mg/dL Final   09/12/2019 0.9 0.5 - 1.4 mg/dL Final              Passed - eGFR is 30 or above and within 360 days     eGFR if non    Date Value Ref Range Status   05/12/2020 >60 >60 mL/min/1.73 m^2 Final     Comment:     Calculation used to obtain the estimated glomerular filtration  rate (eGFR) is the CKD-EPI equation.      10/04/2019 >60 >60 mL/min/1.73 m^2 Final     Comment:     Calculation used to obtain the estimated glomerular filtration  rate (eGFR) is the CKD-EPI equation.      09/12/2019 >60 >60 mL/min/1.73 m^2 Final     Comment:     Calculation used to obtain the estimated glomerular filtration  rate (eGFR) is the CKD-EPI equation.        eGFR if    Date Value Ref Range Status   05/12/2020 >60 >60 mL/min/1.73 m^2 Final   10/04/2019 >60 >60 mL/min/1.73 m^2 Final   09/12/2019 >60 >60 mL/min/1.73 m^2 Final              Passed - WBC in normal range and within 360 days     WBC   Date Value Ref Range Status   05/12/2020 8.01 3.90 - 12.70 K/uL Final   10/04/2019 5.77 3.90 - 12.70 K/uL Final   04/05/2019 6.75 3.90 - 12.70 K/uL Final                    Appointments  past 12m or future 3m with PCP    Date Provider   Last Visit   5/21/2020 Forest Miles MD    Next Visit   11/9/2020 Forest Miles MD   ED visits in past 90 days: 0     Note composed:11:04 AM 07/30/2020

## 2020-07-31 ENCOUNTER — PATIENT MESSAGE (OUTPATIENT)
Dept: INTERNAL MEDICINE | Facility: CLINIC | Age: 75
End: 2020-07-31

## 2020-07-31 NOTE — TELEPHONE ENCOUNTER
Den Nelson, can you place this patient on Dr. Miles schedule 8/5 at 11:30. It wont allow me to do it

## 2020-08-02 DIAGNOSIS — Z79.890 LONG-TERM CURRENT USE OF TESTOSTERONE REPLACEMENT THERAPY: ICD-10-CM

## 2020-08-02 NOTE — TELEPHONE ENCOUNTER
No new care gaps identified.  Powered by [employer]. Reference number: [id number]. 8/02/2020 5:18:45 PM CDT

## 2020-08-03 NOTE — PROGRESS NOTES
Refill Routing Note   Medication(s) are not appropriate for processing by [facility]:       - Outside of protocol           Medication reconciliation completed: No      Automatic Epic Generated Protocol Data:        Requested Prescriptions   Pending Prescriptions Disp Refills    testosterone (ANDROGEL) 20.25 mg/1.25 gram (1.62 %) GlPm [Pharmacy Med Name: TESTOSTERONE 1.62% GEL PUMP] 75 g 4     Sig: APPLY 2 PUMPS DAILY IN THE MORNING TO THE SHOULDER AND UPPER ARMS       Testosterone Refill Protocol Failed - 8/2/2020  5:18 PM        Failed - PSA level within 1 year     Lab Results   Component Value Date    PSA 1.2 05/12/2020    PSA 0.89 04/13/2018    PSA 0.66 03/30/2017     Lab Results   Component Value Date    PSA 1.2 05/12/2020             Passed - Patient seen within 1 year     Last visit with Forest Miles MD: 5/21/2020  Last visit in [facility]: 5/21/2020    Patient's next visit in [facility]: 8/5/2020           Passed - Testosterone level within 1 year     Lab Results   Component Value Date    TOTALTESTOST 467 05/12/2020      Lab Results   Component Value Date    TESTOSTERONE 229 (L) 04/13/2018    TESTOSTERONE 30.2 04/13/2018             Off-Protocol Failed - 8/2/2020  5:18 PM        Failed - Medication not assigned to a protocol, review manually.        Passed - Office visit in past 12 months or future 90 days.     Recent Outpatient Visits            2 months ago Lumbar radiculopathy    [facility] BILL [facility]    2 months ago Annual physical exam    [facility] - Internal Medicine Forest Miles MD    2 months ago Primary open angle glaucoma of left eye, moderate stage    [facility] - Ophthalmology Deborah Khan MD    2 months ago Lumbar radiculopathy    [facility] BILL [facility]    4 months ago Paroxysmal atrial fibrillation    [facility] Shakeel Bello MD          Future Appointments              In   days MD Rohan Perez - Internal Medicine, [facility]    In 3 days MD Rohan May - Dermatology, [facility]    In 1 month MD [facility] - Cardiology, [facility]    In 2 months APPOINTMENT LAB, [facility]    In 2 months MD [facility] - Hematology Oncology, [facility]    In 3 months MD [facility]an Perez - Internal Medicine, [facility]                      Appointments  past 12m or future 3m with PCP    Date Provider   Last Visit   5/21/2020 Forest Miles MD   Next Visit   8/5/2020 Forest Miles MD   ED visits in past 90 days: 0     Note composed:9:23 AM 08/03/2020

## 2020-08-05 ENCOUNTER — LAB VISIT (OUTPATIENT)
Dept: LAB | Facility: HOSPITAL | Age: 75
End: 2020-08-05
Attending: INTERNAL MEDICINE
Payer: COMMERCIAL

## 2020-08-05 ENCOUNTER — OFFICE VISIT (OUTPATIENT)
Dept: INTERNAL MEDICINE | Facility: CLINIC | Age: 75
End: 2020-08-05
Payer: COMMERCIAL

## 2020-08-05 VITALS
DIASTOLIC BLOOD PRESSURE: 58 MMHG | SYSTOLIC BLOOD PRESSURE: 110 MMHG | HEART RATE: 60 BPM | BODY MASS INDEX: 40.74 KG/M2 | WEIGHT: 291 LBS | HEIGHT: 71 IN

## 2020-08-05 DIAGNOSIS — I10 ESSENTIAL HYPERTENSION: ICD-10-CM

## 2020-08-05 DIAGNOSIS — R60.0 BILATERAL LOWER EXTREMITY EDEMA: ICD-10-CM

## 2020-08-05 DIAGNOSIS — I10 ESSENTIAL HYPERTENSION: Primary | ICD-10-CM

## 2020-08-05 LAB
ANION GAP SERPL CALC-SCNC: 8 MMOL/L (ref 8–16)
BNP SERPL-MCNC: 39 PG/ML (ref 0–99)
BUN SERPL-MCNC: 22 MG/DL (ref 8–23)
CALCIUM SERPL-MCNC: 9.5 MG/DL (ref 8.7–10.5)
CHLORIDE SERPL-SCNC: 103 MMOL/L (ref 95–110)
CO2 SERPL-SCNC: 29 MMOL/L (ref 23–29)
CREAT SERPL-MCNC: 0.9 MG/DL (ref 0.5–1.4)
EST. GFR  (AFRICAN AMERICAN): >60 ML/MIN/1.73 M^2
EST. GFR  (NON AFRICAN AMERICAN): >60 ML/MIN/1.73 M^2
GLUCOSE SERPL-MCNC: 93 MG/DL (ref 70–110)
MAGNESIUM SERPL-MCNC: 2.3 MG/DL (ref 1.6–2.6)
POTASSIUM SERPL-SCNC: 4.4 MMOL/L (ref 3.5–5.1)
SODIUM SERPL-SCNC: 140 MMOL/L (ref 136–145)

## 2020-08-05 PROCEDURE — 1126F AMNT PAIN NOTED NONE PRSNT: CPT | Mod: S$GLB,,, | Performed by: INTERNAL MEDICINE

## 2020-08-05 PROCEDURE — 3008F BODY MASS INDEX DOCD: CPT | Mod: CPTII,S$GLB,, | Performed by: INTERNAL MEDICINE

## 2020-08-05 PROCEDURE — 99999 PR PBB SHADOW E&M-EST. PATIENT-LVL V: CPT | Mod: PBBFAC,,, | Performed by: INTERNAL MEDICINE

## 2020-08-05 PROCEDURE — 99214 OFFICE O/P EST MOD 30 MIN: CPT | Mod: S$GLB,,, | Performed by: INTERNAL MEDICINE

## 2020-08-05 PROCEDURE — 99999 PR PBB SHADOW E&M-EST. PATIENT-LVL V: ICD-10-PCS | Mod: PBBFAC,,, | Performed by: INTERNAL MEDICINE

## 2020-08-05 PROCEDURE — 3078F PR MOST RECENT DIASTOLIC BLOOD PRESSURE < 80 MM HG: ICD-10-PCS | Mod: CPTII,S$GLB,, | Performed by: INTERNAL MEDICINE

## 2020-08-05 PROCEDURE — 1126F PR PAIN SEVERITY QUANTIFIED, NO PAIN PRESENT: ICD-10-PCS | Mod: S$GLB,,, | Performed by: INTERNAL MEDICINE

## 2020-08-05 PROCEDURE — 99214 PR OFFICE/OUTPT VISIT, EST, LEVL IV, 30-39 MIN: ICD-10-PCS | Mod: S$GLB,,, | Performed by: INTERNAL MEDICINE

## 2020-08-05 PROCEDURE — 80048 BASIC METABOLIC PNL TOTAL CA: CPT

## 2020-08-05 PROCEDURE — 1101F PT FALLS ASSESS-DOCD LE1/YR: CPT | Mod: CPTII,S$GLB,, | Performed by: INTERNAL MEDICINE

## 2020-08-05 PROCEDURE — 3074F PR MOST RECENT SYSTOLIC BLOOD PRESSURE < 130 MM HG: ICD-10-PCS | Mod: CPTII,S$GLB,, | Performed by: INTERNAL MEDICINE

## 2020-08-05 PROCEDURE — 83735 ASSAY OF MAGNESIUM: CPT

## 2020-08-05 PROCEDURE — 3008F PR BODY MASS INDEX (BMI) DOCUMENTED: ICD-10-PCS | Mod: CPTII,S$GLB,, | Performed by: INTERNAL MEDICINE

## 2020-08-05 PROCEDURE — 83880 ASSAY OF NATRIURETIC PEPTIDE: CPT

## 2020-08-05 PROCEDURE — 1159F PR MEDICATION LIST DOCUMENTED IN MEDICAL RECORD: ICD-10-PCS | Mod: S$GLB,,, | Performed by: INTERNAL MEDICINE

## 2020-08-05 PROCEDURE — 1159F MED LIST DOCD IN RCRD: CPT | Mod: S$GLB,,, | Performed by: INTERNAL MEDICINE

## 2020-08-05 PROCEDURE — 3078F DIAST BP <80 MM HG: CPT | Mod: CPTII,S$GLB,, | Performed by: INTERNAL MEDICINE

## 2020-08-05 PROCEDURE — 1101F PR PT FALLS ASSESS DOC 0-1 FALLS W/OUT INJ PAST YR: ICD-10-PCS | Mod: CPTII,S$GLB,, | Performed by: INTERNAL MEDICINE

## 2020-08-05 PROCEDURE — 3074F SYST BP LT 130 MM HG: CPT | Mod: CPTII,S$GLB,, | Performed by: INTERNAL MEDICINE

## 2020-08-05 PROCEDURE — 36415 COLL VENOUS BLD VENIPUNCTURE: CPT

## 2020-08-05 RX ORDER — ZINC GLUCONATE 50 MG
50 TABLET ORAL DAILY
COMMUNITY
End: 2022-08-23

## 2020-08-05 RX ORDER — TESTOSTERONE 20.25 MG/1.25G
GEL TOPICAL
Qty: 75 G | Refills: 4 | Status: SHIPPED | OUTPATIENT
Start: 2020-08-05 | End: 2021-12-16

## 2020-08-05 NOTE — PATIENT INSTRUCTIONS
Labs today. Also go to the [facility] to enroll with the Hypertension Digital Medicine Program and get a new blood pressure cuff.    Please try compression stockings; put them on in the morning when getting out of bed. These could be measured and purchased at a Medical Equipment (DME) store. Elevate the legs as needed. Cut down on salt.

## 2020-08-05 NOTE — PROGRESS NOTES
"Subjective:       Patient ID: Edis Huggins Jr. is a 75 y.o. male.    Chief Complaint: Follow-up      Last visit with me 5/21/2020.     Follow-up    Afib only once on monitor 24 hr, 30 days monitor though was normal. Then 12 hr atrial fibrillation episode noted at home.    Ft swelling more than normal. More since changing diuretic by Cardiology. Also eating more Chinese food.     Review of Systems   All other systems reviewed and are negative.      Objective:      Physical Exam  Vitals signs and nursing note reviewed.   Constitutional:       Appearance: He is not ill-appearing.   Neck:      Musculoskeletal: No edema or erythema.      Vascular: No hepatojugular reflux or JVD.   Cardiovascular:      Rate and Rhythm: Normal rate and regular rhythm.      Heart sounds: Normal heart sounds.   Pulmonary:      Effort: Pulmonary effort is normal. No respiratory distress.      Breath sounds: Normal breath sounds.   Abdominal:      Palpations: Abdomen is soft.      Tenderness: There is no guarding.   Musculoskeletal:         General: No tenderness.      Right lower leg: Edema present.      Left lower leg: Edema present.      Comments: Pitting edema to just below knee in bilateral lower extremities    Skin:     General: Skin is warm and dry.      Findings: No rash.   Neurological:      Mental Status: He is alert and oriented to person, place, and time.   Psychiatric:         Mood and Affect: Mood normal.         Behavior: Behavior normal.         Vitals:    08/05/20 1140   BP: 118/64   BP Location: Right arm   Patient Position: Sitting   BP Method: Large (Manual)   Pulse: 60   Weight: 132 kg (291 lb)   Height: 5' 11" (1.803 m)     Body mass index is 40.59 kg/m².    RESULTS: Reviewed labs from last 12 months    Assessment:       1. Essential hypertension    2. Bilateral lower extremity edema        Plan:   Edis was seen today for follow-up.    Diagnoses and all orders for this visit:    Essential hypertension:  Prior " diagnosis, currently well controlled.  Any changes will need balance risks of hypotension.  Enroll in digital Medicine for more consistent home monitoring.  -     Cancel: Basic metabolic panel; Future  -     Hypertension Digital Medicine ([facility]) Enrollment Order  -     Hypertension Digital Medicine ([facility]): Assign Onboarding Questionnaires  -     Basic metabolic panel; Future  -     Magnesium; Future    Bilateral lower extremity edema:  Prior diagnosis, however symptoms are becoming more pronounced.  Check for signs of heart failure along with assessing kidney function.  If all is normal, see about restarting chlorthalidone, using a separate metoprolol possibly decreasing dose of amlodipine if tolerated.  Try compression stockings  -     Cancel: Brain Natriuretic Peptide; Future  -     COMPRESSION STOCKINGS  -     Brain Natriuretic Peptide; Future      No follow-ups on file. Labs today.  Forest Miles MD  Internal Medicine    Portions of this note were completed using medical dictation software. Please excuse typographical or syntax errors that were missed on review.

## 2020-08-06 ENCOUNTER — OFFICE VISIT (OUTPATIENT)
Dept: DERMATOLOGY | Facility: CLINIC | Age: 75
End: 2020-08-06
Payer: COMMERCIAL

## 2020-08-06 ENCOUNTER — TELEPHONE (OUTPATIENT)
Dept: INTERNAL MEDICINE | Facility: CLINIC | Age: 75
End: 2020-08-06

## 2020-08-06 DIAGNOSIS — D18.01 ANGIOMA OF SKIN: ICD-10-CM

## 2020-08-06 DIAGNOSIS — L82.0 INFLAMED SEBORRHEIC KERATOSIS: Primary | ICD-10-CM

## 2020-08-06 DIAGNOSIS — D22.9 MULTIPLE BENIGN NEVI: ICD-10-CM

## 2020-08-06 DIAGNOSIS — L82.1 SEBORRHEIC KERATOSIS: ICD-10-CM

## 2020-08-06 DIAGNOSIS — Z85.828 HISTORY OF NONMELANOMA SKIN CANCER: ICD-10-CM

## 2020-08-06 DIAGNOSIS — I10 ESSENTIAL HYPERTENSION: Primary | ICD-10-CM

## 2020-08-06 PROCEDURE — 1159F MED LIST DOCD IN RCRD: CPT | Mod: S$GLB,,, | Performed by: DERMATOLOGY

## 2020-08-06 PROCEDURE — 99213 OFFICE O/P EST LOW 20 MIN: CPT | Mod: 25,S$GLB,, | Performed by: DERMATOLOGY

## 2020-08-06 PROCEDURE — 17110 PR DESTRUCTION BENIGN LESIONS UP TO 14: ICD-10-PCS | Mod: S$GLB,,, | Performed by: DERMATOLOGY

## 2020-08-06 PROCEDURE — 1101F PR PT FALLS ASSESS DOC 0-1 FALLS W/OUT INJ PAST YR: ICD-10-PCS | Mod: CPTII,S$GLB,, | Performed by: DERMATOLOGY

## 2020-08-06 PROCEDURE — 99999 PR PBB SHADOW E&M-EST. PATIENT-LVL IV: CPT | Mod: PBBFAC,,, | Performed by: DERMATOLOGY

## 2020-08-06 PROCEDURE — 99213 PR OFFICE/OUTPT VISIT, EST, LEVL III, 20-29 MIN: ICD-10-PCS | Mod: 25,S$GLB,, | Performed by: DERMATOLOGY

## 2020-08-06 PROCEDURE — 1101F PT FALLS ASSESS-DOCD LE1/YR: CPT | Mod: CPTII,S$GLB,, | Performed by: DERMATOLOGY

## 2020-08-06 PROCEDURE — 17110 DESTRUCTION B9 LES UP TO 14: CPT | Mod: S$GLB,,, | Performed by: DERMATOLOGY

## 2020-08-06 PROCEDURE — 99999 PR PBB SHADOW E&M-EST. PATIENT-LVL IV: ICD-10-PCS | Mod: PBBFAC,,, | Performed by: DERMATOLOGY

## 2020-08-06 PROCEDURE — 1126F PR PAIN SEVERITY QUANTIFIED, NO PAIN PRESENT: ICD-10-PCS | Mod: S$GLB,,, | Performed by: DERMATOLOGY

## 2020-08-06 PROCEDURE — 1126F AMNT PAIN NOTED NONE PRSNT: CPT | Mod: S$GLB,,, | Performed by: DERMATOLOGY

## 2020-08-06 PROCEDURE — 1159F PR MEDICATION LIST DOCUMENTED IN MEDICAL RECORD: ICD-10-PCS | Mod: S$GLB,,, | Performed by: DERMATOLOGY

## 2020-08-06 RX ORDER — CHLORTHALIDONE 50 MG/1
50 TABLET ORAL DAILY
Qty: 90 TABLET | Refills: 3 | Status: SHIPPED | OUTPATIENT
Start: 2020-08-06 | End: 2021-07-02

## 2020-08-06 RX ORDER — AMLODIPINE BESYLATE 5 MG/1
5 TABLET ORAL DAILY
Qty: 90 TABLET | Refills: 3 | Status: SHIPPED | OUTPATIENT
Start: 2020-08-06 | End: 2021-07-16

## 2020-08-06 RX ORDER — METOPROLOL SUCCINATE 50 MG/1
50 TABLET, EXTENDED RELEASE ORAL DAILY
Qty: 90 TABLET | Refills: 3 | Status: SHIPPED | OUTPATIENT
Start: 2020-08-06 | End: 2021-07-15 | Stop reason: SDUPTHER

## 2020-08-06 NOTE — PATIENT INSTRUCTIONS

## 2020-08-06 NOTE — PROGRESS NOTES
Subjective:       Patient ID:  Edis Huggins Jr. is a 75 y.o. male who presents for   Chief Complaint   Patient presents with    Skin Check     History of Present Illness: The patient presents for follow up of skin check.    The patient was last seen on: 8/2018 for bx of idn x 2 post neck and left lateral temple  This is a high risk patient here to check for the development of new lesions.    Other skin complaints:   Patient with new complaint of lesion(s)  Location: scalp  Duration: 1 month  Symptoms: interferes with band from cpap machine  Relieving factors/Previous treatments: none          Review of Systems   Skin: Positive for wears hat (always). Negative for daily sunscreen use, activity-related sunscreen use and recent sunburn.   Hematologic/Lymphatic: Does not bruise/bleed easily (on xeralto).        Objective:    Physical Exam   Constitutional: He appears well-developed and well-nourished. No distress.   Neurological: He is alert and oriented to person, place, and time. He is not disoriented.   Psychiatric: He has a normal mood and affect.   Skin:   Areas Examined (abnormalities noted in diagram):   Scalp / Hair Palpated and Inspected  Head / Face Inspection Performed  Neck Inspection Performed  Chest / Axilla Inspection Performed  Back Inspection Performed  RUE Inspected  LUE Inspection Performed                   Diagram Legend     Erythematous scaling macule/papule c/w actinic keratosis       Vascular papule c/w angioma      Pigmented verrucoid papule/plaque c/w seborrheic keratosis      Yellow umbilicated papule c/w sebaceous hyperplasia      Irregularly shaped tan macule c/w lentigo     1-2 mm smooth white papules consistent with Milia      Movable subcutaneous cyst with punctum c/w epidermal inclusion cyst      Subcutaneous movable cyst c/w pilar cyst      Firm pink to brown papule c/w dermatofibroma      Pedunculated fleshy papule(s) c/w skin tag(s)      Evenly pigmented macule c/w junctional  nevus     Mildly variegated pigmented, slightly irregular-bordered macule c/w mildly atypical nevus      Flesh colored to evenly pigmented papule c/w intradermal nevus       Pink pearly papule/plaque c/w basal cell carcinoma      Erythematous hyperkeratotic cursted plaque c/w SCC      Surgical scar with no sign of skin cancer recurrence      Open and closed comedones      Inflammatory papules and pustules      Verrucoid papule consistent consistent with wart     Erythematous eczematous patches and plaques     Dystrophic onycholytic nail with subungual debris c/w onychomycosis     Umbilicated papule    Erythematous-base heme-crusted tan verrucoid plaque consistent with inflamed seborrheic keratosis     Erythematous Silvery Scaling Plaque c/w Psoriasis     See annotation      Assessment / Plan:        Inflamed seborrheic keratosis- Right scalp  Procedure note for destruction via hyfrecation and curettage:    Verbal consent obtained. Risks of recurrence and scarring discussed.   1 lesion cleaned with alcohol and anesthetized with 1% lidocaine with epinephrine. Areas then lightly hyfrecated and curetted to remove gross lesion. Hemostasis achieved with aluminum chloride application. No complications. Areas dressed with Vaseline jelly and bandage. Wound care discussed.      Seborrheic keratosis  These are benign inherited growths without a malignant potential. Reassurance given to patient. No treatment is necessary.       Angioma of skin  This is a benign vascular lesion. Reassurance given. No treatment required.       Multiple benign nevi  Upper body skin examination performed today including at least 6 points as noted in physical examination. No lesions suspicious for malignancy noted.  Reassurance provided.Instructed patient to observe lesion(s) for changes and follow up in clinic if changes are noted. Discussed ABCDE's of moles and brochure provided.      History of nonmelanoma skin cancer  Area(s) of previous NMSC  evaluated with no signs of recurrence.  Upper body skin examination performed today including at least 6 points as noted in physical examination. No lesions suspicious for malignancy noted.               Follow up in about 1 year (around 8/6/2021).

## 2020-08-07 ENCOUNTER — PATIENT OUTREACH (OUTPATIENT)
Dept: OTHER | Facility: OTHER | Age: 75
End: 2020-08-07

## 2020-08-07 NOTE — LETTER
August 7, 2020     Edis Huggins Jr.  [address]  [place] [address]       Dear Edis,    Welcome to [facility]! Our goal is to make care effective, proactive and convenient by using data you send us from home to better treat your chronic conditions.              My name is Liz Knowles, and I am your dedicated Digital Medicine clinician. As an expert in medication management, I will help ensure that the medications you are taking continue to provide the intended benefits and help you reach your goals. You can reach me directly at [phone] or by sending me a message directly through your [facility] account.      I am Garima Mcmillan and I will be your health . My job is to help you identify lifestyle changes to improve your disease control. We will talk about nutrition, exercise, and other ways you may be able to adjust your current habits to better your health. Additionally, we will help ensure you are completing the tests and screenings that are necessary to help manage your conditions. You can reach me directly at [phone] or by sending me a message directly through your [facility] account.    Most importantly, YOU are at the center of this team. Together, we will work to improve your overall health and encourage you to meet your goals for a healthier lifestyle.     What we expect from YOU:  · Please take frequent home blood pressure measurements. We ask that you take at least 1 blood pressure reading per week, but more information will better help us get you know you. Be sure you rest for a few minutes before taking the reading in a quiet, comfortable place.     Be available to receive phone calls or [facility] messages, when appropriate, from your care team. Please let us know if there are any specific days or times that work best for us to reach you via phone.     Complete routine tests and screenings. Dont worry, we will help keep you on track!           What you  should expect from your Digital Medicine Care Team:   We will work with you to create a personalized plan of care and provide you with encouragement and education, including regarding lifestyle changes, that could help you manage your disease states.     We will adjust your current medications, if needed, and continue to monitor your long-term progress.     We will provide you and your physician with monthly progress reports after you have been in the program for more than 30 days.     We will send you reminders through [employer] and text messages to help ensure you do not miss any testing deadlines to help manage your disease states.    You will be able to reach us by phone or through your [employer] account by clicking our names under Care Team on the right side of the home screen.    I look forward to working with you to achieve your blood pressure goals!    We look forward to working with you to help manage your health,    Sincerely,    Your Digital Medicine Team    Please visit our websites to learn more:   · Hypertension: [email or web address]      Remember, we are not available for emergencies. If you have an emergency, please contact your doctors office directly or call [facility] on-call ([phone] or [phone]) or [phone].

## 2020-08-07 NOTE — PROGRESS NOTES
Digital Medicine: Health  Introduction    Introduced Edis Huggins to Digital Medicine. Discussed health  role and recommended lifestyle modifications.    The history is provided by the patient.               HYPERTENSION  Explained hypertension digital medicine goals including BP goal less than or equal to 130/80mmHg, improved convenience of BP management and reduced risk of heart attack, kidney failure, stroke, eye disease, dementia, and death.      Explained non-pharmacologic therapies like low salt diet and physical activity can reduce blood pressure. .      Explained that we expect patient to submit several blood pressure readings per week at random times of the day, but at least 30 minutes after taking blood pressure medications. Instructed patient not to allow anyone else to use their blood pressure monitor and phone as data submitted is directly entered into medical record. Reviewed and confirmed appropriate blood pressure monitoring technique.         Patient's BP goal is less than or equal to 130/80.Patient's BP average is 139/71 mmHg, which is above goal, per 2017 ACC/AHA Hypertension Guidelines.    Explained to the patient that the Digital Medicine team is not available for emergencies. Advised patient call [facility] ([phone] or [phone]) or [phone] if needed.           Lifestyle    Additional monitoring needed.  Instructed to charge device. Charge once weekly  Provided patient education.  Reviewed Device Techniques.     Patient verbalizes understanding.      Sent link to [facility] webpages and my contact information via [employer] for future questions.        Explained to the patient that the Digital Medicine team is not available for emergencies. Advised patient call [facility] ([phone] or [phone]) or [phone] if needed.       There are no preventive care reminders to display for this patient.    Last 5 Patient Entered Readings                                       Current 30 Day Average: 139/71     Recent Readings 8/6/2020 8/5/2020 8/5/2020 8/5/2020    SBP (mmHg) 125 153 145 108    DBP (mmHg) 69 79 70 64    Pulse 54 61 66 60

## 2020-08-09 NOTE — TELEPHONE ENCOUNTER
Refill Routing Note   Medication(s) are not appropriate for processing by [facility]:       - Outside of protocol           Medication reconciliation completed: No      Automatic Epic Generated Protocol Data:        Requested Prescriptions   Pending Prescriptions Disp Refills    cyclobenzaprine (FLEXERIL) 10 MG tablet [Pharmacy Med Name: CYCLOBENZAPRINE 10MG TABLETS] 30 tablet 0     Sig: TAKE 1 TABLET(10 MG) BY MOUTH THREE TIMES DAILY AS NEEDED       There is no refill protocol information for this order           Appointments  past 12m or future 3m with PCP    Date Provider   Last Visit   8/5/2020 Forest Miles MD   Next Visit   11/9/2020 Forest Miles MD   ED visits in past 90 days: [unfilled]     Note composed:5:02 PM 08/09/2020

## 2020-08-10 ENCOUNTER — PATIENT OUTREACH (OUTPATIENT)
Dept: OTHER | Facility: OTHER | Age: 75
End: 2020-08-10

## 2020-08-10 NOTE — PROGRESS NOTES
Digital Medicine: Clinician Introduction    Edis Huggins Jr. is a 75 y.o. male who is newly enrolled in the Digital Medicine Clinic.    Patient indicates he is going to self decrease citalopram to 10 mg daily. Patient indicates he self increases/decreases dose of medication and his wife will tell him if he needs to change dependent on his mood.    The history is provided by the patient.      Review of patient's allergies indicates:  No Known Allergies  Completed Medication Reconciliation  Verified pharmacy information.    HYPERTENSION  Explained hypertension digital medicine goals including BP goal less than or equal to 130/80mmHg, improved convenience of BP management and reduced risk of heart attack, kidney failure, stroke, eye disease, dementia, and death.     Explained non-pharmacologic therapies like low salt diet and physical activity can reduce blood pressure.       Explained that we expect patient to submit several blood pressure readings per week at random times of the day, but at least 30 minutes after taking blood pressure medications. Instructed patient not to allow anyone else to use their blood pressure monitor and phone as data submitted is directly entered into medical record. Reviewed and confirmed appropriate blood pressure monitoring technique.         Reviewed signs/symptoms of hypertension (headache, changes in vision, chest pain, shortness of breath)   Reviewed signs/symptoms of hypotension (lightheaded, dizziness, weakness)     Patient's BP goal is less than or equal to 130/80. Patients BP average is 128/67 mmHg, which is at goal, per 2017 ACC/AHA Hypertension Guidelines.       Last 5 Patient Entered Readings                                      Current 30 Day Average: 128/67     Recent Readings 8/9/2020 8/8/2020 8/7/2020 8/6/2020 8/5/2020    SBP (mmHg) 111 139 113 125 153    DBP (mmHg) 61 69 57 69 79    Pulse 60 55 55 54 61              Depression Screening  Edis Huggins Jr.  screened negative on the depression screening.   Mood has been stable in last thirty day.     Sleep Apnea Screening  Patient previously diagnosed with CARMELA     He reports he is currently using CPAP for 8 hour(s) per night. CARMELA is managed effectively with CPAP use.      Medication Affordability Screening  Did not address medication affordability screening.     Medication Adherence-Medication adherence was assessed.  Patient continue taking medication as prescribed.            ASSESSMENT(S)  Patients BP average is 128/67 mmHg, which is at goal. Patient's BP goal is less than or equal to 130/80 per 2017 ACC/AHA Hypertension Guidelines.    Stable and controlled. Recommend continuation of current treatment plan as previously prescribed with PCP and cardiologist.         Hypertension Plan  Continue current therapy.  Provided patient education. Informed patient to consult PCP regarding CPAP readings as well as to consult PCP prior to making changes to his citalopram.        Addressed any questions or concerns and patient has my contact information if needed prior to next outreach. Patient verbalizes understanding.      Explained the importance of self-monitoring and medication adherence. Encouraged the patient to communicate with their health  for lifestyle modifications to help improve or maintain a healthy lifestyle.        Sent link to [facility] [employer] [facility] webpages and my contact information via [employer] for future questions.        Explained to the patient that the Digital Medicine team is not available for emergencies. Advised patient call [facility] ([phone] or [phone]) or [phone] if needed.         There are no preventive care reminders to display for this patient.    Current Medication Regimen:  Hypertension Medications             amLODIPine (NORVASC) 5 MG tablet Take 1 tablet (5 mg total) by mouth once daily.    chlorthalidone (HYGROTEN) 50 MG Tab Take 1 tablet (50 mg total) by mouth once  daily.         irbesartan (AVAPRO) 300 MG tablet Take 300 mg by mouth once daily.     metoprolol succinate (TOPROL-XL) 50 MG 24 hr tablet Take 1 tablet (50 mg total) by mouth once daily.

## 2020-08-12 RX ORDER — CYCLOBENZAPRINE HCL 10 MG
TABLET ORAL
Qty: 30 TABLET | Refills: 0 | Status: SHIPPED | OUTPATIENT
Start: 2020-08-12 | End: 2020-08-22

## 2020-08-13 DIAGNOSIS — F41.9 ANXIETY: ICD-10-CM

## 2020-08-13 RX ORDER — CITALOPRAM 20 MG/1
TABLET, FILM COATED ORAL
Qty: 90 TABLET | Refills: 3 | Status: SHIPPED | OUTPATIENT
Start: 2020-08-13 | End: 2021-08-13 | Stop reason: SDUPTHER

## 2020-08-13 RX ORDER — FINASTERIDE 5 MG/1
TABLET, FILM COATED ORAL
Qty: 90 TABLET | Refills: 3 | Status: SHIPPED | OUTPATIENT
Start: 2020-08-13 | End: 2021-08-26 | Stop reason: SDUPTHER

## 2020-08-13 NOTE — TELEPHONE ENCOUNTER
No new care gaps identified.  Powered by [employer]. Reference number: [id number]. 8/13/2020 4:02:29 AM ABBEYT

## 2020-08-13 NOTE — PROGRESS NOTES
Refill Authorization Note    is requesting a refill authorization.    Brief assessment and rationale for refill: APPROVE: prr          Medication Therapy Plan: [facility]    Medication reconciliation completed: No                         Comments:      Orders Placed This Encounter    finasteride (PROSCAR) 5 mg tablet    citalopram (CELEXA) 20 MG tablet      Requested Prescriptions   Signed Prescriptions Disp Refills    finasteride (PROSCAR) 5 mg tablet 90 tablet 3     Sig: TAKE 1 TABLET(5 MG) BY MOUTH EVERY DAY       Urology: 5-alpha Reductase Inhibitors Passed - 8/13/2020  4:01 AM        Passed - Patient is at least 18 years old        Passed - Office visit in past 12 months or future 90 days.     Recent Outpatient Visits            1 week ago Inflamed seborrheic keratosis    [facility] - Dermatology [address] Negar Ordaz MD    1 week ago Essential hypertension    [facility] - Internal Medicine Forest Miles MD    2 months ago Lumbar radiculopathy    [place] - Pain Management BILL Martinez    2 months ago Annual physical exam    [facility] - Internal Medicine Forest Miles MD    2 months ago Primary open angle glaucoma of left eye, moderate stage    [facility] - Ophthalmology Deborah Khan MD          Future Appointments              In 1 month MD [facility] - Cardiology, [place] [facility]    In 2 months APPOINTMENT LAB, [facility] [place] [facility]    In 2 months MD [place] [facility] - Hematology Oncology, [place]    In 2 months Forest Miles MD [facility] - Internal Medicine, [facility]                Passed - Prostate Cancer is not on problem list          citalopram (CELEXA) 20 MG tablet 90 tablet 3     Sig: TAKE 1 TABLET(20 MG) BY MOUTH EVERY DAY       Psychiatry:  Antidepressants - SSRI Passed - 8/13/2020  4:01 AM        Passed - Patient is at least 18 years old        Passed - Office visit in past 6 months or future 90 days.     Recent Outpatient  Visits            1 week ago Inflamed seborrheic keratosis    [facility] - Dermatology [address] Negar Ordaz MD    1 week ago Essential hypertension    [facility] - Internal Medicine Forest Miles MD    2 months ago Lumbar radiculopathy    [facility] - Pain Management BILL Martinez    2 months ago Annual physical exam    [facility] - Internal Medicine Forest Miles MD    2 months ago Primary open angle glaucoma of left eye, moderate stage    [facility] - Ophthalmology Deborah Khan MD          Future Appointments              In 1 month MD [facility] - Cardiology, [facility]    In 2 months APPOINTMENT LAB, [facility]    In 2 months MD [facility] - Hematology Oncology, [facility]    In 2 months MD Rohan Perez [facility] - Internal Medicine, [facility]                    Appointments  past 12m or future 3m with PCP    Date Provider   Last Visit   8/5/2020 Forest Miles MD   Next Visit   11/9/2020 Forest Miles MD   ED visits in past 90 days: 0     Note composed:1:31 PM 08/13/2020

## 2020-08-22 RX ORDER — CYCLOBENZAPRINE HCL 10 MG
TABLET ORAL
Qty: 30 TABLET | Refills: 3 | Status: SHIPPED | OUTPATIENT
Start: 2020-08-22 | End: 2020-09-26

## 2020-08-22 NOTE — PROGRESS NOTES
Refill Routing Note   Medication(s) are not appropriate for processing by [facility]:       - Outside of protocol           Medication reconciliation completed: No      Automatic Epic Generated Protocol Data:        Requested Prescriptions   Pending Prescriptions Disp Refills    cyclobenzaprine (FLEXERIL) 10 MG tablet [Pharmacy Med Name: CYCLOBENZAPRINE 10MG TABLETS] 30 tablet 0     Sig: TAKE 1 TABLET(10 MG) BY MOUTH THREE TIMES DAILY AS NEEDED       There is no refill protocol information for this order           Appointments  past 12m or future 3m with PCP    Date Provider   Last Visit   8/5/2020 Forest Miles MD   Next Visit   11/9/2020 Forest Miles MD   ED visits in past 90 days: 0     Note composed:1:01 PM 08/22/2020

## 2020-08-26 ENCOUNTER — PATIENT OUTREACH (OUTPATIENT)
Dept: OTHER | Facility: OTHER | Age: 75
End: 2020-08-26

## 2020-08-26 NOTE — PROGRESS NOTES
Digital Medicine: Health  Follow-Up    The history is provided by the patient.             Reason for review: Blood pressure at goal        Topics Covered on Call: physical activity and Diet          Diet-no change to diet  No 24 hour dietary recall  No change to diet.  Patient reports eating or drinking the following: Patient consumes a lot of processed foods. He states his diet has been unusual since the pandemic started and he started working from home.    Patient's wife has fibromyalgia, so she does not cook often.    Breakfast is typically a awilda ayleen frozen sausage, egg and cheese biscuit with 2 frozen waffles. - reviewed sodium content of this meal, roughly about 1200 mg of sodium. Discussed with patient sodium allowance for the day and per meal. We briefly discussed some healthier alternatives to this meal.   - patient asked what about the sausage biscuits make them so high in sodium. Reviewed with patient that processed meats are high in sodium, some cheeses. Will send patient low sodium cheese resource per his request    Lunch can be soup, pizza, or fried chicken, or sandwiches from [employer]. He states he will get the 2 sandwiches for $6 and an extra sandwich for the next day.     Dinner can be a salad with a frozen tv dinner. Sometimes wife may cook, recently she prepared steak, baked potato's and asparagus.     Patient drinks prune juice in the morning, occasioanlly orange juice. Patient also drinks 2 cups of coffee per day, 1 decaf. Patient also drinks 2 low sodium v8 juices. Patient does not typically drink lots of water, if he does drink water he likes to add the crystal light packs to it.     Patient states he tends to snack a lot now that he is working from home. He likes to snack on the claudette butter peanut butter cookies, resse's dark chocolate peanut butter cups.    Patient is not interested in the idea of meal prepping right now. I informed patient most frozen or pre-packaged processed foods  "are going to be high in sodium. I asked patient if he was interested in meal prepping meals on the weekends. He states "maybe when Im retired."     Patient does not want to take time away from work to cook breakfast and lunch.     We both agreed to just start with purchasing more low sodium options.     Patient does not read nutrition labels. He states when he goes to the store he usually just gets what he has to purchase and leaves quickly due to the pandemic. and       Since been in covid situatio, has not gained weight, weight stays the same  After dr changes meds, lost 5 lbs of water weight, feet were swollen               Intervention(s): reading food labels, reducing processed foods and DASH diet      Physical Activity-no change to routine  No change to exercise routine.       Additional physical activity details: Patient states he has not been exercising recently. He used to walk when the weather wasn't so hot.     Discussed the benefits regular exercise can have on BP and overall health. Encouraged patient to aim for at least 30 minutes per day.       Medication Adherence-Medication Adherence not addressed.      Substance, Sleep, Stress-Not assessed      Provided patient education. Low sodium diet,        Patient verbalizes understanding.      Explained the importance of self-monitoring and medication adherence. Encouraged the patient to communicate with their health  for lifestyle modifications to help improve or maintain a healthy lifestyle.        Sent link to [facility] [employer] webpages and my contact information via [employer] for future questions.            There are no preventive care reminders to display for this patient.    Last 5 Patient Entered Readings                                      Current 30 Day Average: 124/65     Recent Readings 8/23/2020 8/22/2020 8/20/2020 8/19/2020 8/18/2020    SBP (mmHg) 127 111 117 126 116    DBP (mmHg) 65 57 61 56 55    Pulse 60 59 60 58 56         "

## 2020-09-11 DIAGNOSIS — N40.0 BENIGN NON-NODULAR PROSTATIC HYPERPLASIA WITHOUT LOWER URINARY TRACT SYMPTOMS: ICD-10-CM

## 2020-09-11 NOTE — TELEPHONE ENCOUNTER
No new care gaps identified.  Powered by [employer]. Reference number: [id number]. 9/11/2020 4:04:34 AM ABBEYT

## 2020-09-13 ENCOUNTER — PATIENT OUTREACH (OUTPATIENT)
Dept: ADMINISTRATIVE | Facility: OTHER | Age: 75
End: 2020-09-13

## 2020-09-14 RX ORDER — TAMSULOSIN HYDROCHLORIDE 0.4 MG/1
CAPSULE ORAL
Qty: 90 CAPSULE | Refills: 3 | Status: SHIPPED | OUTPATIENT
Start: 2020-09-14 | End: 2021-01-04

## 2020-09-14 NOTE — PROGRESS NOTES
Refill Authorization Note    is requesting a refill authorization.    Brief assessment and rationale for refill: APPROVE: PRR          Medication Therapy Plan: CDMR; APPROVE PER PROTOCOL    Medication reconciliation completed: No                    Comments:          Requested Prescriptions   Pending Prescriptions Disp Refills    tamsulosin (FLOMAX) 0.4 mg Cap [Pharmacy Med Name: TAMSULOSIN 0.4MG CAPSULES] 90 capsule 3     Sig: TAKE 1 CAPSULE(0.4 MG) BY MOUTH EVERY DAY       Urology: Alpha-Adrenergic Blocker Passed - 9/11/2020  4:03 AM        Passed - Patient is at least 18 years old        Passed - Last BP in normal range within 360 days.     BP Readings from Last 3 Encounters:   08/05/20 (!) 110/58   05/26/20 (!) 103/55   05/21/20 124/74              Passed - Office visit in past 12 months or future 90 days.     Recent Outpatient Visits            1 month ago Inflamed seborrheic keratosis    [facility] - Dermatology 11th Fl Negar Ordaz MD    1 month ago Essential hypertension    [facility] Forest Miles MD    3 months ago Lumbar radiculopathy    [place] - Pain Management BILL Martinez    3 months ago Annual physical exam    [facility] Forest Miles MD    3 months ago Primary open angle glaucoma of left eye, moderate stage    [facility] - Vision Svcs [address] Fl Deborah Khan MD          Future Appointments              Tomorrow MD [facility]iology[facility]    In 4 weeks APPOINTMENT LAB, [facility]    In 4 weeks MD [facility] - Hematology Oncology, [facility]    In 1 month Forest Miles MD [facility], [facility]                Passed - Prostate Cancer is not on problem list            Appointments  past 12m or future 3m with PCP    Date Provider   Last Visit   8/5/2020 Forest Miles MD   Next Visit   11/9/2020 Forest Miles MD   ED  visits in past 90 days: 0     Note composed:1:33 PM 09/14/2020

## 2020-09-15 ENCOUNTER — OFFICE VISIT (OUTPATIENT)
Dept: CARDIOLOGY | Facility: CLINIC | Age: 75
End: 2020-09-15
Payer: COMMERCIAL

## 2020-09-15 VITALS
HEART RATE: 61 BPM | SYSTOLIC BLOOD PRESSURE: 126 MMHG | BODY MASS INDEX: 40.72 KG/M2 | DIASTOLIC BLOOD PRESSURE: 70 MMHG | OXYGEN SATURATION: 97 % | WEIGHT: 290.88 LBS | HEIGHT: 71 IN

## 2020-09-15 DIAGNOSIS — E78.5 HYPERLIPIDEMIA, UNSPECIFIED HYPERLIPIDEMIA TYPE: ICD-10-CM

## 2020-09-15 DIAGNOSIS — M48.061 NEUROFORAMINAL STENOSIS OF LUMBAR SPINE: ICD-10-CM

## 2020-09-15 DIAGNOSIS — E66.9 OBESITY (BMI 35.0-39.9 WITHOUT COMORBIDITY): ICD-10-CM

## 2020-09-15 DIAGNOSIS — I48.0 PAROXYSMAL ATRIAL FIBRILLATION: ICD-10-CM

## 2020-09-15 DIAGNOSIS — I70.0 AORTIC ATHEROSCLEROSIS: ICD-10-CM

## 2020-09-15 DIAGNOSIS — E66.01 CLASS 3 SEVERE OBESITY DUE TO EXCESS CALORIES WITH BODY MASS INDEX (BMI) OF 40.0 TO 44.9 IN ADULT, UNSPECIFIED WHETHER SERIOUS COMORBIDITY PRESENT: ICD-10-CM

## 2020-09-15 DIAGNOSIS — M54.16 LUMBAR RADICULOPATHY: ICD-10-CM

## 2020-09-15 DIAGNOSIS — I65.29 CAROTID ATHEROSCLEROSIS, UNSPECIFIED LATERALITY: ICD-10-CM

## 2020-09-15 DIAGNOSIS — I10 ESSENTIAL HYPERTENSION: Primary | ICD-10-CM

## 2020-09-15 DIAGNOSIS — G47.33 OSA ON CPAP: ICD-10-CM

## 2020-09-15 DIAGNOSIS — D64.9 NORMOCYTIC ANEMIA: ICD-10-CM

## 2020-09-15 PROBLEM — I48.91 LONE ATRIAL FIBRILLATION: Status: RESOLVED | Noted: 2018-06-19 | Resolved: 2020-09-15

## 2020-09-15 PROCEDURE — 3074F SYST BP LT 130 MM HG: CPT | Mod: CPTII,S$GLB,, | Performed by: INTERNAL MEDICINE

## 2020-09-15 PROCEDURE — 1126F PR PAIN SEVERITY QUANTIFIED, NO PAIN PRESENT: ICD-10-PCS | Mod: S$GLB,,, | Performed by: INTERNAL MEDICINE

## 2020-09-15 PROCEDURE — 1159F PR MEDICATION LIST DOCUMENTED IN MEDICAL RECORD: ICD-10-PCS | Mod: S$GLB,,, | Performed by: INTERNAL MEDICINE

## 2020-09-15 PROCEDURE — 1101F PR PT FALLS ASSESS DOC 0-1 FALLS W/OUT INJ PAST YR: ICD-10-PCS | Mod: CPTII,S$GLB,, | Performed by: INTERNAL MEDICINE

## 2020-09-15 PROCEDURE — 3008F BODY MASS INDEX DOCD: CPT | Mod: CPTII,S$GLB,, | Performed by: INTERNAL MEDICINE

## 2020-09-15 PROCEDURE — 1101F PT FALLS ASSESS-DOCD LE1/YR: CPT | Mod: CPTII,S$GLB,, | Performed by: INTERNAL MEDICINE

## 2020-09-15 PROCEDURE — 3078F DIAST BP <80 MM HG: CPT | Mod: CPTII,S$GLB,, | Performed by: INTERNAL MEDICINE

## 2020-09-15 PROCEDURE — 1126F AMNT PAIN NOTED NONE PRSNT: CPT | Mod: S$GLB,,, | Performed by: INTERNAL MEDICINE

## 2020-09-15 PROCEDURE — 99214 OFFICE O/P EST MOD 30 MIN: CPT | Mod: S$GLB,,, | Performed by: INTERNAL MEDICINE

## 2020-09-15 PROCEDURE — 3008F PR BODY MASS INDEX (BMI) DOCUMENTED: ICD-10-PCS | Mod: CPTII,S$GLB,, | Performed by: INTERNAL MEDICINE

## 2020-09-15 PROCEDURE — 99999 PR PBB SHADOW E&M-EST. PATIENT-LVL V: CPT | Mod: PBBFAC,,, | Performed by: INTERNAL MEDICINE

## 2020-09-15 PROCEDURE — 99214 PR OFFICE/OUTPT VISIT, EST, LEVL IV, 30-39 MIN: ICD-10-PCS | Mod: S$GLB,,, | Performed by: INTERNAL MEDICINE

## 2020-09-15 PROCEDURE — 3078F PR MOST RECENT DIASTOLIC BLOOD PRESSURE < 80 MM HG: ICD-10-PCS | Mod: CPTII,S$GLB,, | Performed by: INTERNAL MEDICINE

## 2020-09-15 PROCEDURE — 1159F MED LIST DOCD IN RCRD: CPT | Mod: S$GLB,,, | Performed by: INTERNAL MEDICINE

## 2020-09-15 PROCEDURE — 3074F PR MOST RECENT SYSTOLIC BLOOD PRESSURE < 130 MM HG: ICD-10-PCS | Mod: CPTII,S$GLB,, | Performed by: INTERNAL MEDICINE

## 2020-09-15 PROCEDURE — 99999 PR PBB SHADOW E&M-EST. PATIENT-LVL V: ICD-10-PCS | Mod: PBBFAC,,, | Performed by: INTERNAL MEDICINE

## 2020-09-15 NOTE — PROGRESS NOTES
Subjective:    Patient ID:  Edis Huggins Jr. is a 75 y.o. male who presents for follow-up of Atrial Fibrillation      HPI     75 y/o male with hx of HTN, HLD, obesity, PAfib on DOAC (CHADSVasc 2 for HTN, age), CARMELA on CPAP, who presents for f/u.   Has had extensive w/u for RESENDEZ including normal nuclear SPECT, normal 30 day monitor, normal PFT's. Did have a previous Holter monitor which showed a single run of Paroxysmal atrial fibrillation lasting for 13 sec. Had bilateral LE edema which has improved with chlorthalidone.   Has Cedexisdia device for detection of afib, felt symptoms which woke him from sleep, checked device, and found to be in afib (strips available in media section). Resolved shortly after symptoms. Ne recurrence. Started on BB and DOAC. Currently on CPAP.   Has chronic, mild, unchanged RESENDEZ with moderate activity.  Following with digital HTN clinic and changes made to regimen and BP appears to be controlled.  Denies CP, orthopnea, PND, syncope, palps. Compliant with meds and follows a low salt diet.     Review of Systems   Constitution: Negative for malaise/fatigue.   HENT: Negative for congestion.    Eyes: Negative for blurred vision.   Cardiovascular: Positive for dyspnea on exertion. Negative for chest pain, claudication, cyanosis, irregular heartbeat, leg swelling, near-syncope, orthopnea, palpitations, paroxysmal nocturnal dyspnea and syncope.   Respiratory: Negative for shortness of breath.    Endocrine: Negative for polyuria.   Hematologic/Lymphatic: Negative for bleeding problem.   Skin: Negative for itching and rash.   Musculoskeletal: Negative for joint swelling, muscle cramps and muscle weakness.   Gastrointestinal: Negative for abdominal pain, hematemesis, hematochezia, melena, nausea and vomiting.   Genitourinary: Negative for dysuria and hematuria.   Neurological: Negative for dizziness, focal weakness, headaches, light-headedness, loss of balance and weakness.   Psychiatric/Behavioral:  Negative for depression. The patient is not nervous/anxious.         Objective:    Physical Exam   Constitutional: He is oriented to person, place, and time. He appears well-developed and well-nourished.   HENT:   Head: Normocephalic and atraumatic.   Neck: Neck supple. No JVD present.   Cardiovascular: Normal rate, regular rhythm and normal heart sounds.   Pulses:       Carotid pulses are 2+ on the right side and 2+ on the left side.       Radial pulses are 2+ on the right side and 2+ on the left side.        Femoral pulses are 2+ on the right side and 2+ on the left side.       Dorsalis pedis pulses are 2+ on the right side and 2+ on the left side.        Posterior tibial pulses are 2+ on the right side and 2+ on the left side.   Pulmonary/Chest: Effort normal and breath sounds normal.   Abdominal: Soft. Bowel sounds are normal.   Musculoskeletal:         General: No edema.   Neurological: He is alert and oriented to person, place, and time.   Skin: Skin is warm and dry.   Psychiatric: He has a normal mood and affect. His behavior is normal. Thought content normal.         Assessment:       1. Essential hypertension    2. Aortic atherosclerosis -- CTA 06/2018    3. Carotid atherosclerosis, unspecified laterality    4. Paroxysmal atrial fibrillation    5. Hyperlipidemia, unspecified hyperlipidemia type    6. Lumbar radiculopathy    7. Normocytic anemia -- iron and B12 normal    8. Obesity (BMI 35.0-39.9 without comorbidity)    9. Neuroforaminal stenosis of lumbar spine    10. Class 3 severe obesity due to excess calories with body mass index (BMI) of 40.0 to 44.9 in adult, unspecified whether serious comorbidity present    11. CARMELA on CPAP      74 y/o pt with hx and presentation as above. Doing well from a cardiac perspective and compensated from a HF perspective. Need to be more active and lose weight. Discussed the etiology, evaluation, and management of Afib, CVA, DOAC, HTN, obesity, CARMELA. Discussed the importance  of med compliance, heart healthy diet, and regular exercise.      Plan:       -Continue current medical management  -f/u in 6 months

## 2020-09-26 RX ORDER — CYCLOBENZAPRINE HCL 10 MG
TABLET ORAL
Qty: 30 TABLET | Refills: 3 | Status: SHIPPED | OUTPATIENT
Start: 2020-09-26 | End: 2020-10-30

## 2020-09-26 NOTE — PROGRESS NOTES
Refill Routing Note   Medication(s) are not appropriate for processing by [facility]:       - Outside of protocol           Medication reconciliation completed: No      Automatic Epic Generated Protocol Data:        Requested Prescriptions   Pending Prescriptions Disp Refills    cyclobenzaprine (FLEXERIL) 10 MG tablet [Pharmacy Med Name: CYCLOBENZAPRINE 10MG TABLETS] 30 tablet 3     Sig: TAKE 1 TABLET(10 MG) BY MOUTH THREE TIMES DAILY AS NEEDED       Off-Protocol Failed - 9/26/2020  4:04 AM        Failed - Medication not assigned to a protocol, review manually.        Passed - Valid encounter within last 12 months     Recent Outpatient Visits            1 week ago Essential hypertension    [place] - Cardiology Shakeel Bello MD    1 month ago Inflamed seborrheic keratosis    [facility] - Dermatology 11[address] Fl Negar Ordaz MD    1 month ago Essential hypertension    [facility] Forest Miles MD    3 months ago Lumbar radiculopathy    [facility] - Pain Management BILL Martinez    4 months ago Annual physical exam    [facility] Forest Miles MD          Future Appointments              In 2 weeks APPOINTMENT LAB, [facility]    In 2 weeks Asher Auguste MD [facility] - Hematology Oncology, [facility]    In 1 month Forest Miles MD [facility], [facility]                      Appointments  past 12m or future 3m with PCP    Date Provider   Last Visit   8/5/2020 Forest Miles MD   Next Visit   11/9/2020 Forest Miles MD   ED visits in past 90 days: 0     Note composed:4:50 PM 09/26/2020

## 2020-10-02 ENCOUNTER — PATIENT OUTREACH (OUTPATIENT)
Dept: OTHER | Facility: OTHER | Age: 75
End: 2020-10-02

## 2020-10-02 NOTE — PROGRESS NOTES
Digital Medicine: Health  Follow-Up    The history is provided by the patient.             Reason for review: Blood pressure at goal        Topics Covered on Call: physical activity and Diet    Additional Follow-up details: Patient reports he's doing well, complains of seasonal allergies and feeling a little congested. He states he's still using CPAP machine.       Patient states he hasn't had any issues with swelling in his feet recently which he is pleased with.     Patient noted that he did have a higher reading on 9/30 of 148/75. He states he think he was moving around too much before taking the reading. He asked how long he should be resting before taking a reading. Reviewed resting technique with patient.     Patient states if his readings continue to look good he would like to discuss being taken off of amlodipine. Encouraged patient to continue working on lifestyle change if he wants to reduce dosage of meds or stop taking them. Encouraged him it's important to maintain a low sodium diet and exercise regularly.           Diet-Change  No 24 hour dietary recall  Patient reports eating or drinking the following: Patient drinks 2 cups of coffee per day. 1 decaf and 1 regular. 2 v8 juices, water and occasionally a diet 7 up drink.  Dietary Improvements:Patient has started working making some changes to his diet. Instead of eating a sausage biscuit for breakfast everyday, he has bought some low sugar oatmeal. He states he eats that most mornings.    Patient has started eating turkey sausage instead of regular sausage. Applauded patient's efforts but informed him that although it's turkey sausage it's still high in sodium. Patient looked at the nutrition label while we were on the phone and he said its 490 mg of sodium per 3 links.     He states he does not eat them every morning, maybe 1-2 times per week.     Patient states he's trying to do better with salt intake. Reminded him to consume no more than 1500  mg per day.                       Dietary Indiscretions:Patient still eating chinese food occasionally-- review sodium content of take out food.    Intervention(s): reading food labels, reducing processed foods and DASH diet      Physical Activity-no change to routine  No change to exercise routine.       Additional physical activity details: Patient has not made any changes with exercise yet. He states he's been too busy with work.     Encouraged him to walk on the weekends for at least 15-20 minutes.       Medication Adherence-Medication Adherence not addressed.      Substance, Sleep, Stress-Not assessed      Continue current diet/physical activity routine. Continue working on sodium reduction   Provided patient education.  Reviewed Device Techniques. Reviewed resting technique      Patient verbalizes understanding.      Explained the importance of self-monitoring and medication adherence. Encouraged the patient to communicate with their health  for lifestyle modifications to help improve or maintain a healthy lifestyle.            There are no preventive care reminders to display for this patient.    Last 5 Patient Entered Readings                                      Current 30 Day Average: 125/63     Recent Readings 10/1/2020 9/30/2020 9/30/2020 9/28/2020 9/27/2020    SBP (mmHg) 130 117 148 119 124    DBP (mmHg) 58 57 75 58 58    Pulse 60 63 56 59 58

## 2020-10-05 DIAGNOSIS — I10 ESSENTIAL HYPERTENSION: Primary | ICD-10-CM

## 2020-10-05 RX ORDER — IRBESARTAN 300 MG/1
TABLET ORAL
Qty: 90 TABLET | Refills: 3 | Status: SHIPPED | OUTPATIENT
Start: 2020-10-05 | End: 2021-07-15 | Stop reason: SDUPTHER

## 2020-10-05 NOTE — PROGRESS NOTES
Refill Routing Note   Medication(s) are not appropriate for processing by [facility]:       - Drug-Drug Interaction (AVAPRO 150 MG  MG)     Medication-related problems identified: Drug-drug interaction  Medication Therapy Plan: CDMR; DDI(AVAPRO 150 MG  MG), DEFER TO YOU  Medication reconciliation completed: No      Automatic Epic Generated Protocol Data:        Requested Prescriptions   Pending Prescriptions Disp Refills    irbesartan (AVAPRO) 300 MG tablet [Pharmacy Med Name: IRBESARTAN 300MG TABLETS] 90 tablet 3     Sig: TAKE 1 TABLET BY MOUTH EVERY DAY       Cardiovascular:  Angiotensin Receptor Blockers Passed - 10/5/2020  7:59 AM        Passed - Patient is at least 18 years old        Passed - Last BP in normal range within 360 days     BP Readings from Last 3 Encounters:   09/15/20 126/70   08/05/20 (!) 110/58   05/26/20 (!) 103/55              Passed - Office Visit within last 12 months or future 90 days.     Recent Outpatient Visits            2 weeks ago Essential hypertension    [place] - Cardiology Shakeel Bello MD    2 months ago Inflamed seborrheic keratosis    [facility] D[facility] Fl Negar Ordaz MD    2 months ago Essential hypertension    [facility] Forest Miles MD    4 months ago Lumbar radiculopathy    [facility] - Pain Management BILL Martinez    4 months ago Annual physical exam    [facility] Forest Miles MD          Future Appointments              In 1 week APPOINTMENT LAB, [facility]    In 1 week MD [facility] - Hematology Oncology, [place] [facility]    In 1 month Forest Miles MD [facility]                Passed - Cr is 1.3 or below and within 360 days     Creatinine   Date Value Ref Range Status   08/05/2020 0.9 0.5 - 1.4 mg/dL Final   05/12/2020 0.9 0.5 - 1.4 mg/dL Final   10/04/2019 0.9 0.5 - 1.4 mg/dL Final               Passed - K in normal range and within 360 days     Potassium   Date Value Ref Range Status   08/05/2020 4.4 3.5 - 5.1 mmol/L Final   05/12/2020 4.1 3.5 - 5.1 mmol/L Final   10/04/2019 3.8 3.5 - 5.1 mmol/L Final              Passed - eGFR within 360 days     eGFR if non    Date Value Ref Range Status   08/05/2020 >60.0 >60 mL/min/1.73 m^2 Final     Comment:     Calculation used to obtain the estimated glomerular filtration  rate (eGFR) is the CKD-EPI equation.      05/12/2020 >60 >60 mL/min/1.73 m^2 Final     Comment:     Calculation used to obtain the estimated glomerular filtration  rate (eGFR) is the CKD-EPI equation.      10/04/2019 >60 >60 mL/min/1.73 m^2 Final     Comment:     Calculation used to obtain the estimated glomerular filtration  rate (eGFR) is the CKD-EPI equation.        eGFR if    Date Value Ref Range Status   08/05/2020 >60.0 >60 mL/min/1.73 m^2 Final   05/12/2020 >60 >60 mL/min/1.73 m^2 Final   10/04/2019 >60 >60 mL/min/1.73 m^2 Final                    Appointments  past 12m or future 3m with PCP    Date Provider   Last Visit   8/5/2020 Forest Miles MD   Next Visit   11/9/2020 Forest Miles MD   ED visits in past 90 days: 0     Note composed:7:58 AM 10/05/2020

## 2020-10-05 NOTE — TELEPHONE ENCOUNTER
No new care gaps identified.  Powered by [employer]. Reference number: [id number]. 10/05/2020 4:08:02 AM   ABBEYT

## 2020-10-09 ENCOUNTER — PATIENT MESSAGE (OUTPATIENT)
Dept: INTERNAL MEDICINE | Facility: CLINIC | Age: 75
End: 2020-10-09

## 2020-10-09 DIAGNOSIS — R73.01 IMPAIRED FASTING GLUCOSE: Primary | ICD-10-CM

## 2020-10-13 ENCOUNTER — LAB VISIT (OUTPATIENT)
Dept: LAB | Facility: HOSPITAL | Age: 75
End: 2020-10-13
Attending: INTERNAL MEDICINE
Payer: COMMERCIAL

## 2020-10-13 ENCOUNTER — IMMUNIZATION (OUTPATIENT)
Dept: PHARMACY | Facility: CLINIC | Age: 75
End: 2020-10-13
Payer: COMMERCIAL

## 2020-10-13 DIAGNOSIS — R73.01 IMPAIRED FASTING GLUCOSE: ICD-10-CM

## 2020-10-13 DIAGNOSIS — E29.1 MALE HYPOGONADISM: ICD-10-CM

## 2020-10-13 DIAGNOSIS — R06.02 SHORTNESS OF BREATH: ICD-10-CM

## 2020-10-13 DIAGNOSIS — D64.9 NORMOCYTIC ANEMIA: ICD-10-CM

## 2020-10-13 LAB
25(OH)D3+25(OH)D2 SERPL-MCNC: 34 NG/ML (ref 30–96)
ALBUMIN SERPL BCP-MCNC: 4 G/DL (ref 3.5–5.2)
ALP SERPL-CCNC: 49 U/L (ref 55–135)
ALT SERPL W/O P-5'-P-CCNC: 20 U/L (ref 10–44)
ANION GAP SERPL CALC-SCNC: 10 MMOL/L (ref 8–16)
AST SERPL-CCNC: 21 U/L (ref 10–40)
BASOPHILS # BLD AUTO: 0.05 K/UL (ref 0–0.2)
BASOPHILS NFR BLD: 0.6 % (ref 0–1.9)
BILIRUB SERPL-MCNC: 0.5 MG/DL (ref 0.1–1)
BNP SERPL-MCNC: 21 PG/ML (ref 0–99)
BUN SERPL-MCNC: 21 MG/DL (ref 8–23)
CALCIUM SERPL-MCNC: 10 MG/DL (ref 8.7–10.5)
CHLORIDE SERPL-SCNC: 102 MMOL/L (ref 95–110)
CO2 SERPL-SCNC: 28 MMOL/L (ref 23–29)
COMPLEXED PSA SERPL-MCNC: 1.1 NG/ML (ref 0–4)
CREAT SERPL-MCNC: 0.8 MG/DL (ref 0.5–1.4)
DIFFERENTIAL METHOD: ABNORMAL
EOSINOPHIL # BLD AUTO: 0.4 K/UL (ref 0–0.5)
EOSINOPHIL NFR BLD: 5.2 % (ref 0–8)
ERYTHROCYTE [DISTWIDTH] IN BLOOD BY AUTOMATED COUNT: 13.3 % (ref 11.5–14.5)
EST. GFR  (AFRICAN AMERICAN): >60 ML/MIN/1.73 M^2
EST. GFR  (NON AFRICAN AMERICAN): >60 ML/MIN/1.73 M^2
ESTIMATED AVG GLUCOSE: 123 MG/DL (ref 68–131)
GLUCOSE SERPL-MCNC: 112 MG/DL (ref 70–110)
HBA1C MFR BLD HPLC: 5.9 % (ref 4–5.6)
HCT VFR BLD AUTO: 36.7 % (ref 40–54)
HGB BLD-MCNC: 12 G/DL (ref 14–18)
IMM GRANULOCYTES # BLD AUTO: 0.02 K/UL (ref 0–0.04)
IMM GRANULOCYTES NFR BLD AUTO: 0.2 % (ref 0–0.5)
LYMPHOCYTES # BLD AUTO: 3 K/UL (ref 1–4.8)
LYMPHOCYTES NFR BLD: 35.6 % (ref 18–48)
MCH RBC QN AUTO: 31.4 PG (ref 27–31)
MCHC RBC AUTO-ENTMCNC: 32.7 G/DL (ref 32–36)
MCV RBC AUTO: 96 FL (ref 82–98)
MONOCYTES # BLD AUTO: 0.8 K/UL (ref 0.3–1)
MONOCYTES NFR BLD: 9.3 % (ref 4–15)
NEUTROPHILS # BLD AUTO: 4.1 K/UL (ref 1.8–7.7)
NEUTROPHILS NFR BLD: 49.1 % (ref 38–73)
NRBC BLD-RTO: 0 /100 WBC
PLATELET # BLD AUTO: 214 K/UL (ref 150–350)
PMV BLD AUTO: 11.6 FL (ref 9.2–12.9)
POTASSIUM SERPL-SCNC: 3.7 MMOL/L (ref 3.5–5.1)
PROT SERPL-MCNC: 7.4 G/DL (ref 6–8.4)
RBC # BLD AUTO: 3.82 M/UL (ref 4.6–6.2)
SODIUM SERPL-SCNC: 140 MMOL/L (ref 136–145)
TESTOST SERPL-MCNC: 207 NG/DL (ref 304–1227)
WBC # BLD AUTO: 8.31 K/UL (ref 3.9–12.7)

## 2020-10-13 PROCEDURE — 84403 ASSAY OF TOTAL TESTOSTERONE: CPT

## 2020-10-13 PROCEDURE — 83880 ASSAY OF NATRIURETIC PEPTIDE: CPT

## 2020-10-13 PROCEDURE — 80053 COMPREHEN METABOLIC PANEL: CPT

## 2020-10-13 PROCEDURE — 84153 ASSAY OF PSA TOTAL: CPT

## 2020-10-13 PROCEDURE — 85025 COMPLETE CBC W/AUTO DIFF WBC: CPT

## 2020-10-13 PROCEDURE — 36415 COLL VENOUS BLD VENIPUNCTURE: CPT

## 2020-10-13 PROCEDURE — 82306 VITAMIN D 25 HYDROXY: CPT

## 2020-10-13 PROCEDURE — 83036 HEMOGLOBIN GLYCOSYLATED A1C: CPT

## 2020-10-14 ENCOUNTER — PATIENT MESSAGE (OUTPATIENT)
Dept: INTERNAL MEDICINE | Facility: CLINIC | Age: 75
End: 2020-10-14

## 2020-10-15 ENCOUNTER — OFFICE VISIT (OUTPATIENT)
Dept: HEMATOLOGY/ONCOLOGY | Facility: CLINIC | Age: 75
End: 2020-10-15
Payer: COMMERCIAL

## 2020-10-15 VITALS
BODY MASS INDEX: 40.88 KG/M2 | HEART RATE: 70 BPM | SYSTOLIC BLOOD PRESSURE: 135 MMHG | TEMPERATURE: 98 F | RESPIRATION RATE: 18 BRPM | DIASTOLIC BLOOD PRESSURE: 70 MMHG | OXYGEN SATURATION: 98 % | WEIGHT: 293 LBS

## 2020-10-15 DIAGNOSIS — E66.9 OBESITY, UNSPECIFIED CLASSIFICATION, UNSPECIFIED OBESITY TYPE, UNSPECIFIED WHETHER SERIOUS COMORBIDITY PRESENT: ICD-10-CM

## 2020-10-15 DIAGNOSIS — R79.89 LOW TESTOSTERONE IN MALE: ICD-10-CM

## 2020-10-15 DIAGNOSIS — Z86.79 HISTORY OF ATRIAL FIBRILLATION: ICD-10-CM

## 2020-10-15 DIAGNOSIS — D64.9 NORMOCYTIC ANEMIA: Primary | ICD-10-CM

## 2020-10-15 DIAGNOSIS — G47.33 OBSTRUCTIVE SLEEP APNEA: ICD-10-CM

## 2020-10-15 PROCEDURE — 99999 PR PBB SHADOW E&M-EST. PATIENT-LVL V: ICD-10-PCS | Mod: PBBFAC,,, | Performed by: INTERNAL MEDICINE

## 2020-10-15 PROCEDURE — 3075F SYST BP GE 130 - 139MM HG: CPT | Mod: CPTII,S$GLB,, | Performed by: INTERNAL MEDICINE

## 2020-10-15 PROCEDURE — 3075F PR MOST RECENT SYSTOLIC BLOOD PRESS GE 130-139MM HG: ICD-10-PCS | Mod: CPTII,S$GLB,, | Performed by: INTERNAL MEDICINE

## 2020-10-15 PROCEDURE — 3008F BODY MASS INDEX DOCD: CPT | Mod: CPTII,S$GLB,, | Performed by: INTERNAL MEDICINE

## 2020-10-15 PROCEDURE — 3008F PR BODY MASS INDEX (BMI) DOCUMENTED: ICD-10-PCS | Mod: CPTII,S$GLB,, | Performed by: INTERNAL MEDICINE

## 2020-10-15 PROCEDURE — 99999 PR PBB SHADOW E&M-EST. PATIENT-LVL V: CPT | Mod: PBBFAC,,, | Performed by: INTERNAL MEDICINE

## 2020-10-15 PROCEDURE — 99214 PR OFFICE/OUTPT VISIT, EST, LEVL IV, 30-39 MIN: ICD-10-PCS | Mod: S$GLB,,, | Performed by: INTERNAL MEDICINE

## 2020-10-15 PROCEDURE — 3078F DIAST BP <80 MM HG: CPT | Mod: CPTII,S$GLB,, | Performed by: INTERNAL MEDICINE

## 2020-10-15 PROCEDURE — 3078F PR MOST RECENT DIASTOLIC BLOOD PRESSURE < 80 MM HG: ICD-10-PCS | Mod: CPTII,S$GLB,, | Performed by: INTERNAL MEDICINE

## 2020-10-15 PROCEDURE — 1126F AMNT PAIN NOTED NONE PRSNT: CPT | Mod: S$GLB,,, | Performed by: INTERNAL MEDICINE

## 2020-10-15 PROCEDURE — 1159F PR MEDICATION LIST DOCUMENTED IN MEDICAL RECORD: ICD-10-PCS | Mod: S$GLB,,, | Performed by: INTERNAL MEDICINE

## 2020-10-15 PROCEDURE — 1159F MED LIST DOCD IN RCRD: CPT | Mod: S$GLB,,, | Performed by: INTERNAL MEDICINE

## 2020-10-15 PROCEDURE — 1101F PR PT FALLS ASSESS DOC 0-1 FALLS W/OUT INJ PAST YR: ICD-10-PCS | Mod: CPTII,S$GLB,, | Performed by: INTERNAL MEDICINE

## 2020-10-15 PROCEDURE — 99214 OFFICE O/P EST MOD 30 MIN: CPT | Mod: S$GLB,,, | Performed by: INTERNAL MEDICINE

## 2020-10-15 PROCEDURE — 1101F PT FALLS ASSESS-DOCD LE1/YR: CPT | Mod: CPTII,S$GLB,, | Performed by: INTERNAL MEDICINE

## 2020-10-15 PROCEDURE — 1126F PR PAIN SEVERITY QUANTIFIED, NO PAIN PRESENT: ICD-10-PCS | Mod: S$GLB,,, | Performed by: INTERNAL MEDICINE

## 2020-10-15 RX ORDER — AZELASTINE 1 MG/ML
1 SPRAY, METERED NASAL 2 TIMES DAILY
Qty: 30 ML | Refills: 3 | Status: SHIPPED | OUTPATIENT
Start: 2020-10-15 | End: 2021-06-25 | Stop reason: SDUPTHER

## 2020-10-15 NOTE — PROGRESS NOTES
"Subjective:       Patient ID: Edis Huggins Jr. is a 75 y.o. male.    Chief Complaint: Anemia    Anemia  There has been no abdominal pain, bruising/bleeding easily, confusion, fever or palpitations.     He is here for f/u of normocytic anemia.    Hemoglobin 3/14/18 was 11.8.  WBC and platelets within normal limits.  Nutritional studies unremarkable.    He gets dyspnea when he walks.      None at rest.  Weight 292 pounds.  Height 5' 11"    Trying to lose weight.    Uses CPAP for obstructive sleep apnea    Recently tried testosterone gel for low testosterone levels    On Xarelto, tolerating it well    Review of Systems   Constitutional: Negative for fever and unexpected weight change.   HENT: Negative for mouth sores and nosebleeds.    Eyes: Negative for photophobia and pain.   Respiratory: Positive for shortness of breath. Negative for wheezing.    Cardiovascular: Negative for chest pain and palpitations.   Gastrointestinal: Negative for abdominal pain and vomiting.   Musculoskeletal: Negative for neck pain and neck stiffness.   Skin: Negative for rash and wound.   Neurological: Negative for seizures and syncope.   Hematological: Negative for adenopathy. Does not bruise/bleed easily.   Psychiatric/Behavioral: Negative for behavioral problems and confusion.         Objective:      Physical Exam  Vitals signs reviewed.   Constitutional:       General: He is not in acute distress.     Appearance: He is not toxic-appearing or diaphoretic.   HENT:      Mouth/Throat:      Mouth: Mucous membranes are not pale, not dry and not cyanotic. No lacerations.      Pharynx: No oropharyngeal exudate.   Eyes:      General: No scleral icterus.     Conjunctiva/sclera: Conjunctivae normal.   Neck:      Musculoskeletal: Neck supple.      Thyroid: No thyroid mass or thyromegaly.   Cardiovascular:      Rate and Rhythm: Normal rate and regular rhythm.      Heart sounds: Normal heart sounds and S1 normal.   Pulmonary:      Effort: " Pulmonary effort is normal. No accessory muscle usage or respiratory distress.      Breath sounds: Normal breath sounds. No stridor. No wheezing or rales.   Abdominal:      Palpations: Abdomen is soft. There is no mass.      Tenderness: There is no abdominal tenderness.   Lymphadenopathy:      Head:      Right side of head: No submental or submandibular adenopathy.      Left side of head: No submental or submandibular adenopathy.      Cervical: No cervical adenopathy.   Skin:     Findings: No bruising, petechiae or rash.   Neurological:      Mental Status: He is alert and oriented to person, place, and time.      Cranial Nerves: No cranial nerve deficit.      Sensory: No sensory deficit.      Gait: Gait normal.   Psychiatric:         Thought Content: Thought content normal.         Judgment: Judgment normal.           Assessment:       1. Normocytic anemia    2. History of atrial fibrillation    3. Obstructive sleep apnea    4. Obesity, unspecified classification, unspecified obesity type, unspecified whether serious comorbidity present    5. Low testosterone in male        Plan:   Normocytic anemia  -reviewed recent and prior labs  -likely etiology chronic disease  -did not recommend further workup or intervention  -will continue to monitor    History of atrial fibrillation  -on Xarelto  -follow-up with Cardiology, Dr. Bello    Low testosterone levels  -try testosterone supplements, did not feel any better  -discussed with him the pros and cons testosterone supplementation  -also discussed increased risk for prostate cancer  -he will discuss with Dr. Miles about discontinuing it    Obstructive sleep apnea  -on CPAP    Morbid obesity  -likely causing his dyspnea  -counseled on weight loss    Follow-up 1 year

## 2020-10-26 ENCOUNTER — PATIENT OUTREACH (OUTPATIENT)
Dept: ADMINISTRATIVE | Facility: HOSPITAL | Age: 75
End: 2020-10-26

## 2020-10-30 ENCOUNTER — PATIENT OUTREACH (OUTPATIENT)
Dept: OTHER | Facility: OTHER | Age: 75
End: 2020-10-30

## 2020-10-30 RX ORDER — CYCLOBENZAPRINE HCL 10 MG
TABLET ORAL
Qty: 30 TABLET | Refills: 3 | Status: SHIPPED | OUTPATIENT
Start: 2020-10-30 | End: 2020-12-03

## 2020-10-30 NOTE — PROGRESS NOTES
Refill Routing Note   Medication(s) are not appropriate for processing by [facility] for the following reason(s):     - Outside of protocol    [facility] actions taken in this encounter: Route          Medication reconciliation completed: No   Automatic Epic Generated Protocol Data:        Requested Prescriptions   Pending Prescriptions Disp Refills    cyclobenzaprine (FLEXERIL) 10 MG tablet [Pharmacy Med Name: CYCLOBENZAPRINE 10MG TABLETS] 30 tablet 3     Sig: TAKE 1 TABLET(10 MG) BY MOUTH THREE TIMES DAILY AS NEEDED       There is no refill protocol information for this order           Appointments  past 12m or future 3m with PCP    Date Provider   Last Visit   8/5/2020 Forest Miles MD   Next Visit   11/9/2020 Forest Miles MD   ED visits in past 90 days: 0        Note composed:7:21 AM 10/30/2020

## 2020-11-06 ENCOUNTER — PATIENT MESSAGE (OUTPATIENT)
Dept: INTERNAL MEDICINE | Facility: CLINIC | Age: 75
End: 2020-11-06

## 2020-11-06 NOTE — TELEPHONE ENCOUNTER
Defer appointment until annual exam next year. I have sent the patient a [employer] message. Okay to cancel patient's upcoming appointment with me.

## 2020-11-13 NOTE — PROGRESS NOTES
"Digital Medicine: Health  Follow-Up    The history is provided by the patient.             Reason for review: Blood pressure at goal      Additional Follow-up details: Patient got his monthly report in and was pleased with his at goal BP average.   He feels very well and feels like he's "doing very good.'                Diet-no change to diet    No change to diet.  Patient reports eating or drinking the following: Patient states his diet is still "in the covid stage" but is aware he needs to make better choices.   His wife does not cook much, she just had 2 teeth pulled so he's "fending for himself"  Cardiologist told him he's gained 6 lbs over the last 6 months, he knows he should be trying to lose weight but thinks 6 lbs is not that bad compared to other folks he knows      Physical Activity-no change to routine  No change to exercise routine.       Additional physical activity details: Patient has not started exercising yet. He knows he needs to be more active  We discussed walking on the weekends for at least 20-30 minutes      Medication Adherence-Medication Adherence not addressed.      Substance, Sleep, Stress-Not assessed         Addressed patient questions and patient has my contact information if needed prior to next outreach. Patient verbalizes understanding.      Explained the importance of self-monitoring and medication adherence. Encouraged the patient to communicate with their health  for lifestyle modifications to help improve or maintain a healthy lifestyle.               There are no preventive care reminders to display for this patient.      Last 5 Patient Entered Readings                                      Current 30 Day Average: 120/60     Recent Readings 11/13/2020 11/7/2020 11/3/2020 11/1/2020 10/27/2020    SBP (mmHg) 132 110 118 104 126    DBP (mmHg) 61 56 59 62 68    Pulse 64 68 61 60 61               "

## 2020-12-02 NOTE — PROGRESS NOTES
Refill Routing Note   Medication(s) are not appropriate for processing by [facility] for the following reason(s):     - Outside of protocol  [facility] action(s):  Route        Medication reconciliation completed: No   Automatic Epic Generated Protocol Data:        Requested Prescriptions   Pending Prescriptions Disp Refills    cyclobenzaprine (FLEXERIL) 10 MG tablet [Pharmacy Med Name: CYCLOBENZAPRINE 10MG TABLETS] 30 tablet 3     Sig: TAKE 1 TABLET(10 MG) BY MOUTH THREE TIMES DAILY AS NEEDED       There is no refill protocol information for this order           Appointments  past 12m or future 3m with PCP    Date Provider   Last Visit   8/5/2020 Forest Miles MD   Next Visit   Visit date not found Forest Miles MD   ED visits in past 90 days: 0        Note composed:11:27 AM 12/02/2020

## 2020-12-03 RX ORDER — CYCLOBENZAPRINE HCL 10 MG
TABLET ORAL
Qty: 30 TABLET | Refills: 3 | Status: SHIPPED | OUTPATIENT
Start: 2020-12-03 | End: 2021-01-17

## 2020-12-12 ENCOUNTER — PATIENT MESSAGE (OUTPATIENT)
Dept: INTERNAL MEDICINE | Facility: CLINIC | Age: 75
End: 2020-12-12

## 2020-12-12 DIAGNOSIS — R53.1 WEAKNESS: Primary | ICD-10-CM

## 2020-12-30 ENCOUNTER — CLINICAL SUPPORT (OUTPATIENT)
Dept: REHABILITATION | Facility: HOSPITAL | Age: 75
End: 2020-12-30
Payer: COMMERCIAL

## 2020-12-30 DIAGNOSIS — R26.89 IMPAIRMENT OF BALANCE: ICD-10-CM

## 2020-12-30 DIAGNOSIS — R29.898 WEAKNESS OF LOWER EXTREMITY, UNSPECIFIED LATERALITY: ICD-10-CM

## 2020-12-30 DIAGNOSIS — R53.1 WEAKNESS: ICD-10-CM

## 2020-12-30 PROCEDURE — 97161 PT EVAL LOW COMPLEX 20 MIN: CPT | Mod: PN

## 2020-12-30 NOTE — PLAN OF CARE
[facility]  Physical Therapy Initial Evaluation    Date: 12/30/2020   Name: Edis Huggins Jr.  Clinic Number: [id number]    Therapy Diagnosis:   Encounter Diagnoses   Name Primary?    Weakness     Weakness of lower extremity, unspecified laterality     Impairment of balance      Physician: Forest Miles MD  Physician Orders: PT Eval and Treat   Medical Diagnosis from Referral: R53.1 (ICD-10-CM) - Weakness  Evaluation Date: 12/30/2020  Authorization Period Expiration: 9/30/2021  Plan of Care Expiration: 2/19/2021  Visit # / Visits authorized: 1/20    Time In: 10:35 am  Time Out: 11:15 am  Total Appointment Time (timed & untimed codes): 40 minutes (1 LCE)    Precautions: Standard and Fall    Subjective   Date of onset: ~ 1 month ago   History of current condition - Edis reports: about a month ago, he was walking when he tripped over something and fell on the ground. He did not obtain any injuries during this fall, but he realized he could not get up from the ground after he fell. Edis has had 2 falls this year, which he attributes to tripping over things and not due to loss of balance. Patient would like to focus physical therapy on strength, endurance, and learning how to transition from the floor to standing. Currently, patient will exercise about 4 days a week, which involves walking about a mile or biking 4 miles on his recumbent bike.      Medical History:   Past Medical History:   Diagnosis Date    Allergic rhinitis due to animal (cat) (dog) hair and dander 3/20/2014    Allergy     Anemia     Anxiety     Basal cell carcinoma     BPH (benign prostatic hypertrophy)     Family history of colon cancer 3/20/2014    Family history of ischemic heart disease 3/20/2014    Fever blister     HLD (hyperlipidemia)     HTN (hypertension)     IFG (impaired fasting glucose)     Lumbar spondylosis 6/7/2019    Pain of right hip joint     Primary open angle glaucoma      Squamous cell carcinoma of skin     Status post total knee replacement 3/20/2014       Surgical History:   Edis Huggins Jr.  has a past surgical history that includes Tonsillectomy; Hernia repair; Total knee arthroplasty (Right); Transforaminal epidural injection of steroid (Right, 2019); Eye surgery; Joint replacement; Cataract extraction w/  intraocular lens implant (Left, 2017); Cataract extraction w/  intraocular lens implant (Right, 05/10/2017); Transforaminal epidural injection of steroid (Right, 2020); and Skin biopsy.    Medications:   Edis has a current medication list which includes the following prescription(s): acyclovir, amlodipine, azelastine, bifidobacterium infantis, cetirizine, chlorthalidone, citalopram, cyclobenzaprine, finasteride, fish oil-omega-3 fatty acids, gemfibrozil, irbesartan, metoprolol succinate, potassium chloride sa, rivaroxaban, tamsulosin, testosterone, testosterone, timolol maleate 0.5%, vitamin e, and zinc gluconate, and the following Facility-Administered Medications: sodium chloride 0.9% and lidocaine (pf) 10 mg/ml (1%).    Allergies:   Review of patient's allergies indicates:  No Known Allergies     Imaging: No relevant imaging     Prior Therapy: yes, for his low back and knees  Social History: lives with wife   Occupation:  - working from home   Prior Level of Function: 100%   Current Level of Function: currently fatigues easy    Pain: No pain    Patients goals:   1. Patient wants to improve his strength and endurance in order to allow walking longer distances.   2. Patient will display the ability to transition from prone to half kneeling to standing with independence.    Objective     Gait:   Decreased step length    Increased trunk sway     Outcome Measures:   TU.73   30 second sit to stand: 8 sit to stands without UE assist   6 minute walk test: 1200 feet without AD or rest break    SLS: (L) < 5 seconds; (R) < 5 seconds     Functional  Activity:   Patient was unable to transition from prone to half kneeling to standing      Strength:  Hip Left Right   Flexion 3+/5 3+/5   Abduction 3+/5 3+/5   Adduction 4+/5 4+/5   Extension 4/5 4/5     Knee Left Right   Extension 5/5 5/5   Flexion 5/5 5/5       Palpation: No tenderness to palpation     Sensation: WNL    Flexibility:    (B) hamstring tightness   (B) quadriceps tightness   (B) hip flexor tightness        Limitation/Restriction for FOTO MSK disorder Survey    Therapist reviewed FOTO scores for Edis Huggins Jr. on 12/30/2020.   FOTO documents entered into [employer] - see Media section.    Limitation Score: 44%  Predicted Limitation Score: 40%         TREATMENT   Treatment Time In: N/A  Treatment Time Out: N/A  Total Treatment time (time-based codes) separate from Evaluation: 0 minutes    Edis received therapeutic exercises to develop strength, endurance, ROM, flexibility, posture and core stabilization for 0 minutes including:     NEXT:    Nu-step    Step ups    Lunges at 2nd step    Half kneeling to standing transition with foam roll/cane for assistance    Cybex leg press       Home Exercises and Patient Education Provided    Education provided:   - Prognosis/POC     Written Home Exercises Provided: no.  Exercises were reviewed and Edis was able to demonstrate them prior to the end of the session.  Edis demonstrated good  understanding of the education provided.     See EMR under Media for exercises provided next visit.    Assessment   Edis is a 75 y.o. male referred to outpatient Physical Therapy with a medical diagnosis of weakness. Patient presents without any pain and has reports of 2 falls this year due to tripping over objects. Patient's primary concern is reducing his risk of fall, as well as, learning how to transition from the floor to standing if he were to fall. Patient presents with physical deconditioning, gluteus medius/lizy strength deficits, and balance impairments.  Patient would benefit from skilled PT to address these limitations, gain strength/endurance, and reduce risk of falling.    Patient prognosis is Excellent.   Patientt will benefit from skilled outpatient Physical Therapy to address the deficits stated above and in the chart below, provide patient /family education, and to maximize patientt's level of independence.     Plan of care discussed with patient: Yes  Patient's spiritual, cultural and educational needs considered and patient is agreeable to the plan of care and goals as stated below:     Anticipated Barriers for therapy: age; history of (R) knee replacement    Medical Necessity is demonstrated by the following  History  Co-morbidities and personal factors that may impact the plan of care Co-morbidities:   advanced age, anxiety, high BMI, history of cancer and HTN    Personal Factors:   age     high   Examination  Body Structures and Functions, activity limitations and participation restrictions that may impact the plan of care Body Regions:   lower extremities  upper extremities    Body Systems:    strength  gross coordinated movement  balance  gait  transfers  transitions  motor control  motor learning    Participation Restrictions:   Transitioning from half kneeling to standing     Activity limitations:   Learning and applying knowledge  no deficits    General Tasks and Commands  no deficits    Communication  no deficits    Mobility  lifting and carrying objects    Self care  no deficits    Domestic Life  doing house work (cleaning house, washing dishes, laundry)  assisting others    Interactions/Relationships  no deficits    Life Areas  no deficits    Community and Social Life  no deficits         high   Clinical Presentation stable and uncomplicated low   Decision Making/ Complexity Score: low     Goals:   Long Term Goals: 6 weeks   1. Pt will be independent with HEP in order to maximize PT benefits - PROGRESSING, NOT MET  2. Pt will improve BLE MMT  scores by >/=1 grade in order to improve strength for ADL completion - PROGRESSING, NOT MET  3. Pt will walk >/=1600 feet on 6MWT indoors in order to improve endurance for household mobility - PROGRESSING, NOT MET  4. Pt will score </=40% on FOTO limitation survey in order to improve self-perception of functional mobility deficits - PROGRESSING, NOT MET  5. Pt will score </= 14 seconds on TUG with AD. - MET (12/30/2020)  6. Pt will have 0 falls from initiation of physical therapy management - PROGRESSING, NOT MET  7. Patient goal: Patient will display the ability to transition from prone to half kneeling to standing with independence.  - PROGRESSING, NOT MET    Plan   Plan of care Certification: 12/30/2020 to 2/19/2021.    Outpatient Physical Therapy 2 times weekly for 6 weeks to include the following interventions: Gait Training, Manual Therapy, Moist Heat/ Ice, Neuromuscular Re-ed, Patient Education, Self Care, Therapeutic Activites and Therapeutic Exercise.     Thaddeus Ceballos, PT

## 2020-12-31 ENCOUNTER — PATIENT OUTREACH (OUTPATIENT)
Dept: OTHER | Facility: OTHER | Age: 75
End: 2020-12-31

## 2021-01-01 DIAGNOSIS — E78.1 HYPERTRIGLYCERIDEMIA: ICD-10-CM

## 2021-01-01 DIAGNOSIS — N40.0 BENIGN NON-NODULAR PROSTATIC HYPERPLASIA WITHOUT LOWER URINARY TRACT SYMPTOMS: ICD-10-CM

## 2021-01-04 ENCOUNTER — TELEPHONE (OUTPATIENT)
Dept: INTERNAL MEDICINE | Facility: CLINIC | Age: 76
End: 2021-01-04

## 2021-01-04 DIAGNOSIS — Z03.818 ENCOUNTER FOR OBSERVATION FOR SUSPECTED EXPOSURE TO OTHER BIOLOGICAL AGENTS RULED OUT: ICD-10-CM

## 2021-01-04 RX ORDER — TAMSULOSIN HYDROCHLORIDE 0.4 MG/1
CAPSULE ORAL
Qty: 90 CAPSULE | Refills: 3 | Status: SHIPPED | OUTPATIENT
Start: 2021-01-04 | End: 2021-12-27

## 2021-01-04 RX ORDER — GEMFIBROZIL 600 MG/1
TABLET, FILM COATED ORAL
Qty: 180 TABLET | Refills: 3 | Status: SHIPPED | OUTPATIENT
Start: 2021-01-04 | End: 2022-02-21 | Stop reason: SDUPTHER

## 2021-01-10 ENCOUNTER — IMMUNIZATION (OUTPATIENT)
Dept: INTERNAL MEDICINE | Facility: CLINIC | Age: 76
End: 2021-01-10
Payer: COMMERCIAL

## 2021-01-10 DIAGNOSIS — Z23 NEED FOR VACCINATION: ICD-10-CM

## 2021-01-10 PROCEDURE — 91300 COVID-19, MRNA, LNP-S, PF, 30 MCG/0.3 ML DOSE VACCINE: CPT | Mod: PBBFAC | Performed by: FAMILY MEDICINE

## 2021-01-17 RX ORDER — CYCLOBENZAPRINE HCL 10 MG
TABLET ORAL
Qty: 30 TABLET | Refills: 3 | Status: SHIPPED | OUTPATIENT
Start: 2021-01-17 | End: 2021-04-09

## 2021-01-20 ENCOUNTER — CLINICAL SUPPORT (OUTPATIENT)
Dept: REHABILITATION | Facility: HOSPITAL | Age: 76
End: 2021-01-20
Payer: COMMERCIAL

## 2021-01-20 DIAGNOSIS — R26.89 IMPAIRMENT OF BALANCE: ICD-10-CM

## 2021-01-20 DIAGNOSIS — R29.898 WEAKNESS OF LOWER EXTREMITY, UNSPECIFIED LATERALITY: ICD-10-CM

## 2021-01-20 PROCEDURE — 97112 NEUROMUSCULAR REEDUCATION: CPT | Mod: PN

## 2021-01-20 PROCEDURE — 97110 THERAPEUTIC EXERCISES: CPT | Mod: PN

## 2021-01-22 ENCOUNTER — CLINICAL SUPPORT (OUTPATIENT)
Dept: REHABILITATION | Facility: HOSPITAL | Age: 76
End: 2021-01-22
Payer: COMMERCIAL

## 2021-01-22 DIAGNOSIS — R29.898 WEAKNESS OF LOWER EXTREMITY, UNSPECIFIED LATERALITY: ICD-10-CM

## 2021-01-22 DIAGNOSIS — R26.89 IMPAIRMENT OF BALANCE: ICD-10-CM

## 2021-01-22 PROCEDURE — 97110 THERAPEUTIC EXERCISES: CPT | Mod: PN

## 2021-01-22 PROCEDURE — 97112 NEUROMUSCULAR REEDUCATION: CPT | Mod: PN

## 2021-01-26 ENCOUNTER — CLINICAL SUPPORT (OUTPATIENT)
Dept: REHABILITATION | Facility: HOSPITAL | Age: 76
End: 2021-01-26
Payer: COMMERCIAL

## 2021-01-26 DIAGNOSIS — R29.898 WEAKNESS OF LOWER EXTREMITY, UNSPECIFIED LATERALITY: ICD-10-CM

## 2021-01-26 DIAGNOSIS — R26.89 IMPAIRMENT OF BALANCE: ICD-10-CM

## 2021-01-26 PROCEDURE — 97112 NEUROMUSCULAR REEDUCATION: CPT | Mod: PN,CQ

## 2021-01-26 PROCEDURE — 97110 THERAPEUTIC EXERCISES: CPT | Mod: PN,CQ

## 2021-01-28 ENCOUNTER — CLINICAL SUPPORT (OUTPATIENT)
Dept: REHABILITATION | Facility: HOSPITAL | Age: 76
End: 2021-01-28
Payer: COMMERCIAL

## 2021-01-28 DIAGNOSIS — R29.898 WEAKNESS OF LOWER EXTREMITY, UNSPECIFIED LATERALITY: ICD-10-CM

## 2021-01-28 DIAGNOSIS — R26.89 IMPAIRMENT OF BALANCE: ICD-10-CM

## 2021-01-28 PROCEDURE — 97112 NEUROMUSCULAR REEDUCATION: CPT | Mod: PN,CQ

## 2021-01-28 PROCEDURE — 97110 THERAPEUTIC EXERCISES: CPT | Mod: PN,CQ

## 2021-01-31 ENCOUNTER — IMMUNIZATION (OUTPATIENT)
Dept: INTERNAL MEDICINE | Facility: CLINIC | Age: 76
End: 2021-01-31
Payer: COMMERCIAL

## 2021-01-31 DIAGNOSIS — Z23 NEED FOR VACCINATION: Primary | ICD-10-CM

## 2021-01-31 PROCEDURE — 0002A COVID-19, MRNA, LNP-S, PF, 30 MCG/0.3 ML DOSE VACCINE: CPT | Mod: PBBFAC | Performed by: FAMILY MEDICINE

## 2021-01-31 PROCEDURE — 91300 COVID-19, MRNA, LNP-S, PF, 30 MCG/0.3 ML DOSE VACCINE: CPT | Mod: PBBFAC | Performed by: FAMILY MEDICINE

## 2021-02-02 ENCOUNTER — PATIENT MESSAGE (OUTPATIENT)
Dept: INTERNAL MEDICINE | Facility: CLINIC | Age: 76
End: 2021-02-02

## 2021-02-02 ENCOUNTER — CLINICAL SUPPORT (OUTPATIENT)
Dept: REHABILITATION | Facility: HOSPITAL | Age: 76
End: 2021-02-02
Payer: COMMERCIAL

## 2021-02-02 DIAGNOSIS — R26.89 IMPAIRMENT OF BALANCE: ICD-10-CM

## 2021-02-02 DIAGNOSIS — R29.898 WEAKNESS OF LOWER EXTREMITY, UNSPECIFIED LATERALITY: ICD-10-CM

## 2021-02-02 PROCEDURE — 97530 THERAPEUTIC ACTIVITIES: CPT | Mod: PN

## 2021-02-02 PROCEDURE — 97112 NEUROMUSCULAR REEDUCATION: CPT | Mod: PN

## 2021-02-02 PROCEDURE — 97110 THERAPEUTIC EXERCISES: CPT | Mod: PN

## 2021-02-02 PROCEDURE — 97140 MANUAL THERAPY 1/> REGIONS: CPT | Mod: PN

## 2021-02-09 ENCOUNTER — CLINICAL SUPPORT (OUTPATIENT)
Dept: REHABILITATION | Facility: HOSPITAL | Age: 76
End: 2021-02-09
Payer: COMMERCIAL

## 2021-02-09 DIAGNOSIS — R29.898 WEAKNESS OF LOWER EXTREMITY, UNSPECIFIED LATERALITY: ICD-10-CM

## 2021-02-09 DIAGNOSIS — R26.89 IMPAIRMENT OF BALANCE: ICD-10-CM

## 2021-02-09 PROCEDURE — 97112 NEUROMUSCULAR REEDUCATION: CPT | Mod: PN | Performed by: PHYSICAL THERAPIST

## 2021-02-09 PROCEDURE — 97110 THERAPEUTIC EXERCISES: CPT | Mod: PN | Performed by: PHYSICAL THERAPIST

## 2021-02-09 PROCEDURE — 97530 THERAPEUTIC ACTIVITIES: CPT | Mod: PN | Performed by: PHYSICAL THERAPIST

## 2021-02-11 ENCOUNTER — CLINICAL SUPPORT (OUTPATIENT)
Dept: REHABILITATION | Facility: HOSPITAL | Age: 76
End: 2021-02-11
Payer: COMMERCIAL

## 2021-02-11 DIAGNOSIS — R26.89 IMPAIRMENT OF BALANCE: ICD-10-CM

## 2021-02-11 DIAGNOSIS — R29.898 WEAKNESS OF LOWER EXTREMITY, UNSPECIFIED LATERALITY: ICD-10-CM

## 2021-02-11 PROCEDURE — 97112 NEUROMUSCULAR REEDUCATION: CPT | Mod: PN

## 2021-02-11 PROCEDURE — 97530 THERAPEUTIC ACTIVITIES: CPT | Mod: PN

## 2021-02-11 PROCEDURE — 97110 THERAPEUTIC EXERCISES: CPT | Mod: PN

## 2021-02-17 ENCOUNTER — CLINICAL SUPPORT (OUTPATIENT)
Dept: REHABILITATION | Facility: HOSPITAL | Age: 76
End: 2021-02-17
Payer: COMMERCIAL

## 2021-02-17 DIAGNOSIS — R29.898 WEAKNESS OF LOWER EXTREMITY, UNSPECIFIED LATERALITY: ICD-10-CM

## 2021-02-17 DIAGNOSIS — R26.89 IMPAIRMENT OF BALANCE: ICD-10-CM

## 2021-02-17 PROCEDURE — 97112 NEUROMUSCULAR REEDUCATION: CPT | Mod: PN,CQ

## 2021-02-17 PROCEDURE — 97110 THERAPEUTIC EXERCISES: CPT | Mod: PN,CQ

## 2021-02-19 ENCOUNTER — CLINICAL SUPPORT (OUTPATIENT)
Dept: REHABILITATION | Facility: HOSPITAL | Age: 76
End: 2021-02-19
Payer: COMMERCIAL

## 2021-02-19 DIAGNOSIS — R29.898 WEAKNESS OF LOWER EXTREMITY, UNSPECIFIED LATERALITY: ICD-10-CM

## 2021-02-19 DIAGNOSIS — R26.89 IMPAIRMENT OF BALANCE: ICD-10-CM

## 2021-02-19 PROCEDURE — 97110 THERAPEUTIC EXERCISES: CPT | Mod: PN

## 2021-02-19 PROCEDURE — 97112 NEUROMUSCULAR REEDUCATION: CPT | Mod: PN

## 2021-02-27 ENCOUNTER — PATIENT MESSAGE (OUTPATIENT)
Dept: REHABILITATION | Facility: HOSPITAL | Age: 76
End: 2021-02-27

## 2021-03-01 ENCOUNTER — TELEPHONE (OUTPATIENT)
Dept: REHABILITATION | Facility: HOSPITAL | Age: 76
End: 2021-03-01

## 2021-03-02 ENCOUNTER — CLINICAL SUPPORT (OUTPATIENT)
Dept: REHABILITATION | Facility: HOSPITAL | Age: 76
End: 2021-03-02
Payer: COMMERCIAL

## 2021-03-02 ENCOUNTER — TELEPHONE (OUTPATIENT)
Dept: REHABILITATION | Facility: HOSPITAL | Age: 76
End: 2021-03-02

## 2021-03-02 DIAGNOSIS — R26.89 IMPAIRMENT OF BALANCE: ICD-10-CM

## 2021-03-02 DIAGNOSIS — R29.898 WEAKNESS OF LOWER EXTREMITY, UNSPECIFIED LATERALITY: ICD-10-CM

## 2021-03-02 PROCEDURE — 97112 NEUROMUSCULAR REEDUCATION: CPT | Mod: PN | Performed by: PHYSICAL THERAPIST

## 2021-03-02 PROCEDURE — 97110 THERAPEUTIC EXERCISES: CPT | Mod: PN | Performed by: PHYSICAL THERAPIST

## 2021-03-09 ENCOUNTER — OFFICE VISIT (OUTPATIENT)
Dept: OPHTHALMOLOGY | Facility: CLINIC | Age: 76
End: 2021-03-09
Payer: COMMERCIAL

## 2021-03-09 DIAGNOSIS — H26.491 PCO (POSTERIOR CAPSULAR OPACIFICATION), RIGHT: ICD-10-CM

## 2021-03-09 DIAGNOSIS — H40.1122 PRIMARY OPEN ANGLE GLAUCOMA OF LEFT EYE, MODERATE STAGE: Primary | ICD-10-CM

## 2021-03-09 DIAGNOSIS — H57.09 RELATIVE AFFERENT PUPILLARY DEFECT: ICD-10-CM

## 2021-03-09 DIAGNOSIS — Z96.1 PSEUDOPHAKIA, BOTH EYES: ICD-10-CM

## 2021-03-09 DIAGNOSIS — H21.562 PUPIL IRREGULAR OF LEFT EYE: ICD-10-CM

## 2021-03-09 DIAGNOSIS — H40.1111 PRIMARY OPEN-ANGLE GLAUCOMA, RIGHT EYE, MILD STAGE: ICD-10-CM

## 2021-03-09 DIAGNOSIS — H40.043 STEROID RESPONDERS, BILATERAL: ICD-10-CM

## 2021-03-09 PROCEDURE — 3288F PR FALLS RISK ASSESSMENT DOCUMENTED: ICD-10-PCS | Mod: CPTII,S$GLB,, | Performed by: OPHTHALMOLOGY

## 2021-03-09 PROCEDURE — 1157F ADVNC CARE PLAN IN RCRD: CPT | Mod: S$GLB,,, | Performed by: OPHTHALMOLOGY

## 2021-03-09 PROCEDURE — 1126F PR PAIN SEVERITY QUANTIFIED, NO PAIN PRESENT: ICD-10-PCS | Mod: S$GLB,,, | Performed by: OPHTHALMOLOGY

## 2021-03-09 PROCEDURE — 3288F FALL RISK ASSESSMENT DOCD: CPT | Mod: CPTII,S$GLB,, | Performed by: OPHTHALMOLOGY

## 2021-03-09 PROCEDURE — 92012 PR EYE EXAM, EST PATIENT,INTERMED: ICD-10-PCS | Mod: S$GLB,,, | Performed by: OPHTHALMOLOGY

## 2021-03-09 PROCEDURE — 92020 GONIOSCOPY: CPT | Mod: S$GLB,,, | Performed by: OPHTHALMOLOGY

## 2021-03-09 PROCEDURE — 1157F PR ADVANCE CARE PLAN OR EQUIV PRESENT IN MEDICAL RECORD: ICD-10-PCS | Mod: S$GLB,,, | Performed by: OPHTHALMOLOGY

## 2021-03-09 PROCEDURE — 99999 PR PBB SHADOW E&M-EST. PATIENT-LVL III: CPT | Mod: PBBFAC,,, | Performed by: OPHTHALMOLOGY

## 2021-03-09 PROCEDURE — 92020 PR SPECIAL EYE EVAL,GONIOSCOPY: ICD-10-PCS | Mod: S$GLB,,, | Performed by: OPHTHALMOLOGY

## 2021-03-09 PROCEDURE — 1126F AMNT PAIN NOTED NONE PRSNT: CPT | Mod: S$GLB,,, | Performed by: OPHTHALMOLOGY

## 2021-03-09 PROCEDURE — 1101F PR PT FALLS ASSESS DOC 0-1 FALLS W/OUT INJ PAST YR: ICD-10-PCS | Mod: CPTII,S$GLB,, | Performed by: OPHTHALMOLOGY

## 2021-03-09 PROCEDURE — 92012 INTRM OPH EXAM EST PATIENT: CPT | Mod: S$GLB,,, | Performed by: OPHTHALMOLOGY

## 2021-03-09 PROCEDURE — 1101F PT FALLS ASSESS-DOCD LE1/YR: CPT | Mod: CPTII,S$GLB,, | Performed by: OPHTHALMOLOGY

## 2021-03-09 PROCEDURE — 99999 PR PBB SHADOW E&M-EST. PATIENT-LVL III: ICD-10-PCS | Mod: PBBFAC,,, | Performed by: OPHTHALMOLOGY

## 2021-03-16 ENCOUNTER — TELEPHONE (OUTPATIENT)
Dept: CARDIOLOGY | Facility: CLINIC | Age: 76
End: 2021-03-16

## 2021-03-16 ENCOUNTER — OFFICE VISIT (OUTPATIENT)
Dept: CARDIOLOGY | Facility: CLINIC | Age: 76
End: 2021-03-16
Payer: COMMERCIAL

## 2021-03-16 VITALS
OXYGEN SATURATION: 97 % | SYSTOLIC BLOOD PRESSURE: 105 MMHG | WEIGHT: 282.63 LBS | DIASTOLIC BLOOD PRESSURE: 65 MMHG | BODY MASS INDEX: 39.44 KG/M2 | HEART RATE: 60 BPM

## 2021-03-16 DIAGNOSIS — I10 ESSENTIAL HYPERTENSION: ICD-10-CM

## 2021-03-16 DIAGNOSIS — I48.0 PAROXYSMAL ATRIAL FIBRILLATION: Primary | ICD-10-CM

## 2021-03-16 DIAGNOSIS — I70.0 AORTIC ATHEROSCLEROSIS: ICD-10-CM

## 2021-03-16 DIAGNOSIS — I65.29 CAROTID ATHEROSCLEROSIS, UNSPECIFIED LATERALITY: ICD-10-CM

## 2021-03-16 DIAGNOSIS — E78.5 HYPERLIPIDEMIA, UNSPECIFIED HYPERLIPIDEMIA TYPE: ICD-10-CM

## 2021-03-16 DIAGNOSIS — R06.09 DYSPNEA ON EXERTION: ICD-10-CM

## 2021-03-16 DIAGNOSIS — E66.01 CLASS 3 SEVERE OBESITY DUE TO EXCESS CALORIES WITH BODY MASS INDEX (BMI) OF 40.0 TO 44.9 IN ADULT, UNSPECIFIED WHETHER SERIOUS COMORBIDITY PRESENT: ICD-10-CM

## 2021-03-16 DIAGNOSIS — E66.9 OBESITY (BMI 35.0-39.9 WITHOUT COMORBIDITY): ICD-10-CM

## 2021-03-16 DIAGNOSIS — G47.33 OSA ON CPAP: ICD-10-CM

## 2021-03-16 PROCEDURE — 99999 PR PBB SHADOW E&M-EST. PATIENT-LVL IV: CPT | Mod: PBBFAC,,, | Performed by: INTERNAL MEDICINE

## 2021-03-16 PROCEDURE — 99999 PR PBB SHADOW E&M-EST. PATIENT-LVL IV: ICD-10-PCS | Mod: PBBFAC,,, | Performed by: INTERNAL MEDICINE

## 2021-03-16 PROCEDURE — 1157F ADVNC CARE PLAN IN RCRD: CPT | Mod: S$GLB,,, | Performed by: INTERNAL MEDICINE

## 2021-03-16 PROCEDURE — 1157F PR ADVANCE CARE PLAN OR EQUIV PRESENT IN MEDICAL RECORD: ICD-10-PCS | Mod: S$GLB,,, | Performed by: INTERNAL MEDICINE

## 2021-03-16 PROCEDURE — 3008F PR BODY MASS INDEX (BMI) DOCUMENTED: ICD-10-PCS | Mod: CPTII,S$GLB,, | Performed by: INTERNAL MEDICINE

## 2021-03-16 PROCEDURE — 3288F FALL RISK ASSESSMENT DOCD: CPT | Mod: CPTII,S$GLB,, | Performed by: INTERNAL MEDICINE

## 2021-03-16 PROCEDURE — 1126F AMNT PAIN NOTED NONE PRSNT: CPT | Mod: S$GLB,,, | Performed by: INTERNAL MEDICINE

## 2021-03-16 PROCEDURE — 3074F PR MOST RECENT SYSTOLIC BLOOD PRESSURE < 130 MM HG: ICD-10-PCS | Mod: CPTII,S$GLB,, | Performed by: INTERNAL MEDICINE

## 2021-03-16 PROCEDURE — 1159F PR MEDICATION LIST DOCUMENTED IN MEDICAL RECORD: ICD-10-PCS | Mod: S$GLB,,, | Performed by: INTERNAL MEDICINE

## 2021-03-16 PROCEDURE — 3008F BODY MASS INDEX DOCD: CPT | Mod: CPTII,S$GLB,, | Performed by: INTERNAL MEDICINE

## 2021-03-16 PROCEDURE — 1101F PT FALLS ASSESS-DOCD LE1/YR: CPT | Mod: CPTII,S$GLB,, | Performed by: INTERNAL MEDICINE

## 2021-03-16 PROCEDURE — 99214 OFFICE O/P EST MOD 30 MIN: CPT | Mod: S$GLB,,, | Performed by: INTERNAL MEDICINE

## 2021-03-16 PROCEDURE — 3074F SYST BP LT 130 MM HG: CPT | Mod: CPTII,S$GLB,, | Performed by: INTERNAL MEDICINE

## 2021-03-16 PROCEDURE — 3078F PR MOST RECENT DIASTOLIC BLOOD PRESSURE < 80 MM HG: ICD-10-PCS | Mod: CPTII,S$GLB,, | Performed by: INTERNAL MEDICINE

## 2021-03-16 PROCEDURE — 1159F MED LIST DOCD IN RCRD: CPT | Mod: S$GLB,,, | Performed by: INTERNAL MEDICINE

## 2021-03-16 PROCEDURE — 1126F PR PAIN SEVERITY QUANTIFIED, NO PAIN PRESENT: ICD-10-PCS | Mod: S$GLB,,, | Performed by: INTERNAL MEDICINE

## 2021-03-16 PROCEDURE — 1101F PR PT FALLS ASSESS DOC 0-1 FALLS W/OUT INJ PAST YR: ICD-10-PCS | Mod: CPTII,S$GLB,, | Performed by: INTERNAL MEDICINE

## 2021-03-16 PROCEDURE — 3078F DIAST BP <80 MM HG: CPT | Mod: CPTII,S$GLB,, | Performed by: INTERNAL MEDICINE

## 2021-03-16 PROCEDURE — 3288F PR FALLS RISK ASSESSMENT DOCUMENTED: ICD-10-PCS | Mod: CPTII,S$GLB,, | Performed by: INTERNAL MEDICINE

## 2021-03-16 PROCEDURE — 99214 PR OFFICE/OUTPT VISIT, EST, LEVL IV, 30-39 MIN: ICD-10-PCS | Mod: S$GLB,,, | Performed by: INTERNAL MEDICINE

## 2021-03-22 ENCOUNTER — CLINICAL SUPPORT (OUTPATIENT)
Dept: REHABILITATION | Facility: HOSPITAL | Age: 76
End: 2021-03-22
Payer: COMMERCIAL

## 2021-03-22 DIAGNOSIS — R26.89 IMPAIRMENT OF BALANCE: ICD-10-CM

## 2021-03-22 DIAGNOSIS — R29.898 WEAKNESS OF LOWER EXTREMITY, UNSPECIFIED LATERALITY: ICD-10-CM

## 2021-03-22 PROCEDURE — 97110 THERAPEUTIC EXERCISES: CPT | Mod: PN | Performed by: PHYSICAL THERAPIST

## 2021-03-22 PROCEDURE — 97112 NEUROMUSCULAR REEDUCATION: CPT | Mod: PN | Performed by: PHYSICAL THERAPIST

## 2021-03-31 ENCOUNTER — PATIENT MESSAGE (OUTPATIENT)
Dept: INTERNAL MEDICINE | Facility: CLINIC | Age: 76
End: 2021-03-31

## 2021-04-01 ENCOUNTER — PATIENT MESSAGE (OUTPATIENT)
Dept: INTERNAL MEDICINE | Facility: CLINIC | Age: 76
End: 2021-04-01

## 2021-04-09 RX ORDER — CYCLOBENZAPRINE HCL 10 MG
TABLET ORAL
Qty: 30 TABLET | Refills: 3 | Status: SHIPPED | OUTPATIENT
Start: 2021-04-09 | End: 2021-05-18

## 2021-04-14 ENCOUNTER — OFFICE VISIT (OUTPATIENT)
Dept: INTERNAL MEDICINE | Facility: CLINIC | Age: 76
End: 2021-04-14
Payer: COMMERCIAL

## 2021-04-14 VITALS
HEIGHT: 71 IN | WEIGHT: 278 LBS | SYSTOLIC BLOOD PRESSURE: 104 MMHG | DIASTOLIC BLOOD PRESSURE: 60 MMHG | BODY MASS INDEX: 38.92 KG/M2 | OXYGEN SATURATION: 98 % | HEART RATE: 60 BPM

## 2021-04-14 DIAGNOSIS — G47.33 OSA ON CPAP: ICD-10-CM

## 2021-04-14 DIAGNOSIS — Z78.9 STATIN INTOLERANCE: ICD-10-CM

## 2021-04-14 DIAGNOSIS — R73.03 PREDIABETES: ICD-10-CM

## 2021-04-14 DIAGNOSIS — Z79.899 ENCOUNTER FOR LONG-TERM (CURRENT) USE OF OTHER MEDICATIONS: ICD-10-CM

## 2021-04-14 DIAGNOSIS — Z76.89 ENCOUNTER TO ESTABLISH CARE WITH NEW DOCTOR: ICD-10-CM

## 2021-04-14 DIAGNOSIS — E66.01 SEVERE OBESITY (BMI 35.0-39.9) WITH COMORBIDITY: ICD-10-CM

## 2021-04-14 DIAGNOSIS — Z00.00 ANNUAL PHYSICAL EXAM: Primary | ICD-10-CM

## 2021-04-14 DIAGNOSIS — I10 ESSENTIAL HYPERTENSION: ICD-10-CM

## 2021-04-14 DIAGNOSIS — E78.2 MIXED HYPERLIPIDEMIA: ICD-10-CM

## 2021-04-14 PROCEDURE — 1101F PR PT FALLS ASSESS DOC 0-1 FALLS W/OUT INJ PAST YR: ICD-10-PCS | Mod: CPTII,S$GLB,, | Performed by: FAMILY MEDICINE

## 2021-04-14 PROCEDURE — 3008F PR BODY MASS INDEX (BMI) DOCUMENTED: ICD-10-PCS | Mod: CPTII,S$GLB,, | Performed by: FAMILY MEDICINE

## 2021-04-14 PROCEDURE — 99999 PR PBB SHADOW E&M-EST. PATIENT-LVL IV: ICD-10-PCS | Mod: PBBFAC,,, | Performed by: FAMILY MEDICINE

## 2021-04-14 PROCEDURE — 1157F PR ADVANCE CARE PLAN OR EQUIV PRESENT IN MEDICAL RECORD: ICD-10-PCS | Mod: S$GLB,,, | Performed by: FAMILY MEDICINE

## 2021-04-14 PROCEDURE — 1157F ADVNC CARE PLAN IN RCRD: CPT | Mod: S$GLB,,, | Performed by: FAMILY MEDICINE

## 2021-04-14 PROCEDURE — 99397 PER PM REEVAL EST PAT 65+ YR: CPT | Mod: S$GLB,,, | Performed by: FAMILY MEDICINE

## 2021-04-14 PROCEDURE — 99397 PR PREVENTIVE VISIT,EST,65 & OVER: ICD-10-PCS | Mod: S$GLB,,, | Performed by: FAMILY MEDICINE

## 2021-04-14 PROCEDURE — 3008F BODY MASS INDEX DOCD: CPT | Mod: CPTII,S$GLB,, | Performed by: FAMILY MEDICINE

## 2021-04-14 PROCEDURE — 1101F PT FALLS ASSESS-DOCD LE1/YR: CPT | Mod: CPTII,S$GLB,, | Performed by: FAMILY MEDICINE

## 2021-04-14 PROCEDURE — 1126F AMNT PAIN NOTED NONE PRSNT: CPT | Mod: S$GLB,,, | Performed by: FAMILY MEDICINE

## 2021-04-14 PROCEDURE — 3288F PR FALLS RISK ASSESSMENT DOCUMENTED: ICD-10-PCS | Mod: CPTII,S$GLB,, | Performed by: FAMILY MEDICINE

## 2021-04-14 PROCEDURE — 1126F PR PAIN SEVERITY QUANTIFIED, NO PAIN PRESENT: ICD-10-PCS | Mod: S$GLB,,, | Performed by: FAMILY MEDICINE

## 2021-04-14 PROCEDURE — 3288F FALL RISK ASSESSMENT DOCD: CPT | Mod: CPTII,S$GLB,, | Performed by: FAMILY MEDICINE

## 2021-04-14 PROCEDURE — 99999 PR PBB SHADOW E&M-EST. PATIENT-LVL IV: CPT | Mod: PBBFAC,,, | Performed by: FAMILY MEDICINE

## 2021-04-15 ENCOUNTER — LAB VISIT (OUTPATIENT)
Dept: LAB | Facility: HOSPITAL | Age: 76
End: 2021-04-15
Attending: FAMILY MEDICINE
Payer: COMMERCIAL

## 2021-04-15 DIAGNOSIS — Z00.00 ANNUAL PHYSICAL EXAM: ICD-10-CM

## 2021-04-15 DIAGNOSIS — Z79.899 ENCOUNTER FOR LONG-TERM (CURRENT) USE OF OTHER MEDICATIONS: ICD-10-CM

## 2021-04-15 PROBLEM — Z78.9 STATIN INTOLERANCE: Status: ACTIVE | Noted: 2021-04-15

## 2021-04-15 LAB
ALBUMIN SERPL BCP-MCNC: 3.7 G/DL (ref 3.5–5.2)
ALP SERPL-CCNC: 55 U/L (ref 55–135)
ALT SERPL W/O P-5'-P-CCNC: 14 U/L (ref 10–44)
ANION GAP SERPL CALC-SCNC: 10 MMOL/L (ref 8–16)
AST SERPL-CCNC: 11 U/L (ref 10–40)
BILIRUB SERPL-MCNC: 0.4 MG/DL (ref 0.1–1)
BUN SERPL-MCNC: 21 MG/DL (ref 8–23)
CALCIUM SERPL-MCNC: 9.3 MG/DL (ref 8.7–10.5)
CHLORIDE SERPL-SCNC: 104 MMOL/L (ref 95–110)
CHOLEST SERPL-MCNC: 134 MG/DL (ref 120–199)
CHOLEST/HDLC SERPL: 4.6 {RATIO} (ref 2–5)
CO2 SERPL-SCNC: 27 MMOL/L (ref 23–29)
CREAT SERPL-MCNC: 0.9 MG/DL (ref 0.5–1.4)
EST. GFR  (AFRICAN AMERICAN): >60 ML/MIN/1.73 M^2
EST. GFR  (NON AFRICAN AMERICAN): >60 ML/MIN/1.73 M^2
ESTIMATED AVG GLUCOSE: 111 MG/DL (ref 68–131)
GLUCOSE SERPL-MCNC: 114 MG/DL (ref 70–110)
HBA1C MFR BLD: 5.5 % (ref 4–5.6)
HDLC SERPL-MCNC: 29 MG/DL (ref 40–75)
HDLC SERPL: 21.6 % (ref 20–50)
LDLC SERPL CALC-MCNC: 67.8 MG/DL (ref 63–159)
NONHDLC SERPL-MCNC: 105 MG/DL
POTASSIUM SERPL-SCNC: 3.7 MMOL/L (ref 3.5–5.1)
PROT SERPL-MCNC: 7.4 G/DL (ref 6–8.4)
SODIUM SERPL-SCNC: 141 MMOL/L (ref 136–145)
TRIGL SERPL-MCNC: 186 MG/DL (ref 30–150)

## 2021-04-15 PROCEDURE — 83036 HEMOGLOBIN GLYCOSYLATED A1C: CPT | Performed by: FAMILY MEDICINE

## 2021-04-15 PROCEDURE — 80061 LIPID PANEL: CPT | Performed by: FAMILY MEDICINE

## 2021-04-15 PROCEDURE — 80053 COMPREHEN METABOLIC PANEL: CPT | Performed by: FAMILY MEDICINE

## 2021-04-15 PROCEDURE — 36415 COLL VENOUS BLD VENIPUNCTURE: CPT | Performed by: FAMILY MEDICINE

## 2021-05-18 RX ORDER — CYCLOBENZAPRINE HCL 10 MG
TABLET ORAL
Qty: 30 TABLET | Refills: 3 | Status: SHIPPED | OUTPATIENT
Start: 2021-05-18 | End: 2023-08-09 | Stop reason: SDUPTHER

## 2021-06-08 ENCOUNTER — PATIENT MESSAGE (OUTPATIENT)
Dept: INTERNAL MEDICINE | Facility: CLINIC | Age: 76
End: 2021-06-08

## 2021-06-09 ENCOUNTER — PATIENT MESSAGE (OUTPATIENT)
Dept: INTERNAL MEDICINE | Facility: CLINIC | Age: 76
End: 2021-06-09

## 2021-06-09 RX ORDER — MUPIROCIN 20 MG/G
OINTMENT TOPICAL 2 TIMES DAILY
Qty: 30 G | Refills: 0 | Status: SHIPPED | OUTPATIENT
Start: 2021-06-09 | End: 2023-09-11

## 2021-06-25 ENCOUNTER — PATIENT MESSAGE (OUTPATIENT)
Dept: INTERNAL MEDICINE | Facility: CLINIC | Age: 76
End: 2021-06-25

## 2021-07-01 DIAGNOSIS — I10 ESSENTIAL HYPERTENSION: ICD-10-CM

## 2021-07-01 RX ORDER — AZELASTINE 1 MG/ML
1 SPRAY, METERED NASAL 2 TIMES DAILY
Qty: 30 ML | Refills: 3 | Status: SHIPPED | OUTPATIENT
Start: 2021-07-01 | End: 2021-11-16

## 2021-07-02 RX ORDER — CHLORTHALIDONE 50 MG/1
TABLET ORAL
Qty: 90 TABLET | Refills: 3 | Status: SHIPPED | OUTPATIENT
Start: 2021-07-02 | End: 2021-08-02

## 2021-07-11 ENCOUNTER — PATIENT MESSAGE (OUTPATIENT)
Dept: DERMATOLOGY | Facility: CLINIC | Age: 76
End: 2021-07-11

## 2021-07-13 ENCOUNTER — OFFICE VISIT (OUTPATIENT)
Dept: OPHTHALMOLOGY | Facility: CLINIC | Age: 76
End: 2021-07-13
Payer: COMMERCIAL

## 2021-07-13 ENCOUNTER — PATIENT MESSAGE (OUTPATIENT)
Dept: ADMINISTRATIVE | Facility: OTHER | Age: 76
End: 2021-07-13

## 2021-07-13 DIAGNOSIS — H40.1122 PRIMARY OPEN ANGLE GLAUCOMA OF LEFT EYE, MODERATE STAGE: Primary | ICD-10-CM

## 2021-07-13 DIAGNOSIS — H57.09 RELATIVE AFFERENT PUPILLARY DEFECT: ICD-10-CM

## 2021-07-13 DIAGNOSIS — H40.1111 PRIMARY OPEN-ANGLE GLAUCOMA, RIGHT EYE, MILD STAGE: ICD-10-CM

## 2021-07-13 DIAGNOSIS — H40.043 STEROID RESPONDERS, BILATERAL: ICD-10-CM

## 2021-07-13 DIAGNOSIS — I10 ESSENTIAL HYPERTENSION: ICD-10-CM

## 2021-07-13 DIAGNOSIS — H26.491 PCO (POSTERIOR CAPSULAR OPACIFICATION), RIGHT: ICD-10-CM

## 2021-07-13 DIAGNOSIS — H21.562 PUPIL IRREGULAR OF LEFT EYE: ICD-10-CM

## 2021-07-13 DIAGNOSIS — Z96.1 PSEUDOPHAKIA, BOTH EYES: ICD-10-CM

## 2021-07-13 PROCEDURE — 1157F PR ADVANCE CARE PLAN OR EQUIV PRESENT IN MEDICAL RECORD: ICD-10-PCS | Mod: S$GLB,,, | Performed by: OPHTHALMOLOGY

## 2021-07-13 PROCEDURE — 92012 INTRM OPH EXAM EST PATIENT: CPT | Mod: S$GLB,,, | Performed by: OPHTHALMOLOGY

## 2021-07-13 PROCEDURE — 92012 PR EYE EXAM, EST PATIENT,INTERMED: ICD-10-PCS | Mod: S$GLB,,, | Performed by: OPHTHALMOLOGY

## 2021-07-13 PROCEDURE — 1126F AMNT PAIN NOTED NONE PRSNT: CPT | Mod: S$GLB,,, | Performed by: OPHTHALMOLOGY

## 2021-07-13 PROCEDURE — 1101F PT FALLS ASSESS-DOCD LE1/YR: CPT | Mod: CPTII,S$GLB,, | Performed by: OPHTHALMOLOGY

## 2021-07-13 PROCEDURE — 1126F PR PAIN SEVERITY QUANTIFIED, NO PAIN PRESENT: ICD-10-PCS | Mod: S$GLB,,, | Performed by: OPHTHALMOLOGY

## 2021-07-13 PROCEDURE — 99999 PR PBB SHADOW E&M-EST. PATIENT-LVL III: ICD-10-PCS | Mod: PBBFAC,,, | Performed by: OPHTHALMOLOGY

## 2021-07-13 PROCEDURE — 1157F ADVNC CARE PLAN IN RCRD: CPT | Mod: S$GLB,,, | Performed by: OPHTHALMOLOGY

## 2021-07-13 PROCEDURE — 3288F PR FALLS RISK ASSESSMENT DOCUMENTED: ICD-10-PCS | Mod: CPTII,S$GLB,, | Performed by: OPHTHALMOLOGY

## 2021-07-13 PROCEDURE — 3288F FALL RISK ASSESSMENT DOCD: CPT | Mod: CPTII,S$GLB,, | Performed by: OPHTHALMOLOGY

## 2021-07-13 PROCEDURE — 99999 PR PBB SHADOW E&M-EST. PATIENT-LVL III: CPT | Mod: PBBFAC,,, | Performed by: OPHTHALMOLOGY

## 2021-07-13 PROCEDURE — 1101F PR PT FALLS ASSESS DOC 0-1 FALLS W/OUT INJ PAST YR: ICD-10-PCS | Mod: CPTII,S$GLB,, | Performed by: OPHTHALMOLOGY

## 2021-07-16 RX ORDER — AMLODIPINE BESYLATE 5 MG/1
TABLET ORAL
Qty: 90 TABLET | Refills: 3 | Status: SHIPPED | OUTPATIENT
Start: 2021-07-16 | End: 2021-07-16 | Stop reason: SDUPTHER

## 2021-08-02 ENCOUNTER — OFFICE VISIT (OUTPATIENT)
Dept: DERMATOLOGY | Facility: CLINIC | Age: 76
End: 2021-08-02
Payer: COMMERCIAL

## 2021-08-02 DIAGNOSIS — D22.9 MULTIPLE BENIGN NEVI: ICD-10-CM

## 2021-08-02 DIAGNOSIS — Z85.828 HISTORY OF NONMELANOMA SKIN CANCER: ICD-10-CM

## 2021-08-02 DIAGNOSIS — T14.8XXA EXCORIATION: ICD-10-CM

## 2021-08-02 DIAGNOSIS — L82.1 SEBORRHEIC KERATOSIS: Primary | ICD-10-CM

## 2021-08-02 PROCEDURE — 1101F PR PT FALLS ASSESS DOC 0-1 FALLS W/OUT INJ PAST YR: ICD-10-PCS | Mod: CPTII,S$GLB,, | Performed by: DERMATOLOGY

## 2021-08-02 PROCEDURE — 1157F PR ADVANCE CARE PLAN OR EQUIV PRESENT IN MEDICAL RECORD: ICD-10-PCS | Mod: CPTII,S$GLB,, | Performed by: DERMATOLOGY

## 2021-08-02 PROCEDURE — 3044F HG A1C LEVEL LT 7.0%: CPT | Mod: CPTII,S$GLB,, | Performed by: DERMATOLOGY

## 2021-08-02 PROCEDURE — 1157F ADVNC CARE PLAN IN RCRD: CPT | Mod: CPTII,S$GLB,, | Performed by: DERMATOLOGY

## 2021-08-02 PROCEDURE — 1159F PR MEDICATION LIST DOCUMENTED IN MEDICAL RECORD: ICD-10-PCS | Mod: CPTII,S$GLB,, | Performed by: DERMATOLOGY

## 2021-08-02 PROCEDURE — 1160F RVW MEDS BY RX/DR IN RCRD: CPT | Mod: CPTII,S$GLB,, | Performed by: DERMATOLOGY

## 2021-08-02 PROCEDURE — 1126F PR PAIN SEVERITY QUANTIFIED, NO PAIN PRESENT: ICD-10-PCS | Mod: CPTII,S$GLB,, | Performed by: DERMATOLOGY

## 2021-08-02 PROCEDURE — 3288F PR FALLS RISK ASSESSMENT DOCUMENTED: ICD-10-PCS | Mod: CPTII,S$GLB,, | Performed by: DERMATOLOGY

## 2021-08-02 PROCEDURE — 99213 PR OFFICE/OUTPT VISIT, EST, LEVL III, 20-29 MIN: ICD-10-PCS | Mod: S$GLB,,, | Performed by: DERMATOLOGY

## 2021-08-02 PROCEDURE — 3288F FALL RISK ASSESSMENT DOCD: CPT | Mod: CPTII,S$GLB,, | Performed by: DERMATOLOGY

## 2021-08-02 PROCEDURE — 99213 OFFICE O/P EST LOW 20 MIN: CPT | Mod: S$GLB,,, | Performed by: DERMATOLOGY

## 2021-08-02 PROCEDURE — 99999 PR PBB SHADOW E&M-EST. PATIENT-LVL III: ICD-10-PCS | Mod: PBBFAC,,, | Performed by: DERMATOLOGY

## 2021-08-02 PROCEDURE — 1159F MED LIST DOCD IN RCRD: CPT | Mod: CPTII,S$GLB,, | Performed by: DERMATOLOGY

## 2021-08-02 PROCEDURE — 1101F PT FALLS ASSESS-DOCD LE1/YR: CPT | Mod: CPTII,S$GLB,, | Performed by: DERMATOLOGY

## 2021-08-02 PROCEDURE — 1126F AMNT PAIN NOTED NONE PRSNT: CPT | Mod: CPTII,S$GLB,, | Performed by: DERMATOLOGY

## 2021-08-02 PROCEDURE — 99999 PR PBB SHADOW E&M-EST. PATIENT-LVL III: CPT | Mod: PBBFAC,,, | Performed by: DERMATOLOGY

## 2021-08-02 PROCEDURE — 3044F PR MOST RECENT HEMOGLOBIN A1C LEVEL <7.0%: ICD-10-PCS | Mod: CPTII,S$GLB,, | Performed by: DERMATOLOGY

## 2021-08-02 PROCEDURE — 1160F PR REVIEW ALL MEDS BY PRESCRIBER/CLIN PHARMACIST DOCUMENTED: ICD-10-PCS | Mod: CPTII,S$GLB,, | Performed by: DERMATOLOGY

## 2021-08-12 ENCOUNTER — PATIENT MESSAGE (OUTPATIENT)
Dept: INTERNAL MEDICINE | Facility: CLINIC | Age: 76
End: 2021-08-12

## 2021-08-12 DIAGNOSIS — H40.1122 PRIMARY OPEN ANGLE GLAUCOMA OF LEFT EYE, MODERATE STAGE: ICD-10-CM

## 2021-08-12 DIAGNOSIS — F41.9 ANXIETY: ICD-10-CM

## 2021-08-12 RX ORDER — TIMOLOL MALEATE 5 MG/ML
1 SOLUTION OPHTHALMIC EVERY MORNING
Qty: 5 ML | Refills: 12 | Status: SHIPPED | OUTPATIENT
Start: 2021-08-12 | End: 2022-08-22

## 2021-08-13 ENCOUNTER — PATIENT MESSAGE (OUTPATIENT)
Dept: INTERNAL MEDICINE | Facility: CLINIC | Age: 76
End: 2021-08-13

## 2021-08-13 RX ORDER — CITALOPRAM 20 MG/1
20 TABLET, FILM COATED ORAL DAILY
Qty: 90 TABLET | Refills: 3 | Status: SHIPPED | OUTPATIENT
Start: 2021-08-13 | End: 2022-08-17

## 2021-08-18 ENCOUNTER — PATIENT MESSAGE (OUTPATIENT)
Dept: INTERNAL MEDICINE | Facility: CLINIC | Age: 76
End: 2021-08-18

## 2021-08-18 DIAGNOSIS — I10 ESSENTIAL HYPERTENSION: ICD-10-CM

## 2021-08-18 RX ORDER — METOPROLOL SUCCINATE 50 MG/1
50 TABLET, EXTENDED RELEASE ORAL DAILY
Qty: 90 TABLET | Refills: 1 | Status: SHIPPED | OUTPATIENT
Start: 2021-08-18 | End: 2021-12-23 | Stop reason: SDUPTHER

## 2021-08-26 ENCOUNTER — PATIENT MESSAGE (OUTPATIENT)
Dept: INTERNAL MEDICINE | Facility: CLINIC | Age: 76
End: 2021-08-26

## 2021-08-26 RX ORDER — FINASTERIDE 5 MG/1
5 TABLET, FILM COATED ORAL DAILY
Qty: 90 TABLET | Refills: 3 | Status: SHIPPED | OUTPATIENT
Start: 2021-08-26 | End: 2021-09-15

## 2021-08-31 DIAGNOSIS — I10 ESSENTIAL HYPERTENSION: ICD-10-CM

## 2021-09-07 RX ORDER — IRBESARTAN 300 MG/1
TABLET ORAL
Qty: 90 TABLET | Refills: 3 | Status: SHIPPED | OUTPATIENT
Start: 2021-09-07 | End: 2022-08-07

## 2021-09-15 RX ORDER — FINASTERIDE 5 MG/1
TABLET, FILM COATED ORAL
Qty: 90 TABLET | Refills: 3 | Status: SHIPPED | OUTPATIENT
Start: 2021-09-15 | End: 2022-09-20

## 2021-10-13 ENCOUNTER — LAB VISIT (OUTPATIENT)
Dept: LAB | Facility: HOSPITAL | Age: 76
End: 2021-10-13
Attending: INTERNAL MEDICINE
Payer: COMMERCIAL

## 2021-10-13 DIAGNOSIS — D64.9 NORMOCYTIC ANEMIA: ICD-10-CM

## 2021-10-13 LAB
25(OH)D3+25(OH)D2 SERPL-MCNC: 43 NG/ML (ref 30–96)
ALBUMIN SERPL BCP-MCNC: 4.1 G/DL (ref 3.5–5.2)
ALP SERPL-CCNC: 51 U/L (ref 55–135)
ALT SERPL W/O P-5'-P-CCNC: 15 U/L (ref 10–44)
ANION GAP SERPL CALC-SCNC: 9 MMOL/L (ref 8–16)
AST SERPL-CCNC: 15 U/L (ref 10–40)
BASOPHILS # BLD AUTO: 0.04 K/UL (ref 0–0.2)
BASOPHILS NFR BLD: 0.7 % (ref 0–1.9)
BILIRUB SERPL-MCNC: 0.4 MG/DL (ref 0.1–1)
BUN SERPL-MCNC: 22 MG/DL (ref 8–23)
CALCIUM SERPL-MCNC: 9.4 MG/DL (ref 8.7–10.5)
CHLORIDE SERPL-SCNC: 104 MMOL/L (ref 95–110)
CO2 SERPL-SCNC: 27 MMOL/L (ref 23–29)
CREAT SERPL-MCNC: 0.9 MG/DL (ref 0.5–1.4)
DIFFERENTIAL METHOD: ABNORMAL
EOSINOPHIL # BLD AUTO: 0.3 K/UL (ref 0–0.5)
EOSINOPHIL NFR BLD: 4.5 % (ref 0–8)
ERYTHROCYTE [DISTWIDTH] IN BLOOD BY AUTOMATED COUNT: 13.2 % (ref 11.5–14.5)
EST. GFR  (AFRICAN AMERICAN): >60 ML/MIN/1.73 M^2
EST. GFR  (NON AFRICAN AMERICAN): >60 ML/MIN/1.73 M^2
GLUCOSE SERPL-MCNC: 123 MG/DL (ref 70–110)
HCT VFR BLD AUTO: 38.6 % (ref 40–54)
HGB BLD-MCNC: 12.6 G/DL (ref 14–18)
IMM GRANULOCYTES # BLD AUTO: 0.01 K/UL (ref 0–0.04)
IMM GRANULOCYTES NFR BLD AUTO: 0.2 % (ref 0–0.5)
LYMPHOCYTES # BLD AUTO: 2.3 K/UL (ref 1–4.8)
LYMPHOCYTES NFR BLD: 40.2 % (ref 18–48)
MCH RBC QN AUTO: 31.3 PG (ref 27–31)
MCHC RBC AUTO-ENTMCNC: 32.6 G/DL (ref 32–36)
MCV RBC AUTO: 96 FL (ref 82–98)
MONOCYTES # BLD AUTO: 0.5 K/UL (ref 0.3–1)
MONOCYTES NFR BLD: 9 % (ref 4–15)
NEUTROPHILS # BLD AUTO: 2.6 K/UL (ref 1.8–7.7)
NEUTROPHILS NFR BLD: 45.4 % (ref 38–73)
NRBC BLD-RTO: 0 /100 WBC
PLATELET # BLD AUTO: 113 K/UL (ref 150–450)
PLATELET BLD QL SMEAR: ABNORMAL
PMV BLD AUTO: 12.3 FL (ref 9.2–12.9)
POTASSIUM SERPL-SCNC: 4.3 MMOL/L (ref 3.5–5.1)
PROT SERPL-MCNC: 7.7 G/DL (ref 6–8.4)
RBC # BLD AUTO: 4.03 M/UL (ref 4.6–6.2)
SODIUM SERPL-SCNC: 140 MMOL/L (ref 136–145)
WBC # BLD AUTO: 5.75 K/UL (ref 3.9–12.7)

## 2021-10-13 PROCEDURE — 85025 COMPLETE CBC W/AUTO DIFF WBC: CPT | Performed by: INTERNAL MEDICINE

## 2021-10-13 PROCEDURE — 82306 VITAMIN D 25 HYDROXY: CPT | Performed by: INTERNAL MEDICINE

## 2021-10-13 PROCEDURE — 36415 COLL VENOUS BLD VENIPUNCTURE: CPT | Performed by: INTERNAL MEDICINE

## 2021-10-13 PROCEDURE — 80053 COMPREHEN METABOLIC PANEL: CPT | Performed by: INTERNAL MEDICINE

## 2021-10-14 ENCOUNTER — PATIENT MESSAGE (OUTPATIENT)
Dept: HEMATOLOGY/ONCOLOGY | Facility: CLINIC | Age: 76
End: 2021-10-14

## 2021-10-14 ENCOUNTER — OFFICE VISIT (OUTPATIENT)
Dept: HEMATOLOGY/ONCOLOGY | Facility: CLINIC | Age: 76
End: 2021-10-14
Payer: COMMERCIAL

## 2021-10-14 VITALS
OXYGEN SATURATION: 98 % | RESPIRATION RATE: 20 BRPM | WEIGHT: 281.5 LBS | BODY MASS INDEX: 39.41 KG/M2 | SYSTOLIC BLOOD PRESSURE: 118 MMHG | TEMPERATURE: 98 F | HEIGHT: 71 IN | DIASTOLIC BLOOD PRESSURE: 68 MMHG | HEART RATE: 61 BPM

## 2021-10-14 DIAGNOSIS — D64.9 NORMOCYTIC ANEMIA: ICD-10-CM

## 2021-10-14 DIAGNOSIS — D69.6 THROMBOCYTOPENIA: Primary | ICD-10-CM

## 2021-10-14 DIAGNOSIS — E66.9 OBESITY, UNSPECIFIED CLASSIFICATION, UNSPECIFIED OBESITY TYPE, UNSPECIFIED WHETHER SERIOUS COMORBIDITY PRESENT: ICD-10-CM

## 2021-10-14 DIAGNOSIS — G47.33 OSA ON CPAP: ICD-10-CM

## 2021-10-14 DIAGNOSIS — Z86.79 HISTORY OF ATRIAL FIBRILLATION: ICD-10-CM

## 2021-10-14 PROCEDURE — 3044F PR MOST RECENT HEMOGLOBIN A1C LEVEL <7.0%: ICD-10-PCS | Mod: CPTII,S$GLB,, | Performed by: INTERNAL MEDICINE

## 2021-10-14 PROCEDURE — 99214 PR OFFICE/OUTPT VISIT, EST, LEVL IV, 30-39 MIN: ICD-10-PCS | Mod: S$GLB,,, | Performed by: INTERNAL MEDICINE

## 2021-10-14 PROCEDURE — 1159F MED LIST DOCD IN RCRD: CPT | Mod: CPTII,S$GLB,, | Performed by: INTERNAL MEDICINE

## 2021-10-14 PROCEDURE — 1101F PT FALLS ASSESS-DOCD LE1/YR: CPT | Mod: CPTII,S$GLB,, | Performed by: INTERNAL MEDICINE

## 2021-10-14 PROCEDURE — 3074F PR MOST RECENT SYSTOLIC BLOOD PRESSURE < 130 MM HG: ICD-10-PCS | Mod: CPTII,S$GLB,, | Performed by: INTERNAL MEDICINE

## 2021-10-14 PROCEDURE — 3044F HG A1C LEVEL LT 7.0%: CPT | Mod: CPTII,S$GLB,, | Performed by: INTERNAL MEDICINE

## 2021-10-14 PROCEDURE — 99999 PR PBB SHADOW E&M-EST. PATIENT-LVL IV: ICD-10-PCS | Mod: PBBFAC,,, | Performed by: INTERNAL MEDICINE

## 2021-10-14 PROCEDURE — 3078F DIAST BP <80 MM HG: CPT | Mod: CPTII,S$GLB,, | Performed by: INTERNAL MEDICINE

## 2021-10-14 PROCEDURE — 99214 OFFICE O/P EST MOD 30 MIN: CPT | Mod: S$GLB,,, | Performed by: INTERNAL MEDICINE

## 2021-10-14 PROCEDURE — 4010F PR ACE/ARB THEARPY RXD/TAKEN: ICD-10-PCS | Mod: CPTII,S$GLB,, | Performed by: INTERNAL MEDICINE

## 2021-10-14 PROCEDURE — 4010F ACE/ARB THERAPY RXD/TAKEN: CPT | Mod: CPTII,S$GLB,, | Performed by: INTERNAL MEDICINE

## 2021-10-14 PROCEDURE — 1159F PR MEDICATION LIST DOCUMENTED IN MEDICAL RECORD: ICD-10-PCS | Mod: CPTII,S$GLB,, | Performed by: INTERNAL MEDICINE

## 2021-10-14 PROCEDURE — 1157F ADVNC CARE PLAN IN RCRD: CPT | Mod: CPTII,S$GLB,, | Performed by: INTERNAL MEDICINE

## 2021-10-14 PROCEDURE — 3074F SYST BP LT 130 MM HG: CPT | Mod: CPTII,S$GLB,, | Performed by: INTERNAL MEDICINE

## 2021-10-14 PROCEDURE — 3288F FALL RISK ASSESSMENT DOCD: CPT | Mod: CPTII,S$GLB,, | Performed by: INTERNAL MEDICINE

## 2021-10-14 PROCEDURE — 3078F PR MOST RECENT DIASTOLIC BLOOD PRESSURE < 80 MM HG: ICD-10-PCS | Mod: CPTII,S$GLB,, | Performed by: INTERNAL MEDICINE

## 2021-10-14 PROCEDURE — 1160F RVW MEDS BY RX/DR IN RCRD: CPT | Mod: CPTII,S$GLB,, | Performed by: INTERNAL MEDICINE

## 2021-10-14 PROCEDURE — 1157F PR ADVANCE CARE PLAN OR EQUIV PRESENT IN MEDICAL RECORD: ICD-10-PCS | Mod: CPTII,S$GLB,, | Performed by: INTERNAL MEDICINE

## 2021-10-14 PROCEDURE — 99999 PR PBB SHADOW E&M-EST. PATIENT-LVL IV: CPT | Mod: PBBFAC,,, | Performed by: INTERNAL MEDICINE

## 2021-10-14 PROCEDURE — 3008F BODY MASS INDEX DOCD: CPT | Mod: CPTII,S$GLB,, | Performed by: INTERNAL MEDICINE

## 2021-10-14 PROCEDURE — 3008F PR BODY MASS INDEX (BMI) DOCUMENTED: ICD-10-PCS | Mod: CPTII,S$GLB,, | Performed by: INTERNAL MEDICINE

## 2021-10-14 PROCEDURE — 1160F PR REVIEW ALL MEDS BY PRESCRIBER/CLIN PHARMACIST DOCUMENTED: ICD-10-PCS | Mod: CPTII,S$GLB,, | Performed by: INTERNAL MEDICINE

## 2021-10-14 PROCEDURE — 1101F PR PT FALLS ASSESS DOC 0-1 FALLS W/OUT INJ PAST YR: ICD-10-PCS | Mod: CPTII,S$GLB,, | Performed by: INTERNAL MEDICINE

## 2021-10-14 PROCEDURE — 3288F PR FALLS RISK ASSESSMENT DOCUMENTED: ICD-10-PCS | Mod: CPTII,S$GLB,, | Performed by: INTERNAL MEDICINE

## 2021-10-15 ENCOUNTER — PATIENT MESSAGE (OUTPATIENT)
Dept: INTERNAL MEDICINE | Facility: CLINIC | Age: 76
End: 2021-10-15

## 2021-11-03 ENCOUNTER — PATIENT MESSAGE (OUTPATIENT)
Dept: OTOLARYNGOLOGY | Facility: CLINIC | Age: 76
End: 2021-11-03
Payer: COMMERCIAL

## 2021-11-03 RX ORDER — AZELASTINE 1 MG/ML
1 SPRAY, METERED NASAL 2 TIMES DAILY
Qty: 30 ML | Refills: 3 | OUTPATIENT
Start: 2021-11-03 | End: 2022-11-03

## 2021-11-15 ENCOUNTER — PATIENT OUTREACH (OUTPATIENT)
Dept: ADMINISTRATIVE | Facility: OTHER | Age: 76
End: 2021-11-15
Payer: COMMERCIAL

## 2021-11-15 ENCOUNTER — TELEPHONE (OUTPATIENT)
Dept: OPHTHALMOLOGY | Facility: CLINIC | Age: 76
End: 2021-11-15
Payer: COMMERCIAL

## 2021-11-16 ENCOUNTER — OFFICE VISIT (OUTPATIENT)
Dept: OTOLARYNGOLOGY | Facility: CLINIC | Age: 76
End: 2021-11-16
Payer: COMMERCIAL

## 2021-11-16 VITALS
HEIGHT: 71 IN | WEIGHT: 283.31 LBS | SYSTOLIC BLOOD PRESSURE: 127 MMHG | BODY MASS INDEX: 39.66 KG/M2 | HEART RATE: 63 BPM | DIASTOLIC BLOOD PRESSURE: 72 MMHG

## 2021-11-16 DIAGNOSIS — J34.3 HYPERTROPHY OF INFERIOR NASAL TURBINATE: Chronic | ICD-10-CM

## 2021-11-16 DIAGNOSIS — J34.2 NASAL SEPTAL DEVIATION: Chronic | ICD-10-CM

## 2021-11-16 DIAGNOSIS — J30.89 NON-SEASONAL ALLERGIC RHINITIS, UNSPECIFIED TRIGGER: Primary | Chronic | ICD-10-CM

## 2021-11-16 PROCEDURE — 3008F BODY MASS INDEX DOCD: CPT | Mod: CPTII,S$GLB,, | Performed by: OTOLARYNGOLOGY

## 2021-11-16 PROCEDURE — 3074F SYST BP LT 130 MM HG: CPT | Mod: CPTII,S$GLB,, | Performed by: OTOLARYNGOLOGY

## 2021-11-16 PROCEDURE — 3044F PR MOST RECENT HEMOGLOBIN A1C LEVEL <7.0%: ICD-10-PCS | Mod: CPTII,S$GLB,, | Performed by: OTOLARYNGOLOGY

## 2021-11-16 PROCEDURE — 1126F PR PAIN SEVERITY QUANTIFIED, NO PAIN PRESENT: ICD-10-PCS | Mod: CPTII,S$GLB,, | Performed by: OTOLARYNGOLOGY

## 2021-11-16 PROCEDURE — 99999 PR PBB SHADOW E&M-EST. PATIENT-LVL IV: CPT | Mod: PBBFAC,,, | Performed by: OTOLARYNGOLOGY

## 2021-11-16 PROCEDURE — 3288F PR FALLS RISK ASSESSMENT DOCUMENTED: ICD-10-PCS | Mod: CPTII,S$GLB,, | Performed by: OTOLARYNGOLOGY

## 2021-11-16 PROCEDURE — 3044F HG A1C LEVEL LT 7.0%: CPT | Mod: CPTII,S$GLB,, | Performed by: OTOLARYNGOLOGY

## 2021-11-16 PROCEDURE — 1160F PR REVIEW ALL MEDS BY PRESCRIBER/CLIN PHARMACIST DOCUMENTED: ICD-10-PCS | Mod: CPTII,S$GLB,, | Performed by: OTOLARYNGOLOGY

## 2021-11-16 PROCEDURE — 3008F PR BODY MASS INDEX (BMI) DOCUMENTED: ICD-10-PCS | Mod: CPTII,S$GLB,, | Performed by: OTOLARYNGOLOGY

## 2021-11-16 PROCEDURE — 1101F PR PT FALLS ASSESS DOC 0-1 FALLS W/OUT INJ PAST YR: ICD-10-PCS | Mod: CPTII,S$GLB,, | Performed by: OTOLARYNGOLOGY

## 2021-11-16 PROCEDURE — 99999 PR PBB SHADOW E&M-EST. PATIENT-LVL IV: ICD-10-PCS | Mod: PBBFAC,,, | Performed by: OTOLARYNGOLOGY

## 2021-11-16 PROCEDURE — 3288F FALL RISK ASSESSMENT DOCD: CPT | Mod: CPTII,S$GLB,, | Performed by: OTOLARYNGOLOGY

## 2021-11-16 PROCEDURE — 1159F MED LIST DOCD IN RCRD: CPT | Mod: CPTII,S$GLB,, | Performed by: OTOLARYNGOLOGY

## 2021-11-16 PROCEDURE — 1157F PR ADVANCE CARE PLAN OR EQUIV PRESENT IN MEDICAL RECORD: ICD-10-PCS | Mod: CPTII,S$GLB,, | Performed by: OTOLARYNGOLOGY

## 2021-11-16 PROCEDURE — 1126F AMNT PAIN NOTED NONE PRSNT: CPT | Mod: CPTII,S$GLB,, | Performed by: OTOLARYNGOLOGY

## 2021-11-16 PROCEDURE — 1160F RVW MEDS BY RX/DR IN RCRD: CPT | Mod: CPTII,S$GLB,, | Performed by: OTOLARYNGOLOGY

## 2021-11-16 PROCEDURE — 99204 OFFICE O/P NEW MOD 45 MIN: CPT | Mod: S$GLB,,, | Performed by: OTOLARYNGOLOGY

## 2021-11-16 PROCEDURE — 1159F PR MEDICATION LIST DOCUMENTED IN MEDICAL RECORD: ICD-10-PCS | Mod: CPTII,S$GLB,, | Performed by: OTOLARYNGOLOGY

## 2021-11-16 PROCEDURE — 4010F ACE/ARB THERAPY RXD/TAKEN: CPT | Mod: CPTII,S$GLB,, | Performed by: OTOLARYNGOLOGY

## 2021-11-16 PROCEDURE — 3074F PR MOST RECENT SYSTOLIC BLOOD PRESSURE < 130 MM HG: ICD-10-PCS | Mod: CPTII,S$GLB,, | Performed by: OTOLARYNGOLOGY

## 2021-11-16 PROCEDURE — 1157F ADVNC CARE PLAN IN RCRD: CPT | Mod: CPTII,S$GLB,, | Performed by: OTOLARYNGOLOGY

## 2021-11-16 PROCEDURE — 99204 PR OFFICE/OUTPT VISIT, NEW, LEVL IV, 45-59 MIN: ICD-10-PCS | Mod: S$GLB,,, | Performed by: OTOLARYNGOLOGY

## 2021-11-16 PROCEDURE — 3078F DIAST BP <80 MM HG: CPT | Mod: CPTII,S$GLB,, | Performed by: OTOLARYNGOLOGY

## 2021-11-16 PROCEDURE — 3078F PR MOST RECENT DIASTOLIC BLOOD PRESSURE < 80 MM HG: ICD-10-PCS | Mod: CPTII,S$GLB,, | Performed by: OTOLARYNGOLOGY

## 2021-11-16 PROCEDURE — 4010F PR ACE/ARB THEARPY RXD/TAKEN: ICD-10-PCS | Mod: CPTII,S$GLB,, | Performed by: OTOLARYNGOLOGY

## 2021-11-16 PROCEDURE — 1101F PT FALLS ASSESS-DOCD LE1/YR: CPT | Mod: CPTII,S$GLB,, | Performed by: OTOLARYNGOLOGY

## 2021-11-16 RX ORDER — AZELASTINE 1 MG/ML
1 SPRAY, METERED NASAL 2 TIMES DAILY
Qty: 30 ML | Refills: 6 | Status: SHIPPED | OUTPATIENT
Start: 2021-11-16 | End: 2023-01-08 | Stop reason: SDUPTHER

## 2021-12-13 ENCOUNTER — LAB VISIT (OUTPATIENT)
Dept: LAB | Facility: HOSPITAL | Age: 76
End: 2021-12-13
Attending: INTERNAL MEDICINE
Payer: COMMERCIAL

## 2021-12-13 DIAGNOSIS — D69.6 THROMBOCYTOPENIA: ICD-10-CM

## 2021-12-13 LAB
ALBUMIN SERPL BCP-MCNC: 4.2 G/DL (ref 3.5–5.2)
ALP SERPL-CCNC: 53 U/L (ref 55–135)
ALT SERPL W/O P-5'-P-CCNC: 22 U/L (ref 10–44)
ANION GAP SERPL CALC-SCNC: 8 MMOL/L (ref 8–16)
AST SERPL-CCNC: 17 U/L (ref 10–40)
BASOPHILS # BLD AUTO: 0.04 K/UL (ref 0–0.2)
BASOPHILS NFR BLD: 0.7 % (ref 0–1.9)
BILIRUB SERPL-MCNC: 0.5 MG/DL (ref 0.1–1)
BUN SERPL-MCNC: 19 MG/DL (ref 8–23)
CALCIUM SERPL-MCNC: 9.5 MG/DL (ref 8.7–10.5)
CHLORIDE SERPL-SCNC: 104 MMOL/L (ref 95–110)
CO2 SERPL-SCNC: 29 MMOL/L (ref 23–29)
CREAT SERPL-MCNC: 0.9 MG/DL (ref 0.5–1.4)
DIFFERENTIAL METHOD: ABNORMAL
EOSINOPHIL # BLD AUTO: 0.3 K/UL (ref 0–0.5)
EOSINOPHIL NFR BLD: 4.2 % (ref 0–8)
ERYTHROCYTE [DISTWIDTH] IN BLOOD BY AUTOMATED COUNT: 13.1 % (ref 11.5–14.5)
EST. GFR  (AFRICAN AMERICAN): >60 ML/MIN/1.73 M^2
EST. GFR  (NON AFRICAN AMERICAN): >60 ML/MIN/1.73 M^2
FOLATE SERPL-MCNC: 19.4 NG/ML (ref 4–24)
GLUCOSE SERPL-MCNC: 126 MG/DL (ref 70–110)
HCT VFR BLD AUTO: 39.6 % (ref 40–54)
HGB BLD-MCNC: 13.1 G/DL (ref 14–18)
IMM GRANULOCYTES # BLD AUTO: 0.01 K/UL (ref 0–0.04)
IMM GRANULOCYTES NFR BLD AUTO: 0.2 % (ref 0–0.5)
LDH SERPL L TO P-CCNC: 146 U/L (ref 110–260)
LYMPHOCYTES # BLD AUTO: 2.2 K/UL (ref 1–4.8)
LYMPHOCYTES NFR BLD: 37.1 % (ref 18–48)
MCH RBC QN AUTO: 31.7 PG (ref 27–31)
MCHC RBC AUTO-ENTMCNC: 33.1 G/DL (ref 32–36)
MCV RBC AUTO: 96 FL (ref 82–98)
MONOCYTES # BLD AUTO: 0.4 K/UL (ref 0.3–1)
MONOCYTES NFR BLD: 6.8 % (ref 4–15)
NEUTROPHILS # BLD AUTO: 3 K/UL (ref 1.8–7.7)
NEUTROPHILS NFR BLD: 51 % (ref 38–73)
NRBC BLD-RTO: 0 /100 WBC
PLATELET # BLD AUTO: 187 K/UL (ref 150–450)
PMV BLD AUTO: 11.6 FL (ref 9.2–12.9)
POTASSIUM SERPL-SCNC: 3.9 MMOL/L (ref 3.5–5.1)
PROT SERPL-MCNC: 7.5 G/DL (ref 6–8.4)
RBC # BLD AUTO: 4.13 M/UL (ref 4.6–6.2)
SODIUM SERPL-SCNC: 141 MMOL/L (ref 136–145)
TSH SERPL DL<=0.005 MIU/L-ACNC: 0.96 UIU/ML (ref 0.4–4)
VIT B12 SERPL-MCNC: 536 PG/ML (ref 210–950)
WBC # BLD AUTO: 5.9 K/UL (ref 3.9–12.7)

## 2021-12-13 PROCEDURE — 82746 ASSAY OF FOLIC ACID SERUM: CPT | Performed by: INTERNAL MEDICINE

## 2021-12-13 PROCEDURE — 86706 HEP B SURFACE ANTIBODY: CPT | Performed by: INTERNAL MEDICINE

## 2021-12-13 PROCEDURE — 36415 COLL VENOUS BLD VENIPUNCTURE: CPT | Performed by: INTERNAL MEDICINE

## 2021-12-13 PROCEDURE — 83615 LACTATE (LD) (LDH) ENZYME: CPT | Performed by: INTERNAL MEDICINE

## 2021-12-13 PROCEDURE — 80053 COMPREHEN METABOLIC PANEL: CPT | Performed by: INTERNAL MEDICINE

## 2021-12-13 PROCEDURE — 82607 VITAMIN B-12: CPT | Performed by: INTERNAL MEDICINE

## 2021-12-13 PROCEDURE — 84443 ASSAY THYROID STIM HORMONE: CPT | Performed by: INTERNAL MEDICINE

## 2021-12-13 PROCEDURE — 86803 HEPATITIS C AB TEST: CPT | Performed by: INTERNAL MEDICINE

## 2021-12-13 PROCEDURE — 85025 COMPLETE CBC W/AUTO DIFF WBC: CPT | Performed by: INTERNAL MEDICINE

## 2021-12-13 PROCEDURE — 86704 HEP B CORE ANTIBODY TOTAL: CPT | Performed by: INTERNAL MEDICINE

## 2021-12-13 PROCEDURE — 86038 ANTINUCLEAR ANTIBODIES: CPT | Performed by: INTERNAL MEDICINE

## 2021-12-14 LAB — ANA SER QL IF: NORMAL

## 2021-12-15 LAB
HBV CORE AB SERPL QL IA: POSITIVE
HBV SURFACE AB SER-ACNC: POSITIVE M[IU]/ML
HCV AB SERPL QL IA: NEGATIVE

## 2021-12-16 ENCOUNTER — OFFICE VISIT (OUTPATIENT)
Dept: HEMATOLOGY/ONCOLOGY | Facility: CLINIC | Age: 76
End: 2021-12-16
Payer: COMMERCIAL

## 2021-12-16 VITALS
HEIGHT: 71 IN | HEART RATE: 68 BPM | TEMPERATURE: 97 F | WEIGHT: 291.44 LBS | SYSTOLIC BLOOD PRESSURE: 124 MMHG | OXYGEN SATURATION: 98 % | DIASTOLIC BLOOD PRESSURE: 62 MMHG | BODY MASS INDEX: 40.8 KG/M2

## 2021-12-16 DIAGNOSIS — E66.01 SEVERE OBESITY (BMI 35.0-39.9) WITH COMORBIDITY: ICD-10-CM

## 2021-12-16 DIAGNOSIS — D64.9 NORMOCYTIC ANEMIA: Primary | ICD-10-CM

## 2021-12-16 DIAGNOSIS — D69.6 THROMBOCYTOPENIA: ICD-10-CM

## 2021-12-16 PROCEDURE — 99213 PR OFFICE/OUTPT VISIT, EST, LEVL III, 20-29 MIN: ICD-10-PCS | Mod: S$GLB,,, | Performed by: INTERNAL MEDICINE

## 2021-12-16 PROCEDURE — 1157F ADVNC CARE PLAN IN RCRD: CPT | Mod: CPTII,S$GLB,, | Performed by: INTERNAL MEDICINE

## 2021-12-16 PROCEDURE — 99213 OFFICE O/P EST LOW 20 MIN: CPT | Mod: S$GLB,,, | Performed by: INTERNAL MEDICINE

## 2021-12-16 PROCEDURE — 1157F PR ADVANCE CARE PLAN OR EQUIV PRESENT IN MEDICAL RECORD: ICD-10-PCS | Mod: CPTII,S$GLB,, | Performed by: INTERNAL MEDICINE

## 2021-12-16 PROCEDURE — 4010F PR ACE/ARB THEARPY RXD/TAKEN: ICD-10-PCS | Mod: CPTII,S$GLB,, | Performed by: INTERNAL MEDICINE

## 2021-12-16 PROCEDURE — 99999 PR PBB SHADOW E&M-EST. PATIENT-LVL IV: ICD-10-PCS | Mod: PBBFAC,,, | Performed by: INTERNAL MEDICINE

## 2021-12-16 PROCEDURE — 4010F ACE/ARB THERAPY RXD/TAKEN: CPT | Mod: CPTII,S$GLB,, | Performed by: INTERNAL MEDICINE

## 2021-12-16 PROCEDURE — 99999 PR PBB SHADOW E&M-EST. PATIENT-LVL IV: CPT | Mod: PBBFAC,,, | Performed by: INTERNAL MEDICINE

## 2021-12-17 DIAGNOSIS — N40.0 BENIGN NON-NODULAR PROSTATIC HYPERPLASIA WITHOUT LOWER URINARY TRACT SYMPTOMS: ICD-10-CM

## 2021-12-27 RX ORDER — TAMSULOSIN HYDROCHLORIDE 0.4 MG/1
CAPSULE ORAL
Qty: 90 CAPSULE | Refills: 0 | Status: SHIPPED | OUTPATIENT
Start: 2021-12-27 | End: 2022-02-20 | Stop reason: SDUPTHER

## 2022-01-22 NOTE — PROGRESS NOTES
HPI     DLS: 7/13/2021    Pt here for HVF review/OCT;  Pt states no eye pain or discomfort.     Meds;   Timolol QAM OU   AT's PRN OU     1) POAG   2) PCIOL OU   3) Steroid Responder   4) APD OS   5) PAS OS     Last edited by Millie Lakhani on 1/27/2022  4:07 PM. (History)            Assessment /Plan     For exam results, see Encounter Report.    Primary open angle glaucoma of left eye, moderate stage    Primary open-angle glaucoma, right eye, mild stage    PCO (posterior capsular opacification), right    Steroid responders, bilateral    Pupil irregular of left eye    Relative afferent pupillary defect - Left Eye    Pseudophakia, both eyes            Pt is back at [facility] again - 7/13/2021   Lost to F/u from 11/4/2013 to 8/11/2015   Knows Sharlene Mo and MIKHAIL - use to work with them     1. POAG (primary open-angle glaucoma) - Both Eyes   Old pt of [place] [phone]  Began at [facility] 2012       POAG OS>OD           Family history    neg        Glaucoma meds   T1/2 GFS ou (use to use travatan- off post CE)         H/O adverse rxn to glaucoma drops    none        LASERS    SLT os 9/26/2013 -( good resp 17 --> 14) // yag cap od - 1/2020        GLAUCOMA SURGERIES    trabectome os (combined with phaco - 2/8/2017        OTHER EYE SURGERIES    Combined - phaco/IOL / goniotomy - trabectome os 2/8/2017 - +heme and increae in IOP)                                                 PC IOL OD - 5/10/2017         CDR    0.6/0.7-0.8        Tbase    20-23/21-24          Tmax    23/24            Ttarget    17/17           HVF   6 tests 2894-2177 OD essentially full; OS Sup paracentral defect, IAD/INS (([facility]))             (([facility])) - 4 test 2012 to 2022 - full od // sup paracentral defect / inferior paracentral defect         Gonio    +4 ou        CCT    598/598        OCT    6 test 2012 to 2022  - RNFL - nl od // bord G/TS/T         HRT    7 test-2012 to 2020  - MR -  [place] off  od // dec T, bord I os /// CDR xxx  "od // 0.64 os        Disc photos    2012 - IOS    - Ttoday  16/13  - Test done today   IOP // HVF / DFE / OCT       2. Steroid responders - Both Eyes  2/2 nasacort      3. ? Trace APD os      4. S/P Rt knee replacement Feb 2013  -doing well      5.  Myopia / presbyopia ou     6. Irregular pupil - w/ PAS   -2/2 very high IOP POD # 1 post phaco     7.  PC IOL OU  OD 5/10/2017 - PCB00 14.5  OS 2/8/2017 - + hyphema and high IOP 2/2 trabectome - irregular pupil post op - PCB00 14.0    8. PCO od - vis sign   rec yag cap       Plan  CSM  IOP below target OU  SLT done OS 9/26/2013 - good initial resp 24-->17    -( if responds well consider SLT od - but full VF and helathy ON still od)  HVF stable OU    Cont timolol gfs ou q day     If the glare from the fixed dilated pupil bothers him a lot post 2nd eye getting done - consider pupil surgery with dr Peacock to "purse string it " smaller - pt is doing ok for now - glare is not bothering him too much     Photo file - updated - [facility]  7/13/2021     F/U 4 - 6  months with IOP // gonio  - [facility] - if open   "

## 2022-01-27 ENCOUNTER — OFFICE VISIT (OUTPATIENT)
Dept: OPHTHALMOLOGY | Facility: CLINIC | Age: 77
End: 2022-01-27
Payer: COMMERCIAL

## 2022-01-27 ENCOUNTER — CLINICAL SUPPORT (OUTPATIENT)
Dept: OPHTHALMOLOGY | Facility: CLINIC | Age: 77
End: 2022-01-27
Payer: COMMERCIAL

## 2022-01-27 DIAGNOSIS — H40.1111 PRIMARY OPEN-ANGLE GLAUCOMA, RIGHT EYE, MILD STAGE: ICD-10-CM

## 2022-01-27 DIAGNOSIS — H40.1122 PRIMARY OPEN ANGLE GLAUCOMA OF LEFT EYE, MODERATE STAGE: Primary | ICD-10-CM

## 2022-01-27 DIAGNOSIS — H40.1122 PRIMARY OPEN ANGLE GLAUCOMA OF LEFT EYE, MODERATE STAGE: ICD-10-CM

## 2022-01-27 DIAGNOSIS — Z96.1 PSEUDOPHAKIA, BOTH EYES: ICD-10-CM

## 2022-01-27 DIAGNOSIS — H57.09 RELATIVE AFFERENT PUPILLARY DEFECT: ICD-10-CM

## 2022-01-27 DIAGNOSIS — H40.043 STEROID RESPONDERS, BILATERAL: ICD-10-CM

## 2022-01-27 DIAGNOSIS — H26.491 PCO (POSTERIOR CAPSULAR OPACIFICATION), RIGHT: ICD-10-CM

## 2022-01-27 DIAGNOSIS — H21.562 PUPIL IRREGULAR OF LEFT EYE: ICD-10-CM

## 2022-01-27 PROCEDURE — 1101F PT FALLS ASSESS-DOCD LE1/YR: CPT | Mod: CPTII,S$GLB,, | Performed by: OPHTHALMOLOGY

## 2022-01-27 PROCEDURE — 1101F PR PT FALLS ASSESS DOC 0-1 FALLS W/OUT INJ PAST YR: ICD-10-PCS | Mod: CPTII,S$GLB,, | Performed by: OPHTHALMOLOGY

## 2022-01-27 PROCEDURE — 92014 PR EYE EXAM, EST PATIENT,COMPREHESV: ICD-10-PCS | Mod: S$GLB,,, | Performed by: OPHTHALMOLOGY

## 2022-01-27 PROCEDURE — 1126F PR PAIN SEVERITY QUANTIFIED, NO PAIN PRESENT: ICD-10-PCS | Mod: CPTII,S$GLB,, | Performed by: OPHTHALMOLOGY

## 2022-01-27 PROCEDURE — 1160F RVW MEDS BY RX/DR IN RCRD: CPT | Mod: CPTII,S$GLB,, | Performed by: OPHTHALMOLOGY

## 2022-01-27 PROCEDURE — 4010F PR ACE/ARB THEARPY RXD/TAKEN: ICD-10-PCS | Mod: CPTII,S$GLB,, | Performed by: OPHTHALMOLOGY

## 2022-01-27 PROCEDURE — 1159F PR MEDICATION LIST DOCUMENTED IN MEDICAL RECORD: ICD-10-PCS | Mod: CPTII,S$GLB,, | Performed by: OPHTHALMOLOGY

## 2022-01-27 PROCEDURE — 4010F ACE/ARB THERAPY RXD/TAKEN: CPT | Mod: CPTII,S$GLB,, | Performed by: OPHTHALMOLOGY

## 2022-01-27 PROCEDURE — 99999 PR PBB SHADOW E&M-EST. PATIENT-LVL III: CPT | Mod: PBBFAC,,, | Performed by: OPHTHALMOLOGY

## 2022-01-27 PROCEDURE — 1157F PR ADVANCE CARE PLAN OR EQUIV PRESENT IN MEDICAL RECORD: ICD-10-PCS | Mod: CPTII,S$GLB,, | Performed by: OPHTHALMOLOGY

## 2022-01-27 PROCEDURE — 1126F AMNT PAIN NOTED NONE PRSNT: CPT | Mod: CPTII,S$GLB,, | Performed by: OPHTHALMOLOGY

## 2022-01-27 PROCEDURE — 99999 PR PBB SHADOW E&M-EST. PATIENT-LVL III: ICD-10-PCS | Mod: PBBFAC,,, | Performed by: OPHTHALMOLOGY

## 2022-01-27 PROCEDURE — 92014 COMPRE OPH EXAM EST PT 1/>: CPT | Mod: S$GLB,,, | Performed by: OPHTHALMOLOGY

## 2022-01-27 PROCEDURE — 1160F PR REVIEW ALL MEDS BY PRESCRIBER/CLIN PHARMACIST DOCUMENTED: ICD-10-PCS | Mod: CPTII,S$GLB,, | Performed by: OPHTHALMOLOGY

## 2022-01-27 PROCEDURE — 3288F PR FALLS RISK ASSESSMENT DOCUMENTED: ICD-10-PCS | Mod: CPTII,S$GLB,, | Performed by: OPHTHALMOLOGY

## 2022-01-27 PROCEDURE — 3288F FALL RISK ASSESSMENT DOCD: CPT | Mod: CPTII,S$GLB,, | Performed by: OPHTHALMOLOGY

## 2022-01-27 PROCEDURE — 1159F MED LIST DOCD IN RCRD: CPT | Mod: CPTII,S$GLB,, | Performed by: OPHTHALMOLOGY

## 2022-01-27 PROCEDURE — 1157F ADVNC CARE PLAN IN RCRD: CPT | Mod: CPTII,S$GLB,, | Performed by: OPHTHALMOLOGY

## 2022-02-11 ENCOUNTER — PATIENT MESSAGE (OUTPATIENT)
Dept: ADMINISTRATIVE | Facility: OTHER | Age: 77
End: 2022-02-11
Payer: COMMERCIAL

## 2022-02-14 ENCOUNTER — CLINICAL SUPPORT (OUTPATIENT)
Dept: OTOLARYNGOLOGY | Facility: CLINIC | Age: 77
End: 2022-02-14
Payer: COMMERCIAL

## 2022-02-14 ENCOUNTER — OFFICE VISIT (OUTPATIENT)
Dept: OTOLARYNGOLOGY | Facility: CLINIC | Age: 77
End: 2022-02-14
Payer: COMMERCIAL

## 2022-02-14 VITALS
BODY MASS INDEX: 39.28 KG/M2 | HEART RATE: 65 BPM | WEIGHT: 280.56 LBS | DIASTOLIC BLOOD PRESSURE: 65 MMHG | SYSTOLIC BLOOD PRESSURE: 110 MMHG | HEIGHT: 71 IN

## 2022-02-14 DIAGNOSIS — J34.3 HYPERTROPHY OF INFERIOR NASAL TURBINATE: ICD-10-CM

## 2022-02-14 DIAGNOSIS — J34.2 NASAL SEPTAL DEVIATION: ICD-10-CM

## 2022-02-14 DIAGNOSIS — J30.89 NON-SEASONAL ALLERGIC RHINITIS, UNSPECIFIED TRIGGER: Chronic | ICD-10-CM

## 2022-02-14 DIAGNOSIS — H69.90 DYSFUNCTION OF EUSTACHIAN TUBE, UNSPECIFIED LATERALITY: Primary | ICD-10-CM

## 2022-02-14 DIAGNOSIS — H92.03 OTALGIA OF BOTH EARS: ICD-10-CM

## 2022-02-14 DIAGNOSIS — M26.623 BILATERAL TEMPOROMANDIBULAR JOINT PAIN: Primary | Chronic | ICD-10-CM

## 2022-02-14 PROCEDURE — 92567 TYMPANOMETRY: CPT | Mod: S$GLB,,, | Performed by: PHYSICIAN ASSISTANT

## 2022-02-14 PROCEDURE — 1157F PR ADVANCE CARE PLAN OR EQUIV PRESENT IN MEDICAL RECORD: ICD-10-PCS | Mod: CPTII,S$GLB,, | Performed by: OTOLARYNGOLOGY

## 2022-02-14 PROCEDURE — 3074F PR MOST RECENT SYSTOLIC BLOOD PRESSURE < 130 MM HG: ICD-10-PCS | Mod: CPTII,S$GLB,, | Performed by: OTOLARYNGOLOGY

## 2022-02-14 PROCEDURE — 1159F PR MEDICATION LIST DOCUMENTED IN MEDICAL RECORD: ICD-10-PCS | Mod: CPTII,S$GLB,, | Performed by: OTOLARYNGOLOGY

## 2022-02-14 PROCEDURE — 3008F BODY MASS INDEX DOCD: CPT | Mod: CPTII,S$GLB,, | Performed by: OTOLARYNGOLOGY

## 2022-02-14 PROCEDURE — 3078F DIAST BP <80 MM HG: CPT | Mod: CPTII,S$GLB,, | Performed by: OTOLARYNGOLOGY

## 2022-02-14 PROCEDURE — 92567 PR TYMPA2METRY: ICD-10-PCS | Mod: S$GLB,,, | Performed by: PHYSICIAN ASSISTANT

## 2022-02-14 PROCEDURE — 1125F PR PAIN SEVERITY QUANTIFIED, PAIN PRESENT: ICD-10-PCS | Mod: CPTII,S$GLB,, | Performed by: OTOLARYNGOLOGY

## 2022-02-14 PROCEDURE — 99999 PR PBB SHADOW E&M-EST. PATIENT-LVL I: ICD-10-PCS | Mod: PBBFAC,,, | Performed by: PHYSICIAN ASSISTANT

## 2022-02-14 PROCEDURE — 99214 OFFICE O/P EST MOD 30 MIN: CPT | Mod: S$GLB,,, | Performed by: OTOLARYNGOLOGY

## 2022-02-14 PROCEDURE — 3074F SYST BP LT 130 MM HG: CPT | Mod: CPTII,S$GLB,, | Performed by: OTOLARYNGOLOGY

## 2022-02-14 PROCEDURE — 99214 PR OFFICE/OUTPT VISIT, EST, LEVL IV, 30-39 MIN: ICD-10-PCS | Mod: S$GLB,,, | Performed by: OTOLARYNGOLOGY

## 2022-02-14 PROCEDURE — 3288F PR FALLS RISK ASSESSMENT DOCUMENTED: ICD-10-PCS | Mod: CPTII,S$GLB,, | Performed by: OTOLARYNGOLOGY

## 2022-02-14 PROCEDURE — 1125F AMNT PAIN NOTED PAIN PRSNT: CPT | Mod: CPTII,S$GLB,, | Performed by: OTOLARYNGOLOGY

## 2022-02-14 PROCEDURE — 1160F PR REVIEW ALL MEDS BY PRESCRIBER/CLIN PHARMACIST DOCUMENTED: ICD-10-PCS | Mod: CPTII,S$GLB,, | Performed by: OTOLARYNGOLOGY

## 2022-02-14 PROCEDURE — 99999 PR PBB SHADOW E&M-EST. PATIENT-LVL V: CPT | Mod: PBBFAC,,, | Performed by: OTOLARYNGOLOGY

## 2022-02-14 PROCEDURE — 4010F ACE/ARB THERAPY RXD/TAKEN: CPT | Mod: CPTII,S$GLB,, | Performed by: OTOLARYNGOLOGY

## 2022-02-14 PROCEDURE — 1101F PT FALLS ASSESS-DOCD LE1/YR: CPT | Mod: CPTII,S$GLB,, | Performed by: OTOLARYNGOLOGY

## 2022-02-14 PROCEDURE — 3288F FALL RISK ASSESSMENT DOCD: CPT | Mod: CPTII,S$GLB,, | Performed by: OTOLARYNGOLOGY

## 2022-02-14 PROCEDURE — 99999 PR PBB SHADOW E&M-EST. PATIENT-LVL I: CPT | Mod: PBBFAC,,, | Performed by: PHYSICIAN ASSISTANT

## 2022-02-14 PROCEDURE — 3078F PR MOST RECENT DIASTOLIC BLOOD PRESSURE < 80 MM HG: ICD-10-PCS | Mod: CPTII,S$GLB,, | Performed by: OTOLARYNGOLOGY

## 2022-02-14 PROCEDURE — 99999 PR PBB SHADOW E&M-EST. PATIENT-LVL V: ICD-10-PCS | Mod: PBBFAC,,, | Performed by: OTOLARYNGOLOGY

## 2022-02-14 PROCEDURE — 1101F PR PT FALLS ASSESS DOC 0-1 FALLS W/OUT INJ PAST YR: ICD-10-PCS | Mod: CPTII,S$GLB,, | Performed by: OTOLARYNGOLOGY

## 2022-02-14 PROCEDURE — 3008F PR BODY MASS INDEX (BMI) DOCUMENTED: ICD-10-PCS | Mod: CPTII,S$GLB,, | Performed by: OTOLARYNGOLOGY

## 2022-02-14 PROCEDURE — 1157F ADVNC CARE PLAN IN RCRD: CPT | Mod: CPTII,S$GLB,, | Performed by: OTOLARYNGOLOGY

## 2022-02-14 PROCEDURE — 1160F RVW MEDS BY RX/DR IN RCRD: CPT | Mod: CPTII,S$GLB,, | Performed by: OTOLARYNGOLOGY

## 2022-02-14 PROCEDURE — 1159F MED LIST DOCD IN RCRD: CPT | Mod: CPTII,S$GLB,, | Performed by: OTOLARYNGOLOGY

## 2022-02-14 PROCEDURE — 4010F PR ACE/ARB THEARPY RXD/TAKEN: ICD-10-PCS | Mod: CPTII,S$GLB,, | Performed by: OTOLARYNGOLOGY

## 2022-02-14 RX ORDER — NAPROXEN 500 MG/1
500 TABLET ORAL 2 TIMES DAILY WITH MEALS
Qty: 20 TABLET | Refills: 0 | Status: SHIPPED | OUTPATIENT
Start: 2022-02-14 | End: 2022-02-24

## 2022-02-14 NOTE — PROGRESS NOTES
Edis Huggins Jr. was seen today in the clinic for tympanometry testing.      Tympanometry revealed Type A in the right ear and Type A in the left ear.     Recommendations:  1. Otologic evaluation  2. Annual audiogram  3. Hearing protection when in noise

## 2022-02-14 NOTE — PROGRESS NOTES
Chief Complaint   Patient presents with    Jaw Pain     Pain in jaw since having covid   .     HPI: Edis Huggins Jr. is 76 y.o. male who isself-referred for evaluation of bilateral jaw and ear pain.  It has been present for several weeks since he had COVID-19 in January.  He reports sensitivity with opening his jaw and eating certain foods.  He denies otorrhea, post-auricular pain, change in hearing.  He did try Ibuprofen with minimal relief.       Past Medical History:   Diagnosis Date    Allergic rhinitis due to animal (cat) (dog) hair and dander 3/20/2014    Allergy     Anemia     Anxiety     Basal cell carcinoma     BPH (benign prostatic hypertrophy)     Family history of colon cancer 3/20/2014    Family history of ischemic heart disease 3/20/2014    Fever blister     HLD (hyperlipidemia)     HTN (hypertension)     IFG (impaired fasting glucose)     Lumbar spondylosis 2019    Pain of right hip joint     Primary open angle glaucoma     Squamous cell carcinoma of skin     Status post total knee replacement 3/20/2014     Social History     Socioeconomic History    Marital status:    Occupational History    Occupation:      Employer: [employer]   Tobacco Use    Smoking status: Former Smoker     Packs/day: 1.00     Years: 10.00     Pack years: 10.00     Types: Cigarettes     Quit date: 1974     Years since quittin.1    Smokeless tobacco: Never Used   Substance and Sexual Activity    Alcohol use: Yes     Alcohol/week: 1.0 standard drink     Types: 1 Standard drinks or equivalent per week     Comment: I do not drink every week.    Drug use: Never    Sexual activity: Not Currently     Partners: Female     Birth control/protection: None     Social Determinants of Health     Financial Resource Strain: Low Risk     Difficulty of Paying Living Expenses: Not hard at all   Food Insecurity: No Food Insecurity    Worried About Running Out of Food in the  Last Year: Never true    Ran Out of Food in the Last Year: Never true   Transportation Needs: No Transportation Needs    Lack of Transportation (Medical): No    Lack of Transportation (Non-Medical): No   Physical Activity: Inactive    Days of Exercise per Week: 0 days    Minutes of Exercise per Session: 0 min   Stress: No Stress Concern Present    Feeling of Stress : Not at all   Social Connections: Unknown    Frequency of Communication with Friends and Family: More than three times a week    Frequency of Social Gatherings with Friends and Family: Once a week    Active Member of Clubs or Organizations: Yes    Attends Club or Organization Meetings: More than 4 times per year    Marital Status:    Housing Stability: Low Risk     Unable to Pay for Housing in the Last Year: No    Number of Places Lived in the Last Year: 1    Unstable Housing in the Last Year: No     Past Surgical History:   Procedure Laterality Date    CATARACT EXTRACTION W/  INTRAOCULAR LENS IMPLANT Left 02/08/2017    WITH KAHOOK GONIOTOMY (DR. WHITLEY)    CATARACT EXTRACTION W/  INTRAOCULAR LENS IMPLANT Right 05/10/2017        EYE SURGERY      HERNIA REPAIR      x 2    JOINT REPLACEMENT      SKIN BIOPSY      in my [facility] record    TONSILLECTOMY      TOTAL KNEE ARTHROPLASTY Right     TRANSFORAMINAL EPIDURAL INJECTION OF STEROID Right 6/25/2019    Procedure: Transforaminal Epidural Steroid Injection, Right, L3-4, L4-5;  Surgeon: Eduard Mukherjee Jr., MD;  Location: [facility];  Service: Pain Management;  Laterality: Right;    TRANSFORAMINAL EPIDURAL INJECTION OF STEROID Right 5/26/2020    Procedure: Injection,steroid,epidural,transforaminal approach--Right L3-4, L4-5;  Surgeon: Eduard Mukherjee Jr., MD;  Location: [facility];  Service: Pain Management;  Laterality: Right;     Family History   Problem Relation Age of Onset    Colon cancer Maternal Grandmother         70s    Hearing loss Maternal  Grandmother         Age related, Had hearing aids    Cataracts Mother     Atrial fibrillation Mother     Hearing loss Mother         Age related. Had hearing aids    Heart disease Mother         Heart valve deterioration at 93.  at 95    Cataracts Father     Heart disease Father 85        Heart attack at 85. needed and got herat valve replacement, Lived to 9293.  at 95    Arthritis Father     Cancer Father     Hearing loss Father         Age relared. Needed but refused hearing aids.    Glaucoma Brother     Other Brother         Hypogonadism    Cancer Paternal Grandmother          of undected colon cancer around 87 years old.    Hypertension Son         Developed in his 30s    Stroke Maternal Grandfather         He smoked all his life  of stroke at age 63    Prostate cancer Neg Hx     Amblyopia Neg Hx     Blindness Neg Hx     Macular degeneration Neg Hx     Retinal detachment Neg Hx     Strabismus Neg Hx     Diabetes Neg Hx     Melanoma Neg Hx     Psoriasis Neg Hx     Lupus Neg Hx     Eczema Neg Hx     Acne Neg Hx            Review of Systems  General: negative for chills, fever or weight loss  Psychological: negative for mood changes or depression  Ophthalmic: negative for blurry vision, photophobia or eye pain  ENT: see HPI  Respiratory: no cough, shortness of breath, or wheezing  Cardiovascular: no chest pain or dyspnea on exertion  Gastrointestinal: no abdominal pain, change in bowel habits, or black/ bloody stools  Musculoskeletal: negative for gait disturbance or muscular weakness  Neurological: no syncope or seizures; no ataxia  Dermatological: negative for puritis,  rash and jaundice  Hematologic/lymphatic: no easy bruising, no new lumps or bumps      Physical Exam:    Vitals:    22 1124   BP: 110/65   Pulse: 65       Constitutional: Well appearing / communicating without difficutly.  NAD.  Eyes: EOM I Bilaterally  Head/Face: Normocephalic.  Negative paranasal  sinus pressure/tenderness.  Salivary glands WNL.  House Brackmann I Bilaterally.    Right Ear: Auricle normal appearance. External Auditory Canal within normal limits no lesions or masses,TM w/o masses/lesions/perforations. TM mobility noted.   Left Ear: Auricle normal appearance. External Auditory Canal within normal limits no lesions or masses,TM w/o masses/lesions/perforations. TM mobility noted.  Nose: No gross nasal septal deviation. Inferior Turbinates 3+ bilaterally. No septal perforation. No masses/lesions. External nasal skin appears normal without masses/lesions.  Oral Cavity: Gingiva/lips within normal limits.  Dentition/gingiva healthy appearing. Mucus membranes moist. Floor of mouth soft, no masses palpated. Oral Tongue mobile. Hard Palate appears normal.    Oropharynx: Base of tongue appears normal. No masses/lesions noted. Tonsillar fossa/pharyngeal wall without lesions. Posterior oropharynx WNL.  Soft palate without masses. Midline uvula.   Neck/Lymphatic: No LAD I-VI bilaterally.  No thyromegaly.  No masses noted on exam.        Diagnostic testing reviewed:    Tympanograms interpreted by me showing type A tymps bilaterally.       Assessment:    ICD-10-CM ICD-9-CM    1. Bilateral temporomandibular joint pain  M26.623 524.62    2. Otalgia of both ears  H92.03 388.70    3. Non-seasonal allergic rhinitis, unspecified trigger  J30.89 477.8    4. Nasal septal deviation  J34.2 470    5. Hypertrophy of inferior nasal turbinate  J34.3 478.0      The primary encounter diagnosis was Bilateral temporomandibular joint pain. Diagnoses of Otalgia of both ears, Non-seasonal allergic rhinitis, unspecified trigger, Nasal septal deviation, and Hypertrophy of inferior nasal turbinate were also pertinent to this visit.      Plan:  No orders of the defined types were placed in this encounter.    We had a long discussion regarding the underlying pathology of temporomandibular joint dysfunction (TMD) as the cause of ear  pain.  We further discussed conservative measures to treat TMD including avoiding gum and other foods that require lots of chewing, warm compresses, and scheduled antinflammatories.  Start Naproxen 500 mg PO BID for 10 days.  If the pain persists, the patient will then schedule an appointment with a dentist for further evaluation.    Continue Astelin and zyrtec      Oriana Storm MD

## 2022-02-14 NOTE — PATIENT INSTRUCTIONS
Patient Education       TMJ Exercises   About this topic   The temporomandibular joint is also called the TMJ. It is the joint in front of your ear. It connects your lower jaw to the side of your head. This joint is flexible and lets the jaw move up and down and side to side so you can talk, chew, and yawn. When you have a problem with your jaw, jaw joint, or other facial muscles, you have temporomandibular disorder or TMD. Injury to your jaw, grinding or clenching your teeth, uneven bite, arthritis, and stress are some of the causes of TMD. Exercises can help to make this problem better.  General   Before starting with a program, ask your doctor if you are healthy enough to do these exercises. Your doctor may have you work with a physical therapist to make a safe exercise program to meet your needs.  Strengthening exercises - Strengthening exercises keep your muscles firm and strong. Start by doing each exercise 2 to 3 times and work up to 10 times each. Try to do the exercises 2 to 3 times each day. Do all exercises slowly. Do these in front of a mirror, if possible.  · Jaw opening/closing with tongue on roof of mouth - Close your mouth. Put your tongue on the top of your mouth by your front teeth. Now slide your tongue along the roof of your mouth as far back as you can. Open your mouth while keeping your tongue on the roof of the mouth.  · Chin tucks - Stand up straight. Tuck your chin in and lengthen the back of your neck. Return to the starting position and repeat. It may help to stand up against a wall during this exercise. Try gently pushing your chin with two fingers while trying to flatten your neck against the wall. If you do this exercise lying down, try using a small rolled up washcloth under your neck. Push down into the washcloth when tucking in your chin.  · Resisted jaw opening and closing - First, put your thumb under your jaw. Slowly open your mouth while you give some pressure with your thumb to  make it harder. Hold your mouth open for 5 seconds. Repeat. Next, open your mouth. Grab the part of your chin that juts out with your index finger and thumb. Slowly close your mouth while you give pressure with your finger to make it harder. Repeat.  · Side to side and forward jaw movements - Place a quarter of an inch object such as a pen, tongue depressor, or a dental cotton roll in between your teeth. Slowly move your jaw from side to side. Repeat 10 times. Next, slowly move your bottom jaw forward. Repeat 10 times. You may also want to hold this exercise for 20 to 30 seconds for a few of the repetitions in order to stretch the muscles. Each day gradually increase the height of the object or objects.               What will the results be?   · Less pain  · Less clicking or popping  · Less headaches  · Ability to open mouth wider  Helpful tips   · Doing exercises before a meal may be a good way to get into a routine.  · Avoid chewing gum or eating chewy foods like taffy or caramels. Eat softer foods if you have this problem.  · Apply a warm, wet washcloth to the joint near your ears to help ease pain.  · Use both sides of your mouth to chew.  · Lessen stress. Stress can make this problem worse. Try relaxation techniques.  · Avoid yawning or opening your mouth too wide.  · Do not bite your nails.  · Do not lean on your chin with your hand.  · Use protective headgear, like helmets or mouthguards, when playing sports.  · Wear a helmet when skiing, snowboarding, biking, riding a motorcycle, or roller blading.  · You may need a mouth guard if you clench or grind your teeth at night.  Last Reviewed Date   2020-10-12  Consumer Information Use and Disclaimer   This information is not specific medical advice and does not replace information you receive from your health care provider. This is only a brief summary of general information. It does NOT include all information about conditions, illnesses, injuries, tests,  procedures, treatments, therapies, discharge instructions or life-style choices that may apply to you. You must talk with your health care provider for complete information about your health and treatment options. This information should not be used to decide whether or not to accept your health care providers advice, instructions or recommendations. Only your health care provider has the knowledge and training to provide advice that is right for you.  Copyright   Copyright © 2021 [employer] and its affiliates and/or licensors. All rights reserved.

## 2022-02-20 ENCOUNTER — PATIENT MESSAGE (OUTPATIENT)
Dept: INTERNAL MEDICINE | Facility: CLINIC | Age: 77
End: 2022-02-20
Payer: COMMERCIAL

## 2022-02-20 DIAGNOSIS — E78.1 HYPERTRIGLYCERIDEMIA: ICD-10-CM

## 2022-02-20 DIAGNOSIS — N40.0 BENIGN NON-NODULAR PROSTATIC HYPERPLASIA WITHOUT LOWER URINARY TRACT SYMPTOMS: ICD-10-CM

## 2022-02-21 RX ORDER — GEMFIBROZIL 600 MG/1
600 TABLET, FILM COATED ORAL
Qty: 180 TABLET | Refills: 3 | Status: SHIPPED | OUTPATIENT
Start: 2022-02-21 | End: 2023-03-26

## 2022-02-21 RX ORDER — TAMSULOSIN HYDROCHLORIDE 0.4 MG/1
0.4 CAPSULE ORAL DAILY
Qty: 90 CAPSULE | Refills: 1 | Status: SHIPPED | OUTPATIENT
Start: 2022-02-21 | End: 2022-10-01

## 2022-02-21 NOTE — TELEPHONE ENCOUNTER
Care Due:                  Date            Visit Type   Department     Provider  --------------------------------------------------------------------------------                                NEW PATIENT                              - OPEN       [facility]  Last Visit: 04-      SCHEDULING   MEDICINE       Chris Her  Next Visit: None Scheduled  None         None Found                                                            Last  Test          Frequency    Reason                     Performed    Due Date  --------------------------------------------------------------------------------    Office Visit  12 months..  amLODIPine,                04- 04-                             chlorthalidone,                             citalopram, finasteride,                             gemfibroziL, irbesartan,                             metoprolol...............    Lipid Panel.  12 months..  gemfibroziL..............  04-   04-    Powered by [employer]. Reference number: [id number].   2/20/2022 8:25:58 PM CST

## 2022-02-23 ENCOUNTER — PATIENT MESSAGE (OUTPATIENT)
Dept: CARDIOLOGY | Facility: CLINIC | Age: 77
End: 2022-02-23
Payer: COMMERCIAL

## 2022-03-19 ENCOUNTER — PATIENT MESSAGE (OUTPATIENT)
Dept: ADMINISTRATIVE | Facility: OTHER | Age: 77
End: 2022-03-19
Payer: COMMERCIAL

## 2022-05-20 ENCOUNTER — PATIENT OUTREACH (OUTPATIENT)
Dept: ADMINISTRATIVE | Facility: OTHER | Age: 77
End: 2022-05-20
Payer: COMMERCIAL

## 2022-05-20 DIAGNOSIS — Z12.11 ENCOUNTER FOR FIT (FECAL IMMUNOCHEMICAL TEST) SCREENING: Primary | ICD-10-CM

## 2022-05-20 NOTE — PROGRESS NOTES
Health Maintenance Due   Topic Date Due    High Dose Statin  Never done    COVID-19 Vaccine (3 - Booster for Pfizer series) 06/30/2021    PROSTATE-SPECIFIC ANTIGEN  10/13/2021    Colorectal Cancer Screening  04/05/2022    Lipid Panel  04/15/2022     Updates were requested from care everywhere.  Chart was reviewed for overdue Proactive [facility] Encounters ([facility]) topics (CRS, Breast Cancer Screening, Eye exam)  Health Maintenance has been updated.  LINKS immunization registry triggered.  Immunizations were reconciled.  Order placed for FIT kit.

## 2022-05-23 ENCOUNTER — TELEPHONE (OUTPATIENT)
Dept: CARDIOLOGY | Facility: CLINIC | Age: 77
End: 2022-05-23
Payer: COMMERCIAL

## 2022-05-23 NOTE — TELEPHONE ENCOUNTER
----- Message from Jaqui Obregon sent at 5/23/2022  3:39 PM CDT -----  Type:  Patient Returning Call    Who Called: pt  Who Left Message for Patient: Evie  Does the patient know what this is regarding?:   Would the patient rather a call back or a response via [facility]? call  Best Call Back Number: [phone]  Additional Information:

## 2022-05-23 NOTE — TELEPHONE ENCOUNTER
Called pt to reschedule an appointment with Dr. Bello.    The pt did not answer, but I left a detailed voice message as well as a call back number.    ND

## 2022-05-23 NOTE — TELEPHONE ENCOUNTER
Attempted to return call    Pt has spoken with scheduling and appt time changed to 0830 tomorrow am

## 2022-05-24 ENCOUNTER — PATIENT MESSAGE (OUTPATIENT)
Dept: INTERNAL MEDICINE | Facility: CLINIC | Age: 77
End: 2022-05-24
Payer: COMMERCIAL

## 2022-05-24 ENCOUNTER — OFFICE VISIT (OUTPATIENT)
Dept: CARDIOLOGY | Facility: CLINIC | Age: 77
End: 2022-05-24
Payer: COMMERCIAL

## 2022-05-24 VITALS
SYSTOLIC BLOOD PRESSURE: 124 MMHG | DIASTOLIC BLOOD PRESSURE: 69 MMHG | HEART RATE: 62 BPM | OXYGEN SATURATION: 97 % | BODY MASS INDEX: 40.48 KG/M2 | WEIGHT: 289.13 LBS | HEIGHT: 71 IN

## 2022-05-24 DIAGNOSIS — I10 ESSENTIAL HYPERTENSION: ICD-10-CM

## 2022-05-24 DIAGNOSIS — I70.0 AORTIC ATHEROSCLEROSIS: ICD-10-CM

## 2022-05-24 DIAGNOSIS — R73.03 PREDIABETES: ICD-10-CM

## 2022-05-24 DIAGNOSIS — G47.33 OSA ON CPAP: ICD-10-CM

## 2022-05-24 DIAGNOSIS — E66.01 SEVERE OBESITY (BMI 35.0-39.9) WITH COMORBIDITY: ICD-10-CM

## 2022-05-24 DIAGNOSIS — E78.2 MIXED HYPERLIPIDEMIA: ICD-10-CM

## 2022-05-24 DIAGNOSIS — I48.0 PAROXYSMAL ATRIAL FIBRILLATION: Primary | ICD-10-CM

## 2022-05-24 DIAGNOSIS — Z79.01 CHRONIC ANTICOAGULATION: ICD-10-CM

## 2022-05-24 DIAGNOSIS — I65.29 CAROTID ATHEROSCLEROSIS, UNSPECIFIED LATERALITY: ICD-10-CM

## 2022-05-24 DIAGNOSIS — R06.09 DYSPNEA ON EXERTION: ICD-10-CM

## 2022-05-24 PROCEDURE — 1159F PR MEDICATION LIST DOCUMENTED IN MEDICAL RECORD: ICD-10-PCS | Mod: CPTII,S$GLB,, | Performed by: INTERNAL MEDICINE

## 2022-05-24 PROCEDURE — 1160F RVW MEDS BY RX/DR IN RCRD: CPT | Mod: CPTII,S$GLB,, | Performed by: INTERNAL MEDICINE

## 2022-05-24 PROCEDURE — 3074F SYST BP LT 130 MM HG: CPT | Mod: CPTII,S$GLB,, | Performed by: INTERNAL MEDICINE

## 2022-05-24 PROCEDURE — 4010F ACE/ARB THERAPY RXD/TAKEN: CPT | Mod: CPTII,S$GLB,, | Performed by: INTERNAL MEDICINE

## 2022-05-24 PROCEDURE — 1101F PT FALLS ASSESS-DOCD LE1/YR: CPT | Mod: CPTII,S$GLB,, | Performed by: INTERNAL MEDICINE

## 2022-05-24 PROCEDURE — 1160F PR REVIEW ALL MEDS BY PRESCRIBER/CLIN PHARMACIST DOCUMENTED: ICD-10-PCS | Mod: CPTII,S$GLB,, | Performed by: INTERNAL MEDICINE

## 2022-05-24 PROCEDURE — 1126F AMNT PAIN NOTED NONE PRSNT: CPT | Mod: CPTII,S$GLB,, | Performed by: INTERNAL MEDICINE

## 2022-05-24 PROCEDURE — 99999 PR PBB SHADOW E&M-EST. PATIENT-LVL V: CPT | Mod: PBBFAC,,, | Performed by: INTERNAL MEDICINE

## 2022-05-24 PROCEDURE — 1157F PR ADVANCE CARE PLAN OR EQUIV PRESENT IN MEDICAL RECORD: ICD-10-PCS | Mod: CPTII,S$GLB,, | Performed by: INTERNAL MEDICINE

## 2022-05-24 PROCEDURE — 1101F PR PT FALLS ASSESS DOC 0-1 FALLS W/OUT INJ PAST YR: ICD-10-PCS | Mod: CPTII,S$GLB,, | Performed by: INTERNAL MEDICINE

## 2022-05-24 PROCEDURE — 3078F DIAST BP <80 MM HG: CPT | Mod: CPTII,S$GLB,, | Performed by: INTERNAL MEDICINE

## 2022-05-24 PROCEDURE — 4010F PR ACE/ARB THEARPY RXD/TAKEN: ICD-10-PCS | Mod: CPTII,S$GLB,, | Performed by: INTERNAL MEDICINE

## 2022-05-24 PROCEDURE — 99999 PR PBB SHADOW E&M-EST. PATIENT-LVL V: ICD-10-PCS | Mod: PBBFAC,,, | Performed by: INTERNAL MEDICINE

## 2022-05-24 PROCEDURE — 1126F PR PAIN SEVERITY QUANTIFIED, NO PAIN PRESENT: ICD-10-PCS | Mod: CPTII,S$GLB,, | Performed by: INTERNAL MEDICINE

## 2022-05-24 PROCEDURE — 1157F ADVNC CARE PLAN IN RCRD: CPT | Mod: CPTII,S$GLB,, | Performed by: INTERNAL MEDICINE

## 2022-05-24 PROCEDURE — 3074F PR MOST RECENT SYSTOLIC BLOOD PRESSURE < 130 MM HG: ICD-10-PCS | Mod: CPTII,S$GLB,, | Performed by: INTERNAL MEDICINE

## 2022-05-24 PROCEDURE — 1159F MED LIST DOCD IN RCRD: CPT | Mod: CPTII,S$GLB,, | Performed by: INTERNAL MEDICINE

## 2022-05-24 PROCEDURE — 99214 OFFICE O/P EST MOD 30 MIN: CPT | Mod: S$GLB,,, | Performed by: INTERNAL MEDICINE

## 2022-05-24 PROCEDURE — 99214 PR OFFICE/OUTPT VISIT, EST, LEVL IV, 30-39 MIN: ICD-10-PCS | Mod: S$GLB,,, | Performed by: INTERNAL MEDICINE

## 2022-05-24 PROCEDURE — 3078F PR MOST RECENT DIASTOLIC BLOOD PRESSURE < 80 MM HG: ICD-10-PCS | Mod: CPTII,S$GLB,, | Performed by: INTERNAL MEDICINE

## 2022-05-24 PROCEDURE — 3288F PR FALLS RISK ASSESSMENT DOCUMENTED: ICD-10-PCS | Mod: CPTII,S$GLB,, | Performed by: INTERNAL MEDICINE

## 2022-05-24 PROCEDURE — 3288F FALL RISK ASSESSMENT DOCD: CPT | Mod: CPTII,S$GLB,, | Performed by: INTERNAL MEDICINE

## 2022-05-24 NOTE — PROGRESS NOTES
Subjective:    Patient ID:  Edis Huggins Jr. is a 77 y.o. male who presents for follow-up of Atrial Fibrillation      HPI    78 y/o male with hx of HTN, HLD, obesity, PAfib on DOAC (CHADSVasc 2 for HTN, age), CARMELA on CPAP, who presents for f/u.   Has had extensive w/u for RESENDEZ including normal nuclear SPECT, normal 30 day monitor, normal PFT's. Did have a previous Holter monitor which showed a single run of Paroxysmal atrial fibrillation lasting for 13 sec. Had bilateral LE edema which has improved with chlorthalidone.   Had Heliatekdia device for detection of afib, felt symptoms which woke him from sleep, checked device, and found to be in afib (strips available in media section). Resolved shortly after symptoms. Ne recurrence. Started on BB and DOAC. Currently on CPAP.   Has chronic, mild, unchanged RESENDEZ with moderate activity.   Following with digital HTN clinic and changes made to regimen and BP appears to be controlled.  Has COvid in January 2022 and has had lingering fatigue and SOB.   Denies CP, orthopnea, PND, syncope, palps. Compliant with meds and follows a low salt diet.     Review of Systems   Constitutional: Positive for malaise/fatigue.   HENT: Negative for congestion.    Eyes: Negative for blurred vision.   Cardiovascular: Positive for dyspnea on exertion and leg swelling. Negative for chest pain, claudication, cyanosis, irregular heartbeat, near-syncope, orthopnea, palpitations, paroxysmal nocturnal dyspnea and syncope.   Respiratory: Negative for shortness of breath.    Endocrine: Negative for polyuria.   Hematologic/Lymphatic: Negative for bleeding problem.   Skin: Negative for itching and rash.   Musculoskeletal: Negative for joint swelling, muscle cramps and muscle weakness.   Gastrointestinal: Negative for abdominal pain, hematemesis, hematochezia, melena, nausea and vomiting.   Genitourinary: Negative for dysuria and hematuria.   Neurological: Negative for dizziness, focal weakness, headaches,  light-headedness, loss of balance and weakness.   Psychiatric/Behavioral: Negative for depression. The patient is not nervous/anxious.         Objective:    Physical Exam  Constitutional:       Appearance: He is well-developed. He is obese.   HENT:      Head: Normocephalic and atraumatic.   Neck:      Vascular: No JVD.   Cardiovascular:      Rate and Rhythm: Normal rate and regular rhythm.      Pulses:           Carotid pulses are 2+ on the right side and 2+ on the left side.       Radial pulses are 2+ on the right side and 2+ on the left side.        Femoral pulses are 2+ on the right side and 2+ on the left side.       Dorsalis pedis pulses are 2+ on the right side and 2+ on the left side.        Posterior tibial pulses are 2+ on the right side and 2+ on the left side.      Heart sounds: Normal heart sounds.   Pulmonary:      Effort: Pulmonary effort is normal.      Breath sounds: Normal breath sounds.   Abdominal:      General: Bowel sounds are normal.      Palpations: Abdomen is soft.   Musculoskeletal:      Cervical back: Neck supple.      Right lower leg: Edema present.      Left lower leg: Edema present.   Skin:     General: Skin is warm and dry.   Neurological:      Mental Status: He is alert and oriented to person, place, and time.   Psychiatric:         Behavior: Behavior normal.         Thought Content: Thought content normal.           Assessment:       1. Paroxysmal atrial fibrillation    2. Aortic atherosclerosis -- CTA 06/2018    3. Carotid atherosclerosis, unspecified laterality    4. Dyspnea on exertion    5. Essential hypertension    6. Mixed hyperlipidemia    7. Severe obesity (BMI 35.0-39.9) with obstructive sleep apnea    8. Prediabetes    9. CARMELA on CPAP    10. Chronic anticoagulation      76 y/o pt with hx and presentation as above. Doing well from a cardiac perspective and compensated from a HF perspective. Need to be more active and lose weight. Discussed the etiology, evaluation, and  management of Afib, CVA, DOAC, HTN, obesity, CARMELA. Discussed the importance of med compliance, heart healthy diet, and regular exercise.      Plan:       -Continue current medical management  -f/u in 1 year

## 2022-05-25 ENCOUNTER — PATIENT MESSAGE (OUTPATIENT)
Dept: INTERNAL MEDICINE | Facility: CLINIC | Age: 77
End: 2022-05-25
Payer: COMMERCIAL

## 2022-05-25 DIAGNOSIS — Z12.11 ENCOUNTER FOR COLORECTAL CANCER SCREENING: Primary | ICD-10-CM

## 2022-05-25 DIAGNOSIS — Z12.12 ENCOUNTER FOR COLORECTAL CANCER SCREENING: Primary | ICD-10-CM

## 2022-05-30 ENCOUNTER — PATIENT MESSAGE (OUTPATIENT)
Dept: ADMINISTRATIVE | Facility: HOSPITAL | Age: 77
End: 2022-05-30
Payer: COMMERCIAL

## 2022-06-15 ENCOUNTER — LAB VISIT (OUTPATIENT)
Dept: LAB | Facility: HOSPITAL | Age: 77
End: 2022-06-15
Attending: INTERNAL MEDICINE
Payer: COMMERCIAL

## 2022-06-15 DIAGNOSIS — D69.6 THROMBOCYTOPENIA: ICD-10-CM

## 2022-06-15 DIAGNOSIS — D64.9 NORMOCYTIC ANEMIA: ICD-10-CM

## 2022-06-15 LAB
25(OH)D3+25(OH)D2 SERPL-MCNC: 36 NG/ML (ref 30–96)
ALBUMIN SERPL BCP-MCNC: 3.7 G/DL (ref 3.5–5.2)
ALP SERPL-CCNC: 43 U/L (ref 55–135)
ALT SERPL W/O P-5'-P-CCNC: 33 U/L (ref 10–44)
ANION GAP SERPL CALC-SCNC: 16 MMOL/L (ref 8–16)
AST SERPL-CCNC: 30 U/L (ref 10–40)
BASOPHILS # BLD AUTO: 0.05 K/UL (ref 0–0.2)
BASOPHILS NFR BLD: 0.7 % (ref 0–1.9)
BILIRUB SERPL-MCNC: 0.4 MG/DL (ref 0.1–1)
BUN SERPL-MCNC: 20 MG/DL (ref 8–23)
CALCIUM SERPL-MCNC: 9.3 MG/DL (ref 8.7–10.5)
CHLORIDE SERPL-SCNC: 106 MMOL/L (ref 95–110)
CO2 SERPL-SCNC: 21 MMOL/L (ref 23–29)
CREAT SERPL-MCNC: 0.9 MG/DL (ref 0.5–1.4)
DIFFERENTIAL METHOD: ABNORMAL
EOSINOPHIL # BLD AUTO: 0.5 K/UL (ref 0–0.5)
EOSINOPHIL NFR BLD: 7.6 % (ref 0–8)
ERYTHROCYTE [DISTWIDTH] IN BLOOD BY AUTOMATED COUNT: 12.8 % (ref 11.5–14.5)
EST. GFR  (AFRICAN AMERICAN): >60 ML/MIN/1.73 M^2
EST. GFR  (NON AFRICAN AMERICAN): >60 ML/MIN/1.73 M^2
GLUCOSE SERPL-MCNC: 117 MG/DL (ref 70–110)
HCT VFR BLD AUTO: 37.2 % (ref 40–54)
HGB BLD-MCNC: 12.3 G/DL (ref 14–18)
IMM GRANULOCYTES # BLD AUTO: 0.01 K/UL (ref 0–0.04)
IMM GRANULOCYTES NFR BLD AUTO: 0.1 % (ref 0–0.5)
LYMPHOCYTES # BLD AUTO: 3.2 K/UL (ref 1–4.8)
LYMPHOCYTES NFR BLD: 46.2 % (ref 18–48)
MCH RBC QN AUTO: 31.5 PG (ref 27–31)
MCHC RBC AUTO-ENTMCNC: 33.1 G/DL (ref 32–36)
MCV RBC AUTO: 95 FL (ref 82–98)
MONOCYTES # BLD AUTO: 0.7 K/UL (ref 0.3–1)
MONOCYTES NFR BLD: 9.5 % (ref 4–15)
NEUTROPHILS # BLD AUTO: 2.5 K/UL (ref 1.8–7.7)
NEUTROPHILS NFR BLD: 35.9 % (ref 38–73)
NRBC BLD-RTO: 0 /100 WBC
PLATELET # BLD AUTO: 190 K/UL (ref 150–450)
PMV BLD AUTO: 11.7 FL (ref 9.2–12.9)
POTASSIUM SERPL-SCNC: 3.8 MMOL/L (ref 3.5–5.1)
PROT SERPL-MCNC: 7 G/DL (ref 6–8.4)
RBC # BLD AUTO: 3.9 M/UL (ref 4.6–6.2)
SODIUM SERPL-SCNC: 143 MMOL/L (ref 136–145)
WBC # BLD AUTO: 6.84 K/UL (ref 3.9–12.7)

## 2022-06-15 PROCEDURE — 80053 COMPREHEN METABOLIC PANEL: CPT | Performed by: INTERNAL MEDICINE

## 2022-06-15 PROCEDURE — 85025 COMPLETE CBC W/AUTO DIFF WBC: CPT | Performed by: INTERNAL MEDICINE

## 2022-06-15 PROCEDURE — 36415 COLL VENOUS BLD VENIPUNCTURE: CPT | Performed by: INTERNAL MEDICINE

## 2022-06-15 PROCEDURE — 82306 VITAMIN D 25 HYDROXY: CPT | Performed by: INTERNAL MEDICINE

## 2022-06-16 ENCOUNTER — OFFICE VISIT (OUTPATIENT)
Dept: HEMATOLOGY/ONCOLOGY | Facility: CLINIC | Age: 77
End: 2022-06-16
Payer: COMMERCIAL

## 2022-06-16 VITALS
BODY MASS INDEX: 40.52 KG/M2 | OXYGEN SATURATION: 97 % | RESPIRATION RATE: 22 BRPM | WEIGHT: 289.44 LBS | SYSTOLIC BLOOD PRESSURE: 126 MMHG | HEIGHT: 71 IN | TEMPERATURE: 99 F | HEART RATE: 60 BPM | DIASTOLIC BLOOD PRESSURE: 69 MMHG

## 2022-06-16 DIAGNOSIS — E66.01 SEVERE OBESITY (BMI 35.0-39.9) WITH COMORBIDITY: ICD-10-CM

## 2022-06-16 DIAGNOSIS — D69.6 THROMBOCYTOPENIA: ICD-10-CM

## 2022-06-16 DIAGNOSIS — Z79.01 CHRONIC ANTICOAGULATION: ICD-10-CM

## 2022-06-16 DIAGNOSIS — D64.9 NORMOCYTIC ANEMIA: Primary | ICD-10-CM

## 2022-06-16 DIAGNOSIS — G47.33 OSA ON CPAP: ICD-10-CM

## 2022-06-16 PROCEDURE — 4010F PR ACE/ARB THEARPY RXD/TAKEN: ICD-10-PCS | Mod: CPTII,S$GLB,, | Performed by: INTERNAL MEDICINE

## 2022-06-16 PROCEDURE — 1101F PR PT FALLS ASSESS DOC 0-1 FALLS W/OUT INJ PAST YR: ICD-10-PCS | Mod: CPTII,S$GLB,, | Performed by: INTERNAL MEDICINE

## 2022-06-16 PROCEDURE — 1160F RVW MEDS BY RX/DR IN RCRD: CPT | Mod: CPTII,S$GLB,, | Performed by: INTERNAL MEDICINE

## 2022-06-16 PROCEDURE — 1159F MED LIST DOCD IN RCRD: CPT | Mod: CPTII,S$GLB,, | Performed by: INTERNAL MEDICINE

## 2022-06-16 PROCEDURE — 99999 PR PBB SHADOW E&M-EST. PATIENT-LVL III: CPT | Mod: PBBFAC,,, | Performed by: INTERNAL MEDICINE

## 2022-06-16 PROCEDURE — 99213 OFFICE O/P EST LOW 20 MIN: CPT | Mod: S$GLB,,, | Performed by: INTERNAL MEDICINE

## 2022-06-16 PROCEDURE — 1101F PT FALLS ASSESS-DOCD LE1/YR: CPT | Mod: CPTII,S$GLB,, | Performed by: INTERNAL MEDICINE

## 2022-06-16 PROCEDURE — 1157F ADVNC CARE PLAN IN RCRD: CPT | Mod: CPTII,S$GLB,, | Performed by: INTERNAL MEDICINE

## 2022-06-16 PROCEDURE — 3078F DIAST BP <80 MM HG: CPT | Mod: CPTII,S$GLB,, | Performed by: INTERNAL MEDICINE

## 2022-06-16 PROCEDURE — 99999 PR PBB SHADOW E&M-EST. PATIENT-LVL III: ICD-10-PCS | Mod: PBBFAC,,, | Performed by: INTERNAL MEDICINE

## 2022-06-16 PROCEDURE — 1159F PR MEDICATION LIST DOCUMENTED IN MEDICAL RECORD: ICD-10-PCS | Mod: CPTII,S$GLB,, | Performed by: INTERNAL MEDICINE

## 2022-06-16 PROCEDURE — 4010F ACE/ARB THERAPY RXD/TAKEN: CPT | Mod: CPTII,S$GLB,, | Performed by: INTERNAL MEDICINE

## 2022-06-16 PROCEDURE — 1126F AMNT PAIN NOTED NONE PRSNT: CPT | Mod: CPTII,S$GLB,, | Performed by: INTERNAL MEDICINE

## 2022-06-16 PROCEDURE — 1157F PR ADVANCE CARE PLAN OR EQUIV PRESENT IN MEDICAL RECORD: ICD-10-PCS | Mod: CPTII,S$GLB,, | Performed by: INTERNAL MEDICINE

## 2022-06-16 PROCEDURE — 1160F PR REVIEW ALL MEDS BY PRESCRIBER/CLIN PHARMACIST DOCUMENTED: ICD-10-PCS | Mod: CPTII,S$GLB,, | Performed by: INTERNAL MEDICINE

## 2022-06-16 PROCEDURE — 3288F FALL RISK ASSESSMENT DOCD: CPT | Mod: CPTII,S$GLB,, | Performed by: INTERNAL MEDICINE

## 2022-06-16 PROCEDURE — 3288F PR FALLS RISK ASSESSMENT DOCUMENTED: ICD-10-PCS | Mod: CPTII,S$GLB,, | Performed by: INTERNAL MEDICINE

## 2022-06-16 PROCEDURE — 3074F SYST BP LT 130 MM HG: CPT | Mod: CPTII,S$GLB,, | Performed by: INTERNAL MEDICINE

## 2022-06-16 PROCEDURE — 99213 PR OFFICE/OUTPT VISIT, EST, LEVL III, 20-29 MIN: ICD-10-PCS | Mod: S$GLB,,, | Performed by: INTERNAL MEDICINE

## 2022-06-16 PROCEDURE — 3078F PR MOST RECENT DIASTOLIC BLOOD PRESSURE < 80 MM HG: ICD-10-PCS | Mod: CPTII,S$GLB,, | Performed by: INTERNAL MEDICINE

## 2022-06-16 PROCEDURE — 3074F PR MOST RECENT SYSTOLIC BLOOD PRESSURE < 130 MM HG: ICD-10-PCS | Mod: CPTII,S$GLB,, | Performed by: INTERNAL MEDICINE

## 2022-06-16 PROCEDURE — 1126F PR PAIN SEVERITY QUANTIFIED, NO PAIN PRESENT: ICD-10-PCS | Mod: CPTII,S$GLB,, | Performed by: INTERNAL MEDICINE

## 2022-06-16 NOTE — PROGRESS NOTES
"Subjective:       Patient ID: Edis Huggins Jr. is a 77 y.o. male.    Chief Complaint:  Anemia      Anemia  There has been no fever.     He is here for f/u of normocytic anemia.    Hemoglobin 3/14/18 was 11.8.  WBC and platelets within normal limits.  Nutritional studies unremarkable.    He gets dyspnea when he walks.      None at rest.  Weight 229 pounds.  Height 5' 11"    Trying to lose weight.    No longer using CPAP for obstructive sleep apnea    On Xarelto, tolerating it well    Review of Systems   Constitutional: Negative for fever and unexpected weight change.         Objective:      Vitals:    06/16/22 1455   BP: 126/69   BP Location: Left arm   Patient Position: Sitting   BP Method: Medium (Automatic)   Pulse: 60   Resp: (!) 22   Temp: 98.7 °F (37.1 °C)   TempSrc: Oral   SpO2: 97%   Weight: 131.3 kg (289 lb 7.4 oz)   Height: 5' 11" (1.803 m)       BMI: Body mass index is 40.37 kg/m².    Physical Exam  Vitals and nursing note reviewed.   Constitutional:       General: He is not in acute distress.  Pulmonary:      Effort: Pulmonary effort is normal.      Breath sounds: Normal breath sounds.   Neurological:      Mental Status: He is alert. Mental status is at baseline.            Assessment:       1. Normocytic anemia -- iron and B12 normal    2. Thrombocytopenia    3. Severe obesity (BMI 35.0-39.9) with obstructive sleep apnea    4. CARMELA on CPAP    5. Chronic anticoagulation        Plan:   Normocytic anemia  -reviewed recent and prior CBCs  -likely etiology chronic disease  -hemoglobin stable  -will continue to monitor    Thrombocytopenia  -he did have thrombocytopenia which resolved spontaneously    Hepatitis-B core antibody positive  -he had previous exposure to hepatitis B and is immune    History of atrial fibrillation  -on Xarelto  -follow-up with Cardiology, Dr. Bello    Morbid obesity with obstructive sleep apnea  -no longer on CPAP  -trying to lose weight    F/u in 1 yr with labs                "

## 2022-07-01 ENCOUNTER — PATIENT MESSAGE (OUTPATIENT)
Dept: INTERNAL MEDICINE | Facility: CLINIC | Age: 77
End: 2022-07-01
Payer: COMMERCIAL

## 2022-07-01 DIAGNOSIS — Z79.899 ENCOUNTER FOR LONG-TERM (CURRENT) USE OF OTHER MEDICATIONS: ICD-10-CM

## 2022-07-01 DIAGNOSIS — Z00.00 ANNUAL PHYSICAL EXAM: Primary | ICD-10-CM

## 2022-07-01 DIAGNOSIS — Z12.5 PROSTATE CANCER SCREENING: ICD-10-CM

## 2022-07-01 NOTE — TELEPHONE ENCOUNTER
Help him schedule my orders plus all the ones ordered by his specialist a couple weeks ago   Detail Level: Detailed

## 2022-07-05 ENCOUNTER — LAB VISIT (OUTPATIENT)
Dept: LAB | Facility: HOSPITAL | Age: 77
End: 2022-07-05
Attending: INTERNAL MEDICINE
Payer: COMMERCIAL

## 2022-07-05 DIAGNOSIS — Z12.5 PROSTATE CANCER SCREENING: ICD-10-CM

## 2022-07-05 DIAGNOSIS — D64.9 NORMOCYTIC ANEMIA: ICD-10-CM

## 2022-07-05 DIAGNOSIS — Z79.899 ENCOUNTER FOR LONG-TERM (CURRENT) USE OF OTHER MEDICATIONS: ICD-10-CM

## 2022-07-05 DIAGNOSIS — Z00.00 ANNUAL PHYSICAL EXAM: ICD-10-CM

## 2022-07-05 LAB
25(OH)D3+25(OH)D2 SERPL-MCNC: 35 NG/ML (ref 30–96)
ALBUMIN SERPL BCP-MCNC: 3.7 G/DL (ref 3.5–5.2)
ALP SERPL-CCNC: 43 U/L (ref 55–135)
ALT SERPL W/O P-5'-P-CCNC: 24 U/L (ref 10–44)
ANION GAP SERPL CALC-SCNC: 14 MMOL/L (ref 8–16)
AST SERPL-CCNC: 23 U/L (ref 10–40)
BASOPHILS # BLD AUTO: 0.05 K/UL (ref 0–0.2)
BASOPHILS NFR BLD: 0.7 % (ref 0–1.9)
BILIRUB SERPL-MCNC: 0.4 MG/DL (ref 0.1–1)
BUN SERPL-MCNC: 24 MG/DL (ref 8–23)
CALCIUM SERPL-MCNC: 9.5 MG/DL (ref 8.7–10.5)
CHLORIDE SERPL-SCNC: 104 MMOL/L (ref 95–110)
CHOLEST SERPL-MCNC: 132 MG/DL (ref 120–199)
CHOLEST/HDLC SERPL: 4.1 {RATIO} (ref 2–5)
CO2 SERPL-SCNC: 22 MMOL/L (ref 23–29)
COMPLEXED PSA SERPL-MCNC: 1.3 NG/ML (ref 0–4)
CREAT SERPL-MCNC: 1 MG/DL (ref 0.5–1.4)
DIFFERENTIAL METHOD: ABNORMAL
EOSINOPHIL # BLD AUTO: 0.9 K/UL (ref 0–0.5)
EOSINOPHIL NFR BLD: 11.9 % (ref 0–8)
ERYTHROCYTE [DISTWIDTH] IN BLOOD BY AUTOMATED COUNT: 12.9 % (ref 11.5–14.5)
EST. GFR  (AFRICAN AMERICAN): >60 ML/MIN/1.73 M^2
EST. GFR  (NON AFRICAN AMERICAN): >60 ML/MIN/1.73 M^2
ESTIMATED AVG GLUCOSE: 137 MG/DL (ref 68–131)
FERRITIN SERPL-MCNC: 87 NG/ML (ref 20–300)
GLUCOSE SERPL-MCNC: 129 MG/DL (ref 70–110)
HBA1C MFR BLD: 6.4 % (ref 4–5.6)
HCT VFR BLD AUTO: 37.2 % (ref 40–54)
HDLC SERPL-MCNC: 32 MG/DL (ref 40–75)
HDLC SERPL: 24.2 % (ref 20–50)
HGB BLD-MCNC: 12 G/DL (ref 14–18)
IMM GRANULOCYTES # BLD AUTO: 0.01 K/UL (ref 0–0.04)
IMM GRANULOCYTES NFR BLD AUTO: 0.1 % (ref 0–0.5)
IRON SERPL-MCNC: 71 UG/DL (ref 45–160)
LDLC SERPL CALC-MCNC: 66 MG/DL (ref 63–159)
LYMPHOCYTES # BLD AUTO: 3.2 K/UL (ref 1–4.8)
LYMPHOCYTES NFR BLD: 41.6 % (ref 18–48)
MCH RBC QN AUTO: 31.5 PG (ref 27–31)
MCHC RBC AUTO-ENTMCNC: 32.3 G/DL (ref 32–36)
MCV RBC AUTO: 98 FL (ref 82–98)
MONOCYTES # BLD AUTO: 0.7 K/UL (ref 0.3–1)
MONOCYTES NFR BLD: 9.5 % (ref 4–15)
NEUTROPHILS # BLD AUTO: 2.8 K/UL (ref 1.8–7.7)
NEUTROPHILS NFR BLD: 36.2 % (ref 38–73)
NONHDLC SERPL-MCNC: 100 MG/DL
NRBC BLD-RTO: 0 /100 WBC
PLATELET # BLD AUTO: 196 K/UL (ref 150–450)
PMV BLD AUTO: 11.4 FL (ref 9.2–12.9)
POTASSIUM SERPL-SCNC: 3.6 MMOL/L (ref 3.5–5.1)
PROT SERPL-MCNC: 7.2 G/DL (ref 6–8.4)
RBC # BLD AUTO: 3.81 M/UL (ref 4.6–6.2)
SATURATED IRON: 19 % (ref 20–50)
SODIUM SERPL-SCNC: 140 MMOL/L (ref 136–145)
TESTOST SERPL-MCNC: 285 NG/DL (ref 304–1227)
TOTAL IRON BINDING CAPACITY: 379 UG/DL (ref 250–450)
TRANSFERRIN SERPL-MCNC: 256 MG/DL (ref 200–375)
TRIGL SERPL-MCNC: 170 MG/DL (ref 30–150)
WBC # BLD AUTO: 7.66 K/UL (ref 3.9–12.7)

## 2022-07-05 PROCEDURE — 83036 HEMOGLOBIN GLYCOSYLATED A1C: CPT | Performed by: INTERNAL MEDICINE

## 2022-07-05 PROCEDURE — 82728 ASSAY OF FERRITIN: CPT | Performed by: INTERNAL MEDICINE

## 2022-07-05 PROCEDURE — 82306 VITAMIN D 25 HYDROXY: CPT | Performed by: INTERNAL MEDICINE

## 2022-07-05 PROCEDURE — 84403 ASSAY OF TOTAL TESTOSTERONE: CPT | Performed by: FAMILY MEDICINE

## 2022-07-05 PROCEDURE — 36415 COLL VENOUS BLD VENIPUNCTURE: CPT | Performed by: INTERNAL MEDICINE

## 2022-07-05 PROCEDURE — 80053 COMPREHEN METABOLIC PANEL: CPT | Performed by: INTERNAL MEDICINE

## 2022-07-05 PROCEDURE — 80061 LIPID PANEL: CPT | Performed by: INTERNAL MEDICINE

## 2022-07-05 PROCEDURE — 85025 COMPLETE CBC W/AUTO DIFF WBC: CPT | Performed by: INTERNAL MEDICINE

## 2022-07-05 PROCEDURE — 84466 ASSAY OF TRANSFERRIN: CPT | Performed by: INTERNAL MEDICINE

## 2022-07-05 PROCEDURE — 84153 ASSAY OF PSA TOTAL: CPT | Performed by: FAMILY MEDICINE

## 2022-07-08 ENCOUNTER — OFFICE VISIT (OUTPATIENT)
Dept: INTERNAL MEDICINE | Facility: CLINIC | Age: 77
End: 2022-07-08
Payer: COMMERCIAL

## 2022-07-08 VITALS
DIASTOLIC BLOOD PRESSURE: 60 MMHG | WEIGHT: 291.25 LBS | OXYGEN SATURATION: 96 % | BODY MASS INDEX: 40.77 KG/M2 | SYSTOLIC BLOOD PRESSURE: 104 MMHG | HEIGHT: 71 IN | HEART RATE: 66 BPM

## 2022-07-08 DIAGNOSIS — E29.1 TESTOSTERONE DEFICIENCY IN MALE: ICD-10-CM

## 2022-07-08 DIAGNOSIS — I10 ESSENTIAL HYPERTENSION: ICD-10-CM

## 2022-07-08 DIAGNOSIS — Z12.11 ENCOUNTER FOR COLORECTAL CANCER SCREENING: ICD-10-CM

## 2022-07-08 DIAGNOSIS — Z12.12 ENCOUNTER FOR COLORECTAL CANCER SCREENING: ICD-10-CM

## 2022-07-08 DIAGNOSIS — E66.01 MORBID OBESITY WITH BMI OF 40.0-44.9, ADULT: ICD-10-CM

## 2022-07-08 DIAGNOSIS — I48.0 PAROXYSMAL ATRIAL FIBRILLATION: ICD-10-CM

## 2022-07-08 DIAGNOSIS — E78.2 MIXED HYPERLIPIDEMIA: ICD-10-CM

## 2022-07-08 DIAGNOSIS — Z00.00 ANNUAL PHYSICAL EXAM: Primary | ICD-10-CM

## 2022-07-08 DIAGNOSIS — R73.03 PREDIABETES: ICD-10-CM

## 2022-07-08 PROCEDURE — 1101F PT FALLS ASSESS-DOCD LE1/YR: CPT | Mod: CPTII,S$GLB,, | Performed by: FAMILY MEDICINE

## 2022-07-08 PROCEDURE — 1126F AMNT PAIN NOTED NONE PRSNT: CPT | Mod: CPTII,S$GLB,, | Performed by: FAMILY MEDICINE

## 2022-07-08 PROCEDURE — 3074F SYST BP LT 130 MM HG: CPT | Mod: CPTII,S$GLB,, | Performed by: FAMILY MEDICINE

## 2022-07-08 PROCEDURE — 1157F ADVNC CARE PLAN IN RCRD: CPT | Mod: CPTII,S$GLB,, | Performed by: FAMILY MEDICINE

## 2022-07-08 PROCEDURE — 4010F ACE/ARB THERAPY RXD/TAKEN: CPT | Mod: CPTII,S$GLB,, | Performed by: FAMILY MEDICINE

## 2022-07-08 PROCEDURE — 3044F PR MOST RECENT HEMOGLOBIN A1C LEVEL <7.0%: ICD-10-PCS | Mod: CPTII,S$GLB,, | Performed by: FAMILY MEDICINE

## 2022-07-08 PROCEDURE — 3078F DIAST BP <80 MM HG: CPT | Mod: CPTII,S$GLB,, | Performed by: FAMILY MEDICINE

## 2022-07-08 PROCEDURE — 3288F FALL RISK ASSESSMENT DOCD: CPT | Mod: CPTII,S$GLB,, | Performed by: FAMILY MEDICINE

## 2022-07-08 PROCEDURE — 99999 PR PBB SHADOW E&M-EST. PATIENT-LVL V: CPT | Mod: PBBFAC,,, | Performed by: FAMILY MEDICINE

## 2022-07-08 PROCEDURE — 3044F HG A1C LEVEL LT 7.0%: CPT | Mod: CPTII,S$GLB,, | Performed by: FAMILY MEDICINE

## 2022-07-08 PROCEDURE — 4010F PR ACE/ARB THEARPY RXD/TAKEN: ICD-10-PCS | Mod: CPTII,S$GLB,, | Performed by: FAMILY MEDICINE

## 2022-07-08 PROCEDURE — 99397 PER PM REEVAL EST PAT 65+ YR: CPT | Mod: S$GLB,,, | Performed by: FAMILY MEDICINE

## 2022-07-08 PROCEDURE — 3074F PR MOST RECENT SYSTOLIC BLOOD PRESSURE < 130 MM HG: ICD-10-PCS | Mod: CPTII,S$GLB,, | Performed by: FAMILY MEDICINE

## 2022-07-08 PROCEDURE — 1126F PR PAIN SEVERITY QUANTIFIED, NO PAIN PRESENT: ICD-10-PCS | Mod: CPTII,S$GLB,, | Performed by: FAMILY MEDICINE

## 2022-07-08 PROCEDURE — 99397 PR PREVENTIVE VISIT,EST,65 & OVER: ICD-10-PCS | Mod: S$GLB,,, | Performed by: FAMILY MEDICINE

## 2022-07-08 PROCEDURE — 1160F PR REVIEW ALL MEDS BY PRESCRIBER/CLIN PHARMACIST DOCUMENTED: ICD-10-PCS | Mod: CPTII,S$GLB,, | Performed by: FAMILY MEDICINE

## 2022-07-08 PROCEDURE — 3288F PR FALLS RISK ASSESSMENT DOCUMENTED: ICD-10-PCS | Mod: CPTII,S$GLB,, | Performed by: FAMILY MEDICINE

## 2022-07-08 PROCEDURE — 1159F PR MEDICATION LIST DOCUMENTED IN MEDICAL RECORD: ICD-10-PCS | Mod: CPTII,S$GLB,, | Performed by: FAMILY MEDICINE

## 2022-07-08 PROCEDURE — 3078F PR MOST RECENT DIASTOLIC BLOOD PRESSURE < 80 MM HG: ICD-10-PCS | Mod: CPTII,S$GLB,, | Performed by: FAMILY MEDICINE

## 2022-07-08 PROCEDURE — 1101F PR PT FALLS ASSESS DOC 0-1 FALLS W/OUT INJ PAST YR: ICD-10-PCS | Mod: CPTII,S$GLB,, | Performed by: FAMILY MEDICINE

## 2022-07-08 PROCEDURE — 1159F MED LIST DOCD IN RCRD: CPT | Mod: CPTII,S$GLB,, | Performed by: FAMILY MEDICINE

## 2022-07-08 PROCEDURE — 99999 PR PBB SHADOW E&M-EST. PATIENT-LVL V: ICD-10-PCS | Mod: PBBFAC,,, | Performed by: FAMILY MEDICINE

## 2022-07-08 PROCEDURE — 1160F RVW MEDS BY RX/DR IN RCRD: CPT | Mod: CPTII,S$GLB,, | Performed by: FAMILY MEDICINE

## 2022-07-08 PROCEDURE — 1157F PR ADVANCE CARE PLAN OR EQUIV PRESENT IN MEDICAL RECORD: ICD-10-PCS | Mod: CPTII,S$GLB,, | Performed by: FAMILY MEDICINE

## 2022-07-08 NOTE — PROGRESS NOTES
Subjective:       Patient ID: Edis Huggins Jr. is a 77 y.o. male.    Chief Complaint: Follow-up    Follow-up  Associated symptoms include weakness. Pertinent negatives include no arthralgias, chest pain, headaches, joint swelling, neck pain or vomiting.     Edis Huggins Jr. is a 77 y.o. male PMHx HTN, HLD, Obesity, Paroxysmal Afib on DOAC, CARMELA on cpap for checkup.     Labs prior to visit.    Fatigue since covid, more sleep.    Start taking 1/2 tab of norvasc for 2.5mg total     #Cards: HTN, HLD, Afib on AC  - est w/ Dr. Bello, lv 5/2022 -- f/u 1yr  - active with digital htn program  - reg: Norvasc 5 qd; Chlorthalidone 50 qd; Metoprolol XL 50 qd; Irbesartan 300 qd; Gemfibrozil 600 bid  - on Xarelto  - declines statin for now     #Endo: PreDM (A1c 7/2022 = 6.4)     #Heme/onc: Normocytic anemia (likely etiology chronic dz)  - est w/ Dr. Auguste, lv 6/2022 -- f/u 1yr w/ labs     #Ophtho: Glaucoma  - est w/ Dr. Khan, lov 3/9/21     #CARMELA on cpap     #BMI 40    Review of Systems   Constitutional: Negative for activity change and unexpected weight change.   HENT: Negative for hearing loss, rhinorrhea and trouble swallowing.    Eyes: Negative for discharge and visual disturbance.   Respiratory: Negative for chest tightness and wheezing.    Cardiovascular: Negative for chest pain and palpitations.   Gastrointestinal: Negative for blood in stool, constipation, diarrhea and vomiting.   Endocrine: Negative for polydipsia and polyuria.   Genitourinary: Negative for difficulty urinating, hematuria and urgency.   Musculoskeletal: Negative for arthralgias, joint swelling and neck pain.   Neurological: Positive for weakness. Negative for headaches.   Psychiatric/Behavioral: Negative for confusion and dysphoric mood.         Past Medical History:   Diagnosis Date    Allergic rhinitis due to animal (cat) (dog) hair and dander 3/20/2014    Allergy     Anemia     Anxiety     Basal cell carcinoma     BPH (benign  prostatic hypertrophy)     Family history of colon cancer 3/20/2014    Family history of ischemic heart disease 3/20/2014    Fever blister     HLD (hyperlipidemia)     HTN (hypertension)     IFG (impaired fasting glucose)     Lumbar spondylosis 6/7/2019    Pain of right hip joint     Primary open angle glaucoma     Squamous cell carcinoma of skin     Status post total knee replacement 3/20/2014        Prior to Admission medications    Medication Sig Start Date End Date Taking? Authorizing Provider   acyclovir (ZOVIRAX) 400 MG tablet TAKE 1 TABLET BY MOUTH THREE TIMES DAILY FOR 5 DAYS AT SIGN OF FEVER BLISTER  Patient taking differently: PRN 4/16/20  Yes Forest Miles MD   amLODIPine (NORVASC) 5 MG tablet Take 1 tablet (5 mg total) by mouth once daily. 1/7/22 1/7/23 Yes Chris Her MD   azelastine (ASTELIN) 137 mcg (0.1 %) nasal spray 1 spray (137 mcg total) by Nasal route 2 (two) times daily. 11/16/21 11/16/22 Yes Oriana Storm MD   Bifidobacterium infantis (ALIGN) 4 mg Cap Take 4 mg by mouth once daily.    Yes Historical Provider   cetirizine (ZYRTEC) 10 MG tablet Take 10 mg by mouth once daily.   Yes Historical Provider   chlorthalidone (HYGROTEN) 50 MG Tab Take 1 tablet (50 mg total) by mouth once daily. 8/2/21  Yes Chris Her MD   citalopram (CELEXA) 20 MG tablet Take 1 tablet (20 mg total) by mouth once daily. 8/13/21  Yes Chris Her MD   cyclobenzaprine (FLEXERIL) 10 MG tablet TAKE 1 TABLET(10 MG) BY MOUTH THREE TIMES DAILY AS NEEDED 5/18/21  Yes Chris Her MD   finasteride (PROSCAR) 5 mg tablet TAKE 1 TABLET(5 MG) BY MOUTH EVERY DAY 9/15/21  Yes Chris Her MD   fish oil-omega-3 fatty acids 300-1,000 mg capsule Take 1 g by mouth once daily.   Yes Historical Provider   gemfibroziL (LOPID) 600 MG tablet Take 1 tablet (600 mg total) by mouth 2 (two) times daily before meals. 2/21/22  Yes Chris Her MD   irbesartan (AVAPRO) 300 MG tablet TAKE 1 TABLET BY  "MOUTH EVERY DAY 9/7/21  Yes Chris Her MD   metoprolol succinate (TOPROL-XL) 50 MG 24 hr tablet Take 1 tablet (50 mg total) by mouth once daily. 1/7/22 1/7/23 Yes Chris Her MD   mupirocin (BACTROBAN) 2 % ointment Apply topically 2 (two) times daily. 6/9/21  Yes Kim De Guzman PA-C   mv-mn/iron/folic acid/herb 190 (VITAMIN D3 COMPLETE ORAL) Take by mouth.   Yes Historical Provider   rivaroxaban (XARELTO) 20 mg Tab Take 1 tablet (20 mg total) by mouth once daily at 6am. 5/24/22  Yes Shakeel Bello MD   tamsulosin (FLOMAX) 0.4 mg Cap Take 1 capsule (0.4 mg total) by mouth once daily. 2/21/22  Yes Chris Her MD   timolol maleate 0.5% (TIMOPTIC-XE) 0.5 % SolG Place 1 drop into both eyes every morning. 8/12/21  Yes Deborah Khan MD   vitamin E 1000 UNIT capsule Take 2,000 Units by mouth once daily.   Yes Historical Provider   zinc gluconate 50 mg tablet Take 50 mg by mouth once daily.   Yes Historical Provider        Past medical history, surgical history, and family medical history reviewed and updated as appropriate.    Medications and allergies reviewed.     Objective:          Vitals:    07/08/22 1307   BP: 104/60   BP Location: Left arm   Patient Position: Sitting   BP Method: Medium (Manual)   Pulse: 66   SpO2: 96%   Weight: 132.1 kg (291 lb 3.6 oz)   Height: 5' 11" (1.803 m)     Body mass index is 40.62 kg/m².  Physical Exam  Vitals and nursing note reviewed.   Constitutional:       General: He is not in acute distress.     Appearance: Normal appearance. He is well-developed. He is obese.   Eyes:      Extraocular Movements: Extraocular movements intact.   Cardiovascular:      Rate and Rhythm: Normal rate and regular rhythm.      Pulses: Normal pulses.      Heart sounds: Normal heart sounds. No murmur heard.  Pulmonary:      Effort: Pulmonary effort is normal. No respiratory distress.      Breath sounds: Normal breath sounds. No wheezing.   Abdominal:      General: Bowel sounds are " normal. There is no distension.      Palpations: Abdomen is soft.      Tenderness: There is no abdominal tenderness.   Musculoskeletal:      Right lower le+ Edema present.      Left lower le+ Edema present.   Neurological:      Mental Status: He is alert and oriented to person, place, and time.   Psychiatric:         Mood and Affect: Mood normal.         Behavior: Behavior normal.         Lab Results   Component Value Date    WBC 7.66 2022    HGB 12.0 (L) 2022    HCT 37.2 (L) 2022     2022    CHOL 132 2022    TRIG 170 (H) 2022    HDL 32 (L) 2022    ALT 24 2022    AST 23 2022     2022    K 3.6 2022     2022    CREATININE 1.0 2022    BUN 24 (H) 2022    CO2 22 (L) 2022    TSH 0.957 2021    PSA 1.3 2022    INR 2.4 (A) 2013    GLUF 118 (H) 2017    HGBA1C 6.4 (H) 2022       Assessment:       1. Annual physical exam    2. Encounter for colorectal cancer screening    3. Essential hypertension    4. Paroxysmal atrial fibrillation    5. Mixed hyperlipidemia    6. Morbid obesity with BMI of 40.0-44.9, adult    7. Prediabetes    8. Testosterone deficiency in male          Plan:   1. Annual physical exam    2. Encounter for colorectal cancer screening  -     Case Request Endoscopy: COLONOSCOPY    3. Essential hypertension  Overview:  bp lower side of normal  - titrate down on norvasc  - active w/ digital htn       4. Paroxysmal atrial fibrillation    5. Mixed hyperlipidemia  Overview:  Refuses statin for now, intolerant. On gemfibrozil. Continue current regimen. ASCVD risk discussed.      6. Morbid obesity with BMI of 40.0-44.9, adult  Overview:  Counseled regarding benefits of healthy diet (goal of 5 or more servings of fruits/veggies daily, water as main drink, increased consumption of healthy fats such as nuts, beans, avocados, olive oil instead of unhealthy fat options) and  physical activity (150 minutes of moderate-intensity aerobic activity per week) to improve overall health.        7. Prediabetes    8. Testosterone deficiency in male  Overview:  Aware, does not want to pursue any replacement as had tried in past with Urology without any change        Health maintenance reviewed with patient.     Follow up in about 6 months (around 1/8/2023) for Checkup.    Chris Her MD  Family Medicine / Primary Care  [facility]  7/8/2022

## 2022-07-15 ENCOUNTER — TELEPHONE (OUTPATIENT)
Dept: HEMATOLOGY/ONCOLOGY | Facility: CLINIC | Age: 77
End: 2022-07-15
Payer: COMMERCIAL

## 2022-07-15 DIAGNOSIS — D69.6 THROMBOCYTOPENIA: Primary | ICD-10-CM

## 2022-08-06 DIAGNOSIS — I10 ESSENTIAL HYPERTENSION: ICD-10-CM

## 2022-08-06 NOTE — TELEPHONE ENCOUNTER
No new care gaps identified.  [facility] Embedded Care Gaps. Reference number: [id number]. 8/06/2022   8:36:37 AM ABBEYT

## 2022-08-07 RX ORDER — IRBESARTAN 300 MG/1
TABLET ORAL
Qty: 90 TABLET | Refills: 3 | Status: SHIPPED | OUTPATIENT
Start: 2022-08-07 | End: 2022-11-22 | Stop reason: DRUGHIGH

## 2022-08-07 NOTE — TELEPHONE ENCOUNTER
Refill Decision Note   Edis Huggins  is requesting a refill authorization.  Brief Assessment and Rationale for Refill:  Approve     Medication Therapy Plan:           Comments:     No Care Gaps recommended.     Note composed:12:49 PM 08/07/2022

## 2022-08-17 DIAGNOSIS — F41.9 ANXIETY: ICD-10-CM

## 2022-08-17 RX ORDER — CITALOPRAM 20 MG/1
TABLET, FILM COATED ORAL
Qty: 90 TABLET | Refills: 3 | Status: SHIPPED | OUTPATIENT
Start: 2022-08-17 | End: 2023-08-09 | Stop reason: SDUPTHER

## 2022-08-17 NOTE — TELEPHONE ENCOUNTER
Refill Decision Note   Edis Huggins  is requesting a refill authorization.  Brief Assessment and Rationale for Refill:  Approve     Medication Therapy Plan:       Medication Reconciliation Completed: No   Comments:     No Care Gaps recommended.     Note composed:1:32 PM 08/17/2022

## 2022-08-17 NOTE — TELEPHONE ENCOUNTER
No new care gaps identified.  [facility] Embedded Care Gaps. Reference number: [id number]. 8/17/2022   8:07:51 AM ÓSCAR

## 2022-08-22 DIAGNOSIS — I10 ESSENTIAL HYPERTENSION: ICD-10-CM

## 2022-08-22 RX ORDER — CHLORTHALIDONE 50 MG/1
TABLET ORAL
Qty: 90 TABLET | Refills: 3 | Status: SHIPPED | OUTPATIENT
Start: 2022-08-22 | End: 2023-01-24

## 2022-08-22 NOTE — TELEPHONE ENCOUNTER
No new care gaps identified.  [facility] Embedded Care Gaps. Reference number: [id number]. 8/22/2022   4:28:36 AM ABBEYT

## 2022-08-22 NOTE — TELEPHONE ENCOUNTER
Refill Decision Note   Edis Huggins  is requesting a refill authorization.  Brief Assessment and Rationale for Refill:  Approve     Medication Therapy Plan:       Medication Reconciliation Completed: No   Comments:     No Care Gaps recommended.     Note composed:12:19 PM 08/22/2022

## 2022-08-23 ENCOUNTER — OFFICE VISIT (OUTPATIENT)
Dept: OPHTHALMOLOGY | Facility: CLINIC | Age: 77
End: 2022-08-23
Payer: COMMERCIAL

## 2022-08-23 DIAGNOSIS — H40.1122 PRIMARY OPEN ANGLE GLAUCOMA OF LEFT EYE, MODERATE STAGE: Primary | ICD-10-CM

## 2022-08-23 DIAGNOSIS — H26.491 PCO (POSTERIOR CAPSULAR OPACIFICATION), RIGHT: ICD-10-CM

## 2022-08-23 DIAGNOSIS — H40.1111 PRIMARY OPEN-ANGLE GLAUCOMA, RIGHT EYE, MILD STAGE: ICD-10-CM

## 2022-08-23 DIAGNOSIS — H21.562 PUPIL IRREGULAR OF LEFT EYE: ICD-10-CM

## 2022-08-23 DIAGNOSIS — H40.043 STEROID RESPONDERS, BILATERAL: ICD-10-CM

## 2022-08-23 PROCEDURE — 4010F ACE/ARB THERAPY RXD/TAKEN: CPT | Mod: CPTII,S$GLB,, | Performed by: OPHTHALMOLOGY

## 2022-08-23 PROCEDURE — 3044F PR MOST RECENT HEMOGLOBIN A1C LEVEL <7.0%: ICD-10-PCS | Mod: CPTII,S$GLB,, | Performed by: OPHTHALMOLOGY

## 2022-08-23 PROCEDURE — 92012 INTRM OPH EXAM EST PATIENT: CPT | Mod: S$GLB,,, | Performed by: OPHTHALMOLOGY

## 2022-08-23 PROCEDURE — 1160F RVW MEDS BY RX/DR IN RCRD: CPT | Mod: CPTII,S$GLB,, | Performed by: OPHTHALMOLOGY

## 2022-08-23 PROCEDURE — 3288F FALL RISK ASSESSMENT DOCD: CPT | Mod: CPTII,S$GLB,, | Performed by: OPHTHALMOLOGY

## 2022-08-23 PROCEDURE — 4010F PR ACE/ARB THEARPY RXD/TAKEN: ICD-10-PCS | Mod: CPTII,S$GLB,, | Performed by: OPHTHALMOLOGY

## 2022-08-23 PROCEDURE — 92020 GONIOSCOPY: CPT | Mod: S$GLB,,, | Performed by: OPHTHALMOLOGY

## 2022-08-23 PROCEDURE — 1159F MED LIST DOCD IN RCRD: CPT | Mod: CPTII,S$GLB,, | Performed by: OPHTHALMOLOGY

## 2022-08-23 PROCEDURE — 1126F AMNT PAIN NOTED NONE PRSNT: CPT | Mod: CPTII,S$GLB,, | Performed by: OPHTHALMOLOGY

## 2022-08-23 PROCEDURE — 92020 PR SPECIAL EYE EVAL,GONIOSCOPY: ICD-10-PCS | Mod: S$GLB,,, | Performed by: OPHTHALMOLOGY

## 2022-08-23 PROCEDURE — 1160F PR REVIEW ALL MEDS BY PRESCRIBER/CLIN PHARMACIST DOCUMENTED: ICD-10-PCS | Mod: CPTII,S$GLB,, | Performed by: OPHTHALMOLOGY

## 2022-08-23 PROCEDURE — 99999 PR PBB SHADOW E&M-EST. PATIENT-LVL III: CPT | Mod: PBBFAC,,, | Performed by: OPHTHALMOLOGY

## 2022-08-23 PROCEDURE — 1157F ADVNC CARE PLAN IN RCRD: CPT | Mod: CPTII,S$GLB,, | Performed by: OPHTHALMOLOGY

## 2022-08-23 PROCEDURE — 92012 PR EYE EXAM, EST PATIENT,INTERMED: ICD-10-PCS | Mod: S$GLB,,, | Performed by: OPHTHALMOLOGY

## 2022-08-23 PROCEDURE — 3288F PR FALLS RISK ASSESSMENT DOCUMENTED: ICD-10-PCS | Mod: CPTII,S$GLB,, | Performed by: OPHTHALMOLOGY

## 2022-08-23 PROCEDURE — 3044F HG A1C LEVEL LT 7.0%: CPT | Mod: CPTII,S$GLB,, | Performed by: OPHTHALMOLOGY

## 2022-08-23 PROCEDURE — 99999 PR PBB SHADOW E&M-EST. PATIENT-LVL III: ICD-10-PCS | Mod: PBBFAC,,, | Performed by: OPHTHALMOLOGY

## 2022-08-23 PROCEDURE — 1157F PR ADVANCE CARE PLAN OR EQUIV PRESENT IN MEDICAL RECORD: ICD-10-PCS | Mod: CPTII,S$GLB,, | Performed by: OPHTHALMOLOGY

## 2022-08-23 PROCEDURE — 1159F PR MEDICATION LIST DOCUMENTED IN MEDICAL RECORD: ICD-10-PCS | Mod: CPTII,S$GLB,, | Performed by: OPHTHALMOLOGY

## 2022-08-23 PROCEDURE — 1101F PT FALLS ASSESS-DOCD LE1/YR: CPT | Mod: CPTII,S$GLB,, | Performed by: OPHTHALMOLOGY

## 2022-08-23 PROCEDURE — 1126F PR PAIN SEVERITY QUANTIFIED, NO PAIN PRESENT: ICD-10-PCS | Mod: CPTII,S$GLB,, | Performed by: OPHTHALMOLOGY

## 2022-08-23 PROCEDURE — 1101F PR PT FALLS ASSESS DOC 0-1 FALLS W/OUT INJ PAST YR: ICD-10-PCS | Mod: CPTII,S$GLB,, | Performed by: OPHTHALMOLOGY

## 2022-08-23 NOTE — PROGRESS NOTES
HPI     Glaucoma     Comments: 6 month ck and pt states no changes since last exam               Comments     DLS: 1/27/22    1) POAG   2) PCIOL OU   3) Steroid Responder   4) APD OS   5) PAS OS   6) Yag Cap OD     MEDS:  Timolol GFS QAM OU          Last edited by Lili Booker MA on 8/23/2022  1:18 PM. (History)              Assessment /Plan     For exam results, see Encounter Report.    Primary open angle glaucoma of left eye, moderate stage    Primary open-angle glaucoma, right eye, mild stage    PCO (posterior capsular opacification), right    Steroid responders, bilateral    Pupil irregular of left eye          Pt is back at [facility] again - 7/13/2021   Lost to F/u from 11/4/2013 to 8/11/2015   Knows Sharlene Mo and MIKHAIL - use to work with them     1. POAG (primary open-angle glaucoma) - Both Eyes   Old pt of [facility] [phone]  Began at [facility] 2012       POAG OS>OD           Family history    neg        Glaucoma meds   T1/2 GFS ou (use to use travatan- off post CE)         H/O adverse rxn to glaucoma drops    none        LASERS    SLT os 9/26/2013 -( good resp 17 --> 14) // yag cap od - 1/2020        GLAUCOMA SURGERIES    trabectome os (combined with phaco - 2/8/2017        OTHER EYE SURGERIES    Combined - phaco/IOL / goniotomy - trabectome os 2/8/2017 - +heme and increae in IOP)                                                 PC IOL OD - 5/10/2017         CDR    0.6/0.7-0.8        Tbase    20-23/21-24          Tmax    23/24            Ttarget    17/17           HVF   6 tests 1155-0485 OD essentially full; OS Sup paracentral defect, IAD/INS (([place]))             (([facility])) - 4 test 2012 to 2022 - full od // sup paracentral defect / inferior paracentral defect         Gonio    +4 ou        CCT    598/598        OCT    6 test 2012 to 2022  - RNFL - nl od // bord G/TS/T         HRT    7 test-2012 to 2020  - MR -  [place] off  od // dec T, bord I os /// CDR xxx od // 0.64 os        Disc photos     "2012 - IOS    - Ttoday  17-18 // 14  - Test done today   IOP // gonio       2. Steroid responders - Both Eyes  2/2 nasacort      3. ? Trace APD os      4. S/P Rt knee replacement Feb 2013  -doing well      5.  Myopia / presbyopia ou     6. Irregular pupil - w/ PAS   -2/2 very high IOP POD # 1 post phaco     7.  PC IOL OU  OD 5/10/2017 - PCB00 14.5  OS 2/8/2017 - + hyphema and high IOP 2/2 trabectome - irregular pupil post op - PCB00 14.0    8. PCO od - vis sign   rec yag cap       Plan  CSM  IOP below target OU  SLT done OS 9/26/2013 - good initial resp 24-->17    -( if responds well consider SLT od - but full VF and helathy ON still od)  HVF stable OU    Cont timolol gfs ou q day     If the glare from the fixed dilated pupil bothers him a lot post 2nd eye getting done - consider pupil surgery with dr Peacock to "purse string it " smaller - pt is doing ok for now - glare is not bothering him too much     No Photo file - Met patient     F/U 4 - 6  months with IOP //HVF / DFE/OCT - Met   "

## 2022-09-03 ENCOUNTER — PATIENT MESSAGE (OUTPATIENT)
Dept: INTERNAL MEDICINE | Facility: CLINIC | Age: 77
End: 2022-09-03
Payer: COMMERCIAL

## 2022-10-01 DIAGNOSIS — N40.0 BENIGN NON-NODULAR PROSTATIC HYPERPLASIA WITHOUT LOWER URINARY TRACT SYMPTOMS: ICD-10-CM

## 2022-10-01 RX ORDER — TAMSULOSIN HYDROCHLORIDE 0.4 MG/1
0.4 CAPSULE ORAL DAILY
Qty: 90 CAPSULE | Refills: 3 | Status: SHIPPED | OUTPATIENT
Start: 2022-10-01 | End: 2023-01-18

## 2022-10-01 NOTE — TELEPHONE ENCOUNTER
No new care gaps identified.  [facility] Embedded Care Gaps. Reference number: [id number]. 10/01/2022   4:28:00 AM CDT

## 2022-10-01 NOTE — TELEPHONE ENCOUNTER
Refill Decision Note   Edis Huggins  is requesting a refill authorization.  Brief Assessment and Rationale for Refill:  Approve     Medication Therapy Plan:       Medication Reconciliation Completed: No   Comments:     No Care Gaps recommended.     Note composed:4:19 PM 10/01/2022

## 2022-10-07 ENCOUNTER — PATIENT MESSAGE (OUTPATIENT)
Dept: INTERNAL MEDICINE | Facility: CLINIC | Age: 77
End: 2022-10-07
Payer: COMMERCIAL

## 2022-10-21 ENCOUNTER — TELEPHONE (OUTPATIENT)
Dept: HEMATOLOGY/ONCOLOGY | Facility: CLINIC | Age: 77
End: 2022-10-21
Payer: COMMERCIAL

## 2022-10-21 NOTE — TELEPHONE ENCOUNTER
RE: Reschedule Appointment Call     Spoke with patient regarding rescheduling appointment with Dr. Asher Auguste MD. Discussed reason for rescheduling appointment is related to the physician's departure from [facility]. Call outcome: Patient agreeable to rescheduling appointment with  DIANNE Mares but requesting an earlier appointment for January 2023. Patient rescheduled for labs and office visit. Clinic number provided.  Patient verbalized understanding.     Future Appointments   Date Time Provider Department Center   6/6/2023  7:40 AM APPOINTMENT JEANNA MORENO [facility] LAB [facility]   6/8/2023  1:20 PM Asher Auguste MD [facility] HEM ONC [facility]       Call time:  minutes

## 2022-10-26 ENCOUNTER — PATIENT MESSAGE (OUTPATIENT)
Dept: OTOLARYNGOLOGY | Facility: CLINIC | Age: 77
End: 2022-10-26
Payer: COMMERCIAL

## 2022-10-27 DIAGNOSIS — Z12.11 SPECIAL SCREENING FOR MALIGNANT NEOPLASMS, COLON: Primary | ICD-10-CM

## 2022-11-11 ENCOUNTER — PATIENT MESSAGE (OUTPATIENT)
Dept: OTOLARYNGOLOGY | Facility: CLINIC | Age: 77
End: 2022-11-11
Payer: COMMERCIAL

## 2022-12-06 ENCOUNTER — CLINICAL SUPPORT (OUTPATIENT)
Dept: OPHTHALMOLOGY | Facility: CLINIC | Age: 77
End: 2022-12-06
Payer: COMMERCIAL

## 2022-12-06 ENCOUNTER — OFFICE VISIT (OUTPATIENT)
Dept: OPHTHALMOLOGY | Facility: CLINIC | Age: 77
End: 2022-12-06
Payer: COMMERCIAL

## 2022-12-06 DIAGNOSIS — H26.491 PCO (POSTERIOR CAPSULAR OPACIFICATION), RIGHT: ICD-10-CM

## 2022-12-06 DIAGNOSIS — H40.1111 PRIMARY OPEN-ANGLE GLAUCOMA, RIGHT EYE, MILD STAGE: ICD-10-CM

## 2022-12-06 DIAGNOSIS — H21.562 PUPIL IRREGULAR OF LEFT EYE: ICD-10-CM

## 2022-12-06 DIAGNOSIS — H40.1122 PRIMARY OPEN ANGLE GLAUCOMA OF LEFT EYE, MODERATE STAGE: ICD-10-CM

## 2022-12-06 DIAGNOSIS — H40.043 STEROID RESPONDERS, BILATERAL: ICD-10-CM

## 2022-12-06 DIAGNOSIS — H40.1122 PRIMARY OPEN ANGLE GLAUCOMA OF LEFT EYE, MODERATE STAGE: Primary | ICD-10-CM

## 2022-12-06 PROCEDURE — 4010F ACE/ARB THERAPY RXD/TAKEN: CPT | Mod: CPTII,S$GLB,, | Performed by: OPHTHALMOLOGY

## 2022-12-06 PROCEDURE — 3044F HG A1C LEVEL LT 7.0%: CPT | Mod: CPTII,S$GLB,, | Performed by: OPHTHALMOLOGY

## 2022-12-06 PROCEDURE — 1157F ADVNC CARE PLAN IN RCRD: CPT | Mod: CPTII,S$GLB,, | Performed by: OPHTHALMOLOGY

## 2022-12-06 PROCEDURE — 1159F MED LIST DOCD IN RCRD: CPT | Mod: CPTII,S$GLB,, | Performed by: OPHTHALMOLOGY

## 2022-12-06 PROCEDURE — 99999 PR PBB SHADOW E&M-EST. PATIENT-LVL III: ICD-10-PCS | Mod: PBBFAC,,, | Performed by: OPHTHALMOLOGY

## 2022-12-06 PROCEDURE — 92014 PR EYE EXAM, EST PATIENT,COMPREHESV: ICD-10-PCS | Mod: S$GLB,,, | Performed by: OPHTHALMOLOGY

## 2022-12-06 PROCEDURE — 1159F PR MEDICATION LIST DOCUMENTED IN MEDICAL RECORD: ICD-10-PCS | Mod: CPTII,S$GLB,, | Performed by: OPHTHALMOLOGY

## 2022-12-06 PROCEDURE — 99999 PR PBB SHADOW E&M-EST. PATIENT-LVL III: CPT | Mod: PBBFAC,,, | Performed by: OPHTHALMOLOGY

## 2022-12-06 PROCEDURE — 1101F PR PT FALLS ASSESS DOC 0-1 FALLS W/OUT INJ PAST YR: ICD-10-PCS | Mod: CPTII,S$GLB,, | Performed by: OPHTHALMOLOGY

## 2022-12-06 PROCEDURE — 1160F RVW MEDS BY RX/DR IN RCRD: CPT | Mod: CPTII,S$GLB,, | Performed by: OPHTHALMOLOGY

## 2022-12-06 PROCEDURE — 1101F PT FALLS ASSESS-DOCD LE1/YR: CPT | Mod: CPTII,S$GLB,, | Performed by: OPHTHALMOLOGY

## 2022-12-06 PROCEDURE — 4010F PR ACE/ARB THEARPY RXD/TAKEN: ICD-10-PCS | Mod: CPTII,S$GLB,, | Performed by: OPHTHALMOLOGY

## 2022-12-06 PROCEDURE — 3044F PR MOST RECENT HEMOGLOBIN A1C LEVEL <7.0%: ICD-10-PCS | Mod: CPTII,S$GLB,, | Performed by: OPHTHALMOLOGY

## 2022-12-06 PROCEDURE — 1126F AMNT PAIN NOTED NONE PRSNT: CPT | Mod: CPTII,S$GLB,, | Performed by: OPHTHALMOLOGY

## 2022-12-06 PROCEDURE — 3288F FALL RISK ASSESSMENT DOCD: CPT | Mod: CPTII,S$GLB,, | Performed by: OPHTHALMOLOGY

## 2022-12-06 PROCEDURE — 1126F PR PAIN SEVERITY QUANTIFIED, NO PAIN PRESENT: ICD-10-PCS | Mod: CPTII,S$GLB,, | Performed by: OPHTHALMOLOGY

## 2022-12-06 PROCEDURE — 1157F PR ADVANCE CARE PLAN OR EQUIV PRESENT IN MEDICAL RECORD: ICD-10-PCS | Mod: CPTII,S$GLB,, | Performed by: OPHTHALMOLOGY

## 2022-12-06 PROCEDURE — 1160F PR REVIEW ALL MEDS BY PRESCRIBER/CLIN PHARMACIST DOCUMENTED: ICD-10-PCS | Mod: CPTII,S$GLB,, | Performed by: OPHTHALMOLOGY

## 2022-12-06 PROCEDURE — 3288F PR FALLS RISK ASSESSMENT DOCUMENTED: ICD-10-PCS | Mod: CPTII,S$GLB,, | Performed by: OPHTHALMOLOGY

## 2022-12-06 PROCEDURE — 92014 COMPRE OPH EXAM EST PT 1/>: CPT | Mod: S$GLB,,, | Performed by: OPHTHALMOLOGY

## 2022-12-06 NOTE — PROGRESS NOTES
Visual field test done.  Patient stated no latex allergies used coverlet        [facility] - [address]     [address]  [facility] [address]  Phone: [phone]  Fax: [phone]     Patient Name: Edis Huggins Jr.  MRN: [id number] Date: 12/6/2022      Glasses Prescription     Sphere Cylinder Axis Dist VA Add   Right -0.50 +0.25 005 20/20-1 +2.75   Left -0.50 +0.25 120 20/25+1 +2.75       First pair since cataract surgery        Ordered, Authorized, and Signed By: MD KARMA Jeffries MD

## 2022-12-06 NOTE — PROGRESS NOTES
HPI    DLS: 8/23/2022    Pt here for HVF review/OCT;    Meds;  Timolol GFS QAM OU    1) POAG   2) PCIOL OU   3) Steroid Responder   4) APD OS   5) PAS OS   6) Yag Cap OD     Last edited by Millie Lakhani on 12/6/2022  3:42 PM.            Assessment /Plan     For exam results, see Encounter Report.    There are no diagnoses linked to this encounter.  HPI    DLS: 8/23/2022    Pt here for HVF review/OCT;    Meds;  Timolol GFS QAM OU    1) POAG   2) PCIOL OU   3) Steroid Responder   4) APD OS   5) PAS OS   6) Yag Cap OD     Last edited by Millie Lakhani on 12/6/2022  3:42 PM.              Assessment /Plan     For exam results, see Encounter Report.    Primary open angle glaucoma of left eye, moderate stage    Primary open-angle glaucoma, right eye, mild stage    PCO (posterior capsular opacification), right    Steroid responders, bilateral    Pupil irregular of left eye        Pt is back at [facility] again - 7/13/2021   Lost to F/u from 11/4/2013 to 8/11/2015   Knows Sharlene Mo and MIKHAIL - use to work with them     1. POAG (primary open-angle glaucoma) - Both Eyes   Old pt of [facility] [phone]  Began at [facility] 2012       POAG OS>OD           Family history    neg        Glaucoma meds   T1/2 GFS ou (use to use travatan- off post CE)         H/O adverse rxn to glaucoma drops    none        LASERS    SLT os 9/26/2013 -( good resp 17 --> 14) // yag cap od - 1/2020        GLAUCOMA SURGERIES    trabectome os (combined with phaco - 2/8/2017        OTHER EYE SURGERIES    Combined - phaco/IOL / goniotomy - trabectome os 2/8/2017 - +heme and increae in IOP)                                                 PC IOL OD - 5/10/2017         CDR    0.6/0.7-0.8        Tbase    20-23/21-24          Tmax    23/24            Ttarget    17/17           HVF   6 tests 2562-9167 OD essentially full; OS Sup paracentral defect, IAD/INS (([place]))             (([facility])) - 5 test 2012 to 2022 - full od // sup paracentral defect /  "inferior paracentral defect         Gonio    +4 ou        CCT    598/598        OCT    7 test 2012 to 2022  - RNFL - nl od // bord G/TS/T         HRT    7 test-2012 to 2020  - MR -  [place] off  od // dec T, bord I os /// CDR xxx od // 0.64 os        Disc photos    2012 - IOS    - Ttoday  16//12  - Test done today  IOP //HVF / DFE/OCT      2. Steroid responders - Both Eyes  2/2 nasacort      3. ? Trace APD os      4. S/P Rt knee replacement Feb 2013  -doing well      5.  Myopia / presbyopia ou     6. Irregular pupil - w/ PAS   -2/2 very high IOP POD # 1 post phaco     7.  PC IOL OU  OD 5/10/2017 - PCB00 14.5  OS 2/8/2017 - + hyphema and high IOP 2/2 trabectome - irregular pupil post op - PCB00 14.0    8. PCO od - vis sign   rec yag cap       Plan  CSM  IOP below target OU  SLT done OS 9/26/2013 - good initial resp 24-->17    -( if responds well consider SLT od - but full VF and healthy ON still od)  HVF stable OU    Cont timolol gfs ou q day     If the glare from the fixed dilated pupil bothers him a lot post 2nd eye getting done - consider pupil surgery with dr Peacock to "purse string it " smaller - pt is doing ok for now - glare is not bothering him too much     12/06/2022  - IOP good   - HVF borderline progression OS, OD stable, OCT stable OU (full// borderline thinning)   - Drops: timolol only   - Would CTM       No Photo file - Met patient     F/U 4 - 6  months with IOP and gonio // 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

## 2022-12-23 ENCOUNTER — TELEPHONE (OUTPATIENT)
Dept: ENDOSCOPY | Facility: HOSPITAL | Age: 77
End: 2022-12-23
Payer: COMMERCIAL

## 2022-12-23 ENCOUNTER — CLINICAL SUPPORT (OUTPATIENT)
Dept: ENDOSCOPY | Facility: HOSPITAL | Age: 77
End: 2022-12-23
Attending: FAMILY MEDICINE
Payer: COMMERCIAL

## 2022-12-23 DIAGNOSIS — Z12.11 SPECIAL SCREENING FOR MALIGNANT NEOPLASMS, COLON: ICD-10-CM

## 2022-12-23 NOTE — PLAN OF CARE
Spoke to patient for 11:00 AM PAT appointment to schedule for a colonoscopy. Request to hold Xarelto message sent to provider.  New PAT appt scheduled for 2/7/23.  Patient states he wants to have colonoscopy done at [place].  Instructed pt to cancel 2/7/23 PAT appointment once his colonoscopy is scheduled at the [place] location. Pt verbalized an understanding. Phone number provided. Call transferred.

## 2022-12-23 NOTE — TELEPHONE ENCOUNTER
Patient has a colonoscopy referral that needs to be scheduled.  Patient is currently taking Xarelto.  Per Endoscopy protocol, pt needs to hold Xarelto for 2 days. Ok to proceed? Thank you.

## 2022-12-30 NOTE — PROGRESS NOTES
OK to hold Xarelto 2 days before EGD or C-scope and restart after when safe from a bleeding perspective.

## 2023-01-04 ENCOUNTER — PATIENT MESSAGE (OUTPATIENT)
Dept: INTERNAL MEDICINE | Facility: CLINIC | Age: 78
End: 2023-01-04
Payer: COMMERCIAL

## 2023-01-04 ENCOUNTER — PATIENT MESSAGE (OUTPATIENT)
Dept: CARDIOLOGY | Facility: CLINIC | Age: 78
End: 2023-01-04
Payer: COMMERCIAL

## 2023-01-04 ENCOUNTER — PATIENT MESSAGE (OUTPATIENT)
Dept: OTHER | Facility: OTHER | Age: 78
End: 2023-01-04
Payer: COMMERCIAL

## 2023-01-06 ENCOUNTER — TELEPHONE (OUTPATIENT)
Dept: GASTROENTEROLOGY | Facility: CLINIC | Age: 78
End: 2023-01-06
Payer: COMMERCIAL

## 2023-01-06 DIAGNOSIS — Z12.11 SCREEN FOR COLON CANCER: Primary | ICD-10-CM

## 2023-01-06 NOTE — TELEPHONE ENCOUNTER
Spoke with patient and was able to reschedule his colonoscopy to 2/8/23. Patient stated that his PCP put the colonoscopy order in at [facility] and he wants to go to [place]. Patient stated that he would like the tablet form of the prep because the liquid form will make his b/p rise. Patient said that he will pay extra if he has to for the pill form.           Cardiac clearance received from Dr. Bello giving the Ok to hold Xarelto.

## 2023-01-06 NOTE — TELEPHONE ENCOUNTER
----- Message from Claudia Bowen RN sent at 1/6/2023  8:56 AM CST -----  Regarding: Colonoscopy appt  This patient called re his colonoscopy exam that a spot was reserved for on 02/08/23 pnd cardiac clearance. The cardiac clearance was completed on 12/30/22 but it looked like he was also going to be scheduled at [facility]. Can you help figure this out so we can get him on the schedule if he is supposed to be at [place] and let him know.

## 2023-01-08 ENCOUNTER — PATIENT MESSAGE (OUTPATIENT)
Dept: INTERNAL MEDICINE | Facility: CLINIC | Age: 78
End: 2023-01-08
Payer: COMMERCIAL

## 2023-01-08 RX ORDER — SODIUM, POTASSIUM,MAG SULFATES 17.5-3.13G
1 SOLUTION, RECONSTITUTED, ORAL ORAL DAILY
Qty: 1 KIT | Refills: 0 | Status: SHIPPED | OUTPATIENT
Start: 2023-01-08 | End: 2023-01-10

## 2023-01-13 ENCOUNTER — OFFICE VISIT (OUTPATIENT)
Dept: HEMATOLOGY/ONCOLOGY | Facility: CLINIC | Age: 78
End: 2023-01-13
Payer: COMMERCIAL

## 2023-01-13 ENCOUNTER — LAB VISIT (OUTPATIENT)
Dept: LAB | Facility: HOSPITAL | Age: 78
End: 2023-01-13
Attending: INTERNAL MEDICINE
Payer: COMMERCIAL

## 2023-01-13 VITALS
BODY MASS INDEX: 39.54 KG/M2 | HEART RATE: 84 BPM | OXYGEN SATURATION: 97 % | DIASTOLIC BLOOD PRESSURE: 51 MMHG | WEIGHT: 283.5 LBS | SYSTOLIC BLOOD PRESSURE: 96 MMHG

## 2023-01-13 DIAGNOSIS — I95.9 HYPOTENSION, UNSPECIFIED HYPOTENSION TYPE: ICD-10-CM

## 2023-01-13 DIAGNOSIS — D69.6 THROMBOCYTOPENIA: Primary | ICD-10-CM

## 2023-01-13 DIAGNOSIS — E66.01 SEVERE OBESITY (BMI 35.0-39.9) WITH COMORBIDITY: ICD-10-CM

## 2023-01-13 DIAGNOSIS — Z86.79 HISTORY OF ATRIAL FIBRILLATION: ICD-10-CM

## 2023-01-13 DIAGNOSIS — D64.9 NORMOCYTIC ANEMIA: ICD-10-CM

## 2023-01-13 DIAGNOSIS — D69.6 THROMBOCYTOPENIA: ICD-10-CM

## 2023-01-13 DIAGNOSIS — Z79.01 CHRONIC ANTICOAGULATION: ICD-10-CM

## 2023-01-13 LAB
ALBUMIN SERPL BCP-MCNC: 3.7 G/DL (ref 3.5–5.2)
ALP SERPL-CCNC: 36 U/L (ref 55–135)
ALT SERPL W/O P-5'-P-CCNC: 27 U/L (ref 10–44)
ANION GAP SERPL CALC-SCNC: 12 MMOL/L (ref 8–16)
AST SERPL-CCNC: 21 U/L (ref 10–40)
BASOPHILS # BLD AUTO: 0.05 K/UL (ref 0–0.2)
BASOPHILS NFR BLD: 0.6 % (ref 0–1.9)
BILIRUB SERPL-MCNC: 0.5 MG/DL (ref 0.1–1)
BUN SERPL-MCNC: 17 MG/DL (ref 8–23)
CALCIUM SERPL-MCNC: 9.7 MG/DL (ref 8.7–10.5)
CHLORIDE SERPL-SCNC: 105 MMOL/L (ref 95–110)
CO2 SERPL-SCNC: 24 MMOL/L (ref 23–29)
CREAT SERPL-MCNC: 1 MG/DL (ref 0.5–1.4)
DIFFERENTIAL METHOD: ABNORMAL
EOSINOPHIL # BLD AUTO: 0.6 K/UL (ref 0–0.5)
EOSINOPHIL NFR BLD: 6.6 % (ref 0–8)
ERYTHROCYTE [DISTWIDTH] IN BLOOD BY AUTOMATED COUNT: 12.9 % (ref 11.5–14.5)
EST. GFR  (NO RACE VARIABLE): >60 ML/MIN/1.73 M^2
GLUCOSE SERPL-MCNC: 131 MG/DL (ref 70–110)
HCT VFR BLD AUTO: 39.6 % (ref 40–54)
HGB BLD-MCNC: 13 G/DL (ref 14–18)
IMM GRANULOCYTES # BLD AUTO: 0.03 K/UL (ref 0–0.04)
IMM GRANULOCYTES NFR BLD AUTO: 0.3 % (ref 0–0.5)
LYMPHOCYTES # BLD AUTO: 4 K/UL (ref 1–4.8)
LYMPHOCYTES NFR BLD: 44.7 % (ref 18–48)
MCH RBC QN AUTO: 31.9 PG (ref 27–31)
MCHC RBC AUTO-ENTMCNC: 32.8 G/DL (ref 32–36)
MCV RBC AUTO: 97 FL (ref 82–98)
MONOCYTES # BLD AUTO: 0.8 K/UL (ref 0.3–1)
MONOCYTES NFR BLD: 8.9 % (ref 4–15)
NEUTROPHILS # BLD AUTO: 3.5 K/UL (ref 1.8–7.7)
NEUTROPHILS NFR BLD: 38.9 % (ref 38–73)
NRBC BLD-RTO: 0 /100 WBC
PLATELET # BLD AUTO: 221 K/UL (ref 150–450)
PMV BLD AUTO: 11.9 FL (ref 9.2–12.9)
POTASSIUM SERPL-SCNC: 3.6 MMOL/L (ref 3.5–5.1)
PROT SERPL-MCNC: 7.3 G/DL (ref 6–8.4)
RBC # BLD AUTO: 4.08 M/UL (ref 4.6–6.2)
SODIUM SERPL-SCNC: 141 MMOL/L (ref 136–145)
WBC # BLD AUTO: 8.91 K/UL (ref 3.9–12.7)

## 2023-01-13 PROCEDURE — 1101F PR PT FALLS ASSESS DOC 0-1 FALLS W/OUT INJ PAST YR: ICD-10-PCS | Mod: CPTII,S$GLB,, | Performed by: NURSE PRACTITIONER

## 2023-01-13 PROCEDURE — 1159F PR MEDICATION LIST DOCUMENTED IN MEDICAL RECORD: ICD-10-PCS | Mod: CPTII,S$GLB,, | Performed by: NURSE PRACTITIONER

## 2023-01-13 PROCEDURE — 3288F FALL RISK ASSESSMENT DOCD: CPT | Mod: CPTII,S$GLB,, | Performed by: NURSE PRACTITIONER

## 2023-01-13 PROCEDURE — 1160F RVW MEDS BY RX/DR IN RCRD: CPT | Mod: CPTII,S$GLB,, | Performed by: NURSE PRACTITIONER

## 2023-01-13 PROCEDURE — 99215 PR OFFICE/OUTPT VISIT, EST, LEVL V, 40-54 MIN: ICD-10-PCS | Mod: S$GLB,,, | Performed by: NURSE PRACTITIONER

## 2023-01-13 PROCEDURE — 99999 PR PBB SHADOW E&M-EST. PATIENT-LVL V: ICD-10-PCS | Mod: PBBFAC,,, | Performed by: NURSE PRACTITIONER

## 2023-01-13 PROCEDURE — 3074F SYST BP LT 130 MM HG: CPT | Mod: CPTII,S$GLB,, | Performed by: NURSE PRACTITIONER

## 2023-01-13 PROCEDURE — 1160F PR REVIEW ALL MEDS BY PRESCRIBER/CLIN PHARMACIST DOCUMENTED: ICD-10-PCS | Mod: CPTII,S$GLB,, | Performed by: NURSE PRACTITIONER

## 2023-01-13 PROCEDURE — 1101F PT FALLS ASSESS-DOCD LE1/YR: CPT | Mod: CPTII,S$GLB,, | Performed by: NURSE PRACTITIONER

## 2023-01-13 PROCEDURE — 1159F MED LIST DOCD IN RCRD: CPT | Mod: CPTII,S$GLB,, | Performed by: NURSE PRACTITIONER

## 2023-01-13 PROCEDURE — 99999 PR PBB SHADOW E&M-EST. PATIENT-LVL V: CPT | Mod: PBBFAC,,, | Performed by: NURSE PRACTITIONER

## 2023-01-13 PROCEDURE — 36415 COLL VENOUS BLD VENIPUNCTURE: CPT | Performed by: INTERNAL MEDICINE

## 2023-01-13 PROCEDURE — 3078F DIAST BP <80 MM HG: CPT | Mod: CPTII,S$GLB,, | Performed by: NURSE PRACTITIONER

## 2023-01-13 PROCEDURE — 1157F ADVNC CARE PLAN IN RCRD: CPT | Mod: CPTII,S$GLB,, | Performed by: NURSE PRACTITIONER

## 2023-01-13 PROCEDURE — 99215 OFFICE O/P EST HI 40 MIN: CPT | Mod: S$GLB,,, | Performed by: NURSE PRACTITIONER

## 2023-01-13 PROCEDURE — 1157F PR ADVANCE CARE PLAN OR EQUIV PRESENT IN MEDICAL RECORD: ICD-10-PCS | Mod: CPTII,S$GLB,, | Performed by: NURSE PRACTITIONER

## 2023-01-13 PROCEDURE — 80053 COMPREHEN METABOLIC PANEL: CPT | Performed by: INTERNAL MEDICINE

## 2023-01-13 PROCEDURE — 3288F PR FALLS RISK ASSESSMENT DOCUMENTED: ICD-10-PCS | Mod: CPTII,S$GLB,, | Performed by: NURSE PRACTITIONER

## 2023-01-13 PROCEDURE — 3074F PR MOST RECENT SYSTOLIC BLOOD PRESSURE < 130 MM HG: ICD-10-PCS | Mod: CPTII,S$GLB,, | Performed by: NURSE PRACTITIONER

## 2023-01-13 PROCEDURE — 3078F PR MOST RECENT DIASTOLIC BLOOD PRESSURE < 80 MM HG: ICD-10-PCS | Mod: CPTII,S$GLB,, | Performed by: NURSE PRACTITIONER

## 2023-01-13 PROCEDURE — 85025 COMPLETE CBC W/AUTO DIFF WBC: CPT | Performed by: INTERNAL MEDICINE

## 2023-01-13 NOTE — PROGRESS NOTES
"Subjective:       Patient ID: Edis Huggins Jr. is a 77 y.o. male.    Chief Complaint:  Anemia      Anemia  Symptoms include light-headedness. There has been no bruising/bleeding easily, fever or palpitations.     HPI as per DR CARRASQUILLO's 6/16/22 office visit, reviewed, verified history with pt    He is here for f/u of normocytic anemia.    Hemoglobin 3/14/18 was 11.8.  WBC and platelets within normal limits.  Nutritional studies unremarkable.    He gets dyspnea when he walks.      None at rest.  Weight 229 pounds.  Height 5' 11"    Trying to lose weight.    No longer using CPAP for obstructive sleep apnea    On Xarelto, tolerating it well    INTERVAL  - pt is here for follow up on anemia  - he is a previous patient of Dr Carrasquillo who is no longer in clinic, he is new to me but not clinic  - pt has been having recurrent dizziness and working closely with digital home bp program through [facility] to get blood pressure better controlled, has stopped/reduced several bp medications including recently stopping beta blocker. He reports pcp Dr Her and cardiologist Dr Bello have been notified of concerns with bp, he has multiple portal messsages with them as well as [facility] home bp program. BP at home has been 90s/40s, today in clinic it is 96/51. States he was dizzy with bp lower at home this am but feeling better now. Today at current visit states he wants to make sure it is not anemia causing this  - has upcoming ENT and cardiology follow up  - CBC completed today with cmp  - he denies headaches, chest pain, palpitations, abdominal pain, n/v/d, constipation, hemoptysis, hematemesis, melena, hematuria. He does report OLEARY with doing things too fast, increased activity.      Review of Systems   Constitutional:  Negative for fever and unexpected weight change.        + malaise   Respiratory:  Positive for shortness of breath (oleary).    Cardiovascular:  Negative for chest pain and palpitations.   Musculoskeletal:  Negative for " arthralgias.   Neurological:  Positive for dizziness and light-headedness. Negative for tremors, facial asymmetry and headaches.   Hematological:  Negative for adenopathy. Does not bruise/bleed easily.       Objective:      Vitals:    01/13/23 1314   BP: (!) 96/51   Pulse: 84   SpO2: 97%   Weight: 128.6 kg (283 lb 8.2 oz)       BMI: Body mass index is 39.54 kg/m².    Physical Exam  Vitals and nursing note reviewed.   Constitutional:       General: He is not in acute distress.     Appearance: He is obese. He is not ill-appearing, toxic-appearing or diaphoretic.   HENT:      Head: Normocephalic and atraumatic.   Eyes:      Conjunctiva/sclera: Conjunctivae normal.   Cardiovascular:      Rate and Rhythm: Normal rate and regular rhythm.      Heart sounds: Normal heart sounds. No murmur heard.  Pulmonary:      Effort: Pulmonary effort is normal. No respiratory distress.      Breath sounds: Normal breath sounds.      Comments: Talkative with no sob/oleary  Abdominal:      General: Bowel sounds are normal. There is no distension.      Palpations: Abdomen is soft.      Tenderness: There is no abdominal tenderness. There is no guarding.   Musculoskeletal:      Cervical back: Normal range of motion and neck supple. No rigidity.   Neurological:      Mental Status: He is alert and oriented to person, place, and time. Mental status is at baseline.      Motor: No weakness.      Gait: Gait normal.   Psychiatric:         Mood and Affect: Mood normal.         Behavior: Behavior normal.         Thought Content: Thought content normal.          Assessment:       1. Thrombocytopenia    2. Normocytic anemia    3. Chronic anticoagulation    4. Severe obesity (BMI 35.0-39.9) with obstructive sleep apnea    5. History of atrial fibrillation    6. Hypotension, unspecified hypotension type          Plan:   Normocytic anemia  -reviewed recent and prior CBCs, labs updated today 1/13/23  -likely etiology chronic disease  -hemoglobin stable  13.0/39.6 (1/13/23)  -will continue to monitor    Thrombocytopenia  -he did have thrombocytopenia which resolved spontaneously  -normal 1/13/23  -continue to monitor    Hepatitis-B core antibody positive  -he had previous exposure to hepatitis B and is immune    History of atrial fibrillation  -on Xarelto  -management deferred to Cardiology, Dr. Bello    Severe obesity with obstructive sleep apnea  -Body mass index is 39.54 kg/m².  -no longer on CPAP  -trying to lose weight    Hypotension  -continue with bps at home using [facility] home HTN program  -stay in touch with pharmacist through program  -educated on s/s to seek emergency care  -keep cardiology follow up as scheduled    F/u in 3 months with labs, if remains stable will increase to 6 month frequency      Meagan Hopkins, AFSHIN-C  [facility]  Hematology/Oncology  [address]  [place]  [address] [phone]

## 2023-01-18 DIAGNOSIS — N40.0 BENIGN NON-NODULAR PROSTATIC HYPERPLASIA WITHOUT LOWER URINARY TRACT SYMPTOMS: ICD-10-CM

## 2023-01-18 RX ORDER — TAMSULOSIN HYDROCHLORIDE 0.4 MG/1
CAPSULE ORAL
Qty: 90 CAPSULE | Refills: 1 | Status: SHIPPED | OUTPATIENT
Start: 2023-01-18 | End: 2023-08-09 | Stop reason: SDUPTHER

## 2023-01-18 NOTE — TELEPHONE ENCOUNTER
No new care gaps identified.  [facility] Embedded Care Gaps. Reference number: [id number]. 1/18/2023   1:07:23 PM CST

## 2023-01-18 NOTE — TELEPHONE ENCOUNTER
Refill Decision Note   Edis Huggins  is requesting a refill authorization.  Brief Assessment and Rationale for Refill:  Approve     Medication Therapy Plan:       Medication Reconciliation Completed: No   Comments:     No Care Gaps recommended.     Note composed:1:28 PM 01/18/2023

## 2023-01-23 ENCOUNTER — TELEPHONE (OUTPATIENT)
Dept: DERMATOLOGY | Facility: CLINIC | Age: 78
End: 2023-01-23
Payer: COMMERCIAL

## 2023-01-23 NOTE — TELEPHONE ENCOUNTER
----- Message from April MOISES Del Angel sent at 1/20/2023  4:45 PM CST -----  Contact: pt @ [phone]    ----- Message -----  From: Kym Park  Sent: 1/20/2023   2:49 PM CST  To: Orlin Bills Staff    Edis Huggins calling regarding Appointment Access  (message) for #pt is calling to get appt for mole, asking for call back

## 2023-01-24 ENCOUNTER — TELEPHONE (OUTPATIENT)
Dept: PAIN MEDICINE | Facility: CLINIC | Age: 78
End: 2023-01-24
Payer: COMMERCIAL

## 2023-01-24 ENCOUNTER — OFFICE VISIT (OUTPATIENT)
Dept: CARDIOLOGY | Facility: CLINIC | Age: 78
End: 2023-01-24
Payer: COMMERCIAL

## 2023-01-24 DIAGNOSIS — I70.0 AORTIC ATHEROSCLEROSIS: ICD-10-CM

## 2023-01-24 DIAGNOSIS — I48.0 PAROXYSMAL ATRIAL FIBRILLATION: ICD-10-CM

## 2023-01-24 DIAGNOSIS — I65.29 CAROTID ATHEROSCLEROSIS, UNSPECIFIED LATERALITY: ICD-10-CM

## 2023-01-24 DIAGNOSIS — G47.33 OSA ON CPAP: ICD-10-CM

## 2023-01-24 DIAGNOSIS — R73.03 PREDIABETES: ICD-10-CM

## 2023-01-24 DIAGNOSIS — E78.2 MIXED HYPERLIPIDEMIA: ICD-10-CM

## 2023-01-24 DIAGNOSIS — Z79.01 CHRONIC ANTICOAGULATION: ICD-10-CM

## 2023-01-24 DIAGNOSIS — E66.01 MORBID OBESITY WITH BMI OF 40.0-44.9, ADULT: ICD-10-CM

## 2023-01-24 DIAGNOSIS — Z79.899 ENCOUNTER FOR LONG-TERM (CURRENT) USE OF OTHER MEDICATIONS: ICD-10-CM

## 2023-01-24 DIAGNOSIS — I10 ESSENTIAL HYPERTENSION: Primary | ICD-10-CM

## 2023-01-24 PROCEDURE — 1160F PR REVIEW ALL MEDS BY PRESCRIBER/CLIN PHARMACIST DOCUMENTED: ICD-10-PCS | Mod: CPTII,95,, | Performed by: INTERNAL MEDICINE

## 2023-01-24 PROCEDURE — 99214 OFFICE O/P EST MOD 30 MIN: CPT | Mod: 95,,, | Performed by: INTERNAL MEDICINE

## 2023-01-24 PROCEDURE — 1157F ADVNC CARE PLAN IN RCRD: CPT | Mod: CPTII,95,, | Performed by: INTERNAL MEDICINE

## 2023-01-24 PROCEDURE — 1160F RVW MEDS BY RX/DR IN RCRD: CPT | Mod: CPTII,95,, | Performed by: INTERNAL MEDICINE

## 2023-01-24 PROCEDURE — 1159F PR MEDICATION LIST DOCUMENTED IN MEDICAL RECORD: ICD-10-PCS | Mod: CPTII,95,, | Performed by: INTERNAL MEDICINE

## 2023-01-24 PROCEDURE — 99214 PR OFFICE/OUTPT VISIT, EST, LEVL IV, 30-39 MIN: ICD-10-PCS | Mod: 95,,, | Performed by: INTERNAL MEDICINE

## 2023-01-24 PROCEDURE — 1159F MED LIST DOCD IN RCRD: CPT | Mod: CPTII,95,, | Performed by: INTERNAL MEDICINE

## 2023-01-24 PROCEDURE — 1157F PR ADVANCE CARE PLAN OR EQUIV PRESENT IN MEDICAL RECORD: ICD-10-PCS | Mod: CPTII,95,, | Performed by: INTERNAL MEDICINE

## 2023-01-24 RX ORDER — IRBESARTAN 150 MG/1
150 TABLET ORAL NIGHTLY
COMMUNITY
End: 2023-02-23

## 2023-01-24 RX ORDER — CHLORTHALIDONE 25 MG/1
25 TABLET ORAL DAILY
Qty: 90 TABLET | Refills: 3 | Status: SHIPPED | OUTPATIENT
Start: 2023-01-24 | End: 2023-02-09

## 2023-01-24 RX ORDER — IRBESARTAN 150 MG/1
150 TABLET ORAL NIGHTLY
COMMUNITY
End: 2023-01-24 | Stop reason: SDUPTHER

## 2023-01-24 NOTE — PROGRESS NOTES
Subjective:    Patient ID:  Edis Huggins Jr. is a 77 y.o. male who presents for follow-up of Hypertension      HPI    The patient location is: [place], home  The chief complaint leading to consultation is: Hypotension    Visit type: audiovisual    Face to Face time with patient: 30  45 minutes of total time spent on the encounter, which includes face to face time and non-face to face time preparing to see the patient (eg, review of tests), Obtaining and/or reviewing separately obtained history, Documenting clinical information in the electronic or other health record, Independently interpreting results (not separately reported) and communicating results to the patient/family/caregiver, or Care coordination (not separately reported).         Each patient to whom he or she provides medical services by telemedicine is:  (1) informed of the relationship between the physician and patient and the respective role of any other health care provider with respect to management of the patient; and (2) notified that he or she may decline to receive medical services by telemedicine and may withdraw from such care at any time.    Notes:     76 y/o male with hx of HTN, HLD, obesity, PAfib on DOAC (CHADSVasc 2 for HTN, age), CARMELA on CPAP, who presents for f/u.   Has had extensive w/u for RESENDEZ including normal nuclear SPECT, normal 30 day monitor, normal PFT's. Did have a previous Holter monitor which showed a single run of Paroxysmal atrial fibrillation lasting for 13 sec. Had bilateral LE edema which has improved with chlorthalidone.   Had Kardia device for detection of afib, felt symptoms which woke him from sleep, checked device, and found to be in afib (strips available in media section). Resolved shortly after symptoms. Ne recurrence. Started on BB and DOAC. Currently on CPAP.   Has chronic, mild, unchanged RESENDEZ with moderate activity.   Following with digital HTN clinic and changes made to regimen and BP appears to be  "controlled.  Has COvid in January 2022 and has had lingering fatigue and SOB.   Denies CP, orthopnea, PND, syncope, palps. Compliant with meds and follows a low salt diet.     1/24/2023:  December 22 had a syncopal episode with prodrome after getting up and walking at a restaurant. Has been having issues with BP "being low." On review of digital HTN notes and per pt, BP has been in the low 100's-115's. BB, amlodipine stopped and currently just on irbesartan and chlorthalidone. Has pre-syncopal episodes, usually with positional changes, 1-2 times per week.     Review of Systems   Constitutional: Positive for malaise/fatigue.   HENT:  Negative for congestion.    Eyes:  Negative for blurred vision.   Cardiovascular:  Positive for dyspnea on exertion, leg swelling, near-syncope and syncope. Negative for chest pain, claudication, cyanosis, irregular heartbeat, orthopnea, palpitations and paroxysmal nocturnal dyspnea.   Respiratory:  Negative for shortness of breath.    Endocrine: Negative for polyuria.   Hematologic/Lymphatic: Negative for bleeding problem.   Skin:  Negative for itching and rash.   Musculoskeletal:  Negative for joint swelling, muscle cramps and muscle weakness.   Gastrointestinal:  Negative for abdominal pain, hematemesis, hematochezia, melena, nausea and vomiting.   Genitourinary:  Negative for dysuria and hematuria.   Neurological:  Negative for dizziness, focal weakness, headaches, light-headedness, loss of balance and weakness.   Psychiatric/Behavioral:  Negative for depression. The patient is not nervous/anxious.       Objective:    Physical Exam  Constitutional:       Appearance: Normal appearance.   HENT:      Head: Atraumatic.   Eyes:      Extraocular Movements: Extraocular movements intact.   Neurological:      Mental Status: He is alert.   Psychiatric:         Mood and Affect: Mood normal.         Behavior: Behavior normal.         Thought Content: Thought content normal.         Judgment: " Judgment normal.         Assessment:       1. Essential hypertension    2. Carotid atherosclerosis, unspecified laterality    3. Mixed hyperlipidemia    4. Paroxysmal atrial fibrillation    5. Aortic atherosclerosis -- CTA 06/2018    6. Chronic anticoagulation    7. Prediabetes    8. Morbid obesity with BMI of 40.0-44.9, adult    9. CARMELA on CPAP    10. Encounter for long-term (current) use of other medications      76 y/o pt with hx and presentation as above. Will evaluate symptoms with 2DE and holter. Will decrease chlorthalidone and observe for response. Needs to be more active and lose weight. Discussed the etiology, evaluation, and management of Afib, CVA, DOAC, HTN, obesity, CARMELA. Discussed the importance of med compliance, heart healthy diet, and regular exercise.      Plan:       -Decrease chlorthalidone to 25 mg daily, continue irbesartan for now  -2DE  -Holter x 48 hours  -f/u in 1 month virtual visit

## 2023-01-24 NOTE — TELEPHONE ENCOUNTER
----- Message from Mariam Mojica sent at 1/20/2023  2:56 PM CST -----  Type:  Needs Medical Advice    Who Called:  Pt  Would the patient rather a call back or a response via [facility]?  Call  Best Call Back Number:  [phone]  Additional Information:  Pt doesn't need an appt but is requesting Dr. Chadwick to be his new pain management Dr.  Pt would like to get a call back.

## 2023-01-30 ENCOUNTER — CLINICAL SUPPORT (OUTPATIENT)
Dept: OTOLARYNGOLOGY | Facility: CLINIC | Age: 78
End: 2023-01-30
Payer: COMMERCIAL

## 2023-01-30 ENCOUNTER — OFFICE VISIT (OUTPATIENT)
Dept: OTOLARYNGOLOGY | Facility: CLINIC | Age: 78
End: 2023-01-30
Payer: COMMERCIAL

## 2023-01-30 VITALS
OXYGEN SATURATION: 98 % | HEIGHT: 71 IN | SYSTOLIC BLOOD PRESSURE: 76 MMHG | DIASTOLIC BLOOD PRESSURE: 55 MMHG | BODY MASS INDEX: 39.38 KG/M2 | HEART RATE: 76 BPM | WEIGHT: 281.31 LBS

## 2023-01-30 DIAGNOSIS — H90.3 SENSORINEURAL HEARING LOSS, BILATERAL: Primary | ICD-10-CM

## 2023-01-30 DIAGNOSIS — J30.89 NON-SEASONAL ALLERGIC RHINITIS, UNSPECIFIED TRIGGER: Chronic | ICD-10-CM

## 2023-01-30 DIAGNOSIS — I95.1 ORTHOSTATIC HYPOTENSION: Primary | Chronic | ICD-10-CM

## 2023-01-30 DIAGNOSIS — H90.3 SENSORINEURAL HEARING LOSS (SNHL), BILATERAL: Chronic | ICD-10-CM

## 2023-01-30 PROCEDURE — 1126F PR PAIN SEVERITY QUANTIFIED, NO PAIN PRESENT: ICD-10-PCS | Mod: CPTII,S$GLB,, | Performed by: OTOLARYNGOLOGY

## 2023-01-30 PROCEDURE — 99214 PR OFFICE/OUTPT VISIT, EST, LEVL IV, 30-39 MIN: ICD-10-PCS | Mod: S$GLB,,, | Performed by: OTOLARYNGOLOGY

## 2023-01-30 PROCEDURE — 92567 PR TYMPA2METRY: ICD-10-PCS | Mod: S$GLB,,, | Performed by: AUDIOLOGIST

## 2023-01-30 PROCEDURE — 92557 COMPREHENSIVE HEARING TEST: CPT | Mod: S$GLB,,, | Performed by: AUDIOLOGIST

## 2023-01-30 PROCEDURE — 99214 OFFICE O/P EST MOD 30 MIN: CPT | Mod: S$GLB,,, | Performed by: OTOLARYNGOLOGY

## 2023-01-30 PROCEDURE — 3078F DIAST BP <80 MM HG: CPT | Mod: CPTII,S$GLB,, | Performed by: OTOLARYNGOLOGY

## 2023-01-30 PROCEDURE — 1160F RVW MEDS BY RX/DR IN RCRD: CPT | Mod: CPTII,S$GLB,, | Performed by: OTOLARYNGOLOGY

## 2023-01-30 PROCEDURE — 99999 PR PBB SHADOW E&M-EST. PATIENT-LVL I: ICD-10-PCS | Mod: PBBFAC,,, | Performed by: AUDIOLOGIST

## 2023-01-30 PROCEDURE — 99999 PR PBB SHADOW E&M-EST. PATIENT-LVL I: CPT | Mod: PBBFAC,,, | Performed by: AUDIOLOGIST

## 2023-01-30 PROCEDURE — 1157F PR ADVANCE CARE PLAN OR EQUIV PRESENT IN MEDICAL RECORD: ICD-10-PCS | Mod: CPTII,S$GLB,, | Performed by: OTOLARYNGOLOGY

## 2023-01-30 PROCEDURE — 3078F PR MOST RECENT DIASTOLIC BLOOD PRESSURE < 80 MM HG: ICD-10-PCS | Mod: CPTII,S$GLB,, | Performed by: OTOLARYNGOLOGY

## 2023-01-30 PROCEDURE — 3074F SYST BP LT 130 MM HG: CPT | Mod: CPTII,S$GLB,, | Performed by: OTOLARYNGOLOGY

## 2023-01-30 PROCEDURE — 1100F PR PT FALLS ASSESS DOC 2+ FALLS/FALL W/INJURY/YR: ICD-10-PCS | Mod: CPTII,S$GLB,, | Performed by: OTOLARYNGOLOGY

## 2023-01-30 PROCEDURE — 3288F PR FALLS RISK ASSESSMENT DOCUMENTED: ICD-10-PCS | Mod: CPTII,S$GLB,, | Performed by: OTOLARYNGOLOGY

## 2023-01-30 PROCEDURE — 3288F FALL RISK ASSESSMENT DOCD: CPT | Mod: CPTII,S$GLB,, | Performed by: OTOLARYNGOLOGY

## 2023-01-30 PROCEDURE — 1157F ADVNC CARE PLAN IN RCRD: CPT | Mod: CPTII,S$GLB,, | Performed by: OTOLARYNGOLOGY

## 2023-01-30 PROCEDURE — 99999 PR PBB SHADOW E&M-EST. PATIENT-LVL V: CPT | Mod: PBBFAC,,, | Performed by: OTOLARYNGOLOGY

## 2023-01-30 PROCEDURE — 1160F PR REVIEW ALL MEDS BY PRESCRIBER/CLIN PHARMACIST DOCUMENTED: ICD-10-PCS | Mod: CPTII,S$GLB,, | Performed by: OTOLARYNGOLOGY

## 2023-01-30 PROCEDURE — 1126F AMNT PAIN NOTED NONE PRSNT: CPT | Mod: CPTII,S$GLB,, | Performed by: OTOLARYNGOLOGY

## 2023-01-30 PROCEDURE — 99999 PR PBB SHADOW E&M-EST. PATIENT-LVL V: ICD-10-PCS | Mod: PBBFAC,,, | Performed by: OTOLARYNGOLOGY

## 2023-01-30 PROCEDURE — 1159F MED LIST DOCD IN RCRD: CPT | Mod: CPTII,S$GLB,, | Performed by: OTOLARYNGOLOGY

## 2023-01-30 PROCEDURE — 1100F PTFALLS ASSESS-DOCD GE2>/YR: CPT | Mod: CPTII,S$GLB,, | Performed by: OTOLARYNGOLOGY

## 2023-01-30 PROCEDURE — 92567 TYMPANOMETRY: CPT | Mod: S$GLB,,, | Performed by: AUDIOLOGIST

## 2023-01-30 PROCEDURE — 3074F PR MOST RECENT SYSTOLIC BLOOD PRESSURE < 130 MM HG: ICD-10-PCS | Mod: CPTII,S$GLB,, | Performed by: OTOLARYNGOLOGY

## 2023-01-30 PROCEDURE — 92557 PR COMPREHENSIVE HEARING TEST: ICD-10-PCS | Mod: S$GLB,,, | Performed by: AUDIOLOGIST

## 2023-01-30 PROCEDURE — 1159F PR MEDICATION LIST DOCUMENTED IN MEDICAL RECORD: ICD-10-PCS | Mod: CPTII,S$GLB,, | Performed by: OTOLARYNGOLOGY

## 2023-01-30 NOTE — PROGRESS NOTES
Edis Huggins Jr was seen today in the clinic for an audiologic evaluation.  His main complaint was hearing loss and he reported that he wears a pair of Siemens hearing aids purchased at [employer].    Tympanometry revealed a Type A tympanogram for both ears.  Audiogram results revealed a mild sloping to severe sensorineural hearing loss bilaterally.  Speech reception thresholds were obtained at 15dB for both ears and speech discrimination scores were 68% for the right ear and 76% for the left ear.    Recommendations:  Otologic evaluation  Annual evaluation  Continue use of hearing aids  Hearing protection in noise

## 2023-01-30 NOTE — PROGRESS NOTES
Chief Complaint   Patient presents with    Follow-up     And complains of dizziness    .     HPI: Edis Huggins Jr. is 77 y.o. male who isself-referred for evaluation of bilateral jaw and ear pain.  It has been present for several weeks since he had COVID-19 in January.  He reports sensitivity with opening his jaw and eating certain foods.  He denies otorrhea, post-auricular pain, change in hearing.  He did try Ibuprofen with minimal relief.     Interval HPI 1/30/2023:   Follow up visit. Reports of dizziness with standing and walking that started 5-6 months ago. It comes on weekly. He describes it as falling down sensation with standing and walking. Worse after driving. Denies room spinning. Usually goes away after sitting down. Denies tinnitus, ear pain/pressure, ear drainage or hearing loss with dizziness. His cardiologist has been adjusting his blood pressure medications recently and he is currently only on Avapro 150 mg daily and chlorthalidone to 25 mg daily.  He reports that he has been taking is as a lasting consistently and feels that this helps tremendously with his allergic rhinitis symptoms.        Past Medical History:   Diagnosis Date    Allergic rhinitis due to animal (cat) (dog) hair and dander 3/20/2014    Allergy     Anemia     Anxiety     Basal cell carcinoma     BPH (benign prostatic hypertrophy)     Family history of colon cancer 3/20/2014    Family history of ischemic heart disease 3/20/2014    Fever blister     HLD (hyperlipidemia)     HTN (hypertension)     IFG (impaired fasting glucose)     Lumbar spondylosis 6/7/2019    Pain of right hip joint     Primary open angle glaucoma     Squamous cell carcinoma of skin     Status post total knee replacement 3/20/2014     Social History     Socioeconomic History    Marital status:    Occupational History    Occupation:      Employer: [employer]   Tobacco Use    Smoking status: Former     Packs/day: 1.00     Years:  10.00     Pack years: 10.00     Types: Cigarettes     Quit date: 1974     Years since quittin.1    Smokeless tobacco: Never   Substance and Sexual Activity    Alcohol use: Yes     Alcohol/week: 1.0 standard drink     Types: 1 Standard drinks or equivalent per week     Comment: I do not drink every week.    Drug use: Never    Sexual activity: Not Currently     Partners: Female     Birth control/protection: None     Social Determinants of Health     Financial Resource Strain: Low Risk     Difficulty of Paying Living Expenses: Not hard at all   Food Insecurity: No Food Insecurity    Worried About Running Out of Food in the Last Year: Never true    Ran Out of Food in the Last Year: Never true   Transportation Needs: No Transportation Needs    Lack of Transportation (Medical): No    Lack of Transportation (Non-Medical): No   Physical Activity: Inactive    Days of Exercise per Week: 0 days    Minutes of Exercise per Session: 0 min   Stress: No Stress Concern Present    Feeling of Stress : Only a little   Social Connections: Unknown    Frequency of Communication with Friends and Family: More than three times a week    Frequency of Social Gatherings with Friends and Family: More than three times a week    Active Member of Clubs or Organizations: Yes    Attends Club or Organization Meetings: More than 4 times per year    Marital Status:    Housing Stability: Low Risk     Unable to Pay for Housing in the Last Year: No    Number of Places Lived in the Last Year: 1    Unstable Housing in the Last Year: No     Past Surgical History:   Procedure Laterality Date    CATARACT EXTRACTION W/  INTRAOCULAR LENS IMPLANT Left 2017    WITH KAHOOK GONIOTOMY (DR. WHITLEY)    CATARACT EXTRACTION W/  INTRAOCULAR LENS IMPLANT Right 05/10/2017        EYE SURGERY      HERNIA REPAIR      x 2    JOINT REPLACEMENT      SKIN BIOPSY      [facility]    TONSILLECTOMY      TOTAL KNEE ARTHROPLASTY Right      TRANSFORAMINAL EPIDURAL INJECTION OF STEROID Right 2019    Procedure: Transforaminal Epidural Steroid Injection, Right, L3-4, L4-5;  Surgeon: Eduard Mukherjee Jr., MD;  Location: [facility] [place];  Service: Pain Management;  Laterality: Right;    TRANSFORAMINAL EPIDURAL INJECTION OF STEROID Right 2020    Procedure: Injection,steroid,epidural,transforaminal approach--Right L3-4, L4-5;  Surgeon: Eduard Mukherjee Jr., MD;  Location: [facility] [place];  Service: Pain Management;  Laterality: Right;    YAG CAPSULOTOMY\ Right 2020         Family History   Problem Relation Age of Onset    Colon cancer Maternal Grandmother         70s    Hearing loss Maternal Grandmother         Age related, Had hearing aids    Cataracts Mother     Atrial fibrillation Mother     Hearing loss Mother         Age related. Had hearing aids    Heart disease Mother         Heart valve deterioration at 93.  at 95    Cataracts Father     Heart disease Father 85        Heart attack at 85. needed and got herat valve replacement, Lived to 9293.  at 95    Arthritis Father     Cancer Father     Hearing loss Father         Age relared. Needed but refused hearing aids.    Glaucoma Brother     Other Brother         Hypogonadism    Cancer Paternal Grandmother          of undected colon cancer around 87 years old.    Hypertension Son         Developed in his 30s    Stroke Maternal Grandfather         He smoked all his life  of stroke at age 63    Prostate cancer Neg Hx     Amblyopia Neg Hx     Blindness Neg Hx     Macular degeneration Neg Hx     Retinal detachment Neg Hx     Strabismus Neg Hx     Diabetes Neg Hx     Melanoma Neg Hx     Psoriasis Neg Hx     Lupus Neg Hx     Eczema Neg Hx     Acne Neg Hx            Review of Systems  General: negative for chills, fever or weight loss  Psychological: negative for mood changes or depression  Ophthalmic: negative for blurry vision, photophobia or eye pain  ENT: see  HPI  Respiratory: no cough, shortness of breath, or wheezing  Cardiovascular: no chest pain or dyspnea on exertion  Gastrointestinal: no abdominal pain, change in bowel habits, or black/ bloody stools  Musculoskeletal: negative for gait disturbance or muscular weakness  Neurological: no syncope or seizures; no ataxia  Dermatological: negative for puritis,  rash and jaundice  Hematologic/lymphatic: no easy bruising, no new lumps or bumps      Physical Exam:    Vitals:    01/30/23 1441   BP: (!) 76/55   Pulse: 76       Constitutional: Well appearing / communicating without difficutly.  NAD.  Eyes: EOM I Bilaterally  Head/Face: Normocephalic.  Negative paranasal sinus pressure/tenderness.  Salivary glands WNL.  House Brackmann I Bilaterally.    Right Ear: Auricle normal appearance. External Auditory Canal within normal limits no lesions or masses,TM w/o masses/lesions/perforations. TM mobility noted.   Left Ear: Auricle normal appearance. External Auditory Canal within normal limits no lesions or masses,TM w/o masses/lesions/perforations. TM mobility noted.  Nose: No gross nasal septal deviation. Inferior Turbinates 3+ bilaterally. No septal perforation. No masses/lesions. External nasal skin appears normal without masses/lesions.  Oral Cavity: Gingiva/lips within normal limits.  Dentition/gingiva healthy appearing. Mucus membranes moist. Floor of mouth soft, no masses palpated. Oral Tongue mobile. Hard Palate appears normal.    Oropharynx: Base of tongue appears normal. No masses/lesions noted. Tonsillar fossa/pharyngeal wall without lesions. Posterior oropharynx WNL.  Soft palate without masses. Midline uvula.   Neck/Lymphatic: No LAD I-VI bilaterally.  No thyromegaly.  No masses noted on exam.        Diagnostic testing reviewed:  Orthostatic vital signs:: lying 111/64; HR: 62; Sitting 99/63 HR 69; Standing 76/55 HR 76        Audiogram interpreted personally by me and discussed in detail with the patient today.  Tympanometry revealed a Type A tympanogram for both ears.  Audiogram results revealed a mild sloping to severe sensorineural hearing loss bilaterally.  Speech reception thresholds were obtained at 15dB for both ears and speech discrimination scores were 68% for the right ear and 76% for the left ear.          Assessment:    ICD-10-CM ICD-9-CM    1. Orthostatic hypotension  I95.1 458.0       2. Sensorineural hearing loss (SNHL), bilateral  H90.3 389.18       3. Non-seasonal allergic rhinitis, unspecified trigger  J30.89 477.8           The primary encounter diagnosis was Orthostatic hypotension. Diagnoses of Sensorineural hearing loss (SNHL), bilateral and Non-seasonal allergic rhinitis, unspecified trigger were also pertinent to this visit.      Plan:  No orders of the defined types were placed in this encounter.    Discussed with patient today that his symptoms seem to be more related to orthostatic hypotension (see vital signs above).  I recommend continuing follow-up with his cardiologist as they have been working with his blood pressure medications due to hypotension/BP lability.  I have given him a copy of self directed vestibular exercises which can be of help in maintaining good vestibular function.    We reviewed the patient's recent audiogram and hearing loss in detail.  Continue binaural hearing aids.  We also discussed the use hearing protection when exposed to loud noise, including lawn equipment. Recommend annual audiogram.     Continue Astelin for allergic rhinitis.    Oriana Storm MD

## 2023-01-30 NOTE — PATIENT INSTRUCTIONS
Vestibular Rehabilitation Therapy    TARGETS    Purpose of Activity: This activity will help you keep your vision stable with large head movements. This type of movement is often needed when changing lanes while driving.     1. While seated in a comfortable chair, find three objects in the room that are at eye level. One of the objects should be to your far left, one should be in front of you, and one should be on your far right.                        2. Move your head to the left target, then the center target, then the right target, then center, then left. This completes one cycle.   3. Repetitions: Repeat 30 to 40 cycles without stopping at each target.  4. Then repeat 30 to 40 cycles, but stop for one second at each target.  Do once per day, preferably at night before bedtime.             HORIZONTAL HEAD MOVEMENTS    Purpose of Activity: This activity will help you keep your vision stable with head movements. This is similar to watching for a break in traffic.   1. Sit in a comfortable chair with your feet flat on the floor and your hands on your thighs.  2. Keeping your trunk still, quickly turn your head and look to the right, then turn your head and look to the left without stopping in the center, and then look to the center and focus on an object for five seconds. This completes one cycle.   3. For best results, briefly focus your eyes on an object or target to both right and left directions.   4. Repetitions: Repeat 30 to 40 times.  Do once per day, preferably at night before bedtime.     HEAD CIRCLES    Purpose of Activity: This activity will help you keep your vision stable with smaller head movements. This would be similar to standing in an aisle of a store and looking for an item on the shelves.   1. Sit in a comfortable chair and move your head in a circular motion with your eyes open. Let your eyes lead the way. Each full C[place]il DeHe completes one cycle.  2. Repeat step one with your eyes closed.  3.  Repetitions: Repeat 30 to 40 times in the clockwise direction.  4. Repeat 30 to 40 times in the counter clockwise direction.  Do once per day, preferably at night before bedtime.     FOCUSING WITH HEAD TURNS    Purpose of Activity: This activity will help you stabilize your gaze with quick, short head movements. This type of movement is used while driving.   1. Sit in a comfortable chair and bring your index finger or a playing card approximately 10 inches in front of your nose.  2. Focus on your finger or the card while turning your head from side to side. Try not to let the object blur.  3. Gradually increase the speed of the head turns.  4. Repetitions: Repeat 30 to 40 times.   Do once per day, preferably at night before bedtime.     GAIT WITH HEAD MOVEMENT    Purpose of Activity: This activity will help you build stable head movements while walking. This type of movement occurs when walking down the aisle of a grocery store searching for an item.   1. Begin walking at your normal speed. Walk near a wall so that you can reach out to steady yourself if necessary. A hallway is an excellent place for this activity in the beginning.  2. After three steps, turn your head and look to the right while continuing to walk straight ahead.  3. After three more steps, turn your head and look to the left while continuing to walk straight ahead.  4. To increase the difficulty of this task, go from a solid floor to a carpeted floor, or walk outdoors on an uneven surface. Thick lawns usually are the most difficult surface.  5. Repetitions: Repeat 30 to 40 times.   Do once per day, preferably at night before bedtime.

## 2023-02-06 ENCOUNTER — PATIENT MESSAGE (OUTPATIENT)
Dept: INTERNAL MEDICINE | Facility: CLINIC | Age: 78
End: 2023-02-06
Payer: COMMERCIAL

## 2023-02-06 ENCOUNTER — TELEPHONE (OUTPATIENT)
Dept: ENDOSCOPY | Facility: HOSPITAL | Age: 78
End: 2023-02-06
Payer: COMMERCIAL

## 2023-02-06 RX ORDER — SOD SULF/POT CHLORIDE/MAG SULF 1.479 G
12 TABLET ORAL DAILY
Qty: 24 TABLET | Refills: 0 | Status: SHIPPED | OUTPATIENT
Start: 2023-02-06 | End: 2023-09-11

## 2023-02-06 NOTE — TELEPHONE ENCOUNTER
Chief complaint:   Chief Complaint   Patient presents with   • Eye Problem     right eye possible pink eye started yesterday       Vitals:  Visit Vitals  Pulse 69   Temp 98.1 °F (36.7 °C) (Tympanic)   Resp 12   Ht 5' 5\" (1.651 m)   Wt 52.6 kg   SpO2 96%   BMI 19.30 kg/m²       HISTORY OF PRESENT ILLNESS     Right eye is red for the past 1 day.      Other significant problems:  Patient Active Problem List    Diagnosis Date Noted   • Acute bacterial conjunctivitis of both eyes 11/01/2018     Priority: Low       PAST MEDICAL, FAMILY AND SOCIAL HISTORY     Medications:  Current Outpatient Medications   Medication   • duPILUmab (DUPIXENT) 300 MG/2ML injection   • tobramycin (TOBREX) 0.3 % ophthalmic solution     No current facility-administered medications for this visit.        Allergies:  ALLERGIES:   Allergen Reactions   • Seafood   (Food) THROAT SWELLING     Breakout       Past Medical  History/Surgeries:  Past Medical History:   Diagnosis Date   • Eczema    • RAD (reactive airway disease)        History reviewed. No pertinent surgical history.    Family History:  Family History   Problem Relation Age of Onset   • Patient is unaware of any medical problems Mother    • Patient is unaware of any medical problems Father        Social History:  Social History     Tobacco Use   • Smoking status: Current Some Day Smoker   • Smokeless tobacco: Never Used   Substance Use Topics   • Alcohol use: Yes     Comment: occasionally       REVIEW OF SYSTEMS     Review of Systems   Eyes: Negative for photophobia, pain, itching and visual disturbance.       PHYSICAL EXAM     Physical Exam   Constitutional: He appears well-developed and well-nourished.   Eyes: Pupils are equal, round, and reactive to light. Right eye exhibits discharge. Left eye exhibits no discharge.   Right eye: Conjunctiva injected. Fluorescein staining is negative.   Cardiovascular: Normal rate, regular rhythm, normal heart sounds and intact distal pulses.  Spoke with patient about arrival time @ 1300.   Covid test = vacc    Prep instructions reviewed: the day before the procedure, follow a clear liquid diet all day, then start the first 1/2 of prep at 5pm and take 2nd 1/2 of prep @ 0800.  Pt must be completely NPO when prep completed @ 1000.              Medications: Do not take Insulin or oral diabetic medications the day of the procedure.  Take as prescribed: heart, seizure and blood pressure medication in the morning with a sip of water (less than an ounce).  Take any breathing medications and bring inhalers to hospital with you Leave all valuables and jewelry at home.     Wear comfortable clothes to procedure to change into hospital gown You cannot drive for 24 hours after your procedure because you will receive sedation for your procedure to make you comfortable.  A ride must be provided at discharge.            Pulmonary/Chest: Effort normal and breath sounds normal.       ASSESSMENT/PLAN     Assessment & Plan:  Kimani was seen today for eye problem.    Diagnoses and all orders for this visit:    Conjunctivitis of right eye, unspecified conjunctivitis type  -     moxifloxacin (VIGAMOX) 0.5 % ophthalmic solution; Place 1 drop into left eye 3 times daily.    Will refer to ophthalmology if eye worsens or does not improve. Writer explained this to patient.

## 2023-02-07 ENCOUNTER — HOSPITAL ENCOUNTER (OUTPATIENT)
Dept: CARDIOLOGY | Facility: HOSPITAL | Age: 78
Discharge: HOME | End: 2023-02-07
Attending: INTERNAL MEDICINE
Payer: COMMERCIAL

## 2023-02-07 VITALS — BODY MASS INDEX: 39.34 KG/M2 | HEIGHT: 71 IN | WEIGHT: 281 LBS

## 2023-02-07 DIAGNOSIS — I48.0 PAROXYSMAL ATRIAL FIBRILLATION: ICD-10-CM

## 2023-02-07 DIAGNOSIS — I10 ESSENTIAL HYPERTENSION: ICD-10-CM

## 2023-02-07 LAB
AORTIC ROOT ANNULUS: 3.89 CM
AORTIC VALVE CUSP SEPERATION: 1.95 CM
AV INDEX (PROSTH): 0.55
AV MEAN GRADIENT: 7 MMHG
AV PEAK GRADIENT: 13 MMHG
AV REGURGITATION PRESSURE HALF TIME: 623.07 MS
AV VALVE AREA: 1.78 CM2
AV VELOCITY RATIO: 0.56
BSA FOR ECHO PROCEDURE: 2.53 M2
CV ECHO LV RWT: 0.4 CM
DOP CALC AO PEAK VEL: 1.83 M/S
DOP CALC AO VTI: 43.3 CM
DOP CALC LVOT AREA: 3.3 CM2
DOP CALC LVOT DIAMETER: 2.04 CM
DOP CALC LVOT PEAK VEL: 1.02 M/S
DOP CALC LVOT STROKE VOLUME: 77.1 CM3
DOP CALC MV VTI: 34.5 CM
DOP CALCLVOT PEAK VEL VTI: 23.6 CM
E WAVE DECELERATION TIME: 296.29 MSEC
E/A RATIO: 1.25
E/E' RATIO: 11.57 M/S
ECHO LV POSTERIOR WALL: 1.02 CM (ref 0.6–1.1)
EJECTION FRACTION: 55 %
FRACTIONAL SHORTENING: 26 % (ref 28–44)
INTERVENTRICULAR SEPTUM: 1.22 CM (ref 0.6–1.1)
IVRT: 64.7 MSEC
LA MAJOR: 5.81 CM
LA MINOR: 5.72 CM
LEFT ATRIUM SIZE: 4.82 CM
LEFT ATRIUM VOLUME INDEX MOD: 21.3 ML/M2
LEFT ATRIUM VOLUME MOD: 52.06 CM3
LEFT INTERNAL DIMENSION IN SYSTOLE: 3.74 CM (ref 2.1–4)
LEFT VENTRICLE DIASTOLIC VOLUME INDEX: 49.33 ML/M2
LEFT VENTRICLE DIASTOLIC VOLUME: 120.37 ML
LEFT VENTRICLE MASS INDEX: 88 G/M2
LEFT VENTRICLE SYSTOLIC VOLUME INDEX: 24.5 ML/M2
LEFT VENTRICLE SYSTOLIC VOLUME: 59.73 ML
LEFT VENTRICULAR INTERNAL DIMENSION IN DIASTOLE: 5.04 CM (ref 3.5–6)
LEFT VENTRICULAR MASS: 215.09 G
LV LATERAL E/E' RATIO: 9 M/S
LV SEPTAL E/E' RATIO: 16.2 M/S
LVOT MG: 1.84 MMHG
LVOT MV: 0.61 CM/S
MV MEAN GRADIENT: 1 MMHG
MV PEAK A VEL: 0.65 M/S
MV PEAK E VEL: 0.81 M/S
MV PEAK GRADIENT: 3 MMHG
MV STENOSIS PRESSURE HALF TIME: 85.92 MS
MV VALVE AREA BY CONTINUITY EQUATION: 2.23 CM2
MV VALVE AREA P 1/2 METHOD: 2.56 CM2
PISA AR MAX VEL: 3.94 M/S
PISA MRMAX VEL: 4.19 M/S
PISA TR MAX VEL: 2.84 M/S
PULM VEIN S/D RATIO: 1.08
PV MV: 0.69 M/S
PV PEAK D VEL: 0.5 M/S
PV PEAK S VEL: 0.54 M/S
PV PEAK VELOCITY: 1 CM/S
RA MAJOR: 5.17 CM
RA PRESSURE: 3 MMHG
RA WIDTH: 4.14 CM
RIGHT VENTRICULAR END-DIASTOLIC DIMENSION: 2.8 CM
TDI LATERAL: 0.09 M/S
TDI SEPTAL: 0.05 M/S
TDI: 0.07 M/S
TR MAX PG: 32 MMHG
TRICUSPID ANNULAR PLANE SYSTOLIC EXCURSION: 2.23 CM
TV REST PULMONARY ARTERY PRESSURE: 35 MMHG

## 2023-02-07 PROCEDURE — 93306 TTE W/DOPPLER COMPLETE: CPT

## 2023-02-07 PROCEDURE — 93306 TTE W/DOPPLER COMPLETE: CPT | Mod: 26,,, | Performed by: INTERNAL MEDICINE

## 2023-02-07 PROCEDURE — 93306 ECHO (CUPID ONLY): ICD-10-PCS | Mod: 26,,, | Performed by: INTERNAL MEDICINE

## 2023-02-08 ENCOUNTER — ANESTHESIA EVENT (OUTPATIENT)
Dept: ENDOSCOPY | Facility: HOSPITAL | Age: 78
End: 2023-02-08
Payer: COMMERCIAL

## 2023-02-08 ENCOUNTER — HOSPITAL ENCOUNTER (OUTPATIENT)
Facility: HOSPITAL | Age: 78
Discharge: HOME | End: 2023-02-08
Attending: INTERNAL MEDICINE | Admitting: INTERNAL MEDICINE
Payer: COMMERCIAL

## 2023-02-08 ENCOUNTER — ANESTHESIA (OUTPATIENT)
Dept: ENDOSCOPY | Facility: HOSPITAL | Age: 78
End: 2023-02-08
Payer: COMMERCIAL

## 2023-02-08 DIAGNOSIS — Z12.11 SCREEN FOR COLON CANCER: ICD-10-CM

## 2023-02-08 PROCEDURE — D9220A PRA ANESTHESIA: ICD-10-PCS | Mod: 33,ANES,, | Performed by: ANESTHESIOLOGY

## 2023-02-08 PROCEDURE — 45385 COLONOSCOPY W/LESION REMOVAL: CPT | Mod: 33,59,, | Performed by: INTERNAL MEDICINE

## 2023-02-08 PROCEDURE — 45385 COLONOSCOPY W/LESION REMOVAL: CPT | Mod: PT,59 | Performed by: INTERNAL MEDICINE

## 2023-02-08 PROCEDURE — 88305 TISSUE EXAM BY PATHOLOGIST: CPT | Mod: 59 | Performed by: PATHOLOGY

## 2023-02-08 PROCEDURE — D9220A PRA ANESTHESIA: Mod: 33,ANES,, | Performed by: ANESTHESIOLOGY

## 2023-02-08 PROCEDURE — 27202298: Performed by: INTERNAL MEDICINE

## 2023-02-08 PROCEDURE — 37000009 HC ANESTHESIA EA ADD 15 MINS: Performed by: INTERNAL MEDICINE

## 2023-02-08 PROCEDURE — 45390 COLONOSCOPY W/RESECTION: CPT | Mod: PT | Performed by: INTERNAL MEDICINE

## 2023-02-08 PROCEDURE — 45390 COLONOSCOPY W/RESECTION: CPT | Mod: 33,,, | Performed by: INTERNAL MEDICINE

## 2023-02-08 PROCEDURE — D9220A PRA ANESTHESIA: Mod: 33,CRNA,, | Performed by: NURSE ANESTHETIST, CERTIFIED REGISTERED

## 2023-02-08 PROCEDURE — 25000003 PHARM REV CODE 250: Performed by: NURSE ANESTHETIST, CERTIFIED REGISTERED

## 2023-02-08 PROCEDURE — 37000008 HC ANESTHESIA 1ST 15 MINUTES: Performed by: INTERNAL MEDICINE

## 2023-02-08 PROCEDURE — D9220A PRA ANESTHESIA: ICD-10-PCS | Mod: 33,CRNA,, | Performed by: NURSE ANESTHETIST, CERTIFIED REGISTERED

## 2023-02-08 PROCEDURE — 88305 TISSUE EXAM BY PATHOLOGIST: CPT | Mod: 26,,, | Performed by: PATHOLOGY

## 2023-02-08 PROCEDURE — 45385 PR COLONOSCOPY,REMV LESN,SNARE: ICD-10-PCS | Mod: 33,59,, | Performed by: INTERNAL MEDICINE

## 2023-02-08 PROCEDURE — 25000003 PHARM REV CODE 250: Performed by: INTERNAL MEDICINE

## 2023-02-08 PROCEDURE — 27201089 HC SNARE, DISP (ANY): Performed by: INTERNAL MEDICINE

## 2023-02-08 PROCEDURE — 88305 TISSUE EXAM BY PATHOLOGIST: ICD-10-PCS | Mod: 26,,, | Performed by: PATHOLOGY

## 2023-02-08 PROCEDURE — 27202363 HC INJECTION AGENT, SUBMUCOSAL, ANY: Performed by: INTERNAL MEDICINE

## 2023-02-08 PROCEDURE — 27201028 HC NEEDLE, SCLERO: Performed by: INTERNAL MEDICINE

## 2023-02-08 PROCEDURE — 45390: ICD-10-PCS | Mod: 33,,, | Performed by: INTERNAL MEDICINE

## 2023-02-08 PROCEDURE — 63600175 PHARM REV CODE 636 W HCPCS: Performed by: NURSE ANESTHETIST, CERTIFIED REGISTERED

## 2023-02-08 RX ORDER — ONDANSETRON 2 MG/ML
4 INJECTION INTRAMUSCULAR; INTRAVENOUS ONCE AS NEEDED
Status: DISCONTINUED | OUTPATIENT
Start: 2023-02-08 | End: 2023-02-08 | Stop reason: HOSPADM

## 2023-02-08 RX ORDER — SODIUM CHLORIDE 9 MG/ML
INJECTION, SOLUTION INTRAVENOUS CONTINUOUS
Status: DISCONTINUED | OUTPATIENT
Start: 2023-02-08 | End: 2023-02-08 | Stop reason: HOSPADM

## 2023-02-08 RX ORDER — SODIUM CHLORIDE 0.9 % (FLUSH) 0.9 %
10 SYRINGE (ML) INJECTION
Status: DISCONTINUED | OUTPATIENT
Start: 2023-02-08 | End: 2023-02-08 | Stop reason: HOSPADM

## 2023-02-08 RX ORDER — LIDOCAINE HCL/PF 100 MG/5ML
SYRINGE (ML) INTRAVENOUS
Status: DISCONTINUED | OUTPATIENT
Start: 2023-02-08 | End: 2023-02-08

## 2023-02-08 RX ORDER — SODIUM CHLORIDE 9 MG/ML
INJECTION, SOLUTION INTRAVENOUS CONTINUOUS PRN
Status: DISCONTINUED | OUTPATIENT
Start: 2023-02-08 | End: 2023-02-08

## 2023-02-08 RX ORDER — PROPOFOL 10 MG/ML
VIAL (ML) INTRAVENOUS CONTINUOUS PRN
Status: DISCONTINUED | OUTPATIENT
Start: 2023-02-08 | End: 2023-02-08

## 2023-02-08 RX ORDER — PROPOFOL 10 MG/ML
VIAL (ML) INTRAVENOUS
Status: DISCONTINUED | OUTPATIENT
Start: 2023-02-08 | End: 2023-02-08

## 2023-02-08 RX ORDER — SODIUM CHLORIDE 0.9 % (FLUSH) 0.9 %
3 SYRINGE (ML) INJECTION
Status: DISCONTINUED | OUTPATIENT
Start: 2023-02-08 | End: 2023-02-08 | Stop reason: HOSPADM

## 2023-02-08 RX ADMIN — LIDOCAINE HYDROCHLORIDE 50 MG: 20 INJECTION, SOLUTION INTRAVENOUS at 02:02

## 2023-02-08 RX ADMIN — PROPOFOL 150 MCG/KG/MIN: 10 INJECTION, EMULSION INTRAVENOUS at 02:02

## 2023-02-08 RX ADMIN — PROPOFOL 40 MG: 10 INJECTION, EMULSION INTRAVENOUS at 02:02

## 2023-02-08 RX ADMIN — SODIUM CHLORIDE: 0.9 INJECTION, SOLUTION INTRAVENOUS at 02:02

## 2023-02-08 RX ADMIN — SODIUM CHLORIDE: 0.9 INJECTION, SOLUTION INTRAVENOUS at 01:02

## 2023-02-08 NOTE — PROVATION PATIENT INSTRUCTIONS
Discharge Summary/Instructions after an Endoscopic Procedure  Patient Name: Edis Huggins  Patient MRN: [id number]  Patient YOB: 1945 Wednesday, February 8, 2023  Geovanny Charles MD  Dear patient,  As a result of recent federal legislation (The Federal Cures Act), you may   receive lab or pathology results from your procedure in your [facility]   account before your physician is able to contact you. Your physician or   their representative will relay the results to you with their   recommendations at their soonest availability.  Thank you,  Your health is very important to us during the Covid Crisis. Following your   procedure today, you will receive a daily text for 2 weeks asking about   signs or symptoms of Covid 19.  Please respond to this text when you   receive it so we can follow up and keep you as safe as possible.   RESTRICTIONS:  During your procedure today, you received medications for sedation.  These   medications may affect your judgment, balance and coordination.  Therefore,   for 24 hours, you have the following restrictions:   - DO NOT drive a car, operate machinery, make legal/financial decisions,   sign important papers or drink alcohol.    ACTIVITY:  Today: no heavy lifting, straining or running due to procedural   sedation/anesthesia.  The following day: return to full activity including work.  DIET:  Eat and drink normally unless instructed otherwise.     TREATMENT FOR COMMON SIDE EFFECTS:  - Mild abdominal pain, nausea, belching, bloating or excessive gas:  rest,   eat lightly and use a heating pad.  - Sore Throat: treat with throat lozenges and/or gargle with warm salt   water.  - Because air was used during the procedure, expelling large amounts of air   from your rectum or belching is normal.  - If a bowel prep was taken, you may not have a bowel movement for 1-3 days.    This is normal.  SYMPTOMS TO WATCH FOR AND REPORT TO YOUR PHYSICIAN:  1. Abdominal pain or bloating, other  than gas cramps.  2. Chest pain.  3. Back pain.  4. Signs of infection such as: chills or fever occurring within 24 hours   after the procedure.  5. Rectal bleeding, which would show as bright red, maroon, or black stools.   (A tablespoon of blood from the rectum is not serious, especially if   hemorrhoids are present.)  6. Vomiting.  7. Weakness or dizziness.  GO DIRECTLY TO THE NEAREST EMERGENCY ROOM IF YOU HAVE ANY OF THE FOLLOWING:      Difficulty breathing              Chills and/or fever over 101 F   Persistent vomiting and/or vomiting blood   Severe abdominal pain   Severe chest pain   Black, tarry stools   Bleeding- more than one tablespoon   Any other symptom or condition that you feel may need urgent attention  Your doctor recommends these additional instructions:  If any biopsies were taken, your doctors clinic will contact you in 1 to 2   weeks with any results.  - Discharge patient to home.   - Patient has a contact number available for emergencies.  The signs and   symptoms of potential delayed complications were discussed with the   patient.  Return to normal activities tomorrow.  Written discharge   instructions were provided to the patient.   - Resume previous diet.   - Continue present medications.   - Await pathology results.   - Repeat colonoscopy in 3 years for surveillance.   - Resume Xarelto (rivaroxaban) at prior dose in 5 days.   - Avoid NSAIDs (ibuprofen, aleve, naproxen, BC / Goodys, aspirin etc) for 10   days; Aspirin is OK to continue if indicated for cardiovascular   protection.  For questions, problems or results please call your physician - Geovanny Charles MD.  EMERGENCY PHONE NUMBER: [phone],  LAB RESULTS: [phone]  IF A COMPLICATION OR EMERGENCY SITUATION ARISES AND YOU ARE UNABLE TO REACH   YOUR PHYSICIAN - GO DIRECTLY TO THE EMERGENCY ROOM.  Geovanny Charles MD  2/8/2023 3:37:17 PM  This report has been verified and signed electronically.  Dear patient,  As a  result of recent federal legislation (The Federal Cures Act), you may   receive lab or pathology results from your procedure in your [facility]   account before your physician is able to contact you. Your physician or   their representative will relay the results to you with their   recommendations at their soonest availability.  Thank you,  PROVATION

## 2023-02-08 NOTE — ANESTHESIA POSTPROCEDURE EVALUATION
Anesthesia Post Evaluation    Patient: Edis Huggins JrFrank    Procedure(s) Performed: Procedure(s) (LRB):  COLONOSCOPY (N/A)    Final Anesthesia Type: general      Patient location during evaluation: GI PACU  Patient participation: Yes- Able to Participate  Level of consciousness: awake and alert and oriented  Post-procedure vital signs: reviewed and stable  Pain management: adequate  Airway patency: patent    PONV status at discharge: No PONV  Anesthetic complications: no      Cardiovascular status: blood pressure returned to baseline and hemodynamically stable  Respiratory status: unassisted, spontaneous ventilation and room air  Hydration status: euvolemic  Follow-up not needed.          Vitals Value Taken Time   /56 02/08/23 1542   Temp 98.2 02/08/23 1542   Pulse 59 02/08/23 1542   Resp 18 02/08/23 1542   SpO2 97 02/08/23 1542         No case tracking events are documented in the log.      Pain/Channing Score: No data recorded

## 2023-02-08 NOTE — TRANSFER OF CARE
Anesthesia Transfer of Care Note    Patient: Edis Huggins Jr.    Procedure(s) Performed: Procedure(s) (LRB):  COLONOSCOPY (N/A)    Patient location: GI    Anesthesia Type: general    Transport from OR: Transported from OR on room air with adequate spontaneous ventilation    Post pain: adequate analgesia    Post assessment: no apparent anesthetic complications and tolerated procedure well    Post vital signs: stable    Level of consciousness: awake, alert and oriented    Nausea/Vomiting: no nausea/vomiting    Complications: none    Transfer of care protocol was followed      Last vitals:   Visit Vitals  BP (!) 123/58   Pulse 63   Temp 36.8 °C (98.2 °F)   Resp 16   Ht 5' (1.524 m)   SpO2 97%   BMI 54.88 kg/m²

## 2023-02-08 NOTE — H&P
Short Stay Endoscopy History and Physical    PCP - Chris Her MD    Procedure - Colonoscopy  ASA - per anesthesia  Mallampati - per anesthesia  History of Anesthesia problems - no  Family history Anesthesia problems - no   Plan of anesthesia - General    HPI:  This is a 77 y.o. male here for evaluation of : asymptomatic screening exam    Grandmother with CRC  Brother with multiple polyps        ROS:  Constitutional: No fevers, chills, No weight loss  CV: No chest pain  Pulm: No cough, No shortness of breath  GI: see HPI  Derm: No rash    Medical History:  has a past medical history of Allergic rhinitis due to animal (cat) (dog) hair and dander (3/20/2014), Allergy, Anemia, Anxiety, Basal cell carcinoma, BPH (benign prostatic hypertrophy), Family history of colon cancer (3/20/2014), Family history of ischemic heart disease (3/20/2014), Fever blister, HLD (hyperlipidemia), HTN (hypertension), IFG (impaired fasting glucose), Lumbar spondylosis (6/7/2019), Pain of right hip joint, Primary open angle glaucoma, Squamous cell carcinoma of skin, and 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    Surgical History:  has a past surgical history that includes Tonsillectomy; Hernia repair; Total knee arthroplasty (Right); Transforaminal epidural injection of steroid (Right, 6/25/2019); Eye surgery; Joint replacement; Transforaminal epidural injection of steroid (Right, 5/26/2020); Skin biopsy; Cataract extraction w/  intraocular lens implant (Left, 02/08/2017); Cataract extraction w/  intraocular lens implant (Right, 05/10/2017); and YAG CAPSULOTOMY\ (Right, 01/16/2020).    Family History: family history includes Arthritis in his father; Atrial fibrillation in his mother; Cancer in his father and paternal grandmother; Cataracts in his father and mother; Colon cancer in his maternal grandmother; Glaucoma in his brother; Hearing loss in his father, maternal grandmother, and mother; Heart disease in his mother; Heart  disease (age of onset: 85) in his father; Hypertension in his son; Other in his brother; Stroke in his maternal grandfather.. Otherwise no colon cancer, inflammatory bowel disease, or GI malignancies.    Social History:  reports that he quit smoking about 49 years ago. His smoking use included cigarettes. He has a 10.00 pack-year smoking history. He has never used smokeless tobacco. He reports current alcohol use of about 1.0 standard drink per week. He reports that he does not use drugs.    Review of patient's allergies indicates:  No Known Allergies    Medications:   Medications Prior to Admission   Medication Sig Dispense Refill Last Dose    acyclovir (ZOVIRAX) 400 MG tablet TAKE 1 TABLET BY MOUTH THREE TIMES DAILY FOR 5 DAYS AT SIGN OF FEVER BLISTER 90 tablet 0     azelastine (ASTELIN) 137 mcg (0.1 %) nasal spray 1 spray (137 mcg total) by Nasal route 2 (two) times daily. 30 mL 6     Bifidobacterium infantis (ALIGN) 4 mg Cap Take 4 mg by mouth once daily.        cetirizine (ZYRTEC) 10 MG tablet Take 10 mg by mouth once daily.       chlorthalidone (HYGROTEN) 25 MG Tab Take 1 tablet (25 mg total) by mouth once daily. 90 tablet 3     citalopram (CELEXA) 20 MG tablet TAKE 1 TABLET(20 MG) BY MOUTH EVERY DAY 90 tablet 3     cyclobenzaprine (FLEXERIL) 10 MG tablet TAKE 1 TABLET(10 MG) BY MOUTH THREE TIMES DAILY AS NEEDED 30 tablet 3     finasteride (PROSCAR) 5 mg tablet TAKE 1 TABLET(5 MG) BY MOUTH EVERY DAY 90 tablet 3     fish oil-omega-3 fatty acids 300-1,000 mg capsule Take 1 g by mouth once daily.       gemfibroziL (LOPID) 600 MG tablet Take 1 tablet (600 mg total) by mouth 2 (two) times daily before meals. 180 tablet 3     irbesartan (AVAPRO) 150 MG tablet Take 150 mg by mouth every evening.       mupirocin (BACTROBAN) 2 % ointment Apply topically 2 (two) times daily. 30 g 0     mv-mn/iron/folic acid/herb 190 (VITAMIN D3 COMPLETE ORAL) Take 1 tablet by mouth once daily.       rivaroxaban (XARELTO) 20 mg Tab Take  1 tablet (20 mg total) by mouth once daily at 6am. 90 tablet 3     sod sulf-pot chloride-mag sulf (SUTAB) 1.479-0.188- 0.225 gram tablet Take 12 tablets by mouth once daily. Take according to package instructions with indicated amount of water. 24 tablet 0     tamsulosin (FLOMAX) 0.4 mg Cap TAKE 1 CAPSULE(0.4 MG) BY MOUTH EVERY DAY 90 capsule 1     timolol maleate 0.5% (TIMOPTIC-XE) 0.5 % SolG INSTILL 1 DROP IN BOTH EYES EVERY MORNING 5 mL 12     vitamin E 1000 UNIT capsule Take 2,000 Units by mouth once daily.            Physical Exam:    Vital Signs: There were no vitals filed for this visit.    General Appearance: Well appearing in no acute distress  Eyes:    No scleral icterus  ENT: Neck supple, Lips, mucosa, and tongue normal; teeth and gums normal  Abdomen: Soft, non tender, non distended with positive bowel sounds. No hepatosplenomegaly, ascites, or mass.  Extremities: 2+ pulses, no clubbing, cyanosis or edema  Skin: No rash      Labs:  Lab Results   Component Value Date    WBC 8.91 01/13/2023    HGB 13.0 (L) 01/13/2023    HCT 39.6 (L) 01/13/2023     01/13/2023    CHOL 132 07/05/2022    TRIG 170 (H) 07/05/2022    HDL 32 (L) 07/05/2022    ALT 27 01/13/2023    AST 21 01/13/2023     01/13/2023    K 3.6 01/13/2023     01/13/2023    CREATININE 1.0 01/13/2023    BUN 17 01/13/2023    CO2 24 01/13/2023    TSH 0.957 12/13/2021    PSA 1.3 07/05/2022    INR 2.4 (A) 03/22/2013    GLUF 118 (H) 12/05/2017    HGBA1C 6.4 (H) 07/05/2022       I have explained the risks and benefits of endoscopy procedures to the patient including but not limited to bleeding, perforation, infection, and death.  The patient was asked if they understand and allowed to ask any further questions to their satisfaction.    Geovanny Charles MD

## 2023-02-08 NOTE — ANESTHESIA PREPROCEDURE EVALUATION
02/08/2023  Pre-operative evaluation for Procedure(s) (LRB):  COLONOSCOPY (N/A)    Edis Huggins Jr. is a 77 y.o. male     Patient Active Problem List   Diagnosis    Mixed hyperlipidemia    Essential hypertension    Anxiety    BPH with urinary obstruction    POAG (primary open-angle glaucoma) - Both Eyes    Steroid responders - Both Eyes    Afferent pupillary defect of right eye    Carotid atherosclerosis    Sensorineural hearing loss    Normocytic anemia -- iron and B12 normal    Nuclear sclerotic cataract of right eye    Dyspnea on exertion    Morbid obesity with BMI of 40.0-44.9, adult    Paroxysmal atrial fibrillation    Testosterone deficiency in male    Erectile dysfunction due to arterial insufficiency    Aortic atherosclerosis -- CTA 06/2018    Burn any degree involving 10-19 percent of body surface    Lumbar radiculopathy    Chronic bilateral low back pain with right-sided sciatica    Neuroforaminal stenosis of lumbar spine    Lumbar spondylosis    Chronic pain    CARMELA on CPAP    Prediabetes    Encounter for long-term (current) use of other medications    Statin intolerance    Thrombocytopenia    Chronic anticoagulation       Review of patient's allergies indicates:  No Known Allergies    No current facility-administered medications on file prior to encounter.     Current Outpatient Medications on File Prior to Encounter   Medication Sig Dispense Refill    acyclovir (ZOVIRAX) 400 MG tablet TAKE 1 TABLET BY MOUTH THREE TIMES DAILY FOR 5 DAYS AT SIGN OF FEVER BLISTER 90 tablet 0    azelastine (ASTELIN) 137 mcg (0.1 %) nasal spray 1 spray (137 mcg total) by Nasal route 2 (two) times daily. 30 mL 6    Bifidobacterium infantis (ALIGN) 4 mg Cap Take 4 mg by mouth once daily.       cetirizine (ZYRTEC) 10 MG tablet Take 10 mg by mouth once daily.      citalopram (CELEXA)  20 MG tablet TAKE 1 TABLET(20 MG) BY MOUTH EVERY DAY 90 tablet 3    cyclobenzaprine (FLEXERIL) 10 MG tablet TAKE 1 TABLET(10 MG) BY MOUTH THREE TIMES DAILY AS NEEDED 30 tablet 3    finasteride (PROSCAR) 5 mg tablet TAKE 1 TABLET(5 MG) BY MOUTH EVERY DAY 90 tablet 3    fish oil-omega-3 fatty acids 300-1,000 mg capsule Take 1 g by mouth once daily.      gemfibroziL (LOPID) 600 MG tablet Take 1 tablet (600 mg total) by mouth 2 (two) times daily before meals. 180 tablet 3    mupirocin (BACTROBAN) 2 % ointment Apply topically 2 (two) times daily. 30 g 0    mv-mn/iron/folic acid/herb 190 (VITAMIN D3 COMPLETE ORAL) Take 1 tablet by mouth once daily.      rivaroxaban (XARELTO) 20 mg Tab Take 1 tablet (20 mg total) by mouth once daily at 6am. 90 tablet 3    vitamin E 1000 UNIT capsule Take 2,000 Units by mouth once daily.         Past Surgical History:   Procedure Laterality Date    CATARACT EXTRACTION W/  INTRAOCULAR LENS IMPLANT Left 02/08/2017    WITH KAHOOK GONIOTOMY (DR. WHITLEY)    CATARACT EXTRACTION W/  INTRAOCULAR LENS IMPLANT Right 05/10/2017        EYE SURGERY      HERNIA REPAIR      x 2    JOINT REPLACEMENT      SKIN BIOPSY      in my [facility] record    TONSILLECTOMY      TOTAL KNEE ARTHROPLASTY Right     TRANSFORAMINAL EPIDURAL INJECTION OF STEROID Right 6/25/2019    Procedure: Transforaminal Epidural Steroid Injection, Right, L3-4, L4-5;  Surgeon: Eduard Mukherjee Jr., MD;  Location: [facility];  Service: Pain Management;  Laterality: Right;    TRANSFORAMINAL EPIDURAL INJECTION OF STEROID Right 5/26/2020    Procedure: Injection,steroid,epidural,transforaminal approach--Right L3-4, L4-5;  Surgeon: Eduard Mukherjee Jr., MD;  Location: [facility];  Service: Pain Management;  Laterality: Right;    YAG CAPSULOTOMY\ Right 01/16/2020           Social History     Socioeconomic History    Marital status:    Occupational History    Occupation:       Employer: [employer]   Tobacco Use    Smoking status: Former     Packs/day: 1.00     Years: 10.00     Pack years: 10.00     Types: Cigarettes     Quit date: 1974     Years since quittin.1    Smokeless tobacco: Never   Substance and Sexual Activity    Alcohol use: Yes     Alcohol/week: 1.0 standard drink     Types: 1 Standard drinks or equivalent per week     Comment: I do not drink every week.    Drug use: Never    Sexual activity: Not Currently     Partners: Female     Birth control/protection: None     Social Determinants of Health     Financial Resource Strain: Low Risk     Difficulty of Paying Living Expenses: Not hard at all   Food Insecurity: No Food Insecurity    Worried About Running Out of Food in the Last Year: Never true    Ran Out of Food in the Last Year: Never true   Transportation Needs: No Transportation Needs    Lack of Transportation (Medical): No    Lack of Transportation (Non-Medical): No   Physical Activity: Inactive    Days of Exercise per Week: 0 days    Minutes of Exercise per Session: 0 min   Stress: No Stress Concern Present    Feeling of Stress : Only a little   Social Connections: Unknown    Frequency of Communication with Friends and Family: More than three times a week    Frequency of Social Gatherings with Friends and Family: More than three times a week    Active Member of Clubs or Organizations: Yes    Attends Club or Organization Meetings: More than 4 times per year    Marital Status:    Housing Stability: Low Risk     Unable to Pay for Housing in the Last Year: No    Number of Places Lived in the Last Year: 1    Unstable Housing in the Last Year: No         EKG:  Baseline Artifact   Normal sinus rhythm   Normal ECG   When compared with ECG of 14-MAR-2018 15:19,   No significant change was found   Confirmed by Kim Corrales MD ([id number]) on 3/17/2020 3:33:13 PM     2D Echo:  Results for orders placed or performed during the hospital  encounter of 06/28/18   2D Echo w/ Color Flow Doppler   Result Value Ref Range    EF + QEF 60 55 - 65    Diastolic Dysfunction No     Est. PA Systolic Pressure 32.16     Mitral Valve Mobility NORMAL     Tricuspid Valve Regurgitation TRIVIAL TO MILD            Pre-op Assessment    I have reviewed the Patient Summary Reports.     I have reviewed the Nursing Notes. I have reviewed the NPO Status.   I have reviewed the Medications.     Review of Systems  Anesthesia Hx:  Denies Family Hx of Anesthesia complications.   Denies Personal Hx of Anesthesia complications.   Hematology/Oncology:         -- Anemia:   Cardiovascular:   Exercise tolerance: good Hypertension  Denies Angina.  Denies RESENDEZ.    Pulmonary:   Denies Recent URI. Sleep Apnea    Education provided regarding risk of obstructive sleep apnea     Hepatic/GI:   Denies GERD.    Musculoskeletal:   Arthritis   Spine Disorders: Chronic Pain    Neurological:   Denies Seizures.   Peripheral Neuropathy    Endocrine:   Denies Diabetes.        Physical Exam  General: Well nourished, Cooperative, Alert and Oriented    Airway:  Mallampati: III / II  Mouth Opening: Normal  TM Distance: 4 - 6 cm  Tongue: Normal  Neck ROM: Left Lateral Motion Decreased    Dental:  Intact, Periodontal disease, Caps / Implants    Chest/Lungs:  Clear to auscultation, Normal Respiratory Rate    Heart:  Rate: Normal  Rhythm: Regular Rhythm        Anesthesia Plan  Type of Anesthesia, risks & benefits discussed:    Anesthesia Type: Gen Natural Airway, MAC  Intra-op Monitoring Plan: Standard ASA Monitors  Post Op Pain Control Plan: multimodal analgesia  Induction:  IV  Informed Consent: Informed consent signed with the Patient and all parties understand the risks and agree with anesthesia plan.  All questions answered. Patient consented to blood products? Yes  ASA Score: 3  Day of Surgery Review of History & Physical: H&P Update referred to the surgeon/provider.    Ready For Surgery From Anesthesia  Perspective.     .

## 2023-02-09 VITALS
SYSTOLIC BLOOD PRESSURE: 117 MMHG | RESPIRATION RATE: 15 BRPM | HEIGHT: 60 IN | OXYGEN SATURATION: 100 % | BODY MASS INDEX: 54.88 KG/M2 | HEART RATE: 53 BPM | TEMPERATURE: 98 F | DIASTOLIC BLOOD PRESSURE: 62 MMHG

## 2023-02-09 NOTE — ANESTHESIA POSTPROCEDURE EVALUATION
Anesthesia Post Evaluation    Patient: Edis Huggins JrFrank    Procedure(s) Performed: Procedure(s) (LRB):  COLONOSCOPY (N/A)    Final Anesthesia Type: general      Patient location during evaluation: PACU  Patient participation: Yes- Able to Participate  Level of consciousness: awake and alert and oriented  Post-procedure vital signs: reviewed and stable  Pain management: adequate  Airway patency: patent    PONV status at discharge: No PONV  Anesthetic complications: no      Cardiovascular status: blood pressure returned to baseline and hemodynamically stable  Respiratory status: unassisted, room air and spontaneous ventilation  Hydration status: euvolemic  Follow-up not needed.          Vitals Value Taken Time   /62 02/08/23 1609   Temp 36.4 °C (97.5 °F) 02/08/23 1539   Pulse 53 02/08/23 1609   Resp 15 02/08/23 1609   SpO2 100 % 02/08/23 1609         Event Time   Out of Recovery 16:18:13         Pain/Channing Score: Channing Score: 10 (2/8/2023  4:09 PM)

## 2023-02-10 ENCOUNTER — HOSPITAL ENCOUNTER (OUTPATIENT)
Dept: CARDIOLOGY | Facility: HOSPITAL | Age: 78
Discharge: HOME | End: 2023-02-10
Attending: INTERNAL MEDICINE
Payer: COMMERCIAL

## 2023-02-10 PROCEDURE — 93226 XTRNL ECG REC<48 HR SCAN A/R: CPT

## 2023-02-10 PROCEDURE — 93227 HOLTER MONITOR - 48 HOUR (CUPID ONLY): ICD-10-PCS | Mod: ,,, | Performed by: INTERNAL MEDICINE

## 2023-02-10 PROCEDURE — 93227 XTRNL ECG REC<48 HR R&I: CPT | Mod: ,,, | Performed by: INTERNAL MEDICINE

## 2023-02-13 LAB
OHS CV EVENT MONITOR DAY: 0
OHS CV HOLTER LENGTH DECIMAL HOURS: 23.98
OHS CV HOLTER LENGTH HOURS: 23
OHS CV HOLTER LENGTH MINUTES: 59
OHS CV HOLTER SINUS AVERAGE HR: 59
OHS CV HOLTER SINUS MAX HR: 89
OHS CV HOLTER SINUS MIN HR: 42

## 2023-02-14 LAB
COMMENT: NORMAL
FINAL PATHOLOGIC DIAGNOSIS: NORMAL
GROSS: NORMAL
Lab: NORMAL
MICROSCOPIC EXAM: NORMAL

## 2023-02-20 ENCOUNTER — OFFICE VISIT (OUTPATIENT)
Dept: DERMATOLOGY | Facility: CLINIC | Age: 78
End: 2023-02-20
Payer: COMMERCIAL

## 2023-02-20 ENCOUNTER — PROCEDURE VISIT (OUTPATIENT)
Dept: DERMATOLOGY | Facility: CLINIC | Age: 78
End: 2023-02-20

## 2023-02-20 ENCOUNTER — TELEPHONE (OUTPATIENT)
Dept: DERMATOLOGY | Facility: CLINIC | Age: 78
End: 2023-02-20
Payer: COMMERCIAL

## 2023-02-20 DIAGNOSIS — Z85.828 HISTORY OF NONMELANOMA SKIN CANCER: ICD-10-CM

## 2023-02-20 DIAGNOSIS — D22.9 MULTIPLE BENIGN NEVI: ICD-10-CM

## 2023-02-20 DIAGNOSIS — Z41.1 ELECTIVE PROCEDURE FOR UNACCEPTABLE COSMETIC APPEARANCE: Primary | ICD-10-CM

## 2023-02-20 DIAGNOSIS — L57.0 AK (ACTINIC KERATOSIS): Primary | ICD-10-CM

## 2023-02-20 DIAGNOSIS — L82.1 SEBORRHEIC KERATOSIS: ICD-10-CM

## 2023-02-20 PROCEDURE — 17110 PR DESTRUCTION BENIGN LESIONS UP TO 14: ICD-10-PCS | Mod: CSM,S$GLB,, | Performed by: DERMATOLOGY

## 2023-02-20 PROCEDURE — 17000 PR DESTRUCTION(LASER SURGERY,CRYOSURGERY,CHEMOSURGERY),PREMALIGNANT LESIONS,FIRST LESION: ICD-10-PCS | Mod: S$GLB,,, | Performed by: DERMATOLOGY

## 2023-02-20 PROCEDURE — 3288F FALL RISK ASSESSMENT DOCD: CPT | Mod: CPTII,S$GLB,, | Performed by: DERMATOLOGY

## 2023-02-20 PROCEDURE — 1160F PR REVIEW ALL MEDS BY PRESCRIBER/CLIN PHARMACIST DOCUMENTED: ICD-10-PCS | Mod: CPTII,S$GLB,, | Performed by: DERMATOLOGY

## 2023-02-20 PROCEDURE — 17003 DESTRUCTION, PREMALIGNANT LESIONS; SECOND THROUGH 14 LESIONS: ICD-10-PCS | Mod: S$GLB,,, | Performed by: DERMATOLOGY

## 2023-02-20 PROCEDURE — 1126F AMNT PAIN NOTED NONE PRSNT: CPT | Mod: CPTII,S$GLB,, | Performed by: DERMATOLOGY

## 2023-02-20 PROCEDURE — 1101F PR PT FALLS ASSESS DOC 0-1 FALLS W/OUT INJ PAST YR: ICD-10-PCS | Mod: CPTII,S$GLB,, | Performed by: DERMATOLOGY

## 2023-02-20 PROCEDURE — 99999 PR PBB SHADOW E&M-EST. PATIENT-LVL III: CPT | Mod: PBBFAC,,, | Performed by: DERMATOLOGY

## 2023-02-20 PROCEDURE — 17003 DESTRUCT PREMALG LES 2-14: CPT | Mod: S$GLB,,, | Performed by: DERMATOLOGY

## 2023-02-20 PROCEDURE — 3288F PR FALLS RISK ASSESSMENT DOCUMENTED: ICD-10-PCS | Mod: CPTII,S$GLB,, | Performed by: DERMATOLOGY

## 2023-02-20 PROCEDURE — 1159F PR MEDICATION LIST DOCUMENTED IN MEDICAL RECORD: ICD-10-PCS | Mod: CPTII,S$GLB,, | Performed by: DERMATOLOGY

## 2023-02-20 PROCEDURE — 99999 PR PBB SHADOW E&M-EST. PATIENT-LVL III: ICD-10-PCS | Mod: PBBFAC,,, | Performed by: DERMATOLOGY

## 2023-02-20 PROCEDURE — 1126F PR PAIN SEVERITY QUANTIFIED, NO PAIN PRESENT: ICD-10-PCS | Mod: CPTII,S$GLB,, | Performed by: DERMATOLOGY

## 2023-02-20 PROCEDURE — 1157F ADVNC CARE PLAN IN RCRD: CPT | Mod: CPTII,S$GLB,, | Performed by: DERMATOLOGY

## 2023-02-20 PROCEDURE — 17000 DESTRUCT PREMALG LESION: CPT | Mod: S$GLB,,, | Performed by: DERMATOLOGY

## 2023-02-20 PROCEDURE — 99499 UNLISTED E&M SERVICE: CPT | Mod: S$GLB,,, | Performed by: DERMATOLOGY

## 2023-02-20 PROCEDURE — 1101F PT FALLS ASSESS-DOCD LE1/YR: CPT | Mod: CPTII,S$GLB,, | Performed by: DERMATOLOGY

## 2023-02-20 PROCEDURE — 99499 NO LOS: ICD-10-PCS | Mod: S$GLB,,, | Performed by: DERMATOLOGY

## 2023-02-20 PROCEDURE — 17110 DESTRUCTION B9 LES UP TO 14: CPT | Mod: CSM,S$GLB,, | Performed by: DERMATOLOGY

## 2023-02-20 PROCEDURE — 99213 PR OFFICE/OUTPT VISIT, EST, LEVL III, 20-29 MIN: ICD-10-PCS | Mod: 25,S$GLB,, | Performed by: DERMATOLOGY

## 2023-02-20 PROCEDURE — 1160F RVW MEDS BY RX/DR IN RCRD: CPT | Mod: CPTII,S$GLB,, | Performed by: DERMATOLOGY

## 2023-02-20 PROCEDURE — 99213 OFFICE O/P EST LOW 20 MIN: CPT | Mod: 25,S$GLB,, | Performed by: DERMATOLOGY

## 2023-02-20 PROCEDURE — 1159F MED LIST DOCD IN RCRD: CPT | Mod: CPTII,S$GLB,, | Performed by: DERMATOLOGY

## 2023-02-20 PROCEDURE — 1157F PR ADVANCE CARE PLAN OR EQUIV PRESENT IN MEDICAL RECORD: ICD-10-PCS | Mod: CPTII,S$GLB,, | Performed by: DERMATOLOGY

## 2023-02-20 NOTE — PATIENT INSTRUCTIONS
CRYOSURGERY      Your doctor has used a method called cryosurgery to treat your skin condition. Cryosurgery refers to the use of very cold substances to treat a variety of skin conditions such as warts, pre-skin cancers, molluscum contagiosum, sun spots, and several benign growths. The substance we use in cryosurgery is liquid nitrogen and is so cold (-195 degrees Celsius) that is burns when administered.     Following treatment in the office, the skin may immediately burn and become red. You may find the area around the lesion is affected as well. It is sometimes necessary to treat not only the lesion, but a small area of the surrounding normal skin to achieve a good response.     A blister, and even a blood filled blister, may form after treatment.   This is a normal response. If the blister is painful, it is acceptable to sterilize a needle and with rubbing alcohol and gently pop the blister. It is important that you gently wash the area with soap and warm water as the blister fluid may contain wart virus if a wart was treated. Do no remove the roof of the blister.     The area treated can take anywhere from 1-3 weeks to heal. Healing time depends on the kind skin lesion treated, the location, and how aggressively the lesion was treated. It is recommended that the areas treated are covered with Vaseline or bacitracin ointment and a band-aid. If a band-aid is not practical, just ointment applied several times per day will do. Keeping these areas moist will speed the healing time.    Treatment with liquid nitrogen can leave a scar. In dark skin, it may be a light or dark scar, in light skin it may be a white or pink scar. These will generally fade with time, but may never go away completely.     If you have any concerns after your treatment, please feel free to call the office.       [address] [facility] [place] [address]/ [phone] FAX/ [email or web address]   SEBORRHEIC KERATOSES        What  causes seborrheic keratoses?    Seborrheic keratoses are harmless, common skin growths that first appear during adult life.  As time goes by, more growths appear.  Some persons have a very large number of them.  Seborrheic keratoses appear on both covered and uncovered parts of the body; they are not caused by sunlight.  The tendency to develop seborrheic keratoses is inherited.    Seborrheic keratoses are harmless and never become malignant.  They begin as slightly raised, light brown spots.  Gradually they thicken and take on a rough wartlike surface.  They slowly darken and may turn black.  These color changes are harmless.  Seborrheic keratoses are superficial and look as if they were stuck on the skin.  Persons who have had several seborrheic keratoses can usually recognize this type of benign growth.  However, if you are concerned or unsure about any growth, consult me.    Treatment    Seborrheic keratoses can easily be removed in the office.  The only reason for removing a seborrheic keratosis is your wish to get rid of it.

## 2023-02-20 NOTE — PATIENT INSTRUCTIONS

## 2023-02-20 NOTE — PROGRESS NOTES
Subjective:       Patient ID:  Edis Huggins Jr. is a 77 y.o. male who presents for   Chief Complaint   Patient presents with    Lesion     R cheek, nose    Skin Check     UBSE     History of Present Illness: The patient presents for follow up of skin check.    The patient was last seen on: 8/02/21 for skin check and excoriations on lower legs which have since resolved.  This is a high risk patient here to check for the development of new lesions.    Other skin complaints:   Patient with new complaint of lesion(s)  Location: R cheek  Duration: 1 year  Symptoms: none  Relieving factors/Previous treatments: none    Patient with new complaint of lesion(s)  Location: nose  Duration: 1 year+  Symptoms: bleeds occ  Relieving factors/Previous treatments: none    Review of Systems   Skin:  Positive for wears hat (when outside). Negative for daily sunscreen use, activity-related sunscreen use and recent sunburn.   Hematologic/Lymphatic: Bruises/bleeds easily.      Objective:    Physical Exam   Constitutional: He appears well-developed and well-nourished. He is obese.  No distress.   Neurological: He is alert and oriented to person, place, and time. He is not disoriented.   Psychiatric: He has a normal mood and affect.   Skin:   Areas Examined (abnormalities noted in diagram):   Scalp / Hair Palpated and Inspected  Head / Face Inspection Performed  Neck Inspection Performed  Chest / Axilla Inspection Performed  Back Inspection Performed  RUE Inspected  LUE Inspection Performed                 Diagram Legend     Erythematous scaling macule/papule c/w actinic keratosis       Vascular papule c/w angioma      Pigmented verrucoid papule/plaque c/w seborrheic keratosis      Yellow umbilicated papule c/w sebaceous hyperplasia      Irregularly shaped tan macule c/w lentigo     1-2 mm smooth white papules consistent with Milia      Movable subcutaneous cyst with punctum c/w epidermal inclusion cyst      Subcutaneous movable  cyst c/w pilar cyst      Firm pink to brown papule c/w dermatofibroma      Pedunculated fleshy papule(s) c/w skin tag(s)      Evenly pigmented macule c/w junctional nevus     Mildly variegated pigmented, slightly irregular-bordered macule c/w mildly atypical nevus      Flesh colored to evenly pigmented papule c/w intradermal nevus       Pink pearly papule/plaque c/w basal cell carcinoma      Erythematous hyperkeratotic cursted plaque c/w SCC      Surgical scar with no sign of skin cancer recurrence      Open and closed comedones      Inflammatory papules and pustules      Verrucoid papule consistent consistent with wart     Erythematous eczematous patches and plaques     Dystrophic onycholytic nail with subungual debris c/w onychomycosis     Umbilicated papule    Erythematous-base heme-crusted tan verrucoid plaque consistent with inflamed seborrheic keratosis     Erythematous Silvery Scaling Plaque c/w Psoriasis     See annotation      Assessment / Plan:        AK (actinic keratosis)  Cryosurgery Procedure Note    Verbal consent from the patient is obtained including, but not limited to, risk of hypopigmentation/hyperpigmentation, scar, recurrence of lesion. The patient is aware of the precancerous quality and need for treatment of these lesions. Liquid nitrogen cryosurgery is applied to the 2 actinic keratoses, as detailed in the physical exam, to produce a freeze injury. The patient is aware that blisters may form and is instructed on wound care with gentle cleansing and use of vaseline ointment to keep moist until healed. The patient is supplied a handout on cryosurgery and is instructed to call if lesions do not completely resolve.    Seborrheic keratosis  These are benign inherited growths without a malignant potential. Reassurance given to patient. No treatment is necessary.       Multiple benign nevi   - minor problem and chronic.   Reassurance given to patient. No treatment necessary.       History of  nonmelanoma skin cancer  Area(s) of previous NMSC evaluated with no signs of recurrence.    Upper body skin examination performed today including at least 6 points as noted in physical examination. No lesions suspicious for malignancy noted.    Recommend daily sun protection/avoidance and use of at least SPF 30, broad spectrum sunscreen (OTC drug).                Follow up in about 1 year (around 2/20/2024).

## 2023-02-20 NOTE — PROGRESS NOTES
Here for cosmetic destruction of seborrheic keratoses on right face.    Procedure note for destruction via hyfrecation and curettage:    Verbal consent obtained. Risks of recurrence and scarring discussed.   4 lesions cleaned with alcohol and anesthetized with 1% lidocaine with epinephrine. Areas then lightly hyfrecated and curetted to remove gross lesion. Hemostasis achieved with aluminum chloride application. No complications.   Areas dressed with Vaseline jelly and bandage. Wound care discussed. $150    Pt tolerated well    F/u prn

## 2023-02-23 ENCOUNTER — OFFICE VISIT (OUTPATIENT)
Dept: PAIN MEDICINE | Facility: CLINIC | Age: 78
End: 2023-02-23
Payer: COMMERCIAL

## 2023-02-23 ENCOUNTER — OFFICE VISIT (OUTPATIENT)
Dept: CARDIOLOGY | Facility: CLINIC | Age: 78
End: 2023-02-23
Payer: COMMERCIAL

## 2023-02-23 VITALS
HEART RATE: 67 BPM | BODY MASS INDEX: 54.9 KG/M2 | SYSTOLIC BLOOD PRESSURE: 113 MMHG | DIASTOLIC BLOOD PRESSURE: 65 MMHG | WEIGHT: 281.06 LBS

## 2023-02-23 DIAGNOSIS — G47.33 OSA ON CPAP: ICD-10-CM

## 2023-02-23 DIAGNOSIS — M70.61 GREATER TROCHANTERIC BURSITIS OF RIGHT HIP: ICD-10-CM

## 2023-02-23 DIAGNOSIS — M25.551 HIP PAIN, RIGHT: Primary | ICD-10-CM

## 2023-02-23 DIAGNOSIS — E78.2 MIXED HYPERLIPIDEMIA: ICD-10-CM

## 2023-02-23 DIAGNOSIS — I70.0 AORTIC ATHEROSCLEROSIS: ICD-10-CM

## 2023-02-23 DIAGNOSIS — I48.0 PAROXYSMAL ATRIAL FIBRILLATION: ICD-10-CM

## 2023-02-23 DIAGNOSIS — I10 ESSENTIAL HYPERTENSION: Primary | ICD-10-CM

## 2023-02-23 DIAGNOSIS — R06.09 DYSPNEA ON EXERTION: ICD-10-CM

## 2023-02-23 DIAGNOSIS — Z78.9 STATIN INTOLERANCE: ICD-10-CM

## 2023-02-23 DIAGNOSIS — E66.01 MORBID OBESITY WITH BMI OF 40.0-44.9, ADULT: ICD-10-CM

## 2023-02-23 PROCEDURE — 3078F PR MOST RECENT DIASTOLIC BLOOD PRESSURE < 80 MM HG: ICD-10-PCS | Mod: CPTII,S$GLB,, | Performed by: STUDENT IN AN ORGANIZED HEALTH CARE EDUCATION/TRAINING PROGRAM

## 2023-02-23 PROCEDURE — 3074F SYST BP LT 130 MM HG: CPT | Mod: CPTII,S$GLB,, | Performed by: STUDENT IN AN ORGANIZED HEALTH CARE EDUCATION/TRAINING PROGRAM

## 2023-02-23 PROCEDURE — 1160F PR REVIEW ALL MEDS BY PRESCRIBER/CLIN PHARMACIST DOCUMENTED: ICD-10-PCS | Mod: CPTII,S$GLB,, | Performed by: STUDENT IN AN ORGANIZED HEALTH CARE EDUCATION/TRAINING PROGRAM

## 2023-02-23 PROCEDURE — 1126F AMNT PAIN NOTED NONE PRSNT: CPT | Mod: CPTII,S$GLB,, | Performed by: STUDENT IN AN ORGANIZED HEALTH CARE EDUCATION/TRAINING PROGRAM

## 2023-02-23 PROCEDURE — 99999 PR PBB SHADOW E&M-EST. PATIENT-LVL III: CPT | Mod: PBBFAC,,, | Performed by: STUDENT IN AN ORGANIZED HEALTH CARE EDUCATION/TRAINING PROGRAM

## 2023-02-23 PROCEDURE — 99214 OFFICE O/P EST MOD 30 MIN: CPT | Mod: 95,,, | Performed by: INTERNAL MEDICINE

## 2023-02-23 PROCEDURE — 1157F ADVNC CARE PLAN IN RCRD: CPT | Mod: CPTII,S$GLB,, | Performed by: STUDENT IN AN ORGANIZED HEALTH CARE EDUCATION/TRAINING PROGRAM

## 2023-02-23 PROCEDURE — 99999 PR PBB SHADOW E&M-EST. PATIENT-LVL III: ICD-10-PCS | Mod: PBBFAC,,, | Performed by: STUDENT IN AN ORGANIZED HEALTH CARE EDUCATION/TRAINING PROGRAM

## 2023-02-23 PROCEDURE — 99213 PR OFFICE/OUTPT VISIT, EST, LEVL III, 20-29 MIN: ICD-10-PCS | Mod: S$GLB,,, | Performed by: STUDENT IN AN ORGANIZED HEALTH CARE EDUCATION/TRAINING PROGRAM

## 2023-02-23 PROCEDURE — 1160F RVW MEDS BY RX/DR IN RCRD: CPT | Mod: CPTII,95,, | Performed by: INTERNAL MEDICINE

## 2023-02-23 PROCEDURE — 99213 OFFICE O/P EST LOW 20 MIN: CPT | Mod: S$GLB,,, | Performed by: STUDENT IN AN ORGANIZED HEALTH CARE EDUCATION/TRAINING PROGRAM

## 2023-02-23 PROCEDURE — 1157F ADVNC CARE PLAN IN RCRD: CPT | Mod: CPTII,95,, | Performed by: INTERNAL MEDICINE

## 2023-02-23 PROCEDURE — 1160F RVW MEDS BY RX/DR IN RCRD: CPT | Mod: CPTII,S$GLB,, | Performed by: STUDENT IN AN ORGANIZED HEALTH CARE EDUCATION/TRAINING PROGRAM

## 2023-02-23 PROCEDURE — 1157F PR ADVANCE CARE PLAN OR EQUIV PRESENT IN MEDICAL RECORD: ICD-10-PCS | Mod: CPTII,S$GLB,, | Performed by: STUDENT IN AN ORGANIZED HEALTH CARE EDUCATION/TRAINING PROGRAM

## 2023-02-23 PROCEDURE — 1159F MED LIST DOCD IN RCRD: CPT | Mod: CPTII,95,, | Performed by: INTERNAL MEDICINE

## 2023-02-23 PROCEDURE — 1126F PR PAIN SEVERITY QUANTIFIED, NO PAIN PRESENT: ICD-10-PCS | Mod: CPTII,S$GLB,, | Performed by: STUDENT IN AN ORGANIZED HEALTH CARE EDUCATION/TRAINING PROGRAM

## 2023-02-23 PROCEDURE — 1157F PR ADVANCE CARE PLAN OR EQUIV PRESENT IN MEDICAL RECORD: ICD-10-PCS | Mod: CPTII,95,, | Performed by: INTERNAL MEDICINE

## 2023-02-23 PROCEDURE — 1160F PR REVIEW ALL MEDS BY PRESCRIBER/CLIN PHARMACIST DOCUMENTED: ICD-10-PCS | Mod: CPTII,95,, | Performed by: INTERNAL MEDICINE

## 2023-02-23 PROCEDURE — 1159F PR MEDICATION LIST DOCUMENTED IN MEDICAL RECORD: ICD-10-PCS | Mod: CPTII,S$GLB,, | Performed by: STUDENT IN AN ORGANIZED HEALTH CARE EDUCATION/TRAINING PROGRAM

## 2023-02-23 PROCEDURE — 1159F PR MEDICATION LIST DOCUMENTED IN MEDICAL RECORD: ICD-10-PCS | Mod: CPTII,95,, | Performed by: INTERNAL MEDICINE

## 2023-02-23 PROCEDURE — 99214 PR OFFICE/OUTPT VISIT, EST, LEVL IV, 30-39 MIN: ICD-10-PCS | Mod: 95,,, | Performed by: INTERNAL MEDICINE

## 2023-02-23 PROCEDURE — 3078F DIAST BP <80 MM HG: CPT | Mod: CPTII,S$GLB,, | Performed by: STUDENT IN AN ORGANIZED HEALTH CARE EDUCATION/TRAINING PROGRAM

## 2023-02-23 PROCEDURE — 3074F PR MOST RECENT SYSTOLIC BLOOD PRESSURE < 130 MM HG: ICD-10-PCS | Mod: CPTII,S$GLB,, | Performed by: STUDENT IN AN ORGANIZED HEALTH CARE EDUCATION/TRAINING PROGRAM

## 2023-02-23 PROCEDURE — 1159F MED LIST DOCD IN RCRD: CPT | Mod: CPTII,S$GLB,, | Performed by: STUDENT IN AN ORGANIZED HEALTH CARE EDUCATION/TRAINING PROGRAM

## 2023-02-23 RX ORDER — IRBESARTAN 75 MG/1
75 TABLET ORAL NIGHTLY
Qty: 90 TABLET | Refills: 3 | Status: SHIPPED | OUTPATIENT
Start: 2023-02-23 | End: 2023-03-09

## 2023-02-23 NOTE — PROGRESS NOTES
[facility] Established Patient Evaluation    Chief Complaint  Chief Complaint   Patient presents with    Hip Pain     Right        Last 3 PDI Scores 2/23/2023 6/2/2020 5/6/2020   Pain Disability Index (PDI) 14 0 6       Interval Update  02/23/2023 - Edis Huggins Jr. presents today for follow up visit. He reports his low back pain remains well controlled.  Today he notes intermittent right lateral hip pain that is worse with lying on the right side at night.  He states he has been less active due to working from home and no longer taking daily walks.  He has not tried taking any medication for the hip pain. He denies any recent trauma to the hip. He reports his current pain to be 0/10.        Background from Chart Review  6/2/20 - Mr. Huggins returns to clinic for follow up visit reporting improved back pain.  Patient is s/p RIGHT L3-4 and L4-5TFESI on 5/26/20 with 100%  continued relief of his low back and right leg pain.   Pain intensity is currently 0/10.       05/06/2020 - Mr. Huggins returns to clinic for follow up visit for lower right back pain radiating down right side of leg. Pain intensity is currently 1/10.  Patient reporting only having pain when walking long distances patient states sleeping and sitting his pain is at a minimal or 0/10.  He reports pain starting in the low back radiating into his thigh stopping just above his knee.  The patient is established with our office he has previously had a right L3-4 and L4-5 TF SEB with 85% relief for approximately 6 months.     09/04/2019 - Edis Huggins Jr. presents for follow-up of chronic low back pain with radiculopathy after receiving a right L3-4 and L4-5 transforaminal epidural steroid injection on 06/25/2019.  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  He reports a pain intensity of 2/10 at this time along with a complete absence of radicular pain.  He notes occasional twinges of pain in the lumbar spine, specifically on the right side, but also states that he is very happy with the injection.    Treatment History  PT/OT:   HEP:   TENS:  Injections:               06/25/2019 - right L3-4 and L4-5 TFESI with 85% improvement in pain  05/26/20 - RIGHT L3-4 and L4-5 TFESI on  with 100% relief  Surgery: none  Medications:   - NSAIDS:   - MSK Relaxants: Flexeril  - TCAs:   - SNRIs:   - Topicals:   - Opioids:   - Anticonvulsants:     Current Pain Medications  None at this time     Full Medication List    Current Outpatient Medications:     acyclovir (ZOVIRAX) 400 MG tablet, TAKE 1 TABLET BY MOUTH THREE TIMES DAILY FOR 5 DAYS AT SIGN OF FEVER BLISTER, Disp: 90 tablet, Rfl: 0    azelastine (ASTELIN) 137 mcg (0.1 %) nasal spray, 1 spray (137 mcg total) by Nasal route 2 (two) times daily., Disp: 30 mL, Rfl: 6    Bifidobacterium infantis (ALIGN) 4 mg Cap, Take 4 mg by mouth once daily. , Disp: , Rfl:     cetirizine (ZYRTEC) 10 MG tablet, Take 10 mg by mouth once daily., Disp: , Rfl:     chlorthalidone (HYGROTEN) 25 MG Tab, Take 1 tablet (25 mg total) by mouth once daily., Disp: 30 tablet, Rfl: 11    citalopram (CELEXA) 20 MG tablet, TAKE 1 TABLET(20 MG) BY MOUTH EVERY DAY, Disp: 90 tablet, Rfl: 3    cyclobenzaprine (FLEXERIL) 10 MG tablet, TAKE 1 TABLET(10 MG) BY MOUTH THREE TIMES DAILY AS NEEDED, Disp: 30 tablet, Rfl: 3    finasteride (PROSCAR) 5 mg tablet, TAKE 1 TABLET(5 MG) BY MOUTH EVERY DAY, Disp: 90 tablet, Rfl: 3    fish oil-omega-3 fatty acids 300-1,000 mg capsule, Take 1 g by mouth once daily., Disp: , Rfl:     gemfibroziL (LOPID) 600 MG tablet, Take 1 tablet (600 mg total) by mouth 2 (two) times daily before meals., Disp: 180 tablet, Rfl: 3    irbesartan (AVAPRO) 150 MG tablet, Take 150 mg by mouth every evening., Disp: , Rfl:     mupirocin (BACTROBAN) 2 %  ointment, Apply topically 2 (two) times daily., Disp: 30 g, Rfl: 0    mv-mn/iron/folic acid/herb 190 (VITAMIN D3 COMPLETE ORAL), Take 1 tablet by mouth once daily., Disp: , Rfl:     rivaroxaban (XARELTO) 20 mg Tab, Take 1 tablet (20 mg total) by mouth once daily at 6am., Disp: 90 tablet, Rfl: 3    sod sulf-pot chloride-mag sulf (SUTAB) 1.479-0.188- 0.225 gram tablet, Take 12 tablets by mouth once daily. Take according to package instructions with indicated amount of water., Disp: 24 tablet, Rfl: 0    tamsulosin (FLOMAX) 0.4 mg Cap, TAKE 1 CAPSULE(0.4 MG) BY MOUTH EVERY DAY, Disp: 90 capsule, Rfl: 1    timolol maleate 0.5% (TIMOPTIC-XE) 0.5 % SolG, INSTILL 1 DROP IN BOTH EYES EVERY MORNING, Disp: 5 mL, Rfl: 12    vitamin E 1000 UNIT capsule, Take 2,000 Units by mouth once daily., Disp: , Rfl:      Review of Systems  ROS    GENERAL:  No weight loss, malaise or fevers.  HEENT:   No recent changes in vision or hearing  NECK:  No difficulty with swallowing. No stridor.   RESPIRATORY:  Negative for cough, wheezing or shortness of breath, patient denies any recent URI.  CARDIOVASCULAR:  Negative for chest pain, leg swelling or palpitations.  GI:  Negative for abdominal discomfort, blood in stools or black stools or change in bowel habits.  MUSCULOSKELETAL:  See HPI.  SKIN:  Negative for lesions, rash, and itching.  PSYCH:  No mood disorder or recent psychosocial stressors.    HEMATOLOGY/LYMPHOLOGY:  Negative for prolonged bleeding, bruising easily or swollen nodes.  Patient is taking xarelto.   NEURO:   No history of headaches, syncope, paralysis, seizures or tremors.  All other reviewed and negative other than HPI.      Medical History  Past Medical History:   Diagnosis Date    Allergic rhinitis due to animal (cat) (dog) hair and dander 3/20/2014    Allergy     Anemia     Anxiety     Basal cell carcinoma     BPH (benign prostatic hypertrophy)     Family history of colon cancer 3/20/2014    Family history of ischemic heart  disease 3/20/2014    Fever blister     HLD (hyperlipidemia)     HTN (hypertension)     IFG (impaired fasting glucose)     Lumbar spondylosis 6/7/2019    Pain of right hip joint     Primary open angle glaucoma     Squamous cell carcinoma of skin     Status post total knee replacement 3/20/2014        Surgical History  Past Surgical History:   Procedure Laterality Date    CATARACT EXTRACTION W/  INTRAOCULAR LENS IMPLANT Left 02/08/2017    WITH KAHOOK GONIOTOMY (DR. WHITLEY)    CATARACT EXTRACTION W/  INTRAOCULAR LENS IMPLANT Right 05/10/2017        COLONOSCOPY N/A 2/8/2023    Procedure: COLONOSCOPY;  Surgeon: Geovanny Charles MD;  Location: [facility] ENDO;  Service: Endoscopy;  Laterality: N/A;    EYE SURGERY      HERNIA REPAIR      x 2    JOINT REPLACEMENT      SKIN BIOPSY      in my [facility] record    TONSILLECTOMY      TOTAL KNEE ARTHROPLASTY Right     TRANSFORAMINAL EPIDURAL INJECTION OF STEROID Right 6/25/2019    Procedure: Transforaminal Epidural Steroid Injection, Right, L3-4, L4-5;  Surgeon: Eduard Mukherjee Jr., MD;  [place] [facility] [place] [facility] [place]  Service: Pain Management;  Laterality: Right;    TRANSFORAMINAL EPIDURAL INJECTION OF STEROID Right 5/26/2020    Procedure: Injection,steroid,epidural,transforaminal approach--Right L3-4, L4-5;  Surgeon: Eduard Mukherjee Jr., MD;  Location: [facility];  Service: Pain Management;  Laterality: Right;    YAG CAPSULOTOMY\ Right 01/16/2020            Physical Exam  Vitals:    02/23/23 0907   BP: 113/65   Pulse: 67   Weight: 127.5 kg (281 lb 1.4 oz)   PainSc: 0-No pain     Ortho/SPM Exam    PHYSICAL EXAMINATION:    GENERAL: Well appearing, in no acute distress, alert and oriented x3.  PSYCH:  Mood and affect appropriate.  SKIN: Skin color, texture, turgor normal, no rashes or lesions.  HEAD/FACE:  Normocephalic, atraumatic. Cranial nerves grossly intact.  NECK: No pain to palpation over the cervical paraspinous muscles. Spurling Negative. No pain with  neck flexion, extension, or lateral flexion.   CV: RRR with palpation of the radial artery.  PULM: No evidence of respiratory difficulty, symmetric chest rise.  GI:  Soft and non-distended.  MSK: There is normal ROM of the lumbar spine and b/l LE. Normal range of motion of the hips with sitting and standing.  There is No tenderness over the lumbar axial spine or paraspinal musculature. There is no pain over the SIJ.  There is tenderness over the right greater trochanteric bursa with palpation.  Gait is normal. Strength in b/l LE is normal.   MENTAL STATUS: A x O x 3, good concentration, speech is fluent and goal directed  CN: CN II - XII grossly intact   MOTOR: 5/5 in all muscle groups      Imaging      EXAMINATION:  XR HIP 2 VIEW RIGHT  Date:                                            04/18/2019     CLINICAL HISTORY:  Pain in right hip     TECHNIQUE:  AP view of the pelvis and frog leg lateral view of the right hip were performed.     COMPARISON:  11/03/2016     FINDINGS:  Two views of the right hip demonstrate narrowing of the hip joint.  There is some remodeling of femoral head from chronic degenerative change.  Findings are similar to the prior study.      Labs:  BMP  Lab Results   Component Value Date     01/13/2023    K 3.6 01/13/2023     01/13/2023    CO2 24 01/13/2023    BUN 17 01/13/2023    CREATININE 1.0 01/13/2023    CALCIUM 9.7 01/13/2023    ANIONGAP 12 01/13/2023    EGFRNORACEVR >60 01/13/2023     Lab Results   Component Value Date    ALT 27 01/13/2023    AST 21 01/13/2023    ALKPHOS 36 (L) 01/13/2023    BILITOT 0.5 01/13/2023       Assessment:  Problem List Items Addressed This Visit    None  Visit Diagnoses       Hip pain, right    -  Primary    Greater trochanteric bursitis of right hip              09/04/2019 - Edis Huggins Jr. is a 75 y.o. male with chronic low back pain with right-sided radiculopathy which responded extremely well to a right L3-4 and L4-5 TFESI on 06/25/2019.   Patient experienced 85% relief.  Patient was instructed to contact our clinic when the pain relief against to wear off as we can schedule him directly for another injection given that he is already experienced 10 weeks of relief.     05/06/[id number]-kzwe-old male presents with chronic low back and right-sided radiculopathy, he is status post right L3-4 and L4-5 TF SEB with 85% relief for approximately 6 months of relief, he presents today requesting to repeat his injection, he informed me that his right low back and leg pain increased over the last few weeks, pain is worse when walking he reports no pain while sleeping or sitting,   previous imaging was reviewed and discussed with the patient today, recommended repeating a right L3/4 and L4/5 TFESI, pt agreed and understood.      06/02/[id number]-klfz-old male presents status post right L3-4 and L4-5 TFESI reported 100% relief of his low back and right leg pain.  Patient is very please with his injection and overall relief.  He reports having no pain while performing his ADLs, sleeping and or daily functioning.  Recommended he continue his home exercise plan that involves stretching and muscular strengthening to help with lumbar stabilization, patient agreed understood.    02/23/2023 - Edis Huggins . is a 77 y.o. male with right hip pain consistent with right greater trochanteric bursitis.  I recommend OTC NSAIDs, Topicals, and a HEP.  We may consider a GTB injection if pain worsens or persists.       Plan & Recommendations  Procedures: None indicated at this time. May benefit from a right GTB injection if pain worsens or persists.   Medications: I recommended OTC NSAIDs such as Ibuprofen or Aleve as needed.  I also recommended heat and ice as well as OTC topicals including such medications as lidocaine cream, lidocaine patches, icy hot, biofreeze, or voltaren gel.   Imaging: No additional imaging required at this time.  PT/OT: None  HEP: I encouraged the patient  to maintain a home exercise regimen that includes daily, moderate cardiovascular exercise and gentle stretches.  I provided the patient with a handout including various home exercises. I also encouraged light, daily stretching focused on the target area.  Follow Up: RTC PRCORINNE Chadwick DO  Interventional Pain Medicine / Anesthesiology    Disclaimer: This note was partly generated using dictation software which may occasionally result in transcription errors.

## 2023-02-23 NOTE — PROGRESS NOTES
Subjective:    Patient ID:  Edis Huggins Jr. is a 77 y.o. male who presents for follow-up of Hypertension      HPI    The patient location is: [place], home  The chief complaint leading to consultation is: Hypotension    Visit type: audiovisual    Face to Face time with patient: 30  45 minutes of total time spent on the encounter, which includes face to face time and non-face to face time preparing to see the patient (eg, review of tests), Obtaining and/or reviewing separately obtained history, Documenting clinical information in the electronic or other health record, Independently interpreting results (not separately reported) and communicating results to the patient/family/caregiver, or Care coordination (not separately reported).         Each patient to whom he or she provides medical services by telemedicine is:  (1) informed of the relationship between the physician and patient and the respective role of any other health care provider with respect to management of the patient; and (2) notified that he or she may decline to receive medical services by telemedicine and may withdraw from such care at any time.    Notes:     76 y/o male with hx of HTN, HLD, obesity, PAfib on DOAC (CHADSVasc 2 for HTN, age), CARMELA on CPAP, who presents for f/u.   Has had extensive w/u for RESENDEZ including normal nuclear SPECT, normal 30 day monitor, normal PFT's. Did have a previous Holter monitor which showed a single run of Paroxysmal atrial fibrillation lasting for 13 sec. Had bilateral LE edema which has improved with chlorthalidone.   Had Kardia device for detection of afib, felt symptoms which woke him from sleep, checked device, and found to be in afib (strips available in media section). Resolved shortly after symptoms. Ne recurrence. Started on BB and DOAC. Currently on CPAP.   Has chronic, mild, unchanged RESENDEZ with moderate activity.   Following with digital HTN clinic and changes made to regimen and BP appears to be  "controlled.  Has COvid in January 2022 and has had lingering fatigue and SOB.   Denies CP, orthopnea, PND, syncope, palps. Compliant with meds and follows a low salt diet.     1/24/2023:  December 22 had a syncopal episode with prodrome after getting up and walking at a restaurant. Has been having issues with BP "being low." On review of digital HTN notes and per pt, BP has been in the low 100's-115's. BB, amlodipine stopped and currently just on irbesartan and chlorthalidone. Has pre-syncopal episodes, usually with positional changes, 1-2 times per week.     2/23/2023:  Last visit decreased chlorthalidone to 25 mg daily and D/C'd BB. Symptoms improved, but still has intermittent dizziness. Feeling better overall. -115's. 2DE normal and Holter with PAC's.    Review of Systems   Constitutional: Positive for malaise/fatigue.   HENT:  Negative for congestion.    Eyes:  Negative for blurred vision.   Cardiovascular:  Positive for dyspnea on exertion and leg swelling. Negative for chest pain, claudication, cyanosis, irregular heartbeat, near-syncope, orthopnea, palpitations and paroxysmal nocturnal dyspnea.   Respiratory:  Negative for shortness of breath.    Endocrine: Negative for polyuria.   Hematologic/Lymphatic: Negative for bleeding problem.   Skin:  Negative for itching and rash.   Musculoskeletal:  Negative for joint swelling, muscle cramps and muscle weakness.   Gastrointestinal:  Negative for abdominal pain, hematemesis, hematochezia, melena, nausea and vomiting.   Genitourinary:  Negative for dysuria and hematuria.   Neurological:  Positive for dizziness and light-headedness. Negative for focal weakness, headaches, loss of balance and weakness.   Psychiatric/Behavioral:  Negative for depression. The patient is not nervous/anxious.       Objective:    Physical Exam  Constitutional:       Appearance: Normal appearance.   HENT:      Head: Atraumatic.   Eyes:      Extraocular Movements: Extraocular " movements intact.   Neurological:      Mental Status: He is alert.   Psychiatric:         Mood and Affect: Mood normal.         Behavior: Behavior normal.         Thought Content: Thought content normal.         Judgment: Judgment normal.         Assessment:       1. Essential hypertension    2. Paroxysmal atrial fibrillation    3. Mixed hyperlipidemia    4. Dyspnea on exertion    5. Aortic atherosclerosis -- CTA 06/2018    6. Morbid obesity with BMI of 40.0-44.9, adult    7. CARMELA on CPAP    8. Statin intolerance      78 y/o pt with hx and presentation as above. Will decrease irbesartan and observe. BP log.  Needs to be more active and lose weight. Discussed the etiology, evaluation, and management of Afib, CVA, DOAC, HTN, obesity, CARMELA. Discussed the importance of med compliance, heart healthy diet, and regular exercise.      Plan:       -Decrease irbesartan to 75 mg daily  -BP log  -f/u in 1 month virtual visit

## 2023-03-24 ENCOUNTER — OFFICE VISIT (OUTPATIENT)
Dept: CARDIOLOGY | Facility: CLINIC | Age: 78
End: 2023-03-24
Payer: COMMERCIAL

## 2023-03-24 DIAGNOSIS — Z79.01 CHRONIC ANTICOAGULATION: ICD-10-CM

## 2023-03-24 DIAGNOSIS — I70.0 AORTIC ATHEROSCLEROSIS: ICD-10-CM

## 2023-03-24 DIAGNOSIS — E66.01 MORBID OBESITY WITH BMI OF 40.0-44.9, ADULT: ICD-10-CM

## 2023-03-24 DIAGNOSIS — I48.0 PAROXYSMAL ATRIAL FIBRILLATION: ICD-10-CM

## 2023-03-24 DIAGNOSIS — I10 ESSENTIAL HYPERTENSION: Primary | ICD-10-CM

## 2023-03-24 DIAGNOSIS — G47.33 OSA ON CPAP: ICD-10-CM

## 2023-03-24 DIAGNOSIS — I65.29 CAROTID ATHEROSCLEROSIS, UNSPECIFIED LATERALITY: ICD-10-CM

## 2023-03-24 DIAGNOSIS — E78.2 MIXED HYPERLIPIDEMIA: ICD-10-CM

## 2023-03-24 PROCEDURE — 99214 PR OFFICE/OUTPT VISIT, EST, LEVL IV, 30-39 MIN: ICD-10-PCS | Mod: 95,,, | Performed by: INTERNAL MEDICINE

## 2023-03-24 PROCEDURE — 1160F PR REVIEW ALL MEDS BY PRESCRIBER/CLIN PHARMACIST DOCUMENTED: ICD-10-PCS | Mod: CPTII,95,, | Performed by: INTERNAL MEDICINE

## 2023-03-24 PROCEDURE — 1157F PR ADVANCE CARE PLAN OR EQUIV PRESENT IN MEDICAL RECORD: ICD-10-PCS | Mod: CPTII,95,, | Performed by: INTERNAL MEDICINE

## 2023-03-24 PROCEDURE — 1159F MED LIST DOCD IN RCRD: CPT | Mod: CPTII,95,, | Performed by: INTERNAL MEDICINE

## 2023-03-24 PROCEDURE — 1160F RVW MEDS BY RX/DR IN RCRD: CPT | Mod: CPTII,95,, | Performed by: INTERNAL MEDICINE

## 2023-03-24 PROCEDURE — 1159F PR MEDICATION LIST DOCUMENTED IN MEDICAL RECORD: ICD-10-PCS | Mod: CPTII,95,, | Performed by: INTERNAL MEDICINE

## 2023-03-24 PROCEDURE — 99214 OFFICE O/P EST MOD 30 MIN: CPT | Mod: 95,,, | Performed by: INTERNAL MEDICINE

## 2023-03-24 PROCEDURE — 1157F ADVNC CARE PLAN IN RCRD: CPT | Mod: CPTII,95,, | Performed by: INTERNAL MEDICINE

## 2023-03-24 NOTE — PROGRESS NOTES
Subjective:    Patient ID:  Edis Huggins Jr. is a 77 y.o. male who presents for follow-up of Hypertension      HPI    The patient location is: [place], home  The chief complaint leading to consultation is: Hypotension    Visit type: audiovisual    Face to Face time with patient: 30  45 minutes of total time spent on the encounter, which includes face to face time and non-face to face time preparing to see the patient (eg, review of tests), Obtaining and/or reviewing separately obtained history, Documenting clinical information in the electronic or other health record, Independently interpreting results (not separately reported) and communicating results to the patient/family/caregiver, or Care coordination (not separately reported).         Each patient to whom he or she provides medical services by telemedicine is:  (1) informed of the relationship between the physician and patient and the respective role of any other health care provider with respect to management of the patient; and (2) notified that he or she may decline to receive medical services by telemedicine and may withdraw from such care at any time.    Notes:     78 y/o male with hx of HTN, HLD, obesity, PAfib on DOAC (CHADSVasc 2 for HTN, age), CARMELA on CPAP, who presents for f/u.   Has had extensive w/u for RESENDEZ including normal nuclear SPECT, normal 30 day monitor, normal PFT's. Did have a previous Holter monitor which showed a single run of Paroxysmal atrial fibrillation lasting for 13 sec. Had bilateral LE edema which has improved with chlorthalidone.   Had Kardia device for detection of afib, felt symptoms which woke him from sleep, checked device, and found to be in afib (strips available in media section). Resolved shortly after symptoms. Ne recurrence. Started on BB and DOAC. Currently on CPAP.   Has chronic, mild, unchanged RESENDEZ with moderate activity.   Following with digital HTN clinic and changes made to regimen and BP appears to be  "controlled.  Has COvid in January 2022 and has had lingering fatigue and SOB.   Denies CP, orthopnea, PND, syncope, palps. Compliant with meds and follows a low salt diet.     1/24/2023:  December 22 had a syncopal episode with prodrome after getting up and walking at a restaurant. Has been having issues with BP "being low." On review of digital HTN notes and per pt, BP has been in the low 100's-115's. BB, amlodipine stopped and currently just on irbesartan and chlorthalidone. Has pre-syncopal episodes, usually with positional changes, 1-2 times per week.     2/23/2023:  Last visit decreased chlorthalidone to 25 mg daily and D/C'd BB. Symptoms improved, but still has intermittent dizziness. Feeling better overall. -115's. 2DE normal and Holter with PAC's.    3/23/2023:  Last visit decreased irbesartan to 75 mg daily and symptoms have significantly improved. Had only 1 episode. Uses chlorthalidone PRN.     Review of Systems   Constitutional: Positive for malaise/fatigue.   HENT:  Negative for congestion.    Eyes:  Negative for blurred vision.   Cardiovascular:  Positive for dyspnea on exertion and leg swelling. Negative for chest pain, claudication, cyanosis, irregular heartbeat, near-syncope, orthopnea, palpitations and paroxysmal nocturnal dyspnea.   Respiratory:  Negative for shortness of breath.    Endocrine: Negative for polyuria.   Hematologic/Lymphatic: Negative for bleeding problem.   Skin:  Negative for itching and rash.   Musculoskeletal:  Negative for joint swelling, muscle cramps and muscle weakness.   Gastrointestinal:  Negative for abdominal pain, hematemesis, hematochezia, melena, nausea and vomiting.   Genitourinary:  Negative for dysuria and hematuria.   Neurological:  Positive for dizziness and light-headedness. Negative for focal weakness, headaches, loss of balance and weakness.   Psychiatric/Behavioral:  Negative for depression. The patient is not nervous/anxious.       Objective:    " Physical Exam  Constitutional:       Appearance: Normal appearance.   HENT:      Head: Atraumatic.   Eyes:      Extraocular Movements: Extraocular movements intact.   Neurological:      Mental Status: He is alert.   Psychiatric:         Mood and Affect: Mood normal.         Behavior: Behavior normal.         Thought Content: Thought content normal.         Judgment: Judgment normal.         Assessment:       1. Essential hypertension    2. Paroxysmal atrial fibrillation    3. Mixed hyperlipidemia    4. Carotid atherosclerosis, unspecified laterality    5. Aortic atherosclerosis -- CTA 06/2018    6. Chronic anticoagulation    7. Morbid obesity with BMI of 40.0-44.9, adult    8. CARMELA on CPAP      76 y/o pt with hx and presentation as above. Doing well from a cardiac perspective and compensated from a HF perspective. Bp controlled currently. Needs to be more active and lose weight. Discussed the etiology, evaluation, and management of Afib, CVA, DOAC, HTN, obesity, CARMELA. Discussed the importance of med compliance, heart healthy diet, and regular exercise.      Plan:       -Continue current medical management  -f/u virtual visit in 6 months

## 2023-03-26 ENCOUNTER — PATIENT MESSAGE (OUTPATIENT)
Dept: INTERNAL MEDICINE | Facility: CLINIC | Age: 78
End: 2023-03-26
Payer: COMMERCIAL

## 2023-03-26 DIAGNOSIS — E78.1 HYPERTRIGLYCERIDEMIA: ICD-10-CM

## 2023-03-26 DIAGNOSIS — R73.03 PREDIABETES: Primary | ICD-10-CM

## 2023-03-26 RX ORDER — GEMFIBROZIL 600 MG/1
TABLET, FILM COATED ORAL
Qty: 180 TABLET | Refills: 1 | Status: SHIPPED | OUTPATIENT
Start: 2023-03-26 | End: 2023-08-09 | Stop reason: SDUPTHER

## 2023-03-26 NOTE — TELEPHONE ENCOUNTER
No new care gaps identified.  [facility] Embedded Care Gaps. Reference number: [id number]. 3/26/2023   4:22:45 AM ABBEYT

## 2023-03-26 NOTE — TELEPHONE ENCOUNTER
Refill Decision Note   Edis Huggins  is requesting a refill authorization.  Brief Assessment and Rationale for Refill:  Approve     Medication Therapy Plan:       Medication Reconciliation Completed: No   Comments:     No Care Gaps recommended.     Note composed:2:33 PM 03/26/2023

## 2023-03-27 ENCOUNTER — PATIENT MESSAGE (OUTPATIENT)
Dept: INTERNAL MEDICINE | Facility: CLINIC | Age: 78
End: 2023-03-27
Payer: COMMERCIAL

## 2023-03-29 ENCOUNTER — PATIENT MESSAGE (OUTPATIENT)
Dept: INTERNAL MEDICINE | Facility: CLINIC | Age: 78
End: 2023-03-29
Payer: COMMERCIAL

## 2023-04-14 ENCOUNTER — LAB VISIT (OUTPATIENT)
Dept: LAB | Facility: HOSPITAL | Age: 78
End: 2023-04-14
Payer: COMMERCIAL

## 2023-04-14 ENCOUNTER — TELEPHONE (OUTPATIENT)
Dept: INTERNAL MEDICINE | Facility: CLINIC | Age: 78
End: 2023-04-14
Payer: COMMERCIAL

## 2023-04-14 DIAGNOSIS — D64.9 NORMOCYTIC ANEMIA: ICD-10-CM

## 2023-04-14 DIAGNOSIS — D69.6 THROMBOCYTOPENIA: ICD-10-CM

## 2023-04-14 DIAGNOSIS — Z79.01 CHRONIC ANTICOAGULATION: ICD-10-CM

## 2023-04-14 DIAGNOSIS — R73.03 PREDIABETES: ICD-10-CM

## 2023-04-14 LAB
ALBUMIN SERPL BCP-MCNC: 3.7 G/DL (ref 3.5–5.2)
ALP SERPL-CCNC: 38 U/L (ref 55–135)
ALT SERPL W/O P-5'-P-CCNC: 14 U/L (ref 10–44)
ANION GAP SERPL CALC-SCNC: 11 MMOL/L (ref 8–16)
AST SERPL-CCNC: 16 U/L (ref 10–40)
BASOPHILS # BLD AUTO: 0.04 K/UL (ref 0–0.2)
BASOPHILS NFR BLD: 0.6 % (ref 0–1.9)
BILIRUB SERPL-MCNC: 0.6 MG/DL (ref 0.1–1)
BUN SERPL-MCNC: 16 MG/DL (ref 8–23)
CALCIUM SERPL-MCNC: 9.8 MG/DL (ref 8.7–10.5)
CHLORIDE SERPL-SCNC: 108 MMOL/L (ref 95–110)
CO2 SERPL-SCNC: 20 MMOL/L (ref 23–29)
CREAT SERPL-MCNC: 0.8 MG/DL (ref 0.5–1.4)
DIFFERENTIAL METHOD: ABNORMAL
EOSINOPHIL # BLD AUTO: 0.5 K/UL (ref 0–0.5)
EOSINOPHIL NFR BLD: 8 % (ref 0–8)
ERYTHROCYTE [DISTWIDTH] IN BLOOD BY AUTOMATED COUNT: 12.9 % (ref 11.5–14.5)
EST. GFR  (NO RACE VARIABLE): >60 ML/MIN/1.73 M^2
ESTIMATED AVG GLUCOSE: 117 MG/DL (ref 68–131)
FERRITIN SERPL-MCNC: 79 NG/ML (ref 20–300)
FOLATE SERPL-MCNC: 17.3 NG/ML (ref 4–24)
GLUCOSE SERPL-MCNC: 104 MG/DL (ref 70–110)
HBA1C MFR BLD: 5.7 % (ref 4–5.6)
HCT VFR BLD AUTO: 38.2 % (ref 40–54)
HGB BLD-MCNC: 12.6 G/DL (ref 14–18)
IMM GRANULOCYTES # BLD AUTO: 0.01 K/UL (ref 0–0.04)
IMM GRANULOCYTES NFR BLD AUTO: 0.2 % (ref 0–0.5)
IRON SERPL-MCNC: 86 UG/DL (ref 45–160)
LYMPHOCYTES # BLD AUTO: 2.4 K/UL (ref 1–4.8)
LYMPHOCYTES NFR BLD: 38.1 % (ref 18–48)
MCH RBC QN AUTO: 31.3 PG (ref 27–31)
MCHC RBC AUTO-ENTMCNC: 33 G/DL (ref 32–36)
MCV RBC AUTO: 95 FL (ref 82–98)
MONOCYTES # BLD AUTO: 0.6 K/UL (ref 0.3–1)
MONOCYTES NFR BLD: 9.4 % (ref 4–15)
NEUTROPHILS # BLD AUTO: 2.8 K/UL (ref 1.8–7.7)
NEUTROPHILS NFR BLD: 43.7 % (ref 38–73)
NRBC BLD-RTO: 0 /100 WBC
PLATELET # BLD AUTO: 177 K/UL (ref 150–450)
PMV BLD AUTO: 11.5 FL (ref 9.2–12.9)
POTASSIUM SERPL-SCNC: 3.8 MMOL/L (ref 3.5–5.1)
PROT SERPL-MCNC: 7.1 G/DL (ref 6–8.4)
RBC # BLD AUTO: 4.02 M/UL (ref 4.6–6.2)
SATURATED IRON: 23 % (ref 20–50)
SODIUM SERPL-SCNC: 139 MMOL/L (ref 136–145)
TOTAL IRON BINDING CAPACITY: 369 UG/DL (ref 250–450)
TRANSFERRIN SERPL-MCNC: 249 MG/DL (ref 200–375)
VIT B12 SERPL-MCNC: 465 PG/ML (ref 210–950)
WBC # BLD AUTO: 6.35 K/UL (ref 3.9–12.7)

## 2023-04-14 PROCEDURE — 83036 HEMOGLOBIN GLYCOSYLATED A1C: CPT | Performed by: FAMILY MEDICINE

## 2023-04-14 PROCEDURE — 82728 ASSAY OF FERRITIN: CPT | Performed by: NURSE PRACTITIONER

## 2023-04-14 PROCEDURE — 85025 COMPLETE CBC W/AUTO DIFF WBC: CPT | Performed by: NURSE PRACTITIONER

## 2023-04-14 PROCEDURE — 82746 ASSAY OF FOLIC ACID SERUM: CPT | Performed by: NURSE PRACTITIONER

## 2023-04-14 PROCEDURE — 82607 VITAMIN B-12: CPT | Performed by: NURSE PRACTITIONER

## 2023-04-14 PROCEDURE — 84466 ASSAY OF TRANSFERRIN: CPT | Performed by: NURSE PRACTITIONER

## 2023-04-14 PROCEDURE — 36415 COLL VENOUS BLD VENIPUNCTURE: CPT | Performed by: NURSE PRACTITIONER

## 2023-04-14 PROCEDURE — 80053 COMPREHEN METABOLIC PANEL: CPT | Performed by: NURSE PRACTITIONER

## 2023-04-14 NOTE — TELEPHONE ENCOUNTER
Tried to contact pt to let him know Dr. Her said that his A1c looked great, and would like to see him back in the office some time in July. No answer LVM

## 2023-04-14 NOTE — TELEPHONE ENCOUNTER
----- Message from Chris Her MD sent at 4/14/2023 11:01 AM CDT -----  A1c looks great. Help him organize for checkup with me in July, put something in books

## 2023-04-17 ENCOUNTER — OFFICE VISIT (OUTPATIENT)
Dept: HEMATOLOGY/ONCOLOGY | Facility: CLINIC | Age: 78
End: 2023-04-17
Payer: COMMERCIAL

## 2023-04-17 VITALS
HEART RATE: 66 BPM | SYSTOLIC BLOOD PRESSURE: 125 MMHG | WEIGHT: 284.63 LBS | BODY MASS INDEX: 55.58 KG/M2 | OXYGEN SATURATION: 99 % | DIASTOLIC BLOOD PRESSURE: 58 MMHG | RESPIRATION RATE: 18 BRPM

## 2023-04-17 DIAGNOSIS — D69.6 THROMBOCYTOPENIA: ICD-10-CM

## 2023-04-17 DIAGNOSIS — R76.8 HEPATITIS B CORE ANTIBODY POSITIVE: ICD-10-CM

## 2023-04-17 DIAGNOSIS — I10 ESSENTIAL HYPERTENSION: ICD-10-CM

## 2023-04-17 DIAGNOSIS — D64.9 NORMOCYTIC ANEMIA: Primary | ICD-10-CM

## 2023-04-17 DIAGNOSIS — G47.33 OSA ON CPAP: ICD-10-CM

## 2023-04-17 DIAGNOSIS — E66.01 SEVERE OBESITY (BMI 35.0-39.9) WITH COMORBIDITY: ICD-10-CM

## 2023-04-17 DIAGNOSIS — Z86.79 HISTORY OF ATRIAL FIBRILLATION: ICD-10-CM

## 2023-04-17 PROCEDURE — 3288F PR FALLS RISK ASSESSMENT DOCUMENTED: ICD-10-PCS | Mod: CPTII,S$GLB,, | Performed by: NURSE PRACTITIONER

## 2023-04-17 PROCEDURE — 1159F MED LIST DOCD IN RCRD: CPT | Mod: CPTII,S$GLB,, | Performed by: NURSE PRACTITIONER

## 2023-04-17 PROCEDURE — 99215 PR OFFICE/OUTPT VISIT, EST, LEVL V, 40-54 MIN: ICD-10-PCS | Mod: S$GLB,,, | Performed by: NURSE PRACTITIONER

## 2023-04-17 PROCEDURE — 1157F ADVNC CARE PLAN IN RCRD: CPT | Mod: CPTII,S$GLB,, | Performed by: NURSE PRACTITIONER

## 2023-04-17 PROCEDURE — 3044F PR MOST RECENT HEMOGLOBIN A1C LEVEL <7.0%: ICD-10-PCS | Mod: CPTII,S$GLB,, | Performed by: NURSE PRACTITIONER

## 2023-04-17 PROCEDURE — 1126F PR PAIN SEVERITY QUANTIFIED, NO PAIN PRESENT: ICD-10-PCS | Mod: CPTII,S$GLB,, | Performed by: NURSE PRACTITIONER

## 2023-04-17 PROCEDURE — 3074F PR MOST RECENT SYSTOLIC BLOOD PRESSURE < 130 MM HG: ICD-10-PCS | Mod: CPTII,S$GLB,, | Performed by: NURSE PRACTITIONER

## 2023-04-17 PROCEDURE — 1159F PR MEDICATION LIST DOCUMENTED IN MEDICAL RECORD: ICD-10-PCS | Mod: CPTII,S$GLB,, | Performed by: NURSE PRACTITIONER

## 2023-04-17 PROCEDURE — 1101F PT FALLS ASSESS-DOCD LE1/YR: CPT | Mod: CPTII,S$GLB,, | Performed by: NURSE PRACTITIONER

## 2023-04-17 PROCEDURE — 3044F HG A1C LEVEL LT 7.0%: CPT | Mod: CPTII,S$GLB,, | Performed by: NURSE PRACTITIONER

## 2023-04-17 PROCEDURE — 1160F RVW MEDS BY RX/DR IN RCRD: CPT | Mod: CPTII,S$GLB,, | Performed by: NURSE PRACTITIONER

## 2023-04-17 PROCEDURE — 99215 OFFICE O/P EST HI 40 MIN: CPT | Mod: S$GLB,,, | Performed by: NURSE PRACTITIONER

## 2023-04-17 PROCEDURE — 3078F DIAST BP <80 MM HG: CPT | Mod: CPTII,S$GLB,, | Performed by: NURSE PRACTITIONER

## 2023-04-17 PROCEDURE — 1126F AMNT PAIN NOTED NONE PRSNT: CPT | Mod: CPTII,S$GLB,, | Performed by: NURSE PRACTITIONER

## 2023-04-17 PROCEDURE — 99999 PR PBB SHADOW E&M-EST. PATIENT-LVL V: CPT | Mod: PBBFAC,,, | Performed by: NURSE PRACTITIONER

## 2023-04-17 PROCEDURE — 3074F SYST BP LT 130 MM HG: CPT | Mod: CPTII,S$GLB,, | Performed by: NURSE PRACTITIONER

## 2023-04-17 PROCEDURE — 1101F PR PT FALLS ASSESS DOC 0-1 FALLS W/OUT INJ PAST YR: ICD-10-PCS | Mod: CPTII,S$GLB,, | Performed by: NURSE PRACTITIONER

## 2023-04-17 PROCEDURE — 3288F FALL RISK ASSESSMENT DOCD: CPT | Mod: CPTII,S$GLB,, | Performed by: NURSE PRACTITIONER

## 2023-04-17 PROCEDURE — 1160F PR REVIEW ALL MEDS BY PRESCRIBER/CLIN PHARMACIST DOCUMENTED: ICD-10-PCS | Mod: CPTII,S$GLB,, | Performed by: NURSE PRACTITIONER

## 2023-04-17 PROCEDURE — 1157F PR ADVANCE CARE PLAN OR EQUIV PRESENT IN MEDICAL RECORD: ICD-10-PCS | Mod: CPTII,S$GLB,, | Performed by: NURSE PRACTITIONER

## 2023-04-17 PROCEDURE — 99999 PR PBB SHADOW E&M-EST. PATIENT-LVL V: ICD-10-PCS | Mod: PBBFAC,,, | Performed by: NURSE PRACTITIONER

## 2023-04-17 PROCEDURE — 3078F PR MOST RECENT DIASTOLIC BLOOD PRESSURE < 80 MM HG: ICD-10-PCS | Mod: CPTII,S$GLB,, | Performed by: NURSE PRACTITIONER

## 2023-04-17 RX ORDER — CHLORTHALIDONE 50 MG/1
50 TABLET ORAL
COMMUNITY
Start: 2023-03-27 | End: 2023-08-09

## 2023-04-17 NOTE — PROGRESS NOTES
"Subjective:       Patient ID: Edis Huggins Jr. is a 77 y.o. male.    Chief Complaint:  Anemia      Anemia  There has been no abdominal pain, bruising/bleeding easily, fever, light-headedness or palpitations.     HPI as per DR AUGUSTE's 6/16/22 office visit, reviewed, verified history with pt    He is here for f/u of normocytic anemia.    Hemoglobin 3/14/18 was 11.8.  WBC and platelets within normal limits.  Nutritional studies unremarkable.    He gets dyspnea when he walks.      None at rest.  Weight 229 pounds.  Height 5' 11"    Trying to lose weight.    No longer using CPAP for obstructive sleep apnea    On Xarelto, tolerating it well    INTERVAL  - pt is here for follow up on anemia  - he is a previous patient of Dr Auguste who is no longer in clinic  - bp is improved on monotherapy and dosing reduction; dizziness resolved  - reports continued fatigue, can sleep 12 hrs on weekend easily he states  - he denies headaches, chest pain, palpitations, abdominal pain, n/v/d, constipation, hemoptysis, hematemesis, melena, hematuria. He today reports no sob/oleary, "walked across parking lot whistling and into office"  - he has a history of CARMELA, noncompliant with cpap for quite sometime. Slept well initially with cpap then could not get comfortable so quit using.   - continues to work full time as , 3 days from home, 2 days in office for 40hr week      Review of Systems   Constitutional:  Positive for fatigue. Negative for fever and unexpected weight change.   Respiratory:  Negative for shortness of breath.    Cardiovascular:  Negative for chest pain and palpitations.   Gastrointestinal:  Negative for abdominal pain, blood in stool, constipation, diarrhea, nausea and vomiting.   Genitourinary:  Negative for hematuria.   Musculoskeletal:  Negative for arthralgias.   Neurological:  Negative for dizziness, tremors, facial asymmetry, light-headedness and headaches.   Hematological:  Negative for adenopathy. Does not " bruise/bleed easily.       Objective:      Vitals:    04/17/23 1428   BP: (!) 125/58   BP Location: Left arm   Patient Position: Sitting   BP Method: Large (Automatic)   Pulse: 66   Resp: 18   SpO2: 99%   Weight: 129.1 kg (284 lb 9.8 oz)       BMI: Body mass index is 55.58 kg/m².    Physical Exam  Vitals and nursing note reviewed.   Constitutional:       General: He is not in acute distress.     Appearance: He is obese. He is not ill-appearing, toxic-appearing or diaphoretic.   HENT:      Head: Normocephalic and atraumatic.   Eyes:      Conjunctiva/sclera: Conjunctivae normal.   Cardiovascular:      Rate and Rhythm: Normal rate and regular rhythm.      Heart sounds: Normal heart sounds. No murmur heard.  Pulmonary:      Effort: Pulmonary effort is normal. No respiratory distress.      Breath sounds: Normal breath sounds.      Comments: Talkative with no sob/oleary  Abdominal:      General: Bowel sounds are normal. There is no distension.      Palpations: Abdomen is soft.      Tenderness: There is no abdominal tenderness. There is no guarding.   Musculoskeletal:      Cervical back: Normal range of motion and neck supple. No rigidity.   Neurological:      Mental Status: He is alert and oriented to person, place, and time. Mental status is at baseline.      Motor: No weakness.      Gait: Gait normal.   Psychiatric:         Mood and Affect: Mood normal.         Behavior: Behavior normal.         Thought Content: Thought content normal.          Assessment:       1. Normocytic anemia    2. Thrombocytopenia    3. Hepatitis B core antibody positive    4. History of atrial fibrillation    5. Severe obesity (BMI 35.0-39.9) with obstructive sleep apnea    6. Essential hypertension    7. CARMELA on CPAP          Plan:   Normocytic anemia  -reviewed recent and prior labs  -likely etiology chronic disease  -hemoglobin stable 12.6/38.2 (4/14/23), mild macrocytic evidence, iron levels stable, normal B12 and folate  -will continue to  monitor    Thrombocytopenia  -he did have thrombocytopenia which resolved spontaneously  -normal 1/13/23 and 4/14/23 labs  -continue to monitor    Hepatitis-B core antibody positive  -he had previous exposure to hepatitis B and is immune    History of atrial fibrillation  -on Xarelto  -management deferred to Cardiology, Dr. Bello    Severe obesity with obstructive sleep apnea, fatigue  -Body mass index is 55.58 kg/m².  -fatigue could be related to untreated CARMELA  -no longer using CPAP, will send referral for re-evaluation per pt request   -healthy lifestyle changes encouraged, discussed benefit on general health as well as sleep apnea, energy    Hypertension  -continue with bps at home using [facility] home HTN program  -improved with monotherapy and dose reduction  -management deferred to cardiology    F/u in 3 months with labs, if remains stable will increase to 6 month frequency      Meagan Hopkins, NP-C  [facility]  Hematology/Oncology  [address] [place] [address] [id number]  [place]  [id number]  [phone]

## 2023-05-10 NOTE — PROGRESS NOTES
HPI    DLS: 12/06/2022 Dr. Bill Mancilla;   Timolol GFS QAM OU    1) POAG   2) PCIOL OU   3) Steroid Responder   4) APD OS   5) PAS OS   6) Yag Cap OD     78 y.o. male is here for 4-6 months with IOP and gonio. Denies eye pain   and flashes/floaters. No discomfort. No noticeable VA changes since last   visit.         Last edited by TREY Sevilla on 5/15/2023  3:17 PM.            Assessment /Plan     For exam results, see Encounter Report.    Primary open angle glaucoma of left eye, moderate stage    Primary open-angle glaucoma, right eye, mild stage    PCO (posterior capsular opacification), right    Steroid responders, bilateral    Pupil irregular of left eye    Relative afferent pupillary defect - Left Eye    Pseudophakia, both eyes            Lost to F/u from 11/4/2013 to 8/11/2015   Knows Sharlene Mo and MIKHAIL - use to work with them     1. POAG (primary open-angle glaucoma) - Both Eyes   Old pt of [email or web address] [id number]  Began at [facility] 2012       POAG OS>OD           Family history    neg        Glaucoma meds   T1/2 GFS ou (use to use travatan- off post CE)         H/O adverse rxn to glaucoma drops    none        LASERS    SLT os 9/26/2013 -( good resp 17 --> 14) // yag cap od - 1/2020        GLAUCOMA SURGERIES    trabectome os (combined with phaco - 2/8/2017        OTHER EYE SURGERIES    Combined - phaco/IOL / goniotomy - trabectome os 2/8/2017 - +heme and increae in IOP)                                                 PC IOL OD - 5/10/2017         CDR    0.6/0.7-0.8        Tbase    20-23/21-24          Tmax    23/24            Ttarget    17/17           HVF   6 tests 9301-4923 OD essentially full; OS Sup paracentral defect, IAD/INS (([facility]))             (([facility])) - 5 test 2012 to 2022 - full od // sup paracentral defect / inferior paracentral defect         Gonio    +4 ou        CCT    598/598        OCT    7 test 2012 to 2022  - RNFL - nl od // bord G/TS/T         HRT    7  "test-2012 to 2020  - MR -  Triplett off  od // dec T, bord I os /// CDR xxx od // 0.64 os        Disc photos    2012 - IOS    - Ttoday  17/13   - Test done today  IOP //       2. Steroid responders - Both Eyes  2/2 nasacort      3. ? Trace APD os      4. S/P Rt knee replacement Feb 2013  -doing well      5.  Myopia / presbyopia ou     6. Irregular pupil - w/ PAS   -2/2 very high IOP POD # 1 post phaco     7.  PC IOL OU  OD 5/10/2017 - PCB00 14.5  OS 2/8/2017 - + hyphema and high IOP 2/2 trabectome - irregular pupil post op - PCB00 14.0    8. PCO od - vis sign   rec yag cap       Plan  CSM  IOP below target OU  SLT done OS 9/26/2013 - good initial resp 24-->17    -( if responds well consider SLT od - but full VF and healthy ON still od)  HVF stable OU    Cont timolol gfs ou q day     If the glare from the fixed dilated pupil bothers him a lot post 2nd eye getting done - consider pupil surgery with dr Peacock to "purse string it " smaller - pt is doing ok for now - glare is not bothering him too much     12/06/2022  - IOP good   - HVF borderline progression OS, OD stable, OCT stable OU (full// borderline thinning)   - Drops: timolol only   - Would CTM       No Photo file - Met patient     F/U 4 - 6  months with IOP and gonio // main campus - works on Tuesday - so can no longer do [facility]              "

## 2023-05-15 ENCOUNTER — OFFICE VISIT (OUTPATIENT)
Dept: OPHTHALMOLOGY | Facility: CLINIC | Age: 78
End: 2023-05-15
Payer: COMMERCIAL

## 2023-05-15 DIAGNOSIS — H57.09 RELATIVE AFFERENT PUPILLARY DEFECT: ICD-10-CM

## 2023-05-15 DIAGNOSIS — H40.043 STEROID RESPONDERS, BILATERAL: ICD-10-CM

## 2023-05-15 DIAGNOSIS — H40.1111 PRIMARY OPEN-ANGLE GLAUCOMA, RIGHT EYE, MILD STAGE: ICD-10-CM

## 2023-05-15 DIAGNOSIS — H26.491 PCO (POSTERIOR CAPSULAR OPACIFICATION), RIGHT: ICD-10-CM

## 2023-05-15 DIAGNOSIS — Z96.1 PSEUDOPHAKIA, BOTH EYES: ICD-10-CM

## 2023-05-15 DIAGNOSIS — H40.1122 PRIMARY OPEN ANGLE GLAUCOMA OF LEFT EYE, MODERATE STAGE: Primary | ICD-10-CM

## 2023-05-15 DIAGNOSIS — H21.562 PUPIL IRREGULAR OF LEFT EYE: ICD-10-CM

## 2023-05-15 PROCEDURE — 99214 PR OFFICE/OUTPT VISIT, EST, LEVL IV, 30-39 MIN: ICD-10-PCS | Mod: S$GLB,,, | Performed by: OPHTHALMOLOGY

## 2023-05-15 PROCEDURE — 1101F PR PT FALLS ASSESS DOC 0-1 FALLS W/OUT INJ PAST YR: ICD-10-PCS | Mod: CPTII,S$GLB,, | Performed by: OPHTHALMOLOGY

## 2023-05-15 PROCEDURE — 3288F PR FALLS RISK ASSESSMENT DOCUMENTED: ICD-10-PCS | Mod: CPTII,S$GLB,, | Performed by: OPHTHALMOLOGY

## 2023-05-15 PROCEDURE — 1159F MED LIST DOCD IN RCRD: CPT | Mod: CPTII,S$GLB,, | Performed by: OPHTHALMOLOGY

## 2023-05-15 PROCEDURE — 1126F PR PAIN SEVERITY QUANTIFIED, NO PAIN PRESENT: ICD-10-PCS | Mod: CPTII,S$GLB,, | Performed by: OPHTHALMOLOGY

## 2023-05-15 PROCEDURE — 1159F PR MEDICATION LIST DOCUMENTED IN MEDICAL RECORD: ICD-10-PCS | Mod: CPTII,S$GLB,, | Performed by: OPHTHALMOLOGY

## 2023-05-15 PROCEDURE — 1157F ADVNC CARE PLAN IN RCRD: CPT | Mod: CPTII,S$GLB,, | Performed by: OPHTHALMOLOGY

## 2023-05-15 PROCEDURE — 99214 OFFICE O/P EST MOD 30 MIN: CPT | Mod: S$GLB,,, | Performed by: OPHTHALMOLOGY

## 2023-05-15 PROCEDURE — 99999 PR PBB SHADOW E&M-EST. PATIENT-LVL III: CPT | Mod: PBBFAC,,, | Performed by: OPHTHALMOLOGY

## 2023-05-15 PROCEDURE — 99999 PR PBB SHADOW E&M-EST. PATIENT-LVL III: ICD-10-PCS | Mod: PBBFAC,,, | Performed by: OPHTHALMOLOGY

## 2023-05-15 PROCEDURE — 1126F AMNT PAIN NOTED NONE PRSNT: CPT | Mod: CPTII,S$GLB,, | Performed by: OPHTHALMOLOGY

## 2023-05-15 PROCEDURE — 3288F FALL RISK ASSESSMENT DOCD: CPT | Mod: CPTII,S$GLB,, | Performed by: OPHTHALMOLOGY

## 2023-05-15 PROCEDURE — 1157F PR ADVANCE CARE PLAN OR EQUIV PRESENT IN MEDICAL RECORD: ICD-10-PCS | Mod: CPTII,S$GLB,, | Performed by: OPHTHALMOLOGY

## 2023-05-15 PROCEDURE — 1160F RVW MEDS BY RX/DR IN RCRD: CPT | Mod: CPTII,S$GLB,, | Performed by: OPHTHALMOLOGY

## 2023-05-15 PROCEDURE — 1101F PT FALLS ASSESS-DOCD LE1/YR: CPT | Mod: CPTII,S$GLB,, | Performed by: OPHTHALMOLOGY

## 2023-05-15 PROCEDURE — 1160F PR REVIEW ALL MEDS BY PRESCRIBER/CLIN PHARMACIST DOCUMENTED: ICD-10-PCS | Mod: CPTII,S$GLB,, | Performed by: OPHTHALMOLOGY

## 2023-07-05 ENCOUNTER — PATIENT MESSAGE (OUTPATIENT)
Dept: CARDIOLOGY | Facility: CLINIC | Age: 78
End: 2023-07-05
Payer: COMMERCIAL

## 2023-07-06 ENCOUNTER — PATIENT MESSAGE (OUTPATIENT)
Dept: CARDIOLOGY | Facility: CLINIC | Age: 78
End: 2023-07-06
Payer: COMMERCIAL

## 2023-07-10 ENCOUNTER — TELEPHONE (OUTPATIENT)
Dept: PHARMACY | Facility: CLINIC | Age: 78
End: 2023-07-10
Payer: COMMERCIAL

## 2023-07-10 ENCOUNTER — PATIENT MESSAGE (OUTPATIENT)
Dept: CARDIOLOGY | Facility: CLINIC | Age: 78
End: 2023-07-10
Payer: COMMERCIAL

## 2023-07-10 NOTE — TELEPHONE ENCOUNTER
I have reached out to Edis Huggins Jr. to inform him of the [facility] Care Path application process for Xarelto and whats required to apply.  Edis Huggins Jr. did not answer. I left a voicemail and mailed a letter introducing him to the pharmacy patient assistance program. I will follow up in 5 business days.

## 2023-07-10 NOTE — TELEPHONE ENCOUNTER
Patient is still finalizing jail insurance and paperwork. He will contact PAP team when is ready to pursue assistance from the [facility] PAP program.

## 2023-07-11 ENCOUNTER — PATIENT MESSAGE (OUTPATIENT)
Dept: HEMATOLOGY/ONCOLOGY | Facility: CLINIC | Age: 78
End: 2023-07-11
Payer: COMMERCIAL

## 2023-07-13 ENCOUNTER — PATIENT MESSAGE (OUTPATIENT)
Dept: PHARMACY | Facility: CLINIC | Age: 78
End: 2023-07-13
Payer: COMMERCIAL

## 2023-08-09 ENCOUNTER — OFFICE VISIT (OUTPATIENT)
Dept: INTERNAL MEDICINE | Facility: CLINIC | Age: 78
End: 2023-08-09
Payer: MEDICARE

## 2023-08-09 VITALS
DIASTOLIC BLOOD PRESSURE: 62 MMHG | BODY MASS INDEX: 39.73 KG/M2 | WEIGHT: 283.75 LBS | HEIGHT: 71 IN | HEART RATE: 69 BPM | OXYGEN SATURATION: 97 % | SYSTOLIC BLOOD PRESSURE: 130 MMHG

## 2023-08-09 DIAGNOSIS — Z00.00 ANNUAL PHYSICAL EXAM: Primary | ICD-10-CM

## 2023-08-09 DIAGNOSIS — Z12.5 PROSTATE CANCER SCREENING: ICD-10-CM

## 2023-08-09 DIAGNOSIS — Z79.899 ENCOUNTER FOR LONG-TERM (CURRENT) USE OF OTHER MEDICATIONS: ICD-10-CM

## 2023-08-09 DIAGNOSIS — E78.1 HYPERTRIGLYCERIDEMIA: ICD-10-CM

## 2023-08-09 DIAGNOSIS — F41.9 ANXIETY: ICD-10-CM

## 2023-08-09 DIAGNOSIS — N40.0 BENIGN NON-NODULAR PROSTATIC HYPERPLASIA WITHOUT LOWER URINARY TRACT SYMPTOMS: ICD-10-CM

## 2023-08-09 DIAGNOSIS — G47.33 OSA (OBSTRUCTIVE SLEEP APNEA): ICD-10-CM

## 2023-08-09 DIAGNOSIS — I10 ESSENTIAL HYPERTENSION: ICD-10-CM

## 2023-08-09 PROCEDURE — 3078F PR MOST RECENT DIASTOLIC BLOOD PRESSURE < 80 MM HG: ICD-10-PCS | Mod: HCNC,CPTII,S$GLB, | Performed by: FAMILY MEDICINE

## 2023-08-09 PROCEDURE — 1157F PR ADVANCE CARE PLAN OR EQUIV PRESENT IN MEDICAL RECORD: ICD-10-PCS | Mod: HCNC,CPTII,S$GLB, | Performed by: FAMILY MEDICINE

## 2023-08-09 PROCEDURE — 3288F FALL RISK ASSESSMENT DOCD: CPT | Mod: HCNC,CPTII,S$GLB, | Performed by: FAMILY MEDICINE

## 2023-08-09 PROCEDURE — 1126F AMNT PAIN NOTED NONE PRSNT: CPT | Mod: HCNC,CPTII,S$GLB, | Performed by: FAMILY MEDICINE

## 2023-08-09 PROCEDURE — 99397 PER PM REEVAL EST PAT 65+ YR: CPT | Mod: HCNC,S$GLB,, | Performed by: FAMILY MEDICINE

## 2023-08-09 PROCEDURE — 1101F PT FALLS ASSESS-DOCD LE1/YR: CPT | Mod: HCNC,CPTII,S$GLB, | Performed by: FAMILY MEDICINE

## 2023-08-09 PROCEDURE — 99397 PR PREVENTIVE VISIT,EST,65 & OVER: ICD-10-PCS | Mod: HCNC,S$GLB,, | Performed by: FAMILY MEDICINE

## 2023-08-09 PROCEDURE — 1157F ADVNC CARE PLAN IN RCRD: CPT | Mod: HCNC,CPTII,S$GLB, | Performed by: FAMILY MEDICINE

## 2023-08-09 PROCEDURE — 1101F PR PT FALLS ASSESS DOC 0-1 FALLS W/OUT INJ PAST YR: ICD-10-PCS | Mod: HCNC,CPTII,S$GLB, | Performed by: FAMILY MEDICINE

## 2023-08-09 PROCEDURE — 1159F PR MEDICATION LIST DOCUMENTED IN MEDICAL RECORD: ICD-10-PCS | Mod: HCNC,CPTII,S$GLB, | Performed by: FAMILY MEDICINE

## 2023-08-09 PROCEDURE — 3288F PR FALLS RISK ASSESSMENT DOCUMENTED: ICD-10-PCS | Mod: HCNC,CPTII,S$GLB, | Performed by: FAMILY MEDICINE

## 2023-08-09 PROCEDURE — 3075F PR MOST RECENT SYSTOLIC BLOOD PRESS GE 130-139MM HG: ICD-10-PCS | Mod: HCNC,CPTII,S$GLB, | Performed by: FAMILY MEDICINE

## 2023-08-09 PROCEDURE — 1159F MED LIST DOCD IN RCRD: CPT | Mod: HCNC,CPTII,S$GLB, | Performed by: FAMILY MEDICINE

## 2023-08-09 PROCEDURE — 99999 PR PBB SHADOW E&M-EST. PATIENT-LVL IV: CPT | Mod: PBBFAC,HCNC,, | Performed by: FAMILY MEDICINE

## 2023-08-09 PROCEDURE — 3078F DIAST BP <80 MM HG: CPT | Mod: HCNC,CPTII,S$GLB, | Performed by: FAMILY MEDICINE

## 2023-08-09 PROCEDURE — 3075F SYST BP GE 130 - 139MM HG: CPT | Mod: HCNC,CPTII,S$GLB, | Performed by: FAMILY MEDICINE

## 2023-08-09 PROCEDURE — 99999 PR PBB SHADOW E&M-EST. PATIENT-LVL IV: ICD-10-PCS | Mod: PBBFAC,HCNC,, | Performed by: FAMILY MEDICINE

## 2023-08-09 PROCEDURE — 1126F PR PAIN SEVERITY QUANTIFIED, NO PAIN PRESENT: ICD-10-PCS | Mod: HCNC,CPTII,S$GLB, | Performed by: FAMILY MEDICINE

## 2023-08-09 RX ORDER — CITALOPRAM 20 MG/1
20 TABLET, FILM COATED ORAL DAILY
Qty: 90 TABLET | Refills: 3 | Status: SHIPPED | OUTPATIENT
Start: 2023-08-09

## 2023-08-09 RX ORDER — CYCLOBENZAPRINE HCL 10 MG
TABLET ORAL
Qty: 30 TABLET | Refills: 3 | Status: SHIPPED | OUTPATIENT
Start: 2023-08-09 | End: 2023-11-20

## 2023-08-09 RX ORDER — FINASTERIDE 5 MG/1
5 TABLET, FILM COATED ORAL DAILY
Qty: 90 TABLET | Refills: 3 | Status: SHIPPED | OUTPATIENT
Start: 2023-08-09

## 2023-08-09 RX ORDER — GEMFIBROZIL 600 MG/1
600 TABLET, FILM COATED ORAL 2 TIMES DAILY
Qty: 180 TABLET | Refills: 3 | Status: SHIPPED | OUTPATIENT
Start: 2023-08-09

## 2023-08-09 RX ORDER — IRBESARTAN 75 MG/1
75 TABLET ORAL NIGHTLY
Qty: 90 TABLET | Refills: 3 | Status: SHIPPED | OUTPATIENT
Start: 2023-08-09 | End: 2024-08-08

## 2023-08-09 RX ORDER — TAMSULOSIN HYDROCHLORIDE 0.4 MG/1
0.4 CAPSULE ORAL DAILY
Qty: 90 CAPSULE | Refills: 3 | Status: SHIPPED | OUTPATIENT
Start: 2023-08-09

## 2023-08-09 NOTE — PROGRESS NOTES
Subjective:       Patient ID: Edis Huggins Jr. is a 78 y.o. male.    Chief Complaint: Annual Exam    HPI    Edis Huggins Jr. is a 78 y.o. male PMHx HTN, HLD, Paroxysmal Afib, CARMELA for checkup.      #Cards: HTN, HLD, Afib on AC  - est w/ Dr. Bello, lv 3/2023 - f/u 6m  - active with digital htn program  - reg: Irbesartan 75 qd; Chlorthalidone 25 prn; Gemfibrozil 600 bid, Xarelto 20 qd  - cards d/c'd beta blocker  - declines statin for now     #Endo: PreDM (A1c 4/2023 = 5.7)     #Heme/onc: Normocytic anemia (likely etiology chronic dz)  - est w/ NP Sandeep, lv 4/2023 - upcoming 8/2023     #Ophtho: Glaucoma  - est w/ Dr. Khan, lv 5/2023 - upcoming 9/2023     #Sleep med: CARMELA  - est w/ Dr. Rolle - upcoming 8/2023  - no longer uses cpap, doesn't tolerate    #Derm:  - est w/ Dr. Ordaz, lv 2/2023     #BMI 39    Review of Systems   Constitutional:  Positive for activity change. Negative for unexpected weight change.   HENT:  Positive for rhinorrhea. Negative for hearing loss and trouble swallowing.    Eyes:  Negative for discharge and visual disturbance.   Respiratory:  Negative for chest tightness and wheezing.    Cardiovascular:  Negative for chest pain and palpitations.   Gastrointestinal:  Negative for blood in stool, constipation, diarrhea and vomiting.   Endocrine: Negative for polydipsia and polyuria.   Genitourinary:  Negative for difficulty urinating, hematuria and urgency.   Musculoskeletal:  Negative for arthralgias, joint swelling and neck pain.   Neurological:  Positive for weakness. Negative for headaches.   Psychiatric/Behavioral:  Negative for confusion and dysphoric mood.          Past Medical History:   Diagnosis Date    Allergic rhinitis due to animal (cat) (dog) hair and dander 3/20/2014    Allergy     Anemia     Anxiety     Basal cell carcinoma     BPH (benign prostatic hypertrophy)     Family history of colon cancer 3/20/2014    Family history of ischemic heart disease 3/20/2014     Fever blister     HLD (hyperlipidemia)     HTN (hypertension)     IFG (impaired fasting glucose)     Lumbar spondylosis 6/7/2019    Pain of right hip joint     Primary open angle glaucoma     Squamous cell carcinoma of skin     Status post total knee replacement 3/20/2014        Prior to Admission medications    Medication Sig Start Date End Date Taking? Authorizing Provider   acyclovir (ZOVIRAX) 400 MG tablet TAKE 1 TABLET BY MOUTH THREE TIMES DAILY FOR 5 DAYS AT SIGN OF FEVER BLISTER 4/16/20   Forest Miles MD   azelastine (ASTELIN) 137 mcg (0.1 %) nasal spray 1 spray (137 mcg total) by Nasal route 2 (two) times daily. 1/9/23 1/9/24  Oriana Alcantara MD   Bifidobacterium infantis (ALIGN) 4 mg Cap Take 4 mg by mouth once daily.     Provider, Historical   cetirizine (ZYRTEC) 10 MG tablet Take 10 mg by mouth once daily.    Provider, Historical   chlorthalidone (HYGROTEN) 25 MG Tab Take 1 tablet (25 mg total) by mouth once daily. 2/16/23 2/16/24  Chris Her MD   chlorthalidone (HYGROTEN) 50 MG Tab Take 50 mg by mouth. 3/27/23   Provider, Historical   citalopram (CELEXA) 20 MG tablet TAKE 1 TABLET(20 MG) BY MOUTH EVERY DAY 8/17/22   Chris Her MD   cyclobenzaprine (FLEXERIL) 10 MG tablet TAKE 1 TABLET(10 MG) BY MOUTH THREE TIMES DAILY AS NEEDED 5/18/21   Chris Her MD   finasteride (PROSCAR) 5 mg tablet TAKE 1 TABLET(5 MG) BY MOUTH EVERY DAY 9/20/22   Chris Her MD   fish oil-omega-3 fatty acids 300-1,000 mg capsule Take 1 g by mouth once daily.    Provider, Historical   gemfibroziL (LOPID) 600 MG tablet TAKE 1 TABLET(600 MG) BY MOUTH TWICE DAILY 3/26/23   Chris Her MD   irbesartan (AVAPRO) 75 MG tablet Take 1 tablet (75 mg total) by mouth every evening. 3/9/23 3/8/24  Chris Her MD   mupirocin (BACTROBAN) 2 % ointment Apply topically 2 (two) times daily. 6/9/21   Kim De Guzman, RUTH   mv-mn/iron/folic acid/herb 190 (VITAMIN D3 COMPLETE ORAL) Take 1 tablet by  "mouth once daily.    Provider, Historical   rivaroxaban (XARELTO) 20 mg Tab Take 1 tablet (20 mg total) by mouth once daily. 4/3/23   Shakeel Bello MD   sod sulf-pot chloride-mag sulf (SUTAB) 1.479-0.188- 0.225 gram tablet Take 12 tablets by mouth once daily. Take according to package instructions with indicated amount of water. 23   Geovanny Charles MD   tamsulosin (FLOMAX) 0.4 mg Cap TAKE 1 CAPSULE(0.4 MG) BY MOUTH EVERY DAY 23   Chris Her MD   timolol maleate 0.5% (TIMOPTIC-XE) 0.5 % SolG INSTILL 1 DROP IN BOTH EYES EVERY MORNING 23   Deborah Khan MD   vitamin E 1000 UNIT capsule Take 2,000 Units by mouth once daily.    Provider, Historical        Past medical history, surgical history, and family medical history reviewed and updated as appropriate.    Medications and allergies reviewed.     Objective:          Vitals:    23 1632   BP: 130/62   BP Location: Left arm   Patient Position: Sitting   Pulse: 69   SpO2: 97%   Weight: 128.7 kg (283 lb 11.7 oz)   Height: 5' 11" (1.803 m)     Body mass index is 39.57 kg/m².  Physical Exam  Vitals and nursing note reviewed.   Constitutional:       General: He is not in acute distress.     Appearance: Normal appearance. He is well-developed. He is obese.   Eyes:      Extraocular Movements: Extraocular movements intact.   Cardiovascular:      Rate and Rhythm: Normal rate and regular rhythm.      Pulses: Normal pulses.      Heart sounds: Normal heart sounds. No murmur heard.  Pulmonary:      Effort: Pulmonary effort is normal. No respiratory distress.      Breath sounds: Normal breath sounds. No wheezing.   Abdominal:      General: Bowel sounds are normal. There is no distension.      Palpations: Abdomen is soft.      Tenderness: There is no abdominal tenderness.   Musculoskeletal:      Right lower le+ Edema present.      Left lower le+ Edema present.   Neurological:      Mental Status: He is alert and oriented to person, " place, and time.   Psychiatric:         Mood and Affect: Mood normal.         Behavior: Behavior normal.         Lab Results   Component Value Date    WBC 6.35 04/14/2023    HGB 12.6 (L) 04/14/2023    HCT 38.2 (L) 04/14/2023     04/14/2023    CHOL 132 07/05/2022    TRIG 170 (H) 07/05/2022    HDL 32 (L) 07/05/2022    ALT 14 04/14/2023    AST 16 04/14/2023     04/14/2023    K 3.8 04/14/2023     04/14/2023    CREATININE 0.8 04/14/2023    BUN 16 04/14/2023    CO2 20 (L) 04/14/2023    TSH 0.957 12/13/2021    PSA 1.3 07/05/2022    INR 2.4 (A) 03/22/2013    GLUF 118 (H) 12/05/2017    HGBA1C 5.7 (H) 04/14/2023       Assessment:       1. Annual physical exam    2. Encounter for long-term (current) use of other medications    3. Prostate cancer screening    4. CARMELA (obstructive sleep apnea)    5. Benign non-nodular prostatic hyperplasia without lower urinary tract symptoms    6. Essential hypertension    7. Hypertriglyceridemia    8. Anxiety          Plan:   1. Annual physical exam  -     Lipid Panel; Future; Expected date: 08/09/2023  -     PSA, Screening; Future; Expected date: 08/09/2023  -     Hemoglobin A1C; Future; Expected date: 08/09/2023    2. Encounter for long-term (current) use of other medications  -     Lipid Panel; Future; Expected date: 08/09/2023  -     PSA, Screening; Future; Expected date: 08/09/2023  -     Hemoglobin A1C; Future; Expected date: 08/09/2023    3. Prostate cancer screening  -     PSA, Screening; Future; Expected date: 08/09/2023    4. CARMELA (obstructive sleep apnea)    5. Benign non-nodular prostatic hyperplasia without lower urinary tract symptoms  -     tamsulosin (FLOMAX) 0.4 mg Cap; Take 1 capsule (0.4 mg total) by mouth once daily.  Dispense: 90 capsule; Refill: 3  -     finasteride (PROSCAR) 5 mg tablet; Take 1 tablet (5 mg total) by mouth once daily.  Dispense: 90 tablet; Refill: 3    6. Essential hypertension  Overview:  bp lower side of normal  - titrate down on  norvasc  - active w/ digital htn     Orders:  -     irbesartan (AVAPRO) 75 MG tablet; Take 1 tablet (75 mg total) by mouth every evening.  Dispense: 90 tablet; Refill: 3    7. Hypertriglyceridemia  -     gemfibroziL (LOPID) 600 MG tablet; Take 1 tablet (600 mg total) by mouth 2 (two) times daily.  Dispense: 180 tablet; Refill: 3    8. Anxiety  -     citalopram (CELEXA) 20 MG tablet; Take 1 tablet (20 mg total) by mouth once daily.  Dispense: 90 tablet; Refill: 3        Health maintenance reviewed with patient.     Follow up in about 1 year (around 8/9/2024) for Annual.    Chris Her MD  Family Medicine / Primary Care  [facility]  8/9/2023

## 2023-08-11 ENCOUNTER — LAB VISIT (OUTPATIENT)
Dept: LAB | Facility: HOSPITAL | Age: 78
End: 2023-08-11
Payer: MEDICARE

## 2023-08-11 DIAGNOSIS — D64.9 NORMOCYTIC ANEMIA: ICD-10-CM

## 2023-08-11 DIAGNOSIS — Z79.899 ENCOUNTER FOR LONG-TERM (CURRENT) USE OF OTHER MEDICATIONS: ICD-10-CM

## 2023-08-11 DIAGNOSIS — Z00.00 ANNUAL PHYSICAL EXAM: ICD-10-CM

## 2023-08-11 DIAGNOSIS — Z79.01 CHRONIC ANTICOAGULATION: ICD-10-CM

## 2023-08-11 DIAGNOSIS — D69.6 THROMBOCYTOPENIA: ICD-10-CM

## 2023-08-11 DIAGNOSIS — Z12.5 PROSTATE CANCER SCREENING: ICD-10-CM

## 2023-08-11 LAB
ALBUMIN SERPL BCP-MCNC: 3.7 G/DL (ref 3.5–5.2)
ALP SERPL-CCNC: 42 U/L (ref 55–135)
ALT SERPL W/O P-5'-P-CCNC: 16 U/L (ref 10–44)
ANION GAP SERPL CALC-SCNC: 9 MMOL/L (ref 8–16)
AST SERPL-CCNC: 15 U/L (ref 10–40)
BASOPHILS # BLD AUTO: 0.03 K/UL (ref 0–0.2)
BASOPHILS NFR BLD: 0.6 % (ref 0–1.9)
BILIRUB SERPL-MCNC: 0.3 MG/DL (ref 0.1–1)
BUN SERPL-MCNC: 17 MG/DL (ref 8–23)
CALCIUM SERPL-MCNC: 9.3 MG/DL (ref 8.7–10.5)
CHLORIDE SERPL-SCNC: 109 MMOL/L (ref 95–110)
CHOLEST SERPL-MCNC: 120 MG/DL (ref 120–199)
CHOLEST/HDLC SERPL: 4 {RATIO} (ref 2–5)
CO2 SERPL-SCNC: 21 MMOL/L (ref 23–29)
COMPLEXED PSA SERPL-MCNC: 1.3 NG/ML (ref 0–4)
CREAT SERPL-MCNC: 0.8 MG/DL (ref 0.5–1.4)
DIFFERENTIAL METHOD: ABNORMAL
EOSINOPHIL # BLD AUTO: 0.3 K/UL (ref 0–0.5)
EOSINOPHIL NFR BLD: 5.5 % (ref 0–8)
ERYTHROCYTE [DISTWIDTH] IN BLOOD BY AUTOMATED COUNT: 13 % (ref 11.5–14.5)
EST. GFR  (NO RACE VARIABLE): >60 ML/MIN/1.73 M^2
ESTIMATED AVG GLUCOSE: 120 MG/DL (ref 68–131)
FERRITIN SERPL-MCNC: 76 NG/ML (ref 20–300)
FOLATE SERPL-MCNC: 15.9 NG/ML (ref 4–24)
GLUCOSE SERPL-MCNC: 105 MG/DL (ref 70–110)
HBA1C MFR BLD: 5.8 % (ref 4–5.6)
HCT VFR BLD AUTO: 36 % (ref 40–54)
HDLC SERPL-MCNC: 30 MG/DL (ref 40–75)
HDLC SERPL: 25 % (ref 20–50)
HGB BLD-MCNC: 12 G/DL (ref 14–18)
IMM GRANULOCYTES # BLD AUTO: 0.02 K/UL (ref 0–0.04)
IMM GRANULOCYTES NFR BLD AUTO: 0.4 % (ref 0–0.5)
IRON SERPL-MCNC: 87 UG/DL (ref 45–160)
LDLC SERPL CALC-MCNC: 59.8 MG/DL (ref 63–159)
LYMPHOCYTES # BLD AUTO: 2.4 K/UL (ref 1–4.8)
LYMPHOCYTES NFR BLD: 46.3 % (ref 18–48)
MCH RBC QN AUTO: 31.6 PG (ref 27–31)
MCHC RBC AUTO-ENTMCNC: 33.3 G/DL (ref 32–36)
MCV RBC AUTO: 95 FL (ref 82–98)
MONOCYTES # BLD AUTO: 0.5 K/UL (ref 0.3–1)
MONOCYTES NFR BLD: 9.9 % (ref 4–15)
NEUTROPHILS # BLD AUTO: 2 K/UL (ref 1.8–7.7)
NEUTROPHILS NFR BLD: 37.3 % (ref 38–73)
NONHDLC SERPL-MCNC: 90 MG/DL
NRBC BLD-RTO: 0 /100 WBC
PLATELET # BLD AUTO: 165 K/UL (ref 150–450)
PMV BLD AUTO: 11.8 FL (ref 9.2–12.9)
POTASSIUM SERPL-SCNC: 4 MMOL/L (ref 3.5–5.1)
PROT SERPL-MCNC: 6.9 G/DL (ref 6–8.4)
RBC # BLD AUTO: 3.8 M/UL (ref 4.6–6.2)
SATURATED IRON: 23 % (ref 20–50)
SODIUM SERPL-SCNC: 139 MMOL/L (ref 136–145)
TOTAL IRON BINDING CAPACITY: 386 UG/DL (ref 250–450)
TRANSFERRIN SERPL-MCNC: 261 MG/DL (ref 200–375)
TRIGL SERPL-MCNC: 151 MG/DL (ref 30–150)
VIT B12 SERPL-MCNC: 381 PG/ML (ref 210–950)
WBC # BLD AUTO: 5.25 K/UL (ref 3.9–12.7)

## 2023-08-11 PROCEDURE — 80061 LIPID PANEL: CPT | Mod: HCNC | Performed by: FAMILY MEDICINE

## 2023-08-11 PROCEDURE — 84466 ASSAY OF TRANSFERRIN: CPT | Mod: HCNC | Performed by: NURSE PRACTITIONER

## 2023-08-11 PROCEDURE — 83036 HEMOGLOBIN GLYCOSYLATED A1C: CPT | Mod: HCNC | Performed by: FAMILY MEDICINE

## 2023-08-11 PROCEDURE — 84153 ASSAY OF PSA TOTAL: CPT | Mod: HCNC | Performed by: FAMILY MEDICINE

## 2023-08-11 PROCEDURE — 82607 VITAMIN B-12: CPT | Mod: HCNC | Performed by: NURSE PRACTITIONER

## 2023-08-11 PROCEDURE — 82728 ASSAY OF FERRITIN: CPT | Mod: HCNC | Performed by: NURSE PRACTITIONER

## 2023-08-11 PROCEDURE — 80053 COMPREHEN METABOLIC PANEL: CPT | Mod: HCNC | Performed by: NURSE PRACTITIONER

## 2023-08-11 PROCEDURE — 82746 ASSAY OF FOLIC ACID SERUM: CPT | Mod: HCNC | Performed by: NURSE PRACTITIONER

## 2023-08-11 PROCEDURE — 36415 COLL VENOUS BLD VENIPUNCTURE: CPT | Mod: HCNC | Performed by: NURSE PRACTITIONER

## 2023-08-11 PROCEDURE — 85025 COMPLETE CBC W/AUTO DIFF WBC: CPT | Mod: HCNC | Performed by: NURSE PRACTITIONER

## 2023-08-14 ENCOUNTER — OFFICE VISIT (OUTPATIENT)
Dept: HEMATOLOGY/ONCOLOGY | Facility: CLINIC | Age: 78
End: 2023-08-14
Payer: MEDICARE

## 2023-08-14 VITALS
SYSTOLIC BLOOD PRESSURE: 150 MMHG | OXYGEN SATURATION: 96 % | HEART RATE: 65 BPM | WEIGHT: 285.69 LBS | BODY MASS INDEX: 39.85 KG/M2 | DIASTOLIC BLOOD PRESSURE: 67 MMHG

## 2023-08-14 DIAGNOSIS — E53.8 B12 DEFICIENCY: ICD-10-CM

## 2023-08-14 DIAGNOSIS — Z86.79 HISTORY OF ATRIAL FIBRILLATION: ICD-10-CM

## 2023-08-14 DIAGNOSIS — I10 ESSENTIAL HYPERTENSION: ICD-10-CM

## 2023-08-14 DIAGNOSIS — D53.9 MACROCYTIC ANEMIA: Primary | ICD-10-CM

## 2023-08-14 DIAGNOSIS — E66.01 SEVERE OBESITY (BMI 35.0-39.9) WITH COMORBIDITY: ICD-10-CM

## 2023-08-14 DIAGNOSIS — R76.8 HEPATITIS B CORE ANTIBODY POSITIVE: ICD-10-CM

## 2023-08-14 DIAGNOSIS — G47.33 OSA ON CPAP: ICD-10-CM

## 2023-08-14 DIAGNOSIS — D69.6 THROMBOCYTOPENIA: ICD-10-CM

## 2023-08-14 PROCEDURE — 3078F DIAST BP <80 MM HG: CPT | Mod: HCNC,CPTII,S$GLB, | Performed by: NURSE PRACTITIONER

## 2023-08-14 PROCEDURE — 3077F SYST BP >= 140 MM HG: CPT | Mod: HCNC,CPTII,S$GLB, | Performed by: NURSE PRACTITIONER

## 2023-08-14 PROCEDURE — 3078F PR MOST RECENT DIASTOLIC BLOOD PRESSURE < 80 MM HG: ICD-10-PCS | Mod: HCNC,CPTII,S$GLB, | Performed by: NURSE PRACTITIONER

## 2023-08-14 PROCEDURE — 99999 PR PBB SHADOW E&M-EST. PATIENT-LVL IV: CPT | Mod: PBBFAC,HCNC,, | Performed by: NURSE PRACTITIONER

## 2023-08-14 PROCEDURE — 99214 PR OFFICE/OUTPT VISIT, EST, LEVL IV, 30-39 MIN: ICD-10-PCS | Mod: HCNC,S$GLB,, | Performed by: NURSE PRACTITIONER

## 2023-08-14 PROCEDURE — 1159F MED LIST DOCD IN RCRD: CPT | Mod: HCNC,CPTII,S$GLB, | Performed by: NURSE PRACTITIONER

## 2023-08-14 PROCEDURE — 1100F PTFALLS ASSESS-DOCD GE2>/YR: CPT | Mod: HCNC,CPTII,S$GLB, | Performed by: NURSE PRACTITIONER

## 2023-08-14 PROCEDURE — 3077F PR MOST RECENT SYSTOLIC BLOOD PRESSURE >= 140 MM HG: ICD-10-PCS | Mod: HCNC,CPTII,S$GLB, | Performed by: NURSE PRACTITIONER

## 2023-08-14 PROCEDURE — 1160F PR REVIEW ALL MEDS BY PRESCRIBER/CLIN PHARMACIST DOCUMENTED: ICD-10-PCS | Mod: HCNC,CPTII,S$GLB, | Performed by: NURSE PRACTITIONER

## 2023-08-14 PROCEDURE — 1157F PR ADVANCE CARE PLAN OR EQUIV PRESENT IN MEDICAL RECORD: ICD-10-PCS | Mod: HCNC,CPTII,S$GLB, | Performed by: NURSE PRACTITIONER

## 2023-08-14 PROCEDURE — 99214 OFFICE O/P EST MOD 30 MIN: CPT | Mod: HCNC,S$GLB,, | Performed by: NURSE PRACTITIONER

## 2023-08-14 PROCEDURE — 3288F FALL RISK ASSESSMENT DOCD: CPT | Mod: HCNC,CPTII,S$GLB, | Performed by: NURSE PRACTITIONER

## 2023-08-14 PROCEDURE — 1157F ADVNC CARE PLAN IN RCRD: CPT | Mod: HCNC,CPTII,S$GLB, | Performed by: NURSE PRACTITIONER

## 2023-08-14 PROCEDURE — 1160F RVW MEDS BY RX/DR IN RCRD: CPT | Mod: HCNC,CPTII,S$GLB, | Performed by: NURSE PRACTITIONER

## 2023-08-14 PROCEDURE — 99999 PR PBB SHADOW E&M-EST. PATIENT-LVL IV: ICD-10-PCS | Mod: PBBFAC,HCNC,, | Performed by: NURSE PRACTITIONER

## 2023-08-14 PROCEDURE — 1100F PR PT FALLS ASSESS DOC 2+ FALLS/FALL W/INJURY/YR: ICD-10-PCS | Mod: HCNC,CPTII,S$GLB, | Performed by: NURSE PRACTITIONER

## 2023-08-14 PROCEDURE — 1159F PR MEDICATION LIST DOCUMENTED IN MEDICAL RECORD: ICD-10-PCS | Mod: HCNC,CPTII,S$GLB, | Performed by: NURSE PRACTITIONER

## 2023-08-14 PROCEDURE — 3288F PR FALLS RISK ASSESSMENT DOCUMENTED: ICD-10-PCS | Mod: HCNC,CPTII,S$GLB, | Performed by: NURSE PRACTITIONER

## 2023-08-14 NOTE — PROGRESS NOTES
"Subjective:       Patient ID: Edis Huggins Jr. is a 78 y.o. male.    Chief Complaint:  Anemia      Anemia  There has been no abdominal pain, bruising/bleeding easily, fever, light-headedness or palpitations.       HPI as per DR AUGUSTE's 6/16/22 office visit, reviewed, verified history with pt    He is here for f/u of normocytic anemia.    Hemoglobin 3/14/18 was 11.8.  WBC and platelets within normal limits.  Nutritional studies unremarkable.    He gets dyspnea when he walks.      None at rest.  Weight 229 pounds.  Height 5' 11"    Trying to lose weight.    No longer using CPAP for obstructive sleep apnea    On Xarelto, tolerating it well    INTERVAL  - pt is here for follow up on anemia  - he is a previous patient of Dr Auguste who is no longer in clinic  - he retired from his engineering job 2 months ago  - bp is improved on monotherapy and dosing reduction; dizziness resolved. He did not yet take today's bp medication, bp 150/67, no associated symptoms.   - reports continued fatigue, if gets the chance to sleep late can sleep 12hrs  - still not using cpap, he does see sleep medicine tomorrow fo re-evaluation of CARMELA. Stopped using as mask would come loose waking his wife, uncomfortable.   - he denies headaches, chest pain, palpitations, abdominal pain, n/v/d, constipation, hemoptysis, hematemesis, melena, hematuria.           Review of Systems   Constitutional:  Positive for fatigue. Negative for fever and unexpected weight change.   Respiratory:  Negative for shortness of breath.    Cardiovascular:  Negative for chest pain and palpitations.   Gastrointestinal:  Negative for abdominal pain, blood in stool, constipation, diarrhea, nausea and vomiting.   Genitourinary:  Negative for hematuria.   Musculoskeletal:  Negative for arthralgias.   Neurological:  Negative for dizziness, tremors, facial asymmetry, light-headedness and headaches.   Hematological:  Negative for adenopathy. Does not bruise/bleed easily.     "     Objective:      Vitals:    08/14/23 1402   BP: (!) 150/67   Pulse: 65   SpO2: 96%   Weight: 129.6 kg (285 lb 11.5 oz)         BMI: Body mass index is 39.85 kg/m².    Physical Exam  Vitals and nursing note reviewed.   Constitutional:       General: He is not in acute distress.     Appearance: He is obese. He is not ill-appearing, toxic-appearing or diaphoretic.   HENT:      Head: Normocephalic and atraumatic.   Eyes:      Conjunctiva/sclera: Conjunctivae normal.   Cardiovascular:      Rate and Rhythm: Normal rate and regular rhythm.      Heart sounds: Normal heart sounds. No murmur heard.  Pulmonary:      Effort: Pulmonary effort is normal. No respiratory distress.      Breath sounds: Normal breath sounds.      Comments: Talkative with no sob/oleary  Abdominal:      General: Bowel sounds are normal. There is no distension.      Palpations: Abdomen is soft.      Tenderness: There is no abdominal tenderness. There is no guarding.   Musculoskeletal:      Cervical back: Normal range of motion and neck supple. No rigidity.   Neurological:      Mental Status: He is alert and oriented to person, place, and time. Mental status is at baseline.      Motor: No weakness.      Gait: Gait normal.   Psychiatric:         Mood and Affect: Mood normal.         Behavior: Behavior normal.         Thought Content: Thought content normal.            Assessment:       1. Macrocytic anemia    2. B12 deficiency    3. Thrombocytopenia    4. Hepatitis B core antibody positive    5. History of atrial fibrillation    6. Severe obesity (BMI 35.0-39.9) with obstructive sleep apnea    7. CARMELA on CPAP    8. Essential hypertension            Plan:   Normocytic anemia  -reviewed recent and prior labs  -likely etiology chronic disease  -hemoglobin stable 12.6/38.2 (4/14/23), mild macrocytic evidence, iron levels stable, normal B12 and folate  -labs 8/11/23 stable, hgb 12gdL, mild macrocytic evidence, b12 low normal, folate normal, iron studies normal,  ADRI currently resolved  -will start otc B12 supplement 5000mcg daily  -will continue to monitor, rtc 6 month with labs    Thrombocytopenia  -he did have thrombocytopenia which resolved spontaneously  -normal since 12/13/21 labs, currently 165,000  -continue to monitor    Hepatitis-B core antibody positive  -he had previous exposure to hepatitis B and is immune    History of atrial fibrillation  -on Xarelto, tolerating well with no bleeding side effects  -management deferred to Cardiology, Dr. Bello    Severe obesity with obstructive sleep apnea, fatigue  -Body mass index is 39.85 kg/m².  -fatigue could be related to untreated CARMELA  -healthy lifestyle changes encouraged, discussed benefit on general health as well as sleep apnea, energy  -continue to monitor, carmela management deferred to sleep medicine    Hypertension  -elevated bp today systolic 150/67, did not yet take med today  -continue with bps at home using [facility] home HTN program  -management deferred to cardiology    F/u 6 month, sooner any questions or concerns      Meagan Hopkins, NP-C  [facility]  Hematology/Oncology  [address] [facility] [address]  [place]  [address] [phone]

## 2023-08-15 ENCOUNTER — OFFICE VISIT (OUTPATIENT)
Dept: SLEEP MEDICINE | Facility: CLINIC | Age: 78
End: 2023-08-15
Payer: MEDICARE

## 2023-08-15 ENCOUNTER — PATIENT MESSAGE (OUTPATIENT)
Dept: SLEEP MEDICINE | Facility: CLINIC | Age: 78
End: 2023-08-15

## 2023-08-15 VITALS
DIASTOLIC BLOOD PRESSURE: 69 MMHG | SYSTOLIC BLOOD PRESSURE: 156 MMHG | BODY MASS INDEX: 39.9 KG/M2 | WEIGHT: 285 LBS | HEIGHT: 71 IN | HEART RATE: 57 BPM | OXYGEN SATURATION: 97 %

## 2023-08-15 DIAGNOSIS — G47.33 OSA ON CPAP: ICD-10-CM

## 2023-08-15 DIAGNOSIS — G47.30 SLEEP APNEA, UNSPECIFIED TYPE: Primary | ICD-10-CM

## 2023-08-15 PROCEDURE — 3077F PR MOST RECENT SYSTOLIC BLOOD PRESSURE >= 140 MM HG: ICD-10-PCS | Mod: HCNC,CPTII,S$GLB, | Performed by: PSYCHIATRY & NEUROLOGY

## 2023-08-15 PROCEDURE — 1159F MED LIST DOCD IN RCRD: CPT | Mod: HCNC,CPTII,S$GLB, | Performed by: PSYCHIATRY & NEUROLOGY

## 2023-08-15 PROCEDURE — 3078F PR MOST RECENT DIASTOLIC BLOOD PRESSURE < 80 MM HG: ICD-10-PCS | Mod: HCNC,CPTII,S$GLB, | Performed by: PSYCHIATRY & NEUROLOGY

## 2023-08-15 PROCEDURE — 1126F AMNT PAIN NOTED NONE PRSNT: CPT | Mod: HCNC,CPTII,S$GLB, | Performed by: PSYCHIATRY & NEUROLOGY

## 2023-08-15 PROCEDURE — 99999 PR PBB SHADOW E&M-EST. PATIENT-LVL V: ICD-10-PCS | Mod: PBBFAC,HCNC,, | Performed by: PSYCHIATRY & NEUROLOGY

## 2023-08-15 PROCEDURE — 99999 PR PBB SHADOW E&M-EST. PATIENT-LVL V: CPT | Mod: PBBFAC,HCNC,, | Performed by: PSYCHIATRY & NEUROLOGY

## 2023-08-15 PROCEDURE — 3078F DIAST BP <80 MM HG: CPT | Mod: HCNC,CPTII,S$GLB, | Performed by: PSYCHIATRY & NEUROLOGY

## 2023-08-15 PROCEDURE — 99204 OFFICE O/P NEW MOD 45 MIN: CPT | Mod: S$GLB,,, | Performed by: PSYCHIATRY & NEUROLOGY

## 2023-08-15 PROCEDURE — 1126F PR PAIN SEVERITY QUANTIFIED, NO PAIN PRESENT: ICD-10-PCS | Mod: HCNC,CPTII,S$GLB, | Performed by: PSYCHIATRY & NEUROLOGY

## 2023-08-15 PROCEDURE — 3077F SYST BP >= 140 MM HG: CPT | Mod: HCNC,CPTII,S$GLB, | Performed by: PSYCHIATRY & NEUROLOGY

## 2023-08-15 PROCEDURE — 99204 PR OFFICE/OUTPT VISIT, NEW, LEVL IV, 45-59 MIN: ICD-10-PCS | Mod: S$GLB,,, | Performed by: PSYCHIATRY & NEUROLOGY

## 2023-08-15 PROCEDURE — 1157F PR ADVANCE CARE PLAN OR EQUIV PRESENT IN MEDICAL RECORD: ICD-10-PCS | Mod: HCNC,CPTII,S$GLB, | Performed by: PSYCHIATRY & NEUROLOGY

## 2023-08-15 PROCEDURE — 1157F ADVNC CARE PLAN IN RCRD: CPT | Mod: HCNC,CPTII,S$GLB, | Performed by: PSYCHIATRY & NEUROLOGY

## 2023-08-15 PROCEDURE — 1159F PR MEDICATION LIST DOCUMENTED IN MEDICAL RECORD: ICD-10-PCS | Mod: HCNC,CPTII,S$GLB, | Performed by: PSYCHIATRY & NEUROLOGY

## 2023-08-15 RX ORDER — MULTIVIT WITH MINERALS/HERBS
1 TABLET ORAL DAILY
COMMUNITY

## 2023-08-15 NOTE — PATIENT INSTRUCTIONS
Please call [employer] directly at [phone] and website   [email or web address]   to register your machine for recall.       _________________________  SLEEP LAB ([facility]) will contact you to schedulethe sleep study. Their number is [phone] (ext [phone]). Please call them if you do not hear from them in 2 weeks from now.  [facility] is located on 7th floor of the [facility] (for home and in lab studies); [place] lab is located in [facility] ( in lab studies only).    SLEEP CLINIC (my assistant) will call you when the sleep study results are ready or I will message you through the portal with the results as we have discussed - if you have not heard from us by 2 weeks from the date of the study, or you can use [facility] to contact me.    Our clinic phone number is [phone] (ext 1)       You are advised to abstain from driving should you feel sleepy or drowsy.

## 2023-08-15 NOTE — PROGRESS NOTES
"     Edis Huggins Jr. is a 78 y.o. male is here to be evaluated for a sleep disorder; referred by Meagan Hopkins NP. (Seen for anemia with low CBC count)    The patient reports rare snoring , fatigue  and excessive daytime sleepiness since many years back.   Edis Huggins Jr. denied  snoring, gasping for air, excessive daytime sleepiness, and excessive daytime fatigue.    He has always been a long sleeper and was in a habit to nap in the afternnoon since childhood    He ws diagnosed with CARMELA and ordered CPAP byt his PMD, Dr. Miles.  His original study is not available, Has not been using CPAP for the last 2 yrs.   HE is not sure if he was sleeping better with the machine - he thinks it was making him wake up more often.   He would like to do a slerep study to see if he still needed the machine.   Got Medicare since after getting CPAP - in 2020. He did not bring his machine, but recognized the Dreamwear mask on display.   He does not think he was supposed to register his DS1 for recall  He has not been using it lately.     The patient feels rested upon awakening. Takes naps.     The patient  denies morning headaches andv reports  dry mouth on awakening.   Edis Huggins Jr. denies  nasal congestion.    The patient never had tonsillectomy, adenoidectomy or UPPP    The patient  reports difficulty with falling and staying asleep. Nocturia x3-4 but can return to sleep    Edis Huggins Jr.  Reports  symptoms concerning for parasomnia with  somniloquy - may be dream enacting- speaking out loud according to dream context,or moving according to "about to hit someone" according to dream content - 1-2 times a alyse  The patient  denies auxiliary symptoms of narcolepsy including sleep onset paralysis, hypnagogic hallucinations, sleep attacks and cataplexy.    Edis Huggins Jr. Reports occasional symptoms concerning for RLS; Iron were nor,mal and so was ferritin.  nocturnal leg movements have not been " noticed.   The patient does not experience sleep related leg cramps.         Medications pertinent to sleep  disorders taken currently:Melatonin PRN  Previous  medications taken  for sleep disorders:  no    Sleep studies  HST 2/2020: AHI 14, RDI 22, SAO2 min - 88%        Occupation:retired  Bed partner: his wife  Exercise routine: active  Caffeine:  no -1  beverages per day  Alcohol: hardly any  Smoking:no  EPWORTH SLEEPINESS SCALE TOTAL SCORE  8/11/2023   Total score 6         EPWORTH SLEEPINESS SCALE 8/11/2023   Sitting and reading 1   Watching TV 1   Sitting, inactive in a public place (e.g. a theatre or a meeting) 1   As a passenger in a car for an hour without a break 2   Lying down to rest in the afternoon when circumstances permit 3   Sitting and talking to someone 0   Sitting quietly after a lunch without alcohol 0   In a car, while stopped for a few minutes in traffic 0   Total score 8         EPWORTH SLEEPINESS SCALE 8/11/2023   Sitting and reading 1   Watching TV 1   Sitting, inactive in a public place (e.g. a theatre or a meeting) 1   As a passenger in a car for an hour without a break 0   Lying down to rest in the afternoon when circumstances permit 3   Sitting and talking to someone 0   Sitting quietly after a lunch without alcohol 0   In a car, while stopped for a few minutes in traffic 0   Total score 6     Sleep Clinic New Patient 8/11/2023   What time do you go to bed on a week day? (Give a range) 11pm to 1 am (retired)   What time do you go to bed on a day off? (Give a range) 11pm to 1 am  (retired)   How long does it take you to fall asleep? (Give a range) 35 to 30 minutes   On average, how many times per night do you wake up? 2 to 3 to go to the bathroom.   How long does it take you to fall back into sleep? (Give a range) Return to sleep immediately.   What time do you wake up to start your day on a week day? (Give a range) 8am to noon (retired)   What time do you wake up to start your day on a  day off? (Give a range) 8am to 1 pm (retired)   What time do you get out of bed? (Give a range) 8am to 1 pm (retired)   On average, how many hours do you sleep? 10 hours   On average, how many naps do you take per day? Less than one per day. Maybe once a week.   Rate your sleep quality from 0 to 5 (0-poor, 5-great). 4   Have you experienced:  Weight loss?   How much weight have you lost or gained (in lbs.) in the last year? around 4 to 5 lb   On average, how many times per night do you go to the bathroom?  2 to 3   Have you ever had a sleep study/CPAP machine/surgery for sleep apnea? Yes   Have you ever had a CPAP machine for sleep apnea? Yes   Have you ever had surgery for sleep apnea? No       Sleep Clinic ROS  8/11/2023   Difficulty breathing through the nose?  Sometimes   Sore throat or dry mouth in the morning? Sometimes   Irregular or very fast heart beat?  No   Shortness of breath?  No   Acid reflux? Sometimes   Body aches and pains?  Sometimes   Morning headaches? No   Dizziness? No   Mood changes?  No   Do you exercise?  No   Do you feel like moving your legs a lot?  Yes       DME:         PAST MEDICAL HISTORY:    Active Ambulatory Problems     Diagnosis Date Noted    Mixed hyperlipidemia     Essential hypertension     Anxiety     BPH with urinary obstruction     POAG (primary open-angle glaucoma) - Both Eyes 12/06/2012    Steroid responders - Both Eyes 12/06/2012    Afferent pupillary defect of right eye 12/06/2012    Carotid atherosclerosis 03/20/2014    Sensorineural hearing loss 03/20/2014    Normocytic anemia -- iron and B12 normal 03/20/2014    Nuclear sclerotic cataract of right eye 05/10/2017    Dyspnea on exertion     Severe obesity (BMI 35.0-39.9) with comorbidity 04/06/2018    Paroxysmal atrial fibrillation 04/23/2018    Testosterone deficiency in male 05/28/2018    Erectile dysfunction due to arterial insufficiency 05/28/2018    Aortic atherosclerosis -- CTA 06/2018 08/17/2018    Burn any degree  involving 10-19 percent of body surface 09/29/2018    Lumbar radiculopathy 06/07/2019    Chronic bilateral low back pain with right-sided sciatica 06/07/2019    Neuroforaminal stenosis of lumbar spine 06/07/2019    Lumbar spondylosis 06/07/2019    Chronic pain 06/25/2019    CARMELA (obstructive sleep apnea)     Prediabetes 04/14/2021    Encounter for long-term (current) use of other medications 04/14/2021    Statin intolerance 04/15/2021    Thrombocytopenia 12/16/2021    Chronic anticoagulation 05/24/2022     Resolved Ambulatory Problems     Diagnosis Date Noted    Senile nuclear sclerosis - Both Eyes 12/06/2012    S/P joint replacement 02/26/2013    Pain in limb 03/27/2013    Difficulty walking 03/27/2013    Allergic rhinitis due to animal (cat) (dog) hair and dander 03/20/2014    Family history of ischemic heart disease 03/20/2014    Status post total knee replacement 03/20/2014    Family history of colon cancer 03/20/2014    Pain of right hip joint 12/12/2016    Weakness of both lower extremities 12/12/2016    Encounter for abdominal aortic aneurysm (AAA) screening     Lone atrial fibrillation 06/19/2018    Lower extremity weakness 12/30/2020    Impairment of balance 12/30/2020     Past Medical History:   Diagnosis Date    Allergy     Anemia     Basal cell carcinoma     BPH (benign prostatic hypertrophy)     Fever blister     HLD (hyperlipidemia)     HTN (hypertension)     IFG (impaired fasting glucose)     Primary open angle glaucoma     Squamous cell carcinoma of skin                 PAST SURGICAL HISTORY:    Past Surgical History:   Procedure Laterality Date    CATARACT EXTRACTION W/  INTRAOCULAR LENS IMPLANT Left 02/08/2017    WITH KAHOOK GONIOTOMY (DR. WHITLEY)    CATARACT EXTRACTION W/  INTRAOCULAR LENS IMPLANT Right 05/10/2017        COLONOSCOPY N/A 2/8/2023    Procedure: COLONOSCOPY;  Surgeon: Geovanny Charles MD;  Location: [facility];  Service: Endoscopy;  Laterality: N/A;    EYE SURGERY       HERNIA REPAIR      x 2    JOINT REPLACEMENT      SKIN BIOPSY      in my [facility] record    TONSILLECTOMY      TOTAL KNEE ARTHROPLASTY Right     TRANSFORAMINAL EPIDURAL INJECTION OF STEROID Right 2019    Procedure: Transforaminal Epidural Steroid Injection, Right, L3-4, L4-5;  Surgeon: Eduard Mukherjee Jr., MD;  Location: [facility];  Service: Pain Management;  Laterality: Right;    TRANSFORAMINAL EPIDURAL INJECTION OF STEROID Right 2020    Procedure: Injection,steroid,epidural,transforaminal approach--Right L3-4, L4-5;  Surgeon: Eduard Mukherjee Jr., MD;  Location: [facility];  Service: Pain Management;  Laterality: Right;    YAG CAPSULOTOMY\ Right 2020             FAMILY HISTORY:                Family History   Problem Relation Age of Onset    Colon cancer Maternal Grandmother         70s    Hearing loss Maternal Grandmother         Age related, Had hearing aids    Cataracts Mother     Atrial fibrillation Mother     Hearing loss Mother         Age related. Had hearing aids    Heart disease Mother         Heart valve deterioration at 93.  at 95    Cataracts Father     Heart disease Father 85        Heart attack at 85. needed and got herat valve replacement, Lived to 9293.  at 95    Arthritis Father     Cancer Father         Lung    Hearing loss Father         Age relared. Needed but refused hearing aids.    Glaucoma Brother     Other Brother         Hypogonadism    Cancer Paternal Grandmother          of undected colon cancer around 87 years old.    Arthritis Paternal Grandmother     Hypertension Son         Developed in his 30s    Stroke Maternal Grandfather         He smoked all his life  of stroke at age 63    Heart disease Maternal Grandfather         Hardening of the ataries    Early death Paternal Grandfather         pneumonia 1940    Cancer Paternal Uncle         Prostate    Learning disabilities Sister         dyslexia as a child    Prostate cancer Neg Hx      Amblyopia Neg Hx     Blindness Neg Hx     Macular degeneration Neg Hx     Retinal detachment Neg Hx     Strabismus Neg Hx     Diabetes Neg Hx     Melanoma Neg Hx     Psoriasis Neg Hx     Lupus Neg Hx     Eczema Neg Hx     Acne Neg Hx        SOCIAL HISTORY:          Tobacco:   Social History     Tobacco Use   Smoking Status Former    Current packs/day: 0.00    Average packs/day: 1 pack/day for 10.0 years (10.0 ttl pk-yrs)    Types: Cigarettes    Start date: 1964    Quit date: 1974    Years since quittin.6   Smokeless Tobacco Never       alcohol use:    Social History     Substance and Sexual Activity   Alcohol Use Yes    Alcohol/week: 1.0 standard drink of alcohol    Types: 1 Shots of liquor per week    Comment: I do not drink every week.                   ALLERGIES:  Review of patient's allergies indicates:  No Known Allergies    CURRENT MEDICATIONS:    Current Outpatient Medications   Medication Sig Dispense Refill    acyclovir (ZOVIRAX) 400 MG tablet TAKE 1 TABLET BY MOUTH THREE TIMES DAILY FOR 5 DAYS AT SIGN OF FEVER BLISTER 90 tablet 0    azelastine (ASTELIN) 137 mcg (0.1 %) nasal spray 1 spray (137 mcg total) by Nasal route 2 (two) times daily. 30 mL 6    b complex vitamins tablet Take 1 tablet by mouth once daily.      Bifidobacterium infantis (ALIGN) 4 mg Cap Take 4 mg by mouth once daily.       cetirizine (ZYRTEC) 10 MG tablet Take 10 mg by mouth once daily.      citalopram (CELEXA) 20 MG tablet Take 1 tablet (20 mg total) by mouth once daily. 90 tablet 3    cyclobenzaprine (FLEXERIL) 10 MG tablet TAKE 1 TABLET(10 MG) BY MOUTH THREE TIMES DAILY AS NEEDED 30 tablet 3    finasteride (PROSCAR) 5 mg tablet Take 1 tablet (5 mg total) by mouth once daily. 90 tablet 3    fish oil-omega-3 fatty acids 300-1,000 mg capsule Take 1 g by mouth once daily.      gemfibroziL (LOPID) 600 MG tablet Take 1 tablet (600 mg total) by mouth 2 (two) times daily. 180 tablet 3    irbesartan (AVAPRO) 75 MG tablet Take  "1 tablet (75 mg total) by mouth every evening. 90 tablet 3    mv-mn/iron/folic acid/herb 190 (VITAMIN D3 COMPLETE ORAL) Take 1 tablet by mouth once daily.      rivaroxaban (XARELTO) 20 mg Tab Take 1 tablet (20 mg total) by mouth once daily. 90 tablet 3    tamsulosin (FLOMAX) 0.4 mg Cap Take 1 capsule (0.4 mg total) by mouth once daily. 90 capsule 3    timolol maleate 0.5% (TIMOPTIC-XE) 0.5 % SolG INSTILL 1 DROP IN BOTH EYES EVERY MORNING 5 mL 12    vitamin E 1000 UNIT capsule Take 2,000 Units by mouth once daily.      chlorthalidone (HYGROTEN) 25 MG Tab Take 1 tablet (25 mg total) by mouth once daily. (Patient not taking: Reported on 8/9/2023) 30 tablet 11    mupirocin (BACTROBAN) 2 % ointment Apply topically 2 (two) times daily. (Patient not taking: Reported on 8/9/2023) 30 g 0    sod sulf-pot chloride-mag sulf (SUTAB) 1.479-0.188- 0.225 gram tablet Take 12 tablets by mouth once daily. Take according to package instructions with indicated amount of water. (Patient not taking: Reported on 8/9/2023) 24 tablet 0     No current facility-administered medications for this visit.                  PHYSICAL EXAM:  BP (!) 156/69 (BP Location: Left arm, Patient Position: Sitting, BP Method: Large (Automatic))   Pulse (!) 57   Ht 5' 11" (1.803 m)   Wt 129.3 kg (285 lb)   SpO2 97%   BMI 39.75 kg/m²   GENERAL: Normal development, well groomed.  HEENT:   HEENT:  Conjunctivae are non-erythematous; Pupils equal, round, and reactive to light; Nose is symmetrical; Nasal mucosa is pink and moist; Septum is midline; Inferior turbinates are normal; Nasal airflow is normal; Posterior pharynx is pink; Modified Mallampati:II-III; Posterior palate is normal; Tonsils not visualized; Uvula is normal and pink;Tongue is normal; Dentition is fair; No TMJ tenderness; Jaw opening and protrusion without click and without discomfort.  NECK: Supple. Neck circumference is 17.8 inches. No thyromegaly. No palpable nodes.     SKIN: On face and neck: " No abrasions, no rashes, no lesions.  No subcutaneous nodules are palpable.  RESPIRATORY: Chest is clear to auscultation.  Normal chest expansion and non-labored breathing at rest.  CARDIOVASCULAR: Normal S1, S2.  No murmurs, gallops or rubs. No carotid bruits bilaterally.  No edema. No clubbing. No cyanosis.    NEURO: Oriented to time, place and person. Normal attention span and concentration. Gait normal.    PSYCH: Affect is full. Mood is normal  MUSCULOSKELETAL: Moves 4 extremities. Gait normal.           ASSESSMENT:    1. Sleep Apnea NEC. The patient symptomatically has  snoring, interrupted sleep, excessive daytime sleepiness, and excessive daytime fatigue  with exam findings of crowded airway and elevated BMI. The patient has medical co-morbidities of atrial fibrillation  which can be worsened by CARMELA. This warrants further investigation for possible obstructive sleep apnea.        2. Parasomnia - occasional dream enacting behavior. Possible early RBD      PLAN:    Diagnostic: Polysomnogram in lab. The nature of this procedure and its indication was discussed with the patient. We will notify the patient about sleep study resuts via My Chart.  Can bring MElAtonin and  Parasomnia montage - hence inlab    He will send me S/N from his [employer] machine received in 2023 to see if it's on recall - I will then provide him with  the ink to register it for recall. Has not been using CPAP for the last 2 yrs. Got Medicare since after getting CPAP - in 2020.     During our discussion today, we talked about the etiology of  CARMELA as well as the potential ramifications of untreated sleep apnea, which could include daytime sleepiness, hypertension, heart disease and/or stroke.  We discussed potential treatment options, which could include weight loss, body positioning, continuous positive airway pressure (CPAP), or referral for surgical consideration. Meanwhile, he  is urged to avoid supine sleep, weight gain and alcoholic  beverages since all of these can worsen CARMELA.     The patient was given open opportunity to ask questions and/or express concerns about treatment plan. Two point patient identifier confirmed.       Precautions: The patient was advised to abstain from driving should he feel sleepy or drowsy.    Follow up: MD after the sleep study has been completed.     ESS ([place] Sleepiness Scale) and other sleep medicine related questionnaires were reviewed with the patient.      46-minute visit. >50% spent counseling patient and coordination of care.  The patient was  cautioned against drowsy driving.

## 2023-08-21 ENCOUNTER — PATIENT MESSAGE (OUTPATIENT)
Dept: OPHTHALMOLOGY | Facility: CLINIC | Age: 78
End: 2023-08-21
Payer: MEDICARE

## 2023-08-21 ENCOUNTER — PATIENT MESSAGE (OUTPATIENT)
Dept: OTOLARYNGOLOGY | Facility: CLINIC | Age: 78
End: 2023-08-21
Payer: MEDICARE

## 2023-08-21 DIAGNOSIS — H40.1122 PRIMARY OPEN ANGLE GLAUCOMA OF LEFT EYE, MODERATE STAGE: ICD-10-CM

## 2023-08-21 RX ORDER — AZELASTINE 1 MG/ML
1 SPRAY, METERED NASAL 2 TIMES DAILY
Qty: 30 ML | Refills: 3 | Status: SHIPPED | OUTPATIENT
Start: 2023-08-21 | End: 2024-02-06 | Stop reason: SDUPTHER

## 2023-08-22 RX ORDER — TIMOLOL MALEATE 5 MG/ML
1 SOLUTION OPHTHALMIC EVERY MORNING
Qty: 5 ML | Refills: 12 | Status: SHIPPED | OUTPATIENT
Start: 2023-08-22 | End: 2023-09-11

## 2023-08-23 ENCOUNTER — TELEPHONE (OUTPATIENT)
Dept: SLEEP MEDICINE | Facility: OTHER | Age: 78
End: 2023-08-23
Payer: MEDICARE

## 2023-09-10 NOTE — PROGRESS NOTES
HPI     Glaucoma            Comments: 4 month ck and pt states he is having more difficulty reading   small print          Comments    DLS: 5/15/23    1) POAG   2) PCIOL OU   3) Steroid Responder   4) APD OS   5) PAS OS   6) Yag Cap OD     MEDS:  Timolol GFS QAM OU          Last edited by Lili Booker MA on 9/11/2023  3:29 PM.            Assessment /Plan     For exam results, see Encounter Report.    Primary open angle glaucoma of left eye, moderate stage    Primary open-angle glaucoma, right eye, mild stage    Steroid responders, bilateral    PCO (posterior capsular opacification), right    Pupil irregular of left eye    Relative afferent pupillary defect - Left Eye    Pseudophakia, both eyes            Lost to F/u from 11/4/2013 to 8/11/2015   Knows Sharlene Mo and MIKHAIL - use to work with them     1. POAG (primary open-angle glaucoma) - Both Eyes   Old pt of [facility] [phone]  Began at [facility] 2012       POAG OS>OD           Family history    neg        Glaucoma meds   T1/2 GFS ou (use to use travatan- off post CE)         H/O adverse rxn to glaucoma drops    none        LASERS    SLT os 9/26/2013 -( good resp 17 --> 14) // yag cap od - 1/2020        GLAUCOMA SURGERIES    trabectome os (combined with phaco - 2/8/2017        OTHER EYE SURGERIES    Combined - phaco/IOL / goniotomy - trabectome os 2/8/2017 - +heme and increae in IOP)                                                 PC IOL OD - 5/10/2017         CDR    0.6/0.7-0.8        Tbase    20-23/21-24          Tmax    23/24            Ttarget    17/17           HVF   6 tests 4570-0257 OD essentially full; OS Sup paracentral defect, IAD/INS (([place]))             (([facility])) - 5 test 2012 to 2022 - full od // sup paracentral defect / inferior paracentral defect         Gonio    +4 ou        CCT    598/598        OCT    7 test 2012 to 2022  - RNFL - nl od // bord G/TS/T         HRT    7 test-2012 to 2020  - MR -  [place] off  od // dec T, bord  "I os /// CDR xxx od // 0.64 os        Disc photos    2012 - IOS    - Ttoday  17-18 /14  - Test done today  IOP      2. Steroid responders - Both Eyes  2/2 nasacort      3. ? Trace APD os      4. S/P Rt knee replacement Feb 2013  -doing well      5.  Myopia / presbyopia ou     6. Irregular pupil - w/ PAS   -2/2 very high IOP POD # 1 post phaco     7.  PC IOL OU  OD 5/10/2017 - PCB00 14.5  OS 2/8/2017 - + hyphema and high IOP 2/2 trabectome - irregular pupil post op - PCB00 14.0    8. PCO od - vis sign   rec yag cap       Plan  CSM  IOP below target OU  SLT done OS 9/26/2013 - good initial resp 24-->17    -( if responds well consider SLT od - but full VF and healthy ON still od)  HVF stable OU    Cont timolol gfs ou q day     If the glare from the fixed dilated pupil bothers him a lot post 2nd eye getting done - consider pupil surgery with dr Peacock to "purse string it " smaller - pt is doing ok for now - glare is not bothering him too much     9/11/2023   - IOP good   - Drops: timolol only - change from timolol gfs q am to timolol bid - 2/2 cost     No Photo file - Met patient     F/U 4  months with IOP HVF / DFE / oct -  [facility]   - works on Tuesday - so can no longer do [employer]   "

## 2023-09-11 ENCOUNTER — OFFICE VISIT (OUTPATIENT)
Dept: OPHTHALMOLOGY | Facility: CLINIC | Age: 78
End: 2023-09-11
Payer: MEDICARE

## 2023-09-11 DIAGNOSIS — H26.491 PCO (POSTERIOR CAPSULAR OPACIFICATION), RIGHT: ICD-10-CM

## 2023-09-11 DIAGNOSIS — Z96.1 PSEUDOPHAKIA, BOTH EYES: ICD-10-CM

## 2023-09-11 DIAGNOSIS — H21.562 PUPIL IRREGULAR OF LEFT EYE: ICD-10-CM

## 2023-09-11 DIAGNOSIS — H40.1111 PRIMARY OPEN-ANGLE GLAUCOMA, RIGHT EYE, MILD STAGE: ICD-10-CM

## 2023-09-11 DIAGNOSIS — H40.1122 PRIMARY OPEN ANGLE GLAUCOMA OF LEFT EYE, MODERATE STAGE: Primary | ICD-10-CM

## 2023-09-11 DIAGNOSIS — H57.09 RELATIVE AFFERENT PUPILLARY DEFECT: ICD-10-CM

## 2023-09-11 DIAGNOSIS — H40.043 STEROID RESPONDERS, BILATERAL: ICD-10-CM

## 2023-09-11 PROCEDURE — 1160F PR REVIEW ALL MEDS BY PRESCRIBER/CLIN PHARMACIST DOCUMENTED: ICD-10-PCS | Mod: HCNC,CPTII,S$GLB, | Performed by: OPHTHALMOLOGY

## 2023-09-11 PROCEDURE — 3288F PR FALLS RISK ASSESSMENT DOCUMENTED: ICD-10-PCS | Mod: HCNC,CPTII,S$GLB, | Performed by: OPHTHALMOLOGY

## 2023-09-11 PROCEDURE — 3288F FALL RISK ASSESSMENT DOCD: CPT | Mod: HCNC,CPTII,S$GLB, | Performed by: OPHTHALMOLOGY

## 2023-09-11 PROCEDURE — 99999 PR PBB SHADOW E&M-EST. PATIENT-LVL III: ICD-10-PCS | Mod: PBBFAC,HCNC,, | Performed by: OPHTHALMOLOGY

## 2023-09-11 PROCEDURE — 1101F PT FALLS ASSESS-DOCD LE1/YR: CPT | Mod: HCNC,CPTII,S$GLB, | Performed by: OPHTHALMOLOGY

## 2023-09-11 PROCEDURE — 1101F PR PT FALLS ASSESS DOC 0-1 FALLS W/OUT INJ PAST YR: ICD-10-PCS | Mod: HCNC,CPTII,S$GLB, | Performed by: OPHTHALMOLOGY

## 2023-09-11 PROCEDURE — 1159F MED LIST DOCD IN RCRD: CPT | Mod: HCNC,CPTII,S$GLB, | Performed by: OPHTHALMOLOGY

## 2023-09-11 PROCEDURE — 1126F AMNT PAIN NOTED NONE PRSNT: CPT | Mod: HCNC,CPTII,S$GLB, | Performed by: OPHTHALMOLOGY

## 2023-09-11 PROCEDURE — 1157F PR ADVANCE CARE PLAN OR EQUIV PRESENT IN MEDICAL RECORD: ICD-10-PCS | Mod: HCNC,CPTII,S$GLB, | Performed by: OPHTHALMOLOGY

## 2023-09-11 PROCEDURE — 99999 PR PBB SHADOW E&M-EST. PATIENT-LVL III: CPT | Mod: PBBFAC,HCNC,, | Performed by: OPHTHALMOLOGY

## 2023-09-11 PROCEDURE — 1159F PR MEDICATION LIST DOCUMENTED IN MEDICAL RECORD: ICD-10-PCS | Mod: HCNC,CPTII,S$GLB, | Performed by: OPHTHALMOLOGY

## 2023-09-11 PROCEDURE — 1126F PR PAIN SEVERITY QUANTIFIED, NO PAIN PRESENT: ICD-10-PCS | Mod: HCNC,CPTII,S$GLB, | Performed by: OPHTHALMOLOGY

## 2023-09-11 PROCEDURE — 1157F ADVNC CARE PLAN IN RCRD: CPT | Mod: HCNC,CPTII,S$GLB, | Performed by: OPHTHALMOLOGY

## 2023-09-11 PROCEDURE — 99214 PR OFFICE/OUTPT VISIT, EST, LEVL IV, 30-39 MIN: ICD-10-PCS | Mod: HCNC,S$GLB,, | Performed by: OPHTHALMOLOGY

## 2023-09-11 PROCEDURE — 1160F RVW MEDS BY RX/DR IN RCRD: CPT | Mod: HCNC,CPTII,S$GLB, | Performed by: OPHTHALMOLOGY

## 2023-09-11 PROCEDURE — 99214 OFFICE O/P EST MOD 30 MIN: CPT | Mod: HCNC,S$GLB,, | Performed by: OPHTHALMOLOGY

## 2023-09-11 RX ORDER — TIMOLOL MALEATE 5 MG/ML
1 SOLUTION/ DROPS OPHTHALMIC 2 TIMES DAILY
Qty: 15 ML | Refills: 3 | Status: SHIPPED | OUTPATIENT
Start: 2023-09-11

## 2023-09-12 ENCOUNTER — HOSPITAL ENCOUNTER (OUTPATIENT)
Dept: SLEEP MEDICINE | Facility: HOSPITAL | Age: 78
Discharge: HOME | End: 2023-09-12
Attending: PSYCHIATRY & NEUROLOGY
Payer: MEDICARE

## 2023-09-12 DIAGNOSIS — G47.30 SLEEP APNEA, UNSPECIFIED TYPE: ICD-10-CM

## 2023-09-12 DIAGNOSIS — G47.33 OSA (OBSTRUCTIVE SLEEP APNEA): Primary | ICD-10-CM

## 2023-09-12 PROCEDURE — 95810 POLYSOM 6/> YRS 4/> PARAM: CPT | Mod: HCNC

## 2023-09-13 NOTE — PROGRESS NOTES
Education was done via explanation of sleep study process and procedure. All questions were answered.    Pt. did not meet criteria for CPAP.Some respiratory events were observed. REM behavior montage per orders. REM sleep was obtained. No unusual behaviors were observed.    Low sat of 88% was observed in study. EKG revealed rare PAC and rare PVC and bradycardia Soft to moderate snoring was heard. Thank you letter was given in a.m

## 2023-09-23 ENCOUNTER — PATIENT MESSAGE (OUTPATIENT)
Dept: SLEEP MEDICINE | Facility: CLINIC | Age: 78
End: 2023-09-23

## 2023-09-23 PROCEDURE — 95810 PR POLYSOMNOGRAPHY, 4 OR MORE: ICD-10-PCS | Mod: 26,HCNC,, | Performed by: PSYCHIATRY & NEUROLOGY

## 2023-09-23 PROCEDURE — 95810 POLYSOM 6/> YRS 4/> PARAM: CPT | Mod: 26,HCNC,, | Performed by: PSYCHIATRY & NEUROLOGY

## 2023-09-24 ENCOUNTER — PATIENT MESSAGE (OUTPATIENT)
Dept: SLEEP MEDICINE | Facility: CLINIC | Age: 78
End: 2023-09-24
Payer: MEDICARE

## 2023-09-24 NOTE — PROCEDURES
"    Diagnostic Report  [facility] - [address], [place] [address]  Phone: [phone]  Fax: [phone]           Patient Name: SELIN HUDSON Study Date: 9/12/2023   YOB: 1945 Patient MRN: [id number]   Age:  78 year     Sex: Male Referring Physician: TAHIRA ROLLE M.D   Height: 5' 11" Recording Tech: Pooja Warner RRT RPSGT   Weight: 285.0 lbs Scoring Tech: Byron Renae RPSGT   BMI:  39.9 AASM:  1B   AHI: 26.2 Interpreting Physician: Belén Rolle MD   RERA index: 9.2 Low oxygen sat: 88.0%   RDI: 35.5        Polysomnogram Data: A full night polysomnogram recorded the standard physiologic parameters including EEG, EOG, EMG, EKG, nasal and oral airflow.  Respiratory parameters of chest and abdominal movements were recorded with Peizo-Crystal motion transducers.  Oxygen saturation was recorded by pulse oximetry.    Sleep architecture: This is a baseline polysomnogram. At light's out, the patient fell asleep in 74.5 minutes and slept for 51.9% of the time. Total sleep time (TST) was 247.0 minutes. 42.7% of TST was in Stage N1 sleep, 0.0% TST in slow wave sleep, and 33.6% TST in REM sleep. The REM latency was 213.0 minutes. Sleep architecture was significantly disrupted due to underlying sleep apnea.     Respiratory: Mild to moderate snoring was present. The polysomnogram revealed a presence of 11 obstructive, - central, and - mixed apneas resulting in a Total Apnea index of 2.7 events per hour.  There were 97 hypopneas resulting in a Total Hypopnea index of 23.6 events per hour.  The combined Apnea/Hypopnea index was 26.2 events per hour.  There were a total of 38 RERA events resulting in a Respiratory Disturbance Index (RDI) of 35.5 events per hour.  Mean oxygen saturation was 94.7%.  The lowest oxygen saturation during sleep was 89.0%.  Time spent ?88% oxygen saturation was - minutes (-).The patient did not qualify for a split night study due to an insufficient number " "of events in the first half of the study.  .    Motor movement / Parasomnia: There were frequent  limb movements of sleep noted. The total limb movement index was 64.4 (19.2 with arousal).     Cardiac: Cardiac rhythm monitoring revealed a sinus rhythm  with occasional PAC's and PVC's and bradycardia. The average pulse rate was 57.6 bpm.  The minimum pulse rate was 30.0 bpm while the maximum pulse rate was 77.0 bpm.      Patient perception: On a post-sleep study questionnaire, the patient indicated that sleep was the same in the lab than compared to home.    IMPRESSION:  Obstructive Sleep Apnea (G47.33),  severe based on AASM criteria. Met CMS  criteria for CARMELA.  Frequent PLMS (Periodic Leg Movements of Sleep)      RECOMMENDATION:    CPAP titration study, if the patient is interested in this treatment modality.  In the interim, the patient should avoid supine position sleep, since sleep disordered breathing was most prevalent in this sleeping position.        I have reviewed the attached data report and the raw data tracings of this study epoch-by-epoch and have determined that the recording quality and scoring of events are sufficient to allow for interpretation and electronically signed by:      Belén Rolle MD 9/12/2023                              [facility]    Diagnostic PSG Report    Patient Name: SELIN HUDSON Study Date: 9/12/2023   YOB: 1945 MRN #: [id number]   Age: 78 year DWAYNE #: [id number]   Sex: Male Referring Provider: TAHIRA ROLLE M.D   Height: 5' 11" Recording Tech: Pooja Warner RRT RPSGT   Weight: 285.0 lbs Scoring Tech: Byron Renae RRT RPSGT   BMI: 39.9 Interpreting Physician: Belén Rolle MD   ESS: - Neck Circumference: -     Study Overview    Lights Off: 09:28:08 PM  Count Index   Lights On: 05:23:57 AM Awakenings: 24 5.8   Time in Bed: 475.8 min. Arousals: 158 38.4   Total Sleep Time: 247.0 min. Apneas & Hypopneas: 108 26.2    Sleep Efficiency: " 51.9% Limb Movements: 265 64.4   Sleep Latency: 74.5 min. Snores: - -   Wake After Sleep Onset: 154.0 min. Desaturations: 5 1.2    REM Latency from Sleep Onset: 213.0 min. Minimum SpO2 TST: 89.0%        Sleep Architecture   % of Time in Bed    Stages Time (mins) % Sleep Time   Wake 229.5    Stage N1 105.5 42.7%   Stage N2 58.5 23.7%   Stage N3 0.0 0.0%   REM 83.0 33.6%         Arousal Summary     NREM REM Sleep Index   Respiratory Arousals 23 8 31 7.5   PLM Arousals 79 - 79 19.2   Isolated Limb Movement Arousals - - - -   Spontaneous Arousals 28 19 47 11.4   Total 131 27 158 38.4       Limb Movement Summary     Count Index   Isolated Limb Movements - -   Periodic Limb Movements (PLMs) 265 64.4   Total Limb Movements 265 64.4         Respiratory Summary     By Sleep Stage By Body Position Total    NREM REM Supine Non-Supine    Time (min) 164.0 83.0 31.5 215.5 247.0           Obstructive Apnea 5 6 2 9 11   Mixed Apnea - - - - -   Central Apnea - - - - -   Total Apneas 5 6 2 9 11   Total Apnea Index 1.8 4.3 3.8 2.5 2.7           Hypopnea 97 - - 97 97   Hypopnea Index 35.5 - - 27.0 23.6           Apnea & Hypopnea 102 6 2 106 108   Apnea & Hypopnea Index 37.3 4.3 3.8 29.5 26.2           RERAs 32 6 - 38 38   RERA Index 11.7 4.3 - 10.6 9.2           RDI 49.0 8.7 3.8 40.1 35.5      Scoring Criteria: Hypopneas scored at Choose an item.% desaturation criteria.    Respiratory Event Durations     Apnea Hypopnea    NREM REM NREM REM   Average (seconds) 13.1 13.0 4.6 -   Maximum (seconds) 15.9 16.4 4.6 -       Oxygen Saturation Summary     Wake NREM REM TST TIB   Average SpO2 95.1% 94.1% 94.8% 94.3% 94.7%   Minimum SpO2 88.0% 91.0% 89.0% 89.0% 88.0%   Maximum SpO2 99.0% 100.0% 99.0% 100.0% 100.0%       Oxygen Saturation Distribution    Range (%) Time in range (min) Time in range (%)   90.0 - 100.0 457.6 99.3%   80.0 - 90.0 0.4 0.1%   70.0 - 80.0 - -   60.0 - 70.0 - -   50.0 - 60.0 - -   0.0 - 50.0 - -   Time Spent ?88%  SpO2    Range (%) Time in range (min) Time in range (%)   0.0 - 88.0 0.1 0.0%          Count Index   Desaturations 5 1.2      Cardiac Summary     Wake NREM REM Sleep Total   Average Pulse Rate (BPM) 60.4 55.1 55.4 55.2 57.6   Minimum Pulse Rate (BPM) 44.0 44.0 30.0 30.0 30.0   Maximum Pulse Rate (BPM) 77.0 69.0 66.0 69.0 77.0     Pulse Rate Distribution    Range (bpm) Time in range (min) Time in range (%)   0.0 - 40.0 0.7 0.1%   40.0 - 60.0 318.1 69.0%   60.0 - 80.0 142.3 30.9%   80.0 - 100.0 - -   100.0 - 120.0 - -   120.0 - 140.0 - -   140.0 - 200.0 - -     EtCO2 Summary    Stage Min (mmHg) Average (mmHg) Max (mmHg)   Wake - - -   NREM(1+2+3) - - -   REM - - -     Range (mmHg) Time in range (min) Time in range (%)   20.0 - 40.0 - -   40.0 - 50.0 - -   50.0 - 55.0 - -   55.0 - 100.0 - -   Excluded data <20.0 & >65.0 476.5 100.0%     TcCO2 Summary    Stage Min (mmHg) Average (mmHg) Max (mmHg)   Wake - - -   NREM(1+2+3) - - -   REM - - -     Range (mmHg) Time in range (min) Time in range (%)   - - -   - - -   - - -   - - -   - - -     Comments    -

## 2023-09-25 ENCOUNTER — PATIENT MESSAGE (OUTPATIENT)
Dept: SLEEP MEDICINE | Facility: CLINIC | Age: 78
End: 2023-09-25
Payer: MEDICARE

## 2023-09-26 ENCOUNTER — OFFICE VISIT (OUTPATIENT)
Dept: SLEEP MEDICINE | Facility: CLINIC | Age: 78
End: 2023-09-26
Payer: MEDICARE

## 2023-09-26 VITALS
DIASTOLIC BLOOD PRESSURE: 72 MMHG | SYSTOLIC BLOOD PRESSURE: 143 MMHG | WEIGHT: 280 LBS | BODY MASS INDEX: 39.2 KG/M2 | HEIGHT: 71 IN | HEART RATE: 68 BPM

## 2023-09-26 DIAGNOSIS — G47.33 OSA (OBSTRUCTIVE SLEEP APNEA): Primary | ICD-10-CM

## 2023-09-26 PROCEDURE — 3077F PR MOST RECENT SYSTOLIC BLOOD PRESSURE >= 140 MM HG: ICD-10-PCS | Mod: CPTII,S$GLB,, | Performed by: PSYCHIATRY & NEUROLOGY

## 2023-09-26 PROCEDURE — 99999 PR PBB SHADOW E&M-EST. PATIENT-LVL IV: CPT | Mod: PBBFAC,HCNC,, | Performed by: PSYCHIATRY & NEUROLOGY

## 2023-09-26 PROCEDURE — 1159F PR MEDICATION LIST DOCUMENTED IN MEDICAL RECORD: ICD-10-PCS | Mod: CPTII,S$GLB,, | Performed by: PSYCHIATRY & NEUROLOGY

## 2023-09-26 PROCEDURE — 1101F PR PT FALLS ASSESS DOC 0-1 FALLS W/OUT INJ PAST YR: ICD-10-PCS | Mod: CPTII,S$GLB,, | Performed by: PSYCHIATRY & NEUROLOGY

## 2023-09-26 PROCEDURE — 1157F PR ADVANCE CARE PLAN OR EQUIV PRESENT IN MEDICAL RECORD: ICD-10-PCS | Mod: CPTII,S$GLB,, | Performed by: PSYCHIATRY & NEUROLOGY

## 2023-09-26 PROCEDURE — 1101F PT FALLS ASSESS-DOCD LE1/YR: CPT | Mod: CPTII,S$GLB,, | Performed by: PSYCHIATRY & NEUROLOGY

## 2023-09-26 PROCEDURE — 1157F ADVNC CARE PLAN IN RCRD: CPT | Mod: CPTII,S$GLB,, | Performed by: PSYCHIATRY & NEUROLOGY

## 2023-09-26 PROCEDURE — 3078F DIAST BP <80 MM HG: CPT | Mod: CPTII,S$GLB,, | Performed by: PSYCHIATRY & NEUROLOGY

## 2023-09-26 PROCEDURE — 3078F PR MOST RECENT DIASTOLIC BLOOD PRESSURE < 80 MM HG: ICD-10-PCS | Mod: CPTII,S$GLB,, | Performed by: PSYCHIATRY & NEUROLOGY

## 2023-09-26 PROCEDURE — 3077F SYST BP >= 140 MM HG: CPT | Mod: CPTII,S$GLB,, | Performed by: PSYCHIATRY & NEUROLOGY

## 2023-09-26 PROCEDURE — 1159F MED LIST DOCD IN RCRD: CPT | Mod: CPTII,S$GLB,, | Performed by: PSYCHIATRY & NEUROLOGY

## 2023-09-26 PROCEDURE — 3288F FALL RISK ASSESSMENT DOCD: CPT | Mod: CPTII,S$GLB,, | Performed by: PSYCHIATRY & NEUROLOGY

## 2023-09-26 PROCEDURE — 1126F AMNT PAIN NOTED NONE PRSNT: CPT | Mod: CPTII,S$GLB,, | Performed by: PSYCHIATRY & NEUROLOGY

## 2023-09-26 PROCEDURE — 99214 PR OFFICE/OUTPT VISIT, EST, LEVL IV, 30-39 MIN: ICD-10-PCS | Mod: S$GLB,,, | Performed by: PSYCHIATRY & NEUROLOGY

## 2023-09-26 PROCEDURE — 3288F PR FALLS RISK ASSESSMENT DOCUMENTED: ICD-10-PCS | Mod: CPTII,S$GLB,, | Performed by: PSYCHIATRY & NEUROLOGY

## 2023-09-26 PROCEDURE — 1126F PR PAIN SEVERITY QUANTIFIED, NO PAIN PRESENT: ICD-10-PCS | Mod: CPTII,S$GLB,, | Performed by: PSYCHIATRY & NEUROLOGY

## 2023-09-26 PROCEDURE — 99214 OFFICE O/P EST MOD 30 MIN: CPT | Mod: S$GLB,,, | Performed by: PSYCHIATRY & NEUROLOGY

## 2023-09-26 PROCEDURE — 99999 PR PBB SHADOW E&M-EST. PATIENT-LVL IV: ICD-10-PCS | Mod: PBBFAC,HCNC,, | Performed by: PSYCHIATRY & NEUROLOGY
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
## 2023-09-26 NOTE — Clinical Note
Hi, Would you please call Edis Huggins Jr. To schedule 3.5 months follow up - please ask Carlene to open the slot if needed.

## 2023-09-26 NOTE — PATIENT INSTRUCTIONS
I'm ordering  the machine for you through [facility]:  [phone]->1->2  They will call you within several weeks or you can call them.  ________________________________    Aftter you get your machine:    1. Please keep in mind, that when you come to  the machine, you will be choosing the CPAP mask during that time. Please call [phone]->2 within 30 days if you uncomfortable with your mask (you need to do it within 30 days from getting the mask)    2. Please let me know through [facility] if you are not comfortable with the pressure settings - I can help.    Please make a follow up appointment via My chart or by calling us at [phone] once you get your machine. It is recommended to come for CPAP follow up  within 31-90 days fromt he time you get your new machine. The purpose of CPAP follow up is to ensure that your sleep apnea is well corrected and to address any possible concerns.  My appointments usually run 2 months ahead, so please schedule in advance.     PLEASE BRING YOUR MACHINE (with all supplies) WHEN YOU COME SEE ME FOR CPAP FOLLOW UP!

## 2023-10-12 ENCOUNTER — PATIENT MESSAGE (OUTPATIENT)
Dept: RESPIRATORY THERAPY | Facility: HOSPITAL | Age: 78
End: 2023-10-12
Payer: MEDICARE

## 2023-10-19 DIAGNOSIS — G47.33 OSA (OBSTRUCTIVE SLEEP APNEA): Primary | ICD-10-CM

## 2023-11-20 RX ORDER — CYCLOBENZAPRINE HCL 10 MG
TABLET ORAL
Qty: 30 TABLET | Refills: 10 | Status: SHIPPED | OUTPATIENT
Start: 2023-11-20

## 2023-11-20 NOTE — TELEPHONE ENCOUNTER
No care due was identified.  [facility] Embedded Care Due Messages. Reference number: [id number].   11/20/2023 10:45:09 AM CST

## 2023-11-29 NOTE — PROGRESS NOTES
Edis Huggins presented for a  Medicare AWV and comprehensive Health Risk Assessment today. The following components were reviewed and updated:    Medical history  Family History  Social history  Allergies and Current Medications  Health Risk Assessment  Health Maintenance  Care Team         ** See Completed Assessments for Annual Wellness Visit within the encounter summary.**         The following assessments were completed:  Living Situation  CAGE  Depression Screening  Timed Get Up and Go  Whisper Test  Cognitive Function Screening    Nutrition Screening  ADL Screening  PAQ Screening      Review for Opioid Screening: Pt does not have Rx for Opioids      Review for Substance Use Disorders: Patient does not use substance        Current Outpatient Medications:     acyclovir (ZOVIRAX) 400 MG tablet, TAKE 1 TABLET BY MOUTH THREE TIMES DAILY FOR 5 DAYS AT SIGN OF FEVER BLISTER, Disp: 90 tablet, Rfl: 0    azelastine (ASTELIN) 137 mcg (0.1 %) nasal spray, 1 spray (137 mcg total) by Nasal route 2 (two) times daily., Disp: 30 mL, Rfl: 3    b complex vitamins tablet, Take 1 tablet by mouth once daily., Disp: , Rfl:     Bifidobacterium infantis (ALIGN) 4 mg Cap, Take 4 mg by mouth once daily. , Disp: , Rfl:     cetirizine (ZYRTEC) 10 MG tablet, Take 10 mg by mouth once daily., Disp: , Rfl:     chlorthalidone (HYGROTEN) 25 MG Tab, Take 1 tablet (25 mg total) by mouth once daily., Disp: 30 tablet, Rfl: 11    citalopram (CELEXA) 20 MG tablet, Take 1 tablet (20 mg total) by mouth once daily., Disp: 90 tablet, Rfl: 3    cyclobenzaprine (FLEXERIL) 10 MG tablet, TAKE 1 TABLET THREE TIMES DAILY AS NEEDED, Disp: 30 tablet, Rfl: 10    finasteride (PROSCAR) 5 mg tablet, Take 1 tablet (5 mg total) by mouth once daily., Disp: 90 tablet, Rfl: 3    fish oil-omega-3 fatty acids 300-1,000 mg capsule, Take 1 g by mouth once daily., Disp: , Rfl:     gemfibroziL (LOPID) 600 MG tablet, Take 1 tablet (600 mg total) by mouth 2 (two) times  "daily., Disp: 180 tablet, Rfl: 3    irbesartan (AVAPRO) 75 MG tablet, Take 1 tablet (75 mg total) by mouth every evening., Disp: 90 tablet, Rfl: 3    mv-mn/iron/folic acid/herb 190 (VITAMIN D3 COMPLETE ORAL), Take 1 tablet by mouth once daily., Disp: , Rfl:     rivaroxaban (XARELTO) 20 mg Tab, Take 1 tablet (20 mg total) by mouth once daily., Disp: 90 tablet, Rfl: 3    tamsulosin (FLOMAX) 0.4 mg Cap, Take 1 capsule (0.4 mg total) by mouth once daily., Disp: 90 capsule, Rfl: 3    timolol maleate 0.5% (TIMOPTIC) 0.5 % Drop, Place 1 drop into both eyes 2 (two) times daily., Disp: 15 mL, Rfl: 3    vitamin E 1000 UNIT capsule, Take 2,000 Units by mouth once daily., Disp: , Rfl:        Vitals:    12/12/23 1602   BP: 120/62   Pulse: 62   SpO2: 98%   Weight: 127 kg (279 lb 15.8 oz)   Height: 5' 11" (1.803 m)   PainSc: 0-No pain      Physical Exam  Vitals and nursing note reviewed.   Constitutional:       General: He is not in acute distress.     Appearance: Normal appearance. He is obese. He is not ill-appearing.   HENT:      Head: Normocephalic and atraumatic.   Eyes:      General: No scleral icterus.        Right eye: No discharge.         Left eye: No discharge.      Extraocular Movements: Extraocular movements intact.      Conjunctiva/sclera: Conjunctivae normal.      Comments: +glasses   Cardiovascular:      Rate and Rhythm: Normal rate.   Pulmonary:      Effort: Pulmonary effort is normal.   Musculoskeletal:      Cervical back: Normal range of motion.      Right lower leg: No edema.      Left lower leg: No edema.   Skin:     General: Skin is warm and dry.      Findings: No rash.   Neurological:      Mental Status: He is alert and oriented to person, place, and time.   Psychiatric:         Mood and Affect: Mood normal.         Behavior: Behavior normal. Behavior is cooperative.         Cognition and Memory: Cognition and memory normal.             Diagnoses and health risks identified today and associated " recommendations/orders:    1. Encounter for preventive health examination  - Chart reviewed. Problem list updated. Discussed current medical diagnosis, current medications, medical/surgical/family/social history; updated provider list; documented vital signs; identified any cognitive impairment; and updated risk factor list. Addressed any outstanding health maintenance. Provided patient with personalized health advice. Continue to follow up with PCP and any specialists.       2. Aortic atherosclerosis -- CTA 06/2018  Chronic; stable on current treatment plan; follow up with PCP  - taking lopid    3. Carotid artery disease, unspecified laterality  Chronic; stable on current treatment plan; follow up with PCP\  - taking lopid and xarelto    4. Paroxysmal atrial fibrillation  Chronic; stable on current treatment plan; follow up with PCP  - taking xarelto  - follow up with cardiology     5. Severe obesity (BMI 35.0-39.9) with comorbidity  - Recommendation for healthy diet and increasing exercise as tolerated with goal of 150min/week . Recommend weight loss      6. Essential hypertension  Chronic; stable on current treatment plan; follow up with PCP  - follow up with cardiology   - taking chlorthalidone, avapro    7. Mixed hyperlipidemia  Chronic; stable on current treatment plan; follow up with PCP  - taking lopid    8. BPH with urinary obstruction  Chronic; stable on current treatment plan; follow up with PCP  - taking flomax and proscar, still with trouble emptying bladder; referral urology   - Ambulatory referral/consult to Urology; Future    9. Neuroforaminal stenosis of lumbar spine  Chronic; stable on current treatment plan; follow up with PCP    10. Anxiety  Chronic; stable on current treatment plan; follow up with PCP  - taking celexa    11. Primary open angle glaucoma (POAG) of left eye, indeterminate stage  Chronic; stable on current treatment plan; follow up with PCP  - follow up with ophthalmology     12.  Sensorineural hearing loss (SNHL), unspecified laterality  Chronic; stable on current treatment plan; follow up with PCP  - has hearing aids    13. Other sleep apnea  Chronic; stable on current treatment plan; follow up with PCP  - uses cpap machine    14. Prediabetes  Chronic; stable on current treatment plan; follow up with PCP    15. Erectile dysfunction due to arterial insufficiency  Chronic; stable on current treatment plan; follow up with PCP  -follow up urology     16. Other chronic pain  Chronic; stable on current treatment plan; follow up with PCP      Provided Edis with a 5-10 year written screening schedule and personal prevention plan. Recommendations were developed using the USPSTF age appropriate recommendations. Education, counseling, and referrals were provided as needed. After Visit Summary printed and given to patient which includes a list of additional screenings\tests needed.    Follow up in about 1 year (around 12/12/2024) for your next annual wellness visit.    Ann Marie Machuca, FNP-C    Advance Care Planning     I offered to discuss advanced care planning, including how to pick a person who would make decisions for you if you were unable to make them for yourself, called a health care power of , and what kind of decisions you might make such as use of life sustaining treatments such as ventilators and tube feeding when faced with a life limiting illness recorded on a living will that they will need to know. (How you want to be cared for as you near the end of your natural life)     X Patient is interested in learning more about how to make advanced directives.  I provided them paperwork and offered to discuss this with them.

## 2023-12-12 ENCOUNTER — OFFICE VISIT (OUTPATIENT)
Dept: FAMILY MEDICINE | Facility: CLINIC | Age: 78
End: 2023-12-12
Payer: MEDICARE

## 2023-12-12 VITALS
WEIGHT: 280 LBS | HEART RATE: 62 BPM | HEIGHT: 71 IN | DIASTOLIC BLOOD PRESSURE: 62 MMHG | SYSTOLIC BLOOD PRESSURE: 120 MMHG | BODY MASS INDEX: 39.2 KG/M2 | OXYGEN SATURATION: 98 %

## 2023-12-12 DIAGNOSIS — N13.8 BPH WITH URINARY OBSTRUCTION: ICD-10-CM

## 2023-12-12 DIAGNOSIS — I10 ESSENTIAL HYPERTENSION: ICD-10-CM

## 2023-12-12 DIAGNOSIS — I77.9 CAROTID ARTERY DISEASE, UNSPECIFIED LATERALITY: ICD-10-CM

## 2023-12-12 DIAGNOSIS — H90.5 SENSORINEURAL HEARING LOSS (SNHL), UNSPECIFIED LATERALITY: ICD-10-CM

## 2023-12-12 DIAGNOSIS — N40.1 BPH WITH URINARY OBSTRUCTION: ICD-10-CM

## 2023-12-12 DIAGNOSIS — F41.9 ANXIETY: ICD-10-CM

## 2023-12-12 DIAGNOSIS — G47.39 OTHER SLEEP APNEA: ICD-10-CM

## 2023-12-12 DIAGNOSIS — I70.0 AORTIC ATHEROSCLEROSIS: ICD-10-CM

## 2023-12-12 DIAGNOSIS — I48.0 PAROXYSMAL ATRIAL FIBRILLATION: ICD-10-CM

## 2023-12-12 DIAGNOSIS — E66.01 SEVERE OBESITY (BMI 35.0-39.9) WITH COMORBIDITY: ICD-10-CM

## 2023-12-12 DIAGNOSIS — R73.03 PREDIABETES: ICD-10-CM

## 2023-12-12 DIAGNOSIS — G89.29 OTHER CHRONIC PAIN: ICD-10-CM

## 2023-12-12 DIAGNOSIS — E78.2 MIXED HYPERLIPIDEMIA: ICD-10-CM

## 2023-12-12 DIAGNOSIS — Z00.00 ENCOUNTER FOR PREVENTIVE HEALTH EXAMINATION: Primary | ICD-10-CM

## 2023-12-12 DIAGNOSIS — M48.061 NEUROFORAMINAL STENOSIS OF LUMBAR SPINE: ICD-10-CM

## 2023-12-12 DIAGNOSIS — N52.01 ERECTILE DYSFUNCTION DUE TO ARTERIAL INSUFFICIENCY: ICD-10-CM

## 2023-12-12 DIAGNOSIS — H40.1124 PRIMARY OPEN ANGLE GLAUCOMA (POAG) OF LEFT EYE, INDETERMINATE STAGE: ICD-10-CM

## 2023-12-12 PROBLEM — T31.10 BURN ANY DEGREE INVOLVING 10-19 PERCENT OF BODY SURFACE: Status: RESOLVED | Noted: 2018-09-29 | Resolved: 2023-12-12

## 2023-12-12 PROBLEM — D69.6 THROMBOCYTOPENIA: Status: RESOLVED | Noted: 2021-12-16 | Resolved: 2023-12-12

## 2023-12-12 PROBLEM — D53.9 MACROCYTIC ANEMIA: Status: ACTIVE | Noted: 2023-12-12

## 2023-12-12 PROBLEM — D53.9 MACROCYTIC ANEMIA: Status: RESOLVED | Noted: 2023-12-12 | Resolved: 2023-12-12

## 2023-12-12 PROBLEM — Z79.899 ENCOUNTER FOR LONG-TERM (CURRENT) USE OF OTHER MEDICATIONS: Status: RESOLVED | Noted: 2021-04-14 | Resolved: 2023-12-12

## 2023-12-12 PROCEDURE — 99999 PR PBB SHADOW E&M-EST. PATIENT-LVL V: CPT | Mod: PBBFAC,HCNC,, | Performed by: NURSE PRACTITIONER

## 2023-12-12 PROCEDURE — 1101F PT FALLS ASSESS-DOCD LE1/YR: CPT | Mod: HCNC,CPTII,S$GLB, | Performed by: NURSE PRACTITIONER

## 2023-12-12 PROCEDURE — 3078F PR MOST RECENT DIASTOLIC BLOOD PRESSURE < 80 MM HG: ICD-10-PCS | Mod: HCNC,CPTII,S$GLB, | Performed by: NURSE PRACTITIONER

## 2023-12-12 PROCEDURE — 3074F SYST BP LT 130 MM HG: CPT | Mod: HCNC,CPTII,S$GLB, | Performed by: NURSE PRACTITIONER

## 2023-12-12 PROCEDURE — 1101F PR PT FALLS ASSESS DOC 0-1 FALLS W/OUT INJ PAST YR: ICD-10-PCS | Mod: HCNC,CPTII,S$GLB, | Performed by: NURSE PRACTITIONER

## 2023-12-12 PROCEDURE — G0439 PR MEDICARE ANNUAL WELLNESS SUBSEQUENT VISIT: ICD-10-PCS | Mod: HCNC,S$GLB,, | Performed by: NURSE PRACTITIONER

## 2023-12-12 PROCEDURE — 1159F PR MEDICATION LIST DOCUMENTED IN MEDICAL RECORD: ICD-10-PCS | Mod: HCNC,CPTII,S$GLB, | Performed by: NURSE PRACTITIONER

## 2023-12-12 PROCEDURE — 1170F PR FUNCTIONAL STATUS ASSESSED: ICD-10-PCS | Mod: HCNC,CPTII,S$GLB, | Performed by: NURSE PRACTITIONER

## 2023-12-12 PROCEDURE — 1157F ADVNC CARE PLAN IN RCRD: CPT | Mod: HCNC,CPTII,S$GLB, | Performed by: NURSE PRACTITIONER

## 2023-12-12 PROCEDURE — 1159F MED LIST DOCD IN RCRD: CPT | Mod: HCNC,CPTII,S$GLB, | Performed by: NURSE PRACTITIONER

## 2023-12-12 PROCEDURE — 99999 PR PBB SHADOW E&M-EST. PATIENT-LVL V: ICD-10-PCS | Mod: PBBFAC,HCNC,, | Performed by: NURSE PRACTITIONER

## 2023-12-12 PROCEDURE — 3288F FALL RISK ASSESSMENT DOCD: CPT | Mod: HCNC,CPTII,S$GLB, | Performed by: NURSE PRACTITIONER

## 2023-12-12 PROCEDURE — 3078F DIAST BP <80 MM HG: CPT | Mod: HCNC,CPTII,S$GLB, | Performed by: NURSE PRACTITIONER

## 2023-12-12 PROCEDURE — 1160F RVW MEDS BY RX/DR IN RCRD: CPT | Mod: HCNC,CPTII,S$GLB, | Performed by: NURSE PRACTITIONER

## 2023-12-12 PROCEDURE — 3288F PR FALLS RISK ASSESSMENT DOCUMENTED: ICD-10-PCS | Mod: HCNC,CPTII,S$GLB, | Performed by: NURSE PRACTITIONER

## 2023-12-12 PROCEDURE — 1126F AMNT PAIN NOTED NONE PRSNT: CPT | Mod: HCNC,CPTII,S$GLB, | Performed by: NURSE PRACTITIONER

## 2023-12-12 PROCEDURE — 1126F PR PAIN SEVERITY QUANTIFIED, NO PAIN PRESENT: ICD-10-PCS | Mod: HCNC,CPTII,S$GLB, | Performed by: NURSE PRACTITIONER

## 2023-12-12 PROCEDURE — 1157F PR ADVANCE CARE PLAN OR EQUIV PRESENT IN MEDICAL RECORD: ICD-10-PCS | Mod: HCNC,CPTII,S$GLB, | Performed by: NURSE PRACTITIONER

## 2023-12-12 PROCEDURE — 1160F PR REVIEW ALL MEDS BY PRESCRIBER/CLIN PHARMACIST DOCUMENTED: ICD-10-PCS | Mod: HCNC,CPTII,S$GLB, | Performed by: NURSE PRACTITIONER

## 2023-12-12 PROCEDURE — G0439 PPPS, SUBSEQ VISIT: HCPCS | Mod: HCNC,S$GLB,, | Performed by: NURSE PRACTITIONER

## 2023-12-12 PROCEDURE — 1170F FXNL STATUS ASSESSED: CPT | Mod: HCNC,CPTII,S$GLB, | Performed by: NURSE PRACTITIONER

## 2023-12-12 PROCEDURE — 3074F PR MOST RECENT SYSTOLIC BLOOD PRESSURE < 130 MM HG: ICD-10-PCS | Mod: HCNC,CPTII,S$GLB, | Performed by: NURSE PRACTITIONER

## 2023-12-12 NOTE — PATIENT INSTRUCTIONS
Counseling and Referral of Other Preventative  (Italic type indicates deductible and co-insurance are waived)    Patient Name: Edis Huggins  Today's Date: 12/12/2023    Health Maintenance       Date Due Completion Date    Influenza Vaccine (1) 09/01/2023 11/15/2021    COVID-19 Vaccine (3 - 2023-24 season) 12/14/2023 (Originally 9/1/2023) 1/31/2021    RSV Vaccine (Age 60+ and Pregnant patients) (1 - 1-dose 60+ series) 12/14/2023 (Originally 5/7/2007) ---    PROSTATE-SPECIFIC ANTIGEN 08/11/2024 8/11/2023    Hemoglobin A1c (Prediabetes) 08/11/2024 8/11/2023    Lipid Panel 08/11/2024 8/11/2023    TETANUS VACCINE 06/19/2025 6/19/2015        No orders of the defined types were placed in this encounter.      The following information is provided to all patients.  This information is to help you find resources for any of the problems found today that may be affecting your health:                Living healthy guide: www.[facility].[place].gov      Understanding Diabetes: www.diabetes.org      Eating healthy: www.cdc.gov/healthyweight      CDC home safety checklist: www.cdc.gov/steadi/patient.html      Agency on Aging: www.goea.[place].gov      Alcoholics anonymous (AA): www.aa.org      Physical Activity: www.krysta.nih.gov/hi3bqlo      Tobacco use: www.quitwithusla.org

## 2023-12-14 ENCOUNTER — OFFICE VISIT (OUTPATIENT)
Dept: SLEEP MEDICINE | Facility: CLINIC | Age: 78
End: 2023-12-14
Payer: MEDICARE

## 2023-12-14 ENCOUNTER — TELEPHONE (OUTPATIENT)
Dept: SLEEP MEDICINE | Facility: CLINIC | Age: 78
End: 2023-12-14

## 2023-12-14 DIAGNOSIS — G47.33 OSA (OBSTRUCTIVE SLEEP APNEA): Primary | ICD-10-CM

## 2023-12-14 PROCEDURE — 1159F MED LIST DOCD IN RCRD: CPT | Mod: HCNC,CPTII,95, | Performed by: PSYCHIATRY & NEUROLOGY

## 2023-12-14 PROCEDURE — 1160F PR REVIEW ALL MEDS BY PRESCRIBER/CLIN PHARMACIST DOCUMENTED: ICD-10-PCS | Mod: HCNC,CPTII,95, | Performed by: PSYCHIATRY & NEUROLOGY

## 2023-12-14 PROCEDURE — 99214 PR OFFICE/OUTPT VISIT, EST, LEVL IV, 30-39 MIN: ICD-10-PCS | Mod: 95,,, | Performed by: PSYCHIATRY & NEUROLOGY

## 2023-12-14 PROCEDURE — 1159F PR MEDICATION LIST DOCUMENTED IN MEDICAL RECORD: ICD-10-PCS | Mod: HCNC,CPTII,95, | Performed by: PSYCHIATRY & NEUROLOGY

## 2023-12-14 PROCEDURE — 1157F PR ADVANCE CARE PLAN OR EQUIV PRESENT IN MEDICAL RECORD: ICD-10-PCS | Mod: HCNC,CPTII,95, | Performed by: PSYCHIATRY & NEUROLOGY

## 2023-12-14 PROCEDURE — 1160F RVW MEDS BY RX/DR IN RCRD: CPT | Mod: HCNC,CPTII,95, | Performed by: PSYCHIATRY & NEUROLOGY

## 2023-12-14 PROCEDURE — 1157F ADVNC CARE PLAN IN RCRD: CPT | Mod: HCNC,CPTII,95, | Performed by: PSYCHIATRY & NEUROLOGY

## 2023-12-14 PROCEDURE — 99214 OFFICE O/P EST MOD 30 MIN: CPT | Mod: 95,,, | Performed by: PSYCHIATRY & NEUROLOGY
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
## 2023-12-14 NOTE — TELEPHONE ENCOUNTER
----- Message from Belén Rolle MD sent at 12/14/2023  3:23 PM CST -----  Good afternoon,    Would you  put Mr. Huggins on 18 mo recall please?    Thank you!

## 2024-01-03 ENCOUNTER — OFFICE VISIT (OUTPATIENT)
Dept: UROLOGY | Facility: CLINIC | Age: 79
End: 2024-01-03
Payer: MEDICARE

## 2024-01-03 VITALS
HEIGHT: 71 IN | HEART RATE: 59 BPM | DIASTOLIC BLOOD PRESSURE: 64 MMHG | BODY MASS INDEX: 38.1 KG/M2 | SYSTOLIC BLOOD PRESSURE: 120 MMHG | WEIGHT: 272.19 LBS

## 2024-01-03 DIAGNOSIS — N40.1 BPH WITH URINARY OBSTRUCTION: ICD-10-CM

## 2024-01-03 DIAGNOSIS — N13.8 BPH WITH URINARY OBSTRUCTION: ICD-10-CM

## 2024-01-03 DIAGNOSIS — N52.01 ERECTILE DYSFUNCTION DUE TO ARTERIAL INSUFFICIENCY: Primary | ICD-10-CM

## 2024-01-03 DIAGNOSIS — E78.2 MIXED HYPERLIPIDEMIA: ICD-10-CM

## 2024-01-03 DIAGNOSIS — I10 ESSENTIAL HYPERTENSION: ICD-10-CM

## 2024-01-03 PROCEDURE — 99214 OFFICE O/P EST MOD 30 MIN: CPT | Mod: HCNC,S$GLB,, | Performed by: UROLOGY

## 2024-01-03 PROCEDURE — 99999 PR PBB SHADOW E&M-EST. PATIENT-LVL V: CPT | Mod: PBBFAC,HCNC,, | Performed by: UROLOGY

## 2024-01-03 PROCEDURE — 1101F PT FALLS ASSESS-DOCD LE1/YR: CPT | Mod: HCNC,CPTII,S$GLB, | Performed by: UROLOGY

## 2024-01-03 PROCEDURE — 3288F FALL RISK ASSESSMENT DOCD: CPT | Mod: HCNC,CPTII,S$GLB, | Performed by: UROLOGY

## 2024-01-03 PROCEDURE — 3078F DIAST BP <80 MM HG: CPT | Mod: HCNC,CPTII,S$GLB, | Performed by: UROLOGY

## 2024-01-03 PROCEDURE — 1159F MED LIST DOCD IN RCRD: CPT | Mod: HCNC,CPTII,S$GLB, | Performed by: UROLOGY

## 2024-01-03 PROCEDURE — 1157F ADVNC CARE PLAN IN RCRD: CPT | Mod: HCNC,CPTII,S$GLB, | Performed by: UROLOGY

## 2024-01-03 PROCEDURE — 1160F RVW MEDS BY RX/DR IN RCRD: CPT | Mod: HCNC,CPTII,S$GLB, | Performed by: UROLOGY

## 2024-01-03 PROCEDURE — 3074F SYST BP LT 130 MM HG: CPT | Mod: HCNC,CPTII,S$GLB, | Performed by: UROLOGY

## 2024-01-03 PROCEDURE — 1126F AMNT PAIN NOTED NONE PRSNT: CPT | Mod: HCNC,CPTII,S$GLB, | Performed by: UROLOGY

## 2024-01-03 NOTE — PROGRESS NOTES
CHIEF COMPLAINT:    Mr. Huggins is a 78 y.o. male presenting with hypogonadism.    PRESENTING ILLNESS:    Edis Huggins Jr. is a 78 y.o. male who went to [facility] with [place].  Has been lost to follow up since 2019.      He was found to have a low T.  Was started on androgel, but he stopped as he didn't feel a benefit..    He has some LUTS for which he's on proscar and flomax.  He has nocturia x 3, + frequency  and is pleased with how he voids.  No hematuria. No dysuria.    He has ED.  This has been present for > 1 year.  He's not sexually active, so it doesn't bother him.    REVIEW OF SYSTEMS:    Edis Huggins Jr. denies headache, blurred vision, fever, nausea, vomiting, chills, abdominal pain, bleeding per rectum, cough, SOB, recent loss of consciousness, recent mental status changes, seizures, dizziness, or upper or lower extremity weakness.    ROMMEL  1. 1  2. 0  3. 0  4. 0  5. 0      PATIENT HISTORY:    Past Medical History:   Diagnosis Date    Allergic rhinitis due to animal (cat) (dog) hair and dander 3/20/2014    Allergy     Anemia     Anxiety     Basal cell carcinoma     BPH (benign prostatic hypertrophy)     Family history of colon cancer 3/20/2014    Family history of ischemic heart disease 3/20/2014    Fever blister     HLD (hyperlipidemia)     HTN (hypertension)     IFG (impaired fasting glucose)     Lumbar spondylosis 6/7/2019    Pain of right hip joint     Primary open angle glaucoma     Squamous cell carcinoma of skin     Status post total knee replacement 3/20/2014       Past Surgical History:   Procedure Laterality Date    CATARACT EXTRACTION W/  INTRAOCULAR LENS IMPLANT Left 02/08/2017    WITH GANGAK GONIOTOMY (DR. WHITLEY)    CATARACT EXTRACTION W/  INTRAOCULAR LENS IMPLANT Right 05/10/2017        COLONOSCOPY N/A 02/08/2023    Procedure: COLONOSCOPY;  Surgeon: Geovanny Charles MD;  [facility];  Service: Endoscopy;  Laterality: N/A;    EYE SURGERY       HERNIA REPAIR      x 2    JOINT REPLACEMENT Right     right knee replacement    SKIN BIOPSY      in my [facility] record    TONSILLECTOMY      TOTAL KNEE ARTHROPLASTY Right     TRANSFORAMINAL EPIDURAL INJECTION OF STEROID Right 2019    Procedure: Transforaminal Epidural Steroid Injection, Right, L3-4, L4-5;  Surgeon: Eduard Mukherjee Jr., MD;  Location: [facility];  Service: Pain Management;  Laterality: Right;    TRANSFORAMINAL EPIDURAL INJECTION OF STEROID Right 2020    Procedure: Injection,steroid,epidural,transforaminal approach--Right L3-4, L4-5;  Surgeon: Eduard Mukherjee Jr., MD;  Location: [facility];  Service: Pain Management;  Laterality: Right;    YAG CAPSULOTOMY\ Right 2020           Family History   Problem Relation Age of Onset    Cataracts Mother     Atrial fibrillation Mother     Hearing loss Mother         Age related. Had hearing aids    Heart disease Mother         Heart valve deterioration at 93.  at 95    Valvular heart disease Mother     Hernia Father     Valvular heart disease Father     Cataracts Father     Heart disease Father 85        Heart attack at 85. needed and got herat valve replacement, Lived to 9293.  at 95    Arthritis Father     Cancer Father         Lung    Hearing loss Father         Age relared. Needed but refused hearing aids.    Heart attack Father     Lung cancer Father     Other Sister         small bowel obstruction    No Known Problems Sister     Learning disabilities Brother         dyslexia as a child    Glaucoma Brother     Other Brother         Hypogonadism    Hernia Brother     Hernia Brother     Anxiety disorder Daughter     Hypertension Son         Developed in his 30s    Cancer Paternal Uncle         Prostate    Prostate cancer Paternal Uncle     Hearing loss Maternal Grandmother         Age related, Had hearing aids    Heart failure Maternal Grandmother     Stroke Maternal Grandfather         He smoked all his life   of stroke at age 63    Heart disease Maternal Grandfather         Hardening of the ataries    Colon cancer Paternal Grandmother     Cancer Paternal Grandmother          of undected colon cancer around 87 years old.    Arthritis Paternal Grandmother     Early death Paternal Grandfather         pneumonia     Amblyopia Neg Hx     Blindness Neg Hx     Macular degeneration Neg Hx     Retinal detachment Neg Hx     Strabismus Neg Hx     Diabetes Neg Hx     Melanoma Neg Hx     Psoriasis Neg Hx     Lupus Neg Hx     Eczema Neg Hx     Acne Neg Hx        Social History     Socioeconomic History    Marital status:    Occupational History    Occupation:      Employer: [employer]   Tobacco Use    Smoking status: Former     Current packs/day: 0.00     Average packs/day: 1 pack/day for 10.0 years (10.0 ttl pk-yrs)     Types: Cigarettes     Start date: 1964     Quit date: 1974     Years since quittin.0    Smokeless tobacco: Never   Substance and Sexual Activity    Alcohol use: Yes     Alcohol/week: 1.0 standard drink of alcohol     Types: 1 Shots of liquor per week     Comment: I do not drink every week.    Drug use: Never    Sexual activity: Not Currently     Partners: Female     Birth control/protection: None     Comment: Untreated ED     Social Determinants of Health     Financial Resource Strain: Low Risk  (2023)    Overall Financial Resource Strain (CARDIA)     Difficulty of Paying Living Expenses: Not hard at all   Food Insecurity: No Food Insecurity (2023)    Hunger Vital Sign     Worried About Running Out of Food in the Last Year: Never true     Ran Out of Food in the Last Year: Never true   Transportation Needs: No Transportation Needs (2023)    PRAPARE - Transportation     Lack of Transportation (Medical): No     Lack of Transportation (Non-Medical): No   Physical Activity: Inactive (2023)    Exercise Vital Sign     Days of Exercise per Week: 0 days      Minutes of Exercise per Session: 0 min   Stress: No Stress Concern Present (12/9/2023)    [employer] [place] [employer] - Occupational Stress Questionnaire     Feeling of Stress : Only a little   Social Connections: Unknown (12/9/2023)    Social Connection and Isolation Panel [NHANES]     Frequency of Communication with Friends and Family: More than three times a week     Frequency of Social Gatherings with Friends and Family: Twice a week     Attends Mandaeism Services: Patient declined     Active Member of Clubs or Organizations: Yes     Attends Club or Organization Meetings: More than 4 times per year     Marital Status:    Housing Stability: Low Risk  (12/9/2023)    Housing Stability Vital Sign     Unable to Pay for Housing in the Last Year: No     Number of Places Lived in the Last Year: 1     Unstable Housing in the Last Year: No       Allergies:  Patient has no known allergies.    Medications:    Current Outpatient Medications:     acyclovir (ZOVIRAX) 400 MG tablet, TAKE 1 TABLET BY MOUTH THREE TIMES DAILY FOR 5 DAYS AT SIGN OF FEVER BLISTER, Disp: 90 tablet, Rfl: 0    azelastine (ASTELIN) 137 mcg (0.1 %) nasal spray, 1 spray (137 mcg total) by Nasal route 2 (two) times daily., Disp: 30 mL, Rfl: 3    b complex vitamins tablet, Take 1 tablet by mouth once daily., Disp: , Rfl:     Bifidobacterium infantis (ALIGN) 4 mg Cap, Take 4 mg by mouth once daily. , Disp: , Rfl:     cetirizine (ZYRTEC) 10 MG tablet, Take 10 mg by mouth once daily., Disp: , Rfl:     chlorthalidone (HYGROTEN) 25 MG Tab, Take 1 tablet (25 mg total) by mouth once daily., Disp: 30 tablet, Rfl: 11    citalopram (CELEXA) 20 MG tablet, Take 1 tablet (20 mg total) by mouth once daily., Disp: 90 tablet, Rfl: 3    cyclobenzaprine (FLEXERIL) 10 MG tablet, TAKE 1 TABLET THREE TIMES DAILY AS NEEDED, Disp: 30 tablet, Rfl: 10    finasteride (PROSCAR) 5 mg tablet, Take 1 tablet (5 mg total) by mouth once daily., Disp: 90 tablet, Rfl: 3     fish oil-omega-3 fatty acids 300-1,000 mg capsule, Take 1 g by mouth once daily., Disp: , Rfl:     gemfibroziL (LOPID) 600 MG tablet, Take 1 tablet (600 mg total) by mouth 2 (two) times daily., Disp: 180 tablet, Rfl: 3    irbesartan (AVAPRO) 75 MG tablet, Take 1 tablet (75 mg total) by mouth every evening., Disp: 90 tablet, Rfl: 3    mv-mn/iron/folic acid/herb 190 (VITAMIN D3 COMPLETE ORAL), Take 1 tablet by mouth once daily., Disp: , Rfl:     rivaroxaban (XARELTO) 20 mg Tab, Take 1 tablet (20 mg total) by mouth once daily., Disp: 90 tablet, Rfl: 3    tamsulosin (FLOMAX) 0.4 mg Cap, Take 1 capsule (0.4 mg total) by mouth once daily., Disp: 90 capsule, Rfl: 3    timolol maleate 0.5% (TIMOPTIC) 0.5 % Drop, Place 1 drop into both eyes 2 (two) times daily., Disp: 15 mL, Rfl: 3    vitamin E 1000 UNIT capsule, Take 2,000 Units by mouth once daily., Disp: , Rfl:     PHYSICAL EXAMINATION:    The patient generally appears in good health, is appropriately interactive, and is in no apparent distress.     Eyes: anicteric sclerae, moist conjunctivae; no lid-lag; PERRLA     HENT: Atraumatic; oropharynx clear with moist mucous membranes and no mucosal ulcerations;normal hard and soft palate.  No evidence of lymphadenopathy.    Neck: Trachea midline.  No thyromegaly.    Musculoskeletal: No abnormal gait.    Skin: No lesions.    Mental: Cooperative with normal affect.  Is oriented to time, place, and person.    Neuro: Grossly intact.    Chest: Normal inspiratory effort.   No accessory muscles.  No audible wheezes.  Respirations symmetric on inspiration and expiration.    Heart: Regular rhythm.      Abdomen:  Soft, non-tender. No masses or organomegaly. Bladder is not palpable. No evidence of flank discomfort. No evidence of inguinal hernia.    Genitourinary: The penis is circumcised with no evidence of plaques or induration. The urethral meatus is normal. The testes, epididymides, and cord structures are normal in size and contour  bilaterally. The scrotum is normal in size and contour.    Normal anal sphincter tone. No rectal mass.    The prostate is 30 g. Normal landmarks. Lateral sulci. Median furrow intact.  No nodularity or induration. Seminal vesicles are normal.    Extremities: No clubbing, cyanosis, or edema      LABS:    On Proscar  Lab Results   Component Value Date    PSA 1.3 08/11/2023    PSA 1.3 07/05/2022    PSA 1.1 10/13/2020    PSADIAG 1.0 04/05/2019       IMPRESSION:    Encounter Diagnoses   Name Primary?    Erectile dysfunction due to arterial insufficiency Yes    BPH with urinary obstruction     Mixed hyperlipidemia     Essential hypertension    HTN, stable  Hyperlipidemia, stable      PLAN:    1. Continue flomax and proscar for his LUTS.  Refilled by PCP.  2. Will observe the ED as it's not bothersome.  3. Recommend against PSA screening given his age and life expectancy.  4. RTC prn.      Copy to:

## 2024-01-03 NOTE — LETTER
January 3, 2024        Ann Marie Machuca, FNP-C  [address]  [address]  [facility] [address]             [facility] Atrium 4th Fl  [address]  [place] [address]  Phone: [phone]   Patient: Edis Huggins Jr.   MR Number: [id number]   YOB: 1945   Date of Visit: 1/3/2024       Dear Dr. Machuca:    Thank you for referring Edis Huggins to me for evaluation. Attached you will find relevant portions of my assessment and plan of care.    If you have questions, please do not hesitate to call me. I look forward to following Edis Huggins along with you.    Sincerely,      Isak Kitchen MD            CC  No Recipients    Enclosure

## 2024-01-16 DIAGNOSIS — G47.33 OSA (OBSTRUCTIVE SLEEP APNEA): Primary | ICD-10-CM

## 2024-02-01 ENCOUNTER — PATIENT MESSAGE (OUTPATIENT)
Dept: INTERNAL MEDICINE | Facility: CLINIC | Age: 79
End: 2024-02-01
Payer: MEDICARE

## 2024-02-01 ENCOUNTER — PATIENT MESSAGE (OUTPATIENT)
Dept: ADMINISTRATIVE | Facility: OTHER | Age: 79
End: 2024-02-01
Payer: MEDICARE

## 2024-02-01 DIAGNOSIS — Z79.899 ENCOUNTER FOR LONG-TERM (CURRENT) USE OF OTHER MEDICATIONS: Primary | ICD-10-CM

## 2024-02-06 RX ORDER — AZELASTINE 1 MG/ML
1 SPRAY, METERED NASAL 2 TIMES DAILY
Qty: 30 ML | Refills: 3 | Status: SHIPPED | OUTPATIENT
Start: 2024-02-06 | End: 2025-02-05

## 2024-02-09 ENCOUNTER — LAB VISIT (OUTPATIENT)
Dept: LAB | Facility: HOSPITAL | Age: 79
End: 2024-02-09
Payer: MEDICARE

## 2024-02-09 DIAGNOSIS — D69.6 THROMBOCYTOPENIA: ICD-10-CM

## 2024-02-09 DIAGNOSIS — Z79.01 CHRONIC ANTICOAGULATION: ICD-10-CM

## 2024-02-09 DIAGNOSIS — D64.9 NORMOCYTIC ANEMIA: ICD-10-CM

## 2024-02-09 DIAGNOSIS — Z79.899 ENCOUNTER FOR LONG-TERM (CURRENT) USE OF OTHER MEDICATIONS: ICD-10-CM

## 2024-02-09 LAB
ALBUMIN SERPL BCP-MCNC: 3.5 G/DL (ref 3.5–5.2)
ALP SERPL-CCNC: 58 U/L (ref 55–135)
ALT SERPL W/O P-5'-P-CCNC: 11 U/L (ref 10–44)
ANION GAP SERPL CALC-SCNC: 9 MMOL/L (ref 8–16)
AST SERPL-CCNC: 11 U/L (ref 10–40)
BASOPHILS # BLD AUTO: 0.04 K/UL (ref 0–0.2)
BASOPHILS NFR BLD: 0.8 % (ref 0–1.9)
BILIRUB SERPL-MCNC: 0.3 MG/DL (ref 0.1–1)
BUN SERPL-MCNC: 17 MG/DL (ref 8–23)
CALCIUM SERPL-MCNC: 9.2 MG/DL (ref 8.7–10.5)
CHLORIDE SERPL-SCNC: 108 MMOL/L (ref 95–110)
CHOLEST SERPL-MCNC: 117 MG/DL (ref 120–199)
CHOLEST/HDLC SERPL: 4 {RATIO} (ref 2–5)
CO2 SERPL-SCNC: 25 MMOL/L (ref 23–29)
CREAT SERPL-MCNC: 0.8 MG/DL (ref 0.5–1.4)
DIFFERENTIAL METHOD BLD: ABNORMAL
EOSINOPHIL # BLD AUTO: 0.3 K/UL (ref 0–0.5)
EOSINOPHIL NFR BLD: 6.2 % (ref 0–8)
ERYTHROCYTE [DISTWIDTH] IN BLOOD BY AUTOMATED COUNT: 12.7 % (ref 11.5–14.5)
EST. GFR  (NO RACE VARIABLE): >60 ML/MIN/1.73 M^2
ESTIMATED AVG GLUCOSE: 111 MG/DL (ref 68–131)
FERRITIN SERPL-MCNC: 83 NG/ML (ref 20–300)
FOLATE SERPL-MCNC: 15.2 NG/ML (ref 4–24)
GLUCOSE SERPL-MCNC: 91 MG/DL (ref 70–110)
HBA1C MFR BLD: 5.5 % (ref 4–5.6)
HCT VFR BLD AUTO: 35.7 % (ref 40–54)
HDLC SERPL-MCNC: 29 MG/DL (ref 40–75)
HDLC SERPL: 24.8 % (ref 20–50)
HGB BLD-MCNC: 11.8 G/DL (ref 14–18)
IMM GRANULOCYTES # BLD AUTO: 0.01 K/UL (ref 0–0.04)
IMM GRANULOCYTES NFR BLD AUTO: 0.2 % (ref 0–0.5)
IRON SERPL-MCNC: 58 UG/DL (ref 45–160)
LDLC SERPL CALC-MCNC: 70.2 MG/DL (ref 63–159)
LYMPHOCYTES # BLD AUTO: 2.7 K/UL (ref 1–4.8)
LYMPHOCYTES NFR BLD: 50.4 % (ref 18–48)
MCH RBC QN AUTO: 31.9 PG (ref 27–31)
MCHC RBC AUTO-ENTMCNC: 33.1 G/DL (ref 32–36)
MCV RBC AUTO: 97 FL (ref 82–98)
MONOCYTES # BLD AUTO: 0.5 K/UL (ref 0.3–1)
MONOCYTES NFR BLD: 9.2 % (ref 4–15)
NEUTROPHILS # BLD AUTO: 1.8 K/UL (ref 1.8–7.7)
NEUTROPHILS NFR BLD: 33.2 % (ref 38–73)
NONHDLC SERPL-MCNC: 88 MG/DL
NRBC BLD-RTO: 0 /100 WBC
PLATELET # BLD AUTO: 160 K/UL (ref 150–450)
PMV BLD AUTO: 12.7 FL (ref 9.2–12.9)
POTASSIUM SERPL-SCNC: 4.1 MMOL/L (ref 3.5–5.1)
PROT SERPL-MCNC: 6.9 G/DL (ref 6–8.4)
RBC # BLD AUTO: 3.7 M/UL (ref 4.6–6.2)
SATURATED IRON: 16 % (ref 20–50)
SODIUM SERPL-SCNC: 142 MMOL/L (ref 136–145)
TOTAL IRON BINDING CAPACITY: 355 UG/DL (ref 250–450)
TRANSFERRIN SERPL-MCNC: 240 MG/DL (ref 200–375)
TRIGL SERPL-MCNC: 89 MG/DL (ref 30–150)
VIT B12 SERPL-MCNC: 461 PG/ML (ref 210–950)
WBC # BLD AUTO: 5.3 K/UL (ref 3.9–12.7)

## 2024-02-09 PROCEDURE — 36415 COLL VENOUS BLD VENIPUNCTURE: CPT | Mod: HCNC | Performed by: FAMILY MEDICINE

## 2024-02-09 PROCEDURE — 80061 LIPID PANEL: CPT | Mod: HCNC | Performed by: FAMILY MEDICINE

## 2024-02-09 PROCEDURE — 82746 ASSAY OF FOLIC ACID SERUM: CPT | Mod: HCNC | Performed by: NURSE PRACTITIONER

## 2024-02-09 PROCEDURE — 82607 VITAMIN B-12: CPT | Mod: HCNC | Performed by: NURSE PRACTITIONER

## 2024-02-09 PROCEDURE — 80053 COMPREHEN METABOLIC PANEL: CPT | Mod: HCNC | Performed by: NURSE PRACTITIONER

## 2024-02-09 PROCEDURE — 85025 COMPLETE CBC W/AUTO DIFF WBC: CPT | Mod: HCNC | Performed by: NURSE PRACTITIONER

## 2024-02-09 PROCEDURE — 83540 ASSAY OF IRON: CPT | Mod: HCNC | Performed by: NURSE PRACTITIONER

## 2024-02-09 PROCEDURE — 83036 HEMOGLOBIN GLYCOSYLATED A1C: CPT | Mod: HCNC | Performed by: FAMILY MEDICINE

## 2024-02-09 PROCEDURE — 82728 ASSAY OF FERRITIN: CPT | Mod: HCNC | Performed by: NURSE PRACTITIONER

## 2024-02-12 ENCOUNTER — PATIENT MESSAGE (OUTPATIENT)
Dept: ADMINISTRATIVE | Facility: OTHER | Age: 79
End: 2024-02-12
Payer: MEDICARE

## 2024-02-12 ENCOUNTER — OFFICE VISIT (OUTPATIENT)
Dept: HEMATOLOGY/ONCOLOGY | Facility: CLINIC | Age: 79
End: 2024-02-12
Payer: MEDICARE

## 2024-02-12 VITALS
TEMPERATURE: 99 F | DIASTOLIC BLOOD PRESSURE: 72 MMHG | BODY MASS INDEX: 38.11 KG/M2 | HEIGHT: 71 IN | WEIGHT: 272.25 LBS | HEART RATE: 60 BPM | SYSTOLIC BLOOD PRESSURE: 138 MMHG | RESPIRATION RATE: 20 BRPM | OXYGEN SATURATION: 97 %

## 2024-02-12 DIAGNOSIS — I10 ESSENTIAL HYPERTENSION: ICD-10-CM

## 2024-02-12 DIAGNOSIS — D69.6 THROMBOCYTOPENIA: ICD-10-CM

## 2024-02-12 DIAGNOSIS — Z79.01 CHRONIC ANTICOAGULATION: ICD-10-CM

## 2024-02-12 DIAGNOSIS — D53.9 MACROCYTIC ANEMIA: Primary | ICD-10-CM

## 2024-02-12 DIAGNOSIS — G47.33 OSA ON CPAP: ICD-10-CM

## 2024-02-12 DIAGNOSIS — I70.0 AORTIC ATHEROSCLEROSIS: ICD-10-CM

## 2024-02-12 DIAGNOSIS — E53.8 B12 DEFICIENCY: ICD-10-CM

## 2024-02-12 DIAGNOSIS — R76.8 HEPATITIS B CORE ANTIBODY POSITIVE: ICD-10-CM

## 2024-02-12 DIAGNOSIS — Z86.79 HISTORY OF ATRIAL FIBRILLATION: ICD-10-CM

## 2024-02-12 DIAGNOSIS — D50.8 IRON DEFICIENCY ANEMIA SECONDARY TO INADEQUATE DIETARY IRON INTAKE: ICD-10-CM

## 2024-02-12 DIAGNOSIS — E66.01 SEVERE OBESITY (BMI 35.0-39.9) WITH COMORBIDITY: ICD-10-CM

## 2024-02-12 PROCEDURE — 3078F DIAST BP <80 MM HG: CPT | Mod: HCNC,CPTII,S$GLB, | Performed by: NURSE PRACTITIONER

## 2024-02-12 PROCEDURE — 99214 OFFICE O/P EST MOD 30 MIN: CPT | Mod: HCNC,S$GLB,, | Performed by: NURSE PRACTITIONER

## 2024-02-12 PROCEDURE — 1160F RVW MEDS BY RX/DR IN RCRD: CPT | Mod: HCNC,CPTII,S$GLB, | Performed by: NURSE PRACTITIONER

## 2024-02-12 PROCEDURE — 1159F MED LIST DOCD IN RCRD: CPT | Mod: HCNC,CPTII,S$GLB, | Performed by: NURSE PRACTITIONER

## 2024-02-12 PROCEDURE — 3288F FALL RISK ASSESSMENT DOCD: CPT | Mod: HCNC,CPTII,S$GLB, | Performed by: NURSE PRACTITIONER

## 2024-02-12 PROCEDURE — 3075F SYST BP GE 130 - 139MM HG: CPT | Mod: HCNC,CPTII,S$GLB, | Performed by: NURSE PRACTITIONER

## 2024-02-12 PROCEDURE — 1126F AMNT PAIN NOTED NONE PRSNT: CPT | Mod: HCNC,CPTII,S$GLB, | Performed by: NURSE PRACTITIONER

## 2024-02-12 PROCEDURE — 1101F PT FALLS ASSESS-DOCD LE1/YR: CPT | Mod: HCNC,CPTII,S$GLB, | Performed by: NURSE PRACTITIONER

## 2024-02-12 PROCEDURE — 99999 PR PBB SHADOW E&M-EST. PATIENT-LVL IV: CPT | Mod: PBBFAC,HCNC,, | Performed by: NURSE PRACTITIONER

## 2024-02-12 PROCEDURE — 1157F ADVNC CARE PLAN IN RCRD: CPT | Mod: HCNC,CPTII,S$GLB, | Performed by: NURSE PRACTITIONER

## 2024-02-12 RX ORDER — SODIUM CHLORIDE 0.9 % (FLUSH) 0.9 %
10 SYRINGE (ML) INJECTION
Status: CANCELLED | OUTPATIENT
Start: 2024-03-19

## 2024-02-12 RX ORDER — EPINEPHRINE 0.3 MG/.3ML
0.3 INJECTION SUBCUTANEOUS ONCE AS NEEDED
Status: CANCELLED | OUTPATIENT
Start: 2024-02-19

## 2024-02-12 RX ORDER — HEPARIN 100 UNIT/ML
500 SYRINGE INTRAVENOUS
Status: CANCELLED | OUTPATIENT
Start: 2024-03-19

## 2024-02-12 RX ORDER — EPINEPHRINE 0.3 MG/.3ML
0.3 INJECTION SUBCUTANEOUS ONCE AS NEEDED
Status: CANCELLED | OUTPATIENT
Start: 2024-03-19

## 2024-02-12 RX ORDER — EPINEPHRINE 0.3 MG/.3ML
0.3 INJECTION SUBCUTANEOUS ONCE AS NEEDED
Status: CANCELLED | OUTPATIENT
Start: 2024-03-12

## 2024-02-12 RX ORDER — HEPARIN 100 UNIT/ML
500 SYRINGE INTRAVENOUS
Status: CANCELLED | OUTPATIENT
Start: 2024-03-12

## 2024-02-12 RX ORDER — EPINEPHRINE 0.3 MG/.3ML
0.3 INJECTION SUBCUTANEOUS ONCE AS NEEDED
Status: CANCELLED | OUTPATIENT
Start: 2024-03-26

## 2024-02-12 RX ORDER — DIPHENHYDRAMINE HYDROCHLORIDE 50 MG/ML
50 INJECTION INTRAMUSCULAR; INTRAVENOUS ONCE AS NEEDED
Status: CANCELLED | OUTPATIENT
Start: 2024-03-26

## 2024-02-12 RX ORDER — SODIUM CHLORIDE 0.9 % (FLUSH) 0.9 %
10 SYRINGE (ML) INJECTION
Status: CANCELLED | OUTPATIENT
Start: 2024-03-26

## 2024-02-12 RX ORDER — HEPARIN 100 UNIT/ML
500 SYRINGE INTRAVENOUS
Status: CANCELLED | OUTPATIENT
Start: 2024-02-19

## 2024-02-12 RX ORDER — DIPHENHYDRAMINE HYDROCHLORIDE 50 MG/ML
50 INJECTION INTRAMUSCULAR; INTRAVENOUS ONCE AS NEEDED
Status: CANCELLED | OUTPATIENT
Start: 2024-03-19

## 2024-02-12 RX ORDER — SODIUM CHLORIDE 0.9 % (FLUSH) 0.9 %
10 SYRINGE (ML) INJECTION
Status: CANCELLED | OUTPATIENT
Start: 2024-03-12

## 2024-02-12 RX ORDER — DIPHENHYDRAMINE HYDROCHLORIDE 50 MG/ML
50 INJECTION INTRAMUSCULAR; INTRAVENOUS ONCE AS NEEDED
Status: CANCELLED | OUTPATIENT
Start: 2024-03-12

## 2024-02-12 RX ORDER — HEPARIN 100 UNIT/ML
500 SYRINGE INTRAVENOUS
Status: CANCELLED | OUTPATIENT
Start: 2024-03-26

## 2024-02-12 RX ORDER — DIPHENHYDRAMINE HYDROCHLORIDE 50 MG/ML
50 INJECTION INTRAMUSCULAR; INTRAVENOUS ONCE AS NEEDED
Status: CANCELLED | OUTPATIENT
Start: 2024-02-19

## 2024-02-12 RX ORDER — SODIUM CHLORIDE 0.9 % (FLUSH) 0.9 %
10 SYRINGE (ML) INJECTION
Status: CANCELLED | OUTPATIENT
Start: 2024-02-19

## 2024-02-12 NOTE — PROGRESS NOTES
"Subjective:       Patient ID: Edis Huggins Jr. is a 78 y.o. male.    Chief Complaint:  Anemia      Anemia  There has been no abdominal pain, bruising/bleeding easily, fever, light-headedness or palpitations.       HPI as per DR CARRASQUILLO's 6/16/22 office visit, reviewed, verified history with pt    He is here for f/u of normocytic anemia.    Hemoglobin 3/14/18 was 11.8.  WBC and platelets within normal limits.  Nutritional studies unremarkable.    He gets dyspnea when he walks.      None at rest.  Weight 229 pounds.  Height 5' 11"    Trying to lose weight.    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   - reports fatigue, cold intolerance, mild oleary intermittently   - he denies headaches, chest pain, palpitations, abdominal pain, n/v/d, constipation, hemoptysis, hematemesis, melena, hematuria, extremity swelling.   - has lost 13# on weight watchers thus far          Review of Systems   Constitutional:  Positive for fatigue. Negative for fever and unexpected weight change.   Respiratory:  Positive for shortness of breath (OLEARY, mild intermittent).    Cardiovascular:  Negative for chest pain, palpitations and leg swelling.   Gastrointestinal:  Negative for abdominal pain, blood in stool, constipation, diarrhea, nausea and vomiting.   Genitourinary:  Negative for hematuria.   Musculoskeletal:  Negative for arthralgias.   Neurological:  Negative for dizziness, tremors, facial asymmetry, light-headedness and headaches.   Hematological:  Negative for adenopathy. Does not bruise/bleed easily.         Objective:      Vitals:    02/12/24 1442   BP: 138/72   BP Location: Left arm   Patient Position: Sitting   BP Method: Medium (Automatic)   Pulse: " "60   Resp: 20   Temp: 98.5 °F (36.9 °C)   TempSrc: Oral   SpO2: 97%   Weight: 123.5 kg (272 lb 4.3 oz)   Height: 5' 11" (1.803 m)           BMI: Body mass index is 37.97 kg/m².    Physical Exam  Vitals and nursing note reviewed.   Constitutional:       General: He is not in acute distress.     Appearance: He is obese. He is not ill-appearing, toxic-appearing or diaphoretic.   HENT:      Head: Normocephalic and atraumatic.   Eyes:      Conjunctiva/sclera: Conjunctivae normal.   Cardiovascular:      Rate and Rhythm: Normal rate and regular rhythm.      Heart sounds: Normal heart sounds. No murmur heard.  Pulmonary:      Effort: Pulmonary effort is normal. No respiratory distress.      Breath sounds: Normal breath sounds.      Comments: Talkative with no sob/oleary  Abdominal:      General: Bowel sounds are normal. There is no distension.      Palpations: Abdomen is soft.      Tenderness: There is no abdominal tenderness. There is no guarding.   Musculoskeletal:      Cervical back: Normal range of motion and neck supple. No rigidity.   Neurological:      Mental Status: He is alert and oriented to person, place, and time. Mental status is at baseline.      Motor: No weakness.      Gait: Gait normal.   Psychiatric:         Mood and Affect: Mood normal.         Behavior: Behavior normal.         Thought Content: Thought content normal.            Assessment:       1. Macrocytic anemia    2. Iron deficiency anemia secondary to inadequate dietary iron intake    3. B12 deficiency    4. Thrombocytopenia    5. Hepatitis B core antibody positive    6. Chronic anticoagulation    7. History of atrial fibrillation    8. Severe obesity (BMI 35.0-39.9) with obstructive sleep apnea    9. Essential hypertension    10. CARMELA on CPAP    11. Aortic atherosclerosis              Plan:   Normocytic anemia, iron deficiency, b12 deficiency  -reviewed recent and prior labs  -likely etiology chronic disease  -hemoglobin stable 12.6/38.2 (4/14/23), " mild macrocytic evidence, iron levels stable, normal B12 and folate  -labs 8/11/23 stable, hgb 12gdL, mild macrocytic evidence, b12 low normal, folate normal, iron studies normal, ADRI currently resolved  - labs 2/9/24 slow decrease in hgb to 11.8 g/dL, iron sat decrease to 16%, ferritin stable 83 ng/mL, folate and b12 normal   - he is hesitant to take oral iron with history of constipation  - will benefit from iv iron, educated on infusions, potential side effects; orders placed  - continue B complex daily  - educated on iron rich foods coupled with vit C to enhance absorption, he has decreased red meats with current dietary habits  -will continue to monitor, rtc 4 month with labs    Thrombocytopenia  -he did have thrombocytopenia which resolved spontaneously  -normal since 12/13/21 labs, currently 160,000 (2/9/24)  -continue to monitor    Hepatitis-B core antibody positive  -he had previous exposure to hepatitis B and is immune    History of atrial fibrillation  -on Xarelto, tolerating well with no bleeding side effects  -management deferred to Cardiology, Dr. Bello    Severe obesity with obstructive sleep apnea, fatigue  -Body mass index is 37.97 kg/m².  -fatigue could be related to untreated CARMELA as well  -healthy lifestyle changes encouraged, discussed benefit on general health as well as sleep apnea, energy  -on weight watchers now with 13# weight loss, congratulated  -continue to monitor, carmela management deferred to sleep medicine    Hypertension  -elevated bp today systolic 150/67, did not yet take med today  -continue with bps at home using [facility] HTN program  -management deferred to cardiology    Aortic atherosclerosis  - noted on CTA 6/2018, no updated imaging, chronic  - lipid panel 2/9/24 improved  - taking lopid  - management deferred to pcp    F/u 6 month, sooner any questions or concerns      Meagan Hopkins, NP-C  [facility]  Hematology/Oncology  [address]  [place]   [address]  [phone]

## 2024-02-14 ENCOUNTER — PATIENT MESSAGE (OUTPATIENT)
Dept: ADMINISTRATIVE | Facility: OTHER | Age: 79
End: 2024-02-14
Payer: MEDICARE

## 2024-02-15 ENCOUNTER — TELEPHONE (OUTPATIENT)
Dept: INFUSION THERAPY | Facility: HOSPITAL | Age: 79
End: 2024-02-15
Payer: MEDICARE

## 2024-02-15 NOTE — TELEPHONE ENCOUNTER
Confirmed upcoming infusion appointments with the patient  3/11 at 1130a  3/18 at 1130a  3/25 at 1130a  4/1 at 1130a

## 2024-03-09 NOTE — PROGRESS NOTES
HPI     Glaucoma            Comments: HVF and OCT review today           Blurred Vision            Comments: Pt states that vision OS is very cloudy           Comments    DLS: 9/11/23    1) POAG   2) PCIOL OU   3) Steroid Responder   4) APD OS   5) PAS OS   6) Yag Cap OD     MEDS:  Timolol BID OU          Last edited by Lili Booker MA on 3/14/2024  3:06 PM.            Assessment /Plan     For exam results, see Encounter Report.    Primary open angle glaucoma of left eye, moderate stage    Primary open-angle glaucoma, right eye, mild stage    Steroid responders, bilateral    PCO (posterior capsular opacification), right    Pupil irregular of left eye    Relative afferent pupillary defect - Left Eye    Pseudophakia, both eyes        Lost to F/u from 11/4/2013 to 8/11/2015   Knows Sharlene Mo and MIKHAIL - use to work with them     1. POAG (primary open-angle glaucoma) - Both Eyes   Old pt of [facility] [id number]  Began at [facility] 2012       POAG OS>OD           Family history    neg        Glaucoma meds   T1/2 GFS ou (use to use travatan- off post CE)         H/O adverse rxn to glaucoma drops    none        LASERS    SLT os 9/26/2013 -( good resp 17 --> 14) // yag cap od - 1/2020        GLAUCOMA SURGERIES    trabectome os (combined with phaco - 2/8/2017        OTHER EYE SURGERIES    Combined - phaco/IOL / goniotomy - trabectome os 2/8/2017 - +heme and increae in IOP)                                                 PC IOL OD - 5/10/2017         CDR    0.6/0.7-0.8        Tbase    20-23/21-24          Tmax    23/24            Ttarget    17/17           HVF   6 tests 2021-4080 OD essentially full; OS Sup paracentral defect, IAD/INS (([place]))             (([facility])) - 8 test 2012 to 2024 - full od // sup paracentral defect / inferior paracentral defect         Gonio    +4 ou        CCT    598/598        OCT    5 test 2019 to 2024  - RNFL - nl od // bord G/TS/T         HRT    7 test-2012 to 2020  - MR -   "[place] off  od // dec T, bord I os /// CDR xxx od // 0.64 os        Disc photos    2012 - IOS    - Ttoday  16/11  - Test done today  IOP / HVF / DFE / OCT      2. Steroid responders - Both Eyes  2/2 nasacort      3. ? Trace APD os      4. S/P Rt knee replacement Feb 2013  -doing well      5.  Myopia / presbyopia ou     6. Irregular pupil - w/ PAS   -2/2 very high IOP POD # 1 post phaco     7.  PC IOL OU  OD 5/10/2017 - PCB00 14.5  OS 2/8/2017 - + hyphema and high IOP 2/2 trabectome - irregular pupil post op - PCB00 14.0    8. PCO od - vis sign   rec yag cap       Plan  CSM  IOP below target OU  SLT done OS 9/26/2013 - good initial resp 24-->17    -( if responds well consider SLT od - but full VF and healthy ON still od)  HVF stable OU    Cont timolol gfs ou q day     If the glare from the fixed dilated pupil bothers him a lot post 2nd eye getting done - consider pupil surgery with dr Peacock to "purse string it " smaller - pt is doing ok for now - glare is not bothering him too much     3/14/2024   - IOP good   - HVF full od // IAD / sup paracentral defect - ? Mild prog   -OCT nl od // unchanged os - sstable   - Drops: timolol only - changed from timolol gfs q am to timolol bid - 2/2 cost     No Photo file - Met patient     F/U for yag cap os / + PCO - pt c/o of blurry vision - worse than a year ago    Does have paracaentral VF defects - but these have been there a long time - hopefully the change he has noticed is from the PCO     "

## 2024-03-11 ENCOUNTER — INFUSION (OUTPATIENT)
Dept: INFUSION THERAPY | Facility: HOSPITAL | Age: 79
End: 2024-03-11
Attending: INTERNAL MEDICINE
Payer: MEDICARE

## 2024-03-11 VITALS
OXYGEN SATURATION: 99 % | WEIGHT: 272.25 LBS | RESPIRATION RATE: 18 BRPM | SYSTOLIC BLOOD PRESSURE: 122 MMHG | HEART RATE: 60 BPM | DIASTOLIC BLOOD PRESSURE: 58 MMHG | TEMPERATURE: 98 F | BODY MASS INDEX: 38.11 KG/M2 | HEIGHT: 71 IN

## 2024-03-11 DIAGNOSIS — D50.8 IRON DEFICIENCY ANEMIA SECONDARY TO INADEQUATE DIETARY IRON INTAKE: Primary | ICD-10-CM

## 2024-03-11 PROCEDURE — 25000003 PHARM REV CODE 250: Mod: HCNC | Performed by: NURSE PRACTITIONER

## 2024-03-11 PROCEDURE — 63600175 PHARM REV CODE 636 W HCPCS: Mod: HCNC | Performed by: NURSE PRACTITIONER

## 2024-03-11 PROCEDURE — A4216 STERILE WATER/SALINE, 10 ML: HCPCS | Mod: HCNC | Performed by: NURSE PRACTITIONER

## 2024-03-11 PROCEDURE — 96374 THER/PROPH/DIAG INJ IV PUSH: CPT | Mod: HCNC

## 2024-03-11 RX ORDER — EPINEPHRINE 0.3 MG/.3ML
0.3 INJECTION SUBCUTANEOUS ONCE AS NEEDED
Status: DISCONTINUED | OUTPATIENT
Start: 2024-03-11 | End: 2024-03-11 | Stop reason: HOSPADM

## 2024-03-11 RX ORDER — DIPHENHYDRAMINE HYDROCHLORIDE 50 MG/ML
50 INJECTION INTRAMUSCULAR; INTRAVENOUS ONCE AS NEEDED
Status: DISCONTINUED | OUTPATIENT
Start: 2024-03-11 | End: 2024-03-11 | Stop reason: HOSPADM

## 2024-03-11 RX ORDER — SODIUM CHLORIDE 0.9 % (FLUSH) 0.9 %
10 SYRINGE (ML) INJECTION
Status: DISCONTINUED | OUTPATIENT
Start: 2024-03-11 | End: 2024-03-11 | Stop reason: HOSPADM

## 2024-03-11 RX ADMIN — IRON SUCROSE 200 MG: 20 INJECTION, SOLUTION INTRAVENOUS at 11:03

## 2024-03-11 RX ADMIN — Medication 10 ML: at 12:03

## 2024-03-11 RX ADMIN — SODIUM CHLORIDE: 9 INJECTION, SOLUTION INTRAVENOUS at 11:03

## 2024-03-11 NOTE — PLAN OF CARE
Pt tolerated venofer 200mg iv push over 5 min dose 1 of 4  well.  No adverse reaction noted during medication administration and 30 min post. IV flushed with NS and D/C per protocol. Patient left clinic in no acute distress.

## 2024-03-14 ENCOUNTER — OFFICE VISIT (OUTPATIENT)
Dept: OPHTHALMOLOGY | Facility: CLINIC | Age: 79
End: 2024-03-14
Payer: MEDICARE

## 2024-03-14 ENCOUNTER — CLINICAL SUPPORT (OUTPATIENT)
Dept: OPHTHALMOLOGY | Facility: CLINIC | Age: 79
End: 2024-03-14
Payer: MEDICARE

## 2024-03-14 DIAGNOSIS — H40.043 STEROID RESPONDERS, BILATERAL: ICD-10-CM

## 2024-03-14 DIAGNOSIS — H40.1111 PRIMARY OPEN-ANGLE GLAUCOMA, RIGHT EYE, MILD STAGE: ICD-10-CM

## 2024-03-14 DIAGNOSIS — Z96.1 PSEUDOPHAKIA, BOTH EYES: ICD-10-CM

## 2024-03-14 DIAGNOSIS — H40.1122 PRIMARY OPEN ANGLE GLAUCOMA OF LEFT EYE, MODERATE STAGE: ICD-10-CM

## 2024-03-14 DIAGNOSIS — H21.562 PUPIL IRREGULAR OF LEFT EYE: ICD-10-CM

## 2024-03-14 DIAGNOSIS — H40.1122 PRIMARY OPEN ANGLE GLAUCOMA OF LEFT EYE, MODERATE STAGE: Primary | ICD-10-CM

## 2024-03-14 DIAGNOSIS — H26.491 PCO (POSTERIOR CAPSULAR OPACIFICATION), RIGHT: ICD-10-CM

## 2024-03-14 DIAGNOSIS — H57.09 RELATIVE AFFERENT PUPILLARY DEFECT: ICD-10-CM

## 2024-03-14 PROCEDURE — 1126F AMNT PAIN NOTED NONE PRSNT: CPT | Mod: HCNC,CPTII,S$GLB, | Performed by: OPHTHALMOLOGY

## 2024-03-14 PROCEDURE — 1159F MED LIST DOCD IN RCRD: CPT | Mod: HCNC,CPTII,S$GLB, | Performed by: OPHTHALMOLOGY

## 2024-03-14 PROCEDURE — 1101F PT FALLS ASSESS-DOCD LE1/YR: CPT | Mod: HCNC,CPTII,S$GLB, | Performed by: OPHTHALMOLOGY

## 2024-03-14 PROCEDURE — 3288F FALL RISK ASSESSMENT DOCD: CPT | Mod: HCNC,CPTII,S$GLB, | Performed by: OPHTHALMOLOGY

## 2024-03-14 PROCEDURE — 99214 OFFICE O/P EST MOD 30 MIN: CPT | Mod: HCNC,S$GLB,, | Performed by: OPHTHALMOLOGY

## 2024-03-14 PROCEDURE — 99999 PR PBB SHADOW E&M-EST. PATIENT-LVL III: CPT | Mod: PBBFAC,HCNC,, | Performed by: OPHTHALMOLOGY

## 2024-03-14 PROCEDURE — 1160F RVW MEDS BY RX/DR IN RCRD: CPT | Mod: HCNC,CPTII,S$GLB, | Performed by: OPHTHALMOLOGY

## 2024-03-14 PROCEDURE — 1157F ADVNC CARE PLAN IN RCRD: CPT | Mod: HCNC,CPTII,S$GLB, | Performed by: OPHTHALMOLOGY

## 2024-03-14 NOTE — PROGRESS NOTES
HVF rel/fix poor OD good OS coop good OU/chart checked for latex allergy/-0.50 + 0.50 x 180 OD -0.50 + 0.25 x 135 OS - BJ

## 2024-03-18 ENCOUNTER — INFUSION (OUTPATIENT)
Dept: INFUSION THERAPY | Facility: HOSPITAL | Age: 79
End: 2024-03-18
Attending: INTERNAL MEDICINE
Payer: MEDICARE

## 2024-03-18 VITALS
RESPIRATION RATE: 18 BRPM | OXYGEN SATURATION: 97 % | TEMPERATURE: 98 F | HEART RATE: 61 BPM | SYSTOLIC BLOOD PRESSURE: 123 MMHG | DIASTOLIC BLOOD PRESSURE: 57 MMHG

## 2024-03-18 DIAGNOSIS — D50.8 IRON DEFICIENCY ANEMIA SECONDARY TO INADEQUATE DIETARY IRON INTAKE: Primary | ICD-10-CM

## 2024-03-18 PROCEDURE — 25000003 PHARM REV CODE 250: Mod: HCNC | Performed by: NURSE PRACTITIONER

## 2024-03-18 PROCEDURE — 96374 THER/PROPH/DIAG INJ IV PUSH: CPT | Mod: HCNC

## 2024-03-18 PROCEDURE — 63600175 PHARM REV CODE 636 W HCPCS: Mod: HCNC | Performed by: NURSE PRACTITIONER

## 2024-03-18 PROCEDURE — A4216 STERILE WATER/SALINE, 10 ML: HCPCS | Mod: HCNC | Performed by: NURSE PRACTITIONER

## 2024-03-18 RX ORDER — SODIUM CHLORIDE 0.9 % (FLUSH) 0.9 %
10 SYRINGE (ML) INJECTION
Status: DISCONTINUED | OUTPATIENT
Start: 2024-03-18 | End: 2024-03-18 | Stop reason: HOSPADM

## 2024-03-18 RX ORDER — DIPHENHYDRAMINE HYDROCHLORIDE 50 MG/ML
50 INJECTION INTRAMUSCULAR; INTRAVENOUS ONCE AS NEEDED
Status: DISCONTINUED | OUTPATIENT
Start: 2024-03-18 | End: 2024-03-18 | Stop reason: HOSPADM

## 2024-03-18 RX ORDER — HEPARIN 100 UNIT/ML
500 SYRINGE INTRAVENOUS
Status: DISCONTINUED | OUTPATIENT
Start: 2024-03-18 | End: 2024-03-18 | Stop reason: HOSPADM

## 2024-03-18 RX ORDER — EPINEPHRINE 0.3 MG/.3ML
0.3 INJECTION SUBCUTANEOUS ONCE AS NEEDED
Status: DISCONTINUED | OUTPATIENT
Start: 2024-03-18 | End: 2024-03-18 | Stop reason: HOSPADM

## 2024-03-18 RX ADMIN — SODIUM CHLORIDE: 9 INJECTION, SOLUTION INTRAVENOUS at 11:03

## 2024-03-18 RX ADMIN — Medication 10 ML: at 12:03

## 2024-03-18 RX ADMIN — IRON SUCROSE 200 MG: 20 INJECTION, SOLUTION INTRAVENOUS at 11:03

## 2024-03-18 NOTE — NURSING
Patient tolerated venofer #2 well today. Observed for 15 min post infusion. NAD noted upon discharge. Plan to rt 3/25 for dose #3.  Discharged home, ambulated independently.

## 2024-03-25 ENCOUNTER — INFUSION (OUTPATIENT)
Dept: INFUSION THERAPY | Facility: HOSPITAL | Age: 79
End: 2024-03-25
Attending: INTERNAL MEDICINE
Payer: MEDICARE

## 2024-03-25 VITALS
DIASTOLIC BLOOD PRESSURE: 57 MMHG | TEMPERATURE: 99 F | HEART RATE: 55 BPM | SYSTOLIC BLOOD PRESSURE: 114 MMHG | RESPIRATION RATE: 18 BRPM | OXYGEN SATURATION: 98 %

## 2024-03-25 DIAGNOSIS — D50.8 IRON DEFICIENCY ANEMIA SECONDARY TO INADEQUATE DIETARY IRON INTAKE: Primary | ICD-10-CM

## 2024-03-25 PROCEDURE — 96374 THER/PROPH/DIAG INJ IV PUSH: CPT | Mod: HCNC

## 2024-03-25 PROCEDURE — A4216 STERILE WATER/SALINE, 10 ML: HCPCS | Mod: HCNC | Performed by: NURSE PRACTITIONER

## 2024-03-25 PROCEDURE — 63600175 PHARM REV CODE 636 W HCPCS: Mod: HCNC | Performed by: NURSE PRACTITIONER

## 2024-03-25 PROCEDURE — 25000003 PHARM REV CODE 250: Mod: HCNC | Performed by: NURSE PRACTITIONER

## 2024-03-25 RX ORDER — DIPHENHYDRAMINE HYDROCHLORIDE 50 MG/ML
50 INJECTION INTRAMUSCULAR; INTRAVENOUS ONCE AS NEEDED
Status: DISCONTINUED | OUTPATIENT
Start: 2024-03-25 | End: 2024-03-25 | Stop reason: HOSPADM

## 2024-03-25 RX ORDER — SODIUM CHLORIDE 0.9 % (FLUSH) 0.9 %
10 SYRINGE (ML) INJECTION
Status: DISCONTINUED | OUTPATIENT
Start: 2024-03-25 | End: 2024-03-25 | Stop reason: HOSPADM

## 2024-03-25 RX ORDER — EPINEPHRINE 0.3 MG/.3ML
0.3 INJECTION SUBCUTANEOUS ONCE AS NEEDED
Status: DISCONTINUED | OUTPATIENT
Start: 2024-03-25 | End: 2024-03-25 | Stop reason: HOSPADM

## 2024-03-25 RX ADMIN — Medication 10 ML: at 12:03

## 2024-03-25 RX ADMIN — SODIUM CHLORIDE: 9 INJECTION, SOLUTION INTRAVENOUS at 12:03

## 2024-03-25 RX ADMIN — IRON SUCROSE 200 MG: 20 INJECTION, SOLUTION INTRAVENOUS at 12:03

## 2024-03-25 NOTE — NURSING
Pt tolerated venofer3/4 infusion well.  No adverse reaction noted.   IV flushed with NS and D/C per protocol.  Patient left clinic in no acute distress.

## 2024-04-01 ENCOUNTER — INFUSION (OUTPATIENT)
Dept: INFUSION THERAPY | Facility: HOSPITAL | Age: 79
End: 2024-04-01
Attending: INTERNAL MEDICINE
Payer: MEDICARE

## 2024-04-01 VITALS
RESPIRATION RATE: 18 BRPM | HEART RATE: 67 BPM | OXYGEN SATURATION: 95 % | TEMPERATURE: 98 F | SYSTOLIC BLOOD PRESSURE: 101 MMHG | DIASTOLIC BLOOD PRESSURE: 56 MMHG

## 2024-04-01 DIAGNOSIS — D50.8 IRON DEFICIENCY ANEMIA SECONDARY TO INADEQUATE DIETARY IRON INTAKE: Primary | ICD-10-CM

## 2024-04-01 PROCEDURE — 25000003 PHARM REV CODE 250: Mod: HCNC | Performed by: NURSE PRACTITIONER

## 2024-04-01 PROCEDURE — 63600175 PHARM REV CODE 636 W HCPCS: Mod: HCNC | Performed by: NURSE PRACTITIONER

## 2024-04-01 PROCEDURE — 96374 THER/PROPH/DIAG INJ IV PUSH: CPT | Mod: HCNC

## 2024-04-01 RX ORDER — HEPARIN 100 UNIT/ML
500 SYRINGE INTRAVENOUS
Status: DISCONTINUED | OUTPATIENT
Start: 2024-04-01 | End: 2024-04-01 | Stop reason: HOSPADM

## 2024-04-01 RX ORDER — EPINEPHRINE 0.3 MG/.3ML
0.3 INJECTION SUBCUTANEOUS ONCE AS NEEDED
Status: DISCONTINUED | OUTPATIENT
Start: 2024-04-01 | End: 2024-04-01 | Stop reason: HOSPADM

## 2024-04-01 RX ORDER — DIPHENHYDRAMINE HYDROCHLORIDE 50 MG/ML
50 INJECTION INTRAMUSCULAR; INTRAVENOUS ONCE AS NEEDED
Status: DISCONTINUED | OUTPATIENT
Start: 2024-04-01 | End: 2024-04-01 | Stop reason: HOSPADM

## 2024-04-01 RX ORDER — SODIUM CHLORIDE 0.9 % (FLUSH) 0.9 %
10 SYRINGE (ML) INJECTION
Status: DISCONTINUED | OUTPATIENT
Start: 2024-04-01 | End: 2024-04-01 | Stop reason: HOSPADM

## 2024-04-01 RX ADMIN — SODIUM CHLORIDE: 9 INJECTION, SOLUTION INTRAVENOUS at 11:04

## 2024-04-01 RX ADMIN — IRON SUCROSE 200 MG: 20 INJECTION, SOLUTION INTRAVENOUS at 11:04

## 2024-04-01 NOTE — PLAN OF CARE
Patient tolerated venofer #4 well today. NAD noted upon discharge. PIV removed, catheter tip intact. Discharged home, ambulated independently.

## 2024-04-04 ENCOUNTER — OFFICE VISIT (OUTPATIENT)
Dept: OPHTHALMOLOGY | Facility: CLINIC | Age: 79
End: 2024-04-04
Payer: MEDICARE

## 2024-04-04 DIAGNOSIS — H21.562 PUPIL IRREGULAR OF LEFT EYE: ICD-10-CM

## 2024-04-04 DIAGNOSIS — H57.09 RELATIVE AFFERENT PUPILLARY DEFECT: ICD-10-CM

## 2024-04-04 DIAGNOSIS — H40.1111 PRIMARY OPEN-ANGLE GLAUCOMA, RIGHT EYE, MILD STAGE: ICD-10-CM

## 2024-04-04 DIAGNOSIS — H26.492 PCO (POSTERIOR CAPSULAR OPACIFICATION), LEFT: Primary | ICD-10-CM

## 2024-04-04 DIAGNOSIS — H40.1122 PRIMARY OPEN ANGLE GLAUCOMA OF LEFT EYE, MODERATE STAGE: ICD-10-CM

## 2024-04-04 DIAGNOSIS — Z96.1 PSEUDOPHAKIA, BOTH EYES: ICD-10-CM

## 2024-04-04 DIAGNOSIS — H40.043 STEROID RESPONDERS, BILATERAL: ICD-10-CM

## 2024-04-04 PROCEDURE — 66821 AFTER CATARACT LASER SURGERY: CPT | Mod: HCNC,LT,S$GLB, | Performed by: OPHTHALMOLOGY

## 2024-04-04 PROCEDURE — 99999 PR PBB SHADOW E&M-EST. PATIENT-LVL III: CPT | Mod: PBBFAC,HCNC,, | Performed by: OPHTHALMOLOGY

## 2024-04-04 PROCEDURE — 99499 UNLISTED E&M SERVICE: CPT | Mod: HCNC,S$GLB,, | Performed by: OPHTHALMOLOGY

## 2024-04-08 NOTE — PROGRESS NOTES
HPI    DLS: 3/14/2024    Pt here for YAG CAP OS;  Pt states no eye pain but vision in OS is very cloudy.    Meds;  Timolol BID OU    1) POAG   2) PCIOL OU   3) Steroid Responder   4) APD OS   5) PAS OS   6) Yag Cap OD     Last edited by Millie Lakhani on 4/4/2024 11:08 AM.            Assessment /Plan     For exam results, see Encounter Report.    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 POAG (primary open-angle glaucoma) - Both Eyes   Old pt of [facility] [employer] [id number]  Began at [facility] 2012       POAG OS>OD           Family history    neg        Glaucoma meds   T1/2 GFS ou (use to use travatan- off post CE)         H/O adverse rxn to glaucoma drops    none        LASERS    SLT os 9/26/2013 -( good resp 17 --> 14) // yag cap od - 1/2020 // yag cap os 4/4/2024         GLAUCOMA SURGERIES    trabectome os (combined with phaco - 2/8/2017        OTHER EYE SURGERIES    Combined - phaco/IOL / goniotomy - trabectome os 2/8/2017 - +heme and increae in IOP)                                                 PC IOL OD - 5/10/2017         CDR    0.6/0.7-0.8        Tbase    20-23/21-24          Tmax    23/24            Ttarget    17/17           HVF   6 tests 6302-2162 OD essentially full; OS Sup paracentral defect, IAD/INS (([place]))             (([facility])) - 8 test 2012 to 2024 - full od // sup paracentral defect / inferior paracentral defect         Gonio    +4 ou        CCT    598/598        OCT    5 test 2019 to 2024  - RNFL - nl od // bord G/TS/T         HRT    7 test-2012 to 2020  - MR -  [place] off  od // dec T, bord I os /// CDR xxx od // 0.64 os         "Disc photos    2012 - IOS    - Ttoday  15/12  - Test done today  yag cap os       2. Steroid responders - Both Eyes  2/2 nasacort      3. ? Trace APD os      4. S/P Rt knee replacement Feb 2013  -doing well      5.  Myopia / presbyopia ou     6. Irregular pupil - w/ PAS   -2/2 very high IOP POD # 1 post phaco     7.  PC IOL OU  OD 5/10/2017 - PCB00 14.5  OS 2/8/2017 - + hyphema and high IOP 2/2 trabectome - irregular pupil post op - PCB00 14.0    8. PCO od -  S/P yag cap od - 1/2020  S/P yag cap os 4/4/2024       Plan  CSM  IOP below target OU  SLT done OS 9/26/2013 - good initial resp 24-->17    -( if responds well consider SLT od - but full VF and healthy ON still od)  HVF stable OU    Cont timolol gfs ou q day     If the glare from the fixed dilated pupil bothers him a lot post 2nd eye getting done - consider pupil surgery with dr Peacock to "purse string it " smaller - pt is doing ok for now - glare is not bothering him too much     3/14/2024   - IOP good   - HVF full od // IAD / sup paracentral defect - ? Mild prog   -OCT nl od // unchanged os - sstable   - Drops: timolol only - changed from timolol gfs q am to timolol bid - 2/2 cost     No Photo file - Met patient     F/U for yag cap os / + PCO - pt c/o of blurry vision - worse than a year ago    Does have paracaentral VF defects - but these have been there a long time - hopefully the change he has noticed is from the PCO     YAG CAP OS - 4/4/2024 - steroid taper over 12 days     F/U 3-4 months - Met - IOP // gonio // F/U yag cap os - ? If helped - sooner prn       "

## 2024-04-27 ENCOUNTER — PATIENT MESSAGE (OUTPATIENT)
Dept: INTERNAL MEDICINE | Facility: CLINIC | Age: 79
End: 2024-04-27
Payer: MEDICARE

## 2024-04-27 ENCOUNTER — PATIENT MESSAGE (OUTPATIENT)
Dept: OTHER | Facility: OTHER | Age: 79
End: 2024-04-27
Payer: MEDICARE

## 2024-05-01 ENCOUNTER — PATIENT MESSAGE (OUTPATIENT)
Dept: PAIN MEDICINE | Facility: CLINIC | Age: 79
End: 2024-05-01
Payer: MEDICARE

## 2024-05-07 ENCOUNTER — PATIENT MESSAGE (OUTPATIENT)
Dept: OPHTHALMOLOGY | Facility: CLINIC | Age: 79
End: 2024-05-07
Payer: MEDICARE

## 2024-05-07 DIAGNOSIS — H10.9 CONJUNCTIVITIS OF BOTH EYES, UNSPECIFIED CONJUNCTIVITIS TYPE: Primary | ICD-10-CM

## 2024-05-07 RX ORDER — CIPROFLOXACIN HYDROCHLORIDE 3 MG/ML
1 SOLUTION/ DROPS OPHTHALMIC 4 TIMES DAILY
Qty: 10 ML | Refills: 0 | Status: SHIPPED | OUTPATIENT
Start: 2024-05-07 | End: 2024-05-14

## 2024-05-07 RX ORDER — CIPROFLOXACIN HYDROCHLORIDE 3 MG/ML
1 SOLUTION/ DROPS OPHTHALMIC 4 TIMES DAILY
COMMUNITY
Start: 2024-05-07 | End: 2024-05-07 | Stop reason: SDUPTHER

## 2024-05-13 ENCOUNTER — OFFICE VISIT (OUTPATIENT)
Dept: PAIN MEDICINE | Facility: CLINIC | Age: 79
End: 2024-05-13
Payer: MEDICARE

## 2024-05-13 ENCOUNTER — TELEPHONE (OUTPATIENT)
Dept: INTERNAL MEDICINE | Facility: CLINIC | Age: 79
End: 2024-05-13
Payer: MEDICARE

## 2024-05-13 ENCOUNTER — TELEPHONE (OUTPATIENT)
Dept: PAIN MEDICINE | Facility: CLINIC | Age: 79
End: 2024-05-13
Payer: MEDICARE

## 2024-05-13 VITALS
SYSTOLIC BLOOD PRESSURE: 138 MMHG | WEIGHT: 266.13 LBS | BODY MASS INDEX: 37.26 KG/M2 | DIASTOLIC BLOOD PRESSURE: 69 MMHG | HEART RATE: 73 BPM | HEIGHT: 71 IN

## 2024-05-13 DIAGNOSIS — M51.36 DDD (DEGENERATIVE DISC DISEASE), LUMBAR: ICD-10-CM

## 2024-05-13 DIAGNOSIS — M54.16 LUMBAR RADICULOPATHY: Primary | ICD-10-CM

## 2024-05-13 DIAGNOSIS — M54.41 CHRONIC BILATERAL LOW BACK PAIN WITH RIGHT-SIDED SCIATICA: ICD-10-CM

## 2024-05-13 DIAGNOSIS — M48.061 NEUROFORAMINAL STENOSIS OF LUMBAR SPINE: ICD-10-CM

## 2024-05-13 DIAGNOSIS — G89.29 CHRONIC BILATERAL LOW BACK PAIN WITH RIGHT-SIDED SCIATICA: ICD-10-CM

## 2024-05-13 PROCEDURE — 99999 PR PBB SHADOW E&M-EST. PATIENT-LVL III: CPT | Mod: PBBFAC,HCNC,, | Performed by: STUDENT IN AN ORGANIZED HEALTH CARE EDUCATION/TRAINING PROGRAM

## 2024-05-13 PROCEDURE — 1101F PT FALLS ASSESS-DOCD LE1/YR: CPT | Mod: HCNC,CPTII,S$GLB, | Performed by: STUDENT IN AN ORGANIZED HEALTH CARE EDUCATION/TRAINING PROGRAM

## 2024-05-13 PROCEDURE — 3075F SYST BP GE 130 - 139MM HG: CPT | Mod: HCNC,CPTII,S$GLB, | Performed by: STUDENT IN AN ORGANIZED HEALTH CARE EDUCATION/TRAINING PROGRAM

## 2024-05-13 PROCEDURE — 1157F ADVNC CARE PLAN IN RCRD: CPT | Mod: HCNC,CPTII,S$GLB, | Performed by: STUDENT IN AN ORGANIZED HEALTH CARE EDUCATION/TRAINING PROGRAM

## 2024-05-13 PROCEDURE — 99214 OFFICE O/P EST MOD 30 MIN: CPT | Mod: HCNC,S$GLB,, | Performed by: STUDENT IN AN ORGANIZED HEALTH CARE EDUCATION/TRAINING PROGRAM

## 2024-05-13 PROCEDURE — 3078F DIAST BP <80 MM HG: CPT | Mod: HCNC,CPTII,S$GLB, | Performed by: STUDENT IN AN ORGANIZED HEALTH CARE EDUCATION/TRAINING PROGRAM

## 2024-05-13 PROCEDURE — 1125F AMNT PAIN NOTED PAIN PRSNT: CPT | Mod: HCNC,CPTII,S$GLB, | Performed by: STUDENT IN AN ORGANIZED HEALTH CARE EDUCATION/TRAINING PROGRAM

## 2024-05-13 PROCEDURE — 1159F MED LIST DOCD IN RCRD: CPT | Mod: HCNC,CPTII,S$GLB, | Performed by: STUDENT IN AN ORGANIZED HEALTH CARE EDUCATION/TRAINING PROGRAM

## 2024-05-13 PROCEDURE — 3288F FALL RISK ASSESSMENT DOCD: CPT | Mod: HCNC,CPTII,S$GLB, | Performed by: STUDENT IN AN ORGANIZED HEALTH CARE EDUCATION/TRAINING PROGRAM

## 2024-05-13 NOTE — TELEPHONE ENCOUNTER
----- Message from Bailey Chadwick DO sent at 2024 11:26 AM CDT -----  Regarding: Order for SELIN HUDSON JR.    Patient Name: SELIN HUDSON JR.([id number])  Sex: Male  : 1945      PCP: FÁTIMA CHERY    Center: [facility] CENTRAL BILLING OFFICE     Types of orders made on 2024: Procedure Request    Order Date:2024  Ordering User:BAILEY CHADWICK [[id number]]  Encounter Provider:Bailey Chadwick DO [[id number]]  Z1   Authorizing Provider: Bailey Chadwick DO [[id number]]  Department:[facility] PAIN MANAGEMENT[[id number]]    Common Order Information  Procedure -> Transforaminal Injection (Specify level and laterality) Cmt: Right             L3-4 and L4-5 TFESI    Pre-op Diagnosis -> Lumbar radiculopathy       -> Lumbar degenerative disc disease     Order Specific Information  Order: Procedure Order to Pain Management [Custom: [id number]]  Order #:            [id number]: 1 FUTURE    Priority: Routine  Class: Clinic Performed    Future Order Information      Expires on:2025            Expected by:2024                   Associated Diagnoses      M54.16 Lumbar radiculopathy      M48.061 Neuroforaminal stenosis of lumbar spine      M51.36 DDD (degenerative disc disease), lumbar      Physician -> Gelter         Is patient on anti-coagulants? -> Yes C  mt: ASA and Xarelto        Facility Name: -> [place]           Priority: Routine  Class: Clinic Performed    Future Order Information      Expires on:2025            Expected by:2024                   Associated Diagnoses      M54.16 Lumbar radiculopathy      M48.061 Neuroforaminal stenosis of lumbar spine      M51.36 DDD (degenerative disc disease), lumbar      Procedure -> Transforaminal Injection (  Specify level and laterality) Cmt:                 Right L3-4 and L4-5 TFESI        Physician -> Gelter         Is patient on anti-coagulants? -> Yes Cmt: ASA and Xarelto        Pre-op Diagnosis -> Lumbar radiculopathy           -> Lumbar  degenerative disc disease         Facility Name: -> [place]

## 2024-05-13 NOTE — PROGRESS NOTES
[facility] Established Patient Evaluation    Chief Complaint  Chief Complaint   Patient presents with    Hip Pain     Right            5/13/2024    11:00 AM 2/23/2023     9:07 AM 6/2/2020     3:54 PM   Last 3 PDI Scores   Pain Disability Index (PDI) 30 14 0       Interval Updates    05/13/2024 - .Edis Huggins Jr. Presents today for follow up visit.  He complains of right sided low back pain that radiates to the right lower extremity. This is the same pain he had previously that responded well to TFESI. He would like to repeat the procedure. Denies lower extremity weakness, saddle anesthesia, or loss bowel or bladder function.  He rates his pain 7/10.      02/23/2023 - Edis Huggins Jr. presents today for follow up visit. He reports his low back pain remains well controlled.  Today he notes intermittent right lateral hip pain that is worse with lying on the right side at night.  He states he has been less active due to working from home and no longer taking daily walks.  He has not tried taking any medication for the hip pain. He denies any recent trauma to the hip. He reports his current pain to be 0/10.        Background from Chart Review  6/2/20 - Mr. Huggins returns to clinic for follow up visit reporting improved back pain.  Patient is s/p RIGHT L3-4 and L4-5TFESI on 5/26/20 with 100%  continued relief of his low back and right leg pain.   Pain intensity is currently 0/10.       05/06/2020 - Mr. Huggins returns to clinic for follow up visit for lower right back pain radiating down right side of leg. Pain intensity is currently 1/10.  Patient reporting only having pain when walking long distances patient states sleeping and sitting his pain is at a minimal or 0/10.  He reports pain starting in the low back radiating into his thigh stopping just above his knee.  The patient is established with our office he has previously had a right L3-4 and L4-5 TF SEB with 85% relief for approximately  6 months.     09/04/2019 - Edis Huggins Jr. presents for follow-up of chronic low back pain with radiculopathy after receiving a right L3-4 and L4-5 transforaminal epidural steroid injection on 06/25/2019.  He reports 85% improvement and lists numerous benefits from the injection including the ability to walk farther and faster; improved sleep; and ability to better handle some of his job responsibilities such as moving boxes.  He reports a pain intensity of 2/10 at this time along with a complete absence of radicular pain.  He notes occasional twinges of pain in the lumbar spine, specifically on the right side, but also states that he is very happy with the injection.    Treatment History  PT/OT:   HEP:   TENS:  Injections:               06/25/2019 - right L3-4 and L4-5 TFESI with 85% improvement in pain  05/26/20 - RIGHT L3-4 and L4-5 TFESI on  with 100% relief  Surgery: none  Medications:   - NSAIDS:   - MSK Relaxants: Flexeril  - TCAs:   - SNRIs:   - Topicals:   - Opioids:   - Anticonvulsants:     Current Pain Medications  None at this time     Full Medication List    Current Outpatient Medications:     acyclovir (ZOVIRAX) 400 MG tablet, TAKE 1 TABLET BY MOUTH THREE TIMES DAILY FOR 5 DAYS AT SIGN OF FEVER BLISTER, Disp: 90 tablet, Rfl: 0    azelastine (ASTELIN) 137 mcg (0.1 %) nasal spray, 1 spray (137 mcg total) by Nasal route 2 (two) times daily., Disp: 30 mL, Rfl: 3    b complex vitamins tablet, Take 1 tablet by mouth once daily., Disp: , Rfl:     Bifidobacterium infantis (ALIGN) 4 mg Cap, Take 4 mg by mouth once daily. , Disp: , Rfl:     cetirizine (ZYRTEC) 10 MG tablet, Take 10 mg by mouth once daily., Disp: , Rfl:     ciprofloxacin HCl (CILOXAN) 0.3 % ophthalmic solution, Place 1 drop into both eyes 4 (four) times daily. for 7 days, Disp: 10 mL, Rfl: 0    citalopram (CELEXA) 20 MG tablet, Take 1 tablet (20 mg total) by mouth once daily., Disp: 90 tablet, Rfl: 3    cyclobenzaprine (FLEXERIL) 10 MG  tablet, TAKE 1 TABLET THREE TIMES DAILY AS NEEDED, Disp: 30 tablet, Rfl: 10    finasteride (PROSCAR) 5 mg tablet, Take 1 tablet (5 mg total) by mouth once daily., Disp: 90 tablet, Rfl: 3    fish oil-omega-3 fatty acids 300-1,000 mg capsule, Take 1 g by mouth once daily., Disp: , Rfl:     gemfibroziL (LOPID) 600 MG tablet, Take 1 tablet (600 mg total) by mouth 2 (two) times daily., Disp: 180 tablet, Rfl: 3    irbesartan (AVAPRO) 75 MG tablet, Take 1 tablet (75 mg total) by mouth every evening., Disp: 90 tablet, Rfl: 3    mv-mn/iron/folic acid/herb 190 (VITAMIN D3 COMPLETE ORAL), Take 1 tablet by mouth once daily., Disp: , Rfl:     rivaroxaban (XARELTO) 20 mg Tab, Take 1 tablet (20 mg total) by mouth once daily., Disp: 90 tablet, Rfl: 0    tamsulosin (FLOMAX) 0.4 mg Cap, Take 1 capsule (0.4 mg total) by mouth once daily., Disp: 90 capsule, Rfl: 3    timolol maleate 0.5% (TIMOPTIC) 0.5 % Drop, Place 1 drop into both eyes 2 (two) times daily., Disp: 15 mL, Rfl: 3    vitamin E 1000 UNIT capsule, Take 2,000 Units by mouth once daily., Disp: , Rfl:     chlorthalidone (HYGROTEN) 25 MG Tab, Take 1 tablet (25 mg total) by mouth once daily. (Patient not taking: Reported on 3/14/2024), Disp: 30 tablet, Rfl: 11     Review of Systems  ROS    GENERAL:  No weight loss, malaise or fevers.  HEENT:   No recent changes in vision or hearing  NECK:  No difficulty with swallowing. No stridor.   RESPIRATORY:  Negative for cough, wheezing or shortness of breath, patient denies any recent URI.  CARDIOVASCULAR:  Negative for chest pain, leg swelling or palpitations.  GI:  Negative for abdominal discomfort, blood in stools or black stools or change in bowel habits.  MUSCULOSKELETAL:  See HPI.  SKIN:  Negative for lesions, rash, and itching.  PSYCH:  No mood disorder or recent psychosocial stressors.    HEMATOLOGY/LYMPHOLOGY:  Negative for prolonged bleeding, bruising easily or swollen nodes.  Patient is taking xarelto.   NEURO:   No history of  headaches, syncope, paralysis, seizures or tremors.  All other reviewed and negative other than HPI.      Medical History  Past Medical History:   Diagnosis Date    Allergic rhinitis due to animal (cat) (dog) hair and dander 3/20/2014    Allergy     Anemia     Anxiety     Basal cell carcinoma     BPH (benign prostatic hypertrophy)     Family history of colon cancer 3/20/2014    Family history of ischemic heart disease 3/20/2014    Fever blister     HLD (hyperlipidemia)     HTN (hypertension)     IFG (impaired fasting glucose)     Lumbar spondylosis 6/7/2019    Pain of right hip joint     Primary open angle glaucoma     Squamous cell carcinoma of skin     Status post total knee replacement 3/20/2014        Surgical History  Past Surgical History:   Procedure Laterality Date    CATARACT EXTRACTION W/  INTRAOCULAR LENS IMPLANT Left 02/08/2017    WITH KAHOOK GONIOTOMY (DR. WHITLEY)    CATARACT EXTRACTION W/  INTRAOCULAR LENS IMPLANT Right 05/10/2017        COLONOSCOPY N/A 02/08/2023    Procedure: COLONOSCOPY;  Surgeon: Geovanny Charles MD;  Location: [facility] ENDO;  Service: Endoscopy;  Laterality: N/A;    EYE SURGERY      HERNIA REPAIR      x 2    JOINT REPLACEMENT Right     right knee replacement    SKIN BIOPSY      in my [facility] record    TONSILLECTOMY      TOTAL KNEE ARTHROPLASTY Right     TRANSFORAMINAL EPIDURAL INJECTION OF STEROID Right 06/25/2019    Procedure: Transforaminal Epidural Steroid Injection, Right, L3-4, L4-5;  Surgeon: Eduard Mukherjee Jr., MD;  [place] [facility] [place] [facility] [place] [facility] [place]  Service: Pain Management;  Laterality: Right;    TRANSFORAMINAL EPIDURAL INJECTION OF STEROID Right 05/26/2020    Procedure: Injection,steroid,epidural,transforaminal approach--Right L3-4, L4-5;  Surgeon: Eduard Mukherjee Jr., MD;  Location: [facility] PAIN [facility];  Service: Pain Management;  Laterality: Right;    YAG CAPSULOTOMY Left 04/04/2024        YAG CAPSULOTOMY\ Right 01/16/2020            Physical  "Exam  Vitals:    05/13/24 1057   BP: 138/69   Pulse: 73   Weight: 120.7 kg (266 lb 1.5 oz)   Height: 5' 11" (1.803 m)   PainSc:   7   PainLoc: Back       Ortho/SPM Exam    PHYSICAL EXAMINATION:    GENERAL: Well appearing, in no acute distress, alert and oriented x3.  PSYCH:  Mood and affect appropriate.  SKIN: Skin color, texture, turgor normal, no rashes or lesions.  HEAD/FACE:  Normocephalic, atraumatic. Cranial nerves grossly intact.  NECK: No pain to palpation over the cervical paraspinous muscles. Spurling Negative. No pain with neck flexion, extension, or lateral flexion.   CV: RRR with palpation of the radial artery.  PULM: No evidence of respiratory difficulty, symmetric chest rise.  GI:  Soft and non-distended.  MSK:   Straight leg raise is negative bilaterally. There is normal ROM of the lumbar spine and b/l LE. Normal range of motion of the hips with sitting and standing.  There is No tenderness over the lumbar axial spine or paraspinal musculature. There is no pain over the SIJ.  There is mild tenderness over the right greater trochanteric bursa with palpation.  Gait is normal. Strength in b/l LE is normal.   MENTAL STATUS: A x O x 3, good concentration, speech is fluent and goal directed  CN: CN II - XII grossly intact   MOTOR: 5/5 in all muscle groups      Imaging      EXAMINATION:  XR HIP 2 VIEW RIGHT  Date:                                            04/18/2019     CLINICAL HISTORY:  Pain in right hip     TECHNIQUE:  AP view of the pelvis and frog leg lateral view of the right hip were performed.     COMPARISON:  11/03/2016     FINDINGS:  Two views of the right hip demonstrate narrowing of the hip joint.  There is some remodeling of femoral head from chronic degenerative change.  Findings are similar to the prior study.      Labs:  BMP  Lab Results   Component Value Date     02/09/2024    K 4.1 02/09/2024     02/09/2024    CO2 25 02/09/2024    BUN 17 02/09/2024    CREATININE 0.8 02/09/2024 "    CALCIUM 9.2 02/09/2024    ANIONGAP 9 02/09/2024    EGFRNORACEVR >60 02/09/2024     Lab Results   Component Value Date    ALT 11 02/09/2024    AST 11 02/09/2024    ALKPHOS 58 02/09/2024    BILITOT 0.3 02/09/2024       Assessment:  Problem List Items Addressed This Visit          Neuro    Lumbar radiculopathy - Primary    Relevant Orders    Procedure Order to Pain Management    Neuroforaminal stenosis of lumbar spine    Relevant Orders    Procedure Order to Pain Management       Orthopedic    Chronic bilateral low back pain with right-sided sciatica     Other Visit Diagnoses       DDD (degenerative disc disease), lumbar        Relevant Orders    Procedure Order to Pain Management            09/04/2019 - Edis Huggins Jr. is a 75 y.o. male with chronic low back pain with right-sided radiculopathy which responded extremely well to a right L3-4 and L4-5 TFESI on 06/25/2019.  Patient experienced 85% relief.  Patient was instructed to contact our clinic when the pain relief against to wear off as we can schedule him directly for another injection given that he is already experienced 10 weeks of relief.     05/06/20200919-34-ypha-old male presents with chronic low back and right-sided radiculopathy, he is status post right L3-4 and L4-5 TF SEB with 85% relief for approximately 6 months of relief, he presents today requesting to repeat his injection, he informed me that his right low back and leg pain increased over the last few weeks, pain is worse when walking he reports no pain while sleeping or sitting,   previous imaging was reviewed and discussed with the patient today, recommended repeating a right L3/4 and L4/5 TFESI, pt agreed and understood.      06/02/20208583-77-ntop-old male presents status post right L3-4 and L4-5 TFESI reported 100% relief of his low back and right leg pain.  Patient is very please with his injection and overall relief.  He reports having no pain while performing his ADLs, sleeping and or  daily functioning.  Recommended he continue his home exercise plan that involves stretching and muscular strengthening to help with lumbar stabilization, patient agreed understood.    02/23/2023 - Edis Huggins Jr. is a 79 y.o. male with right hip pain consistent with right greater trochanteric bursitis.  I recommend OTC NSAIDs, Topicals, and a HEP.  We may consider a GTB injection if pain worsens or persists.     05/13/2024 -   Patient presents today with  return of right-sided radicular symptoms.  In the past his pain has responded well to right L3/4 and L4/5 TFESI  and he would like to repeat the injections.   Of note, the patient has recently started on blood thinners for Afib.      Treatment Plan:   Procedures:   Schedule patient for repeat right L3/4 and L4/5 TFESI.  He will need clearance to hold his blood thinners.  PT/OT/HEP: I have stressed the importance of physical activity and a home exercise plan to help with pain and improve health.   Continue with home exercise regimen as tolerated.  Medications:    -  no new medications were prescribed at this visit   -  Reviewed and consistent with medication use as prescribed.  Imaging:   Reviewed.  Follow Up: RTC 2 weeks postprocedure      Abida Chadwick DO  Interventional Pain Medicine / Anesthesiology    Disclaimer: This note was partly generated using dictation software which may occasionally result in transcription errors.

## 2024-05-13 NOTE — TELEPHONE ENCOUNTER
----- Message from Bailey Chadwick DO sent at 2024 11:26 AM CDT -----  Regarding: Order for SELIN HUDSON JR.    Patient Name: SELIN HUDSON JR.([id number])  Sex: Male  : 1945      PCP: FÁTIMA CHERY    Center: [facility] CENTRAL BILLING OFFICE     Types of orders made on 2024: Procedure Request    Order Date:2024  Ordering User:BAILEY CHADWICK [[id number]]  Encounter Provider:Bailey Chadwick DO [[id number]]  Z1   Authorizing Provider: Bailey Chadwick DO [[id number]]  Department:[facility] PAIN MANAGEMENT[[id number]]    Common Order Information  Procedure -> Transforaminal Injection (Specify level and laterality) Cmt: Right             L3-4 and L4-5 TFESI    Pre-op Diagnosis -> Lumbar radiculopathy       -> Lumbar degenerative disc disease     Order Specific Information  Order: Procedure Order to Pain Management [Custom: [id number]]  Order #:            [id number]: 1 FUTURE    Priority: Routine  Class: Clinic Performed    Future Order Information      Expires on:2025            Expected by:2024                   Associated Diagnoses      M54.16 Lumbar radiculopathy      M48.061 Neuroforaminal stenosis of lumbar spine      M51.36 DDD (degenerative disc disease), lumbar      Physician -> Gelter         Is patient on anti-coagulants? -> Yes C  mt: ASA and Xarelto        Facility Name: -> [place]           Priority: Routine  Class: Clinic Performed    Future Order Information      Expires on:2025            Expected by:2024                   Associated Diagnoses      M54.16 Lumbar radiculopathy      M48.061 Neuroforaminal stenosis of lumbar spine      M51.36 DDD (degenerative disc disease), lumbar      Procedure -> Transforaminal Injection (  Specify level and laterality) Cmt:                 Right L3-4 and L4-5 TFESI        Physician -> Gelter         Is patient on anti-coagulants? -> Yes Cmt: ASA and Xarelto        Pre-op Diagnosis -> Lumbar radiculopathy           -> Lumbar  degenerative disc disease         Facility Name: -> [place]

## 2024-05-13 NOTE — TELEPHONE ENCOUNTER
----- Message from Shabnam Anderson sent at 5/13/2024  4:12 PM CDT -----  Regarding: Kyra request to hold Xarelto  Good afternoon,   We have scheduled Edis Huggins for a RT L3-4 and L4-5 Transforaminal SEB on 6/4/24 and we are requesting clearance for him to hold his xarelto 3 days prior to this injection.    Thank you in advance   Shabnam

## 2024-05-13 NOTE — TELEPHONE ENCOUNTER
----- Message from Chris Her MD sent at 5/13/2024  4:27 PM CDT -----  Regarding: RE: Clearnace request to hold Xarelto  Ok to hold for that time frame  ----- Message -----  From: Shabnam Anderson  Sent: 5/13/2024   4:14 PM CDT  To: Chris Her MD; Ar[facility] Perez Staff  Subject: Clearnace request to hold Xarelto                Good afternoon,   We have scheduled Edis Huggins for a RT L3-4 and L4-5 Transforaminal SEB on 6/4/24 and we are requesting clearance for him to hold his xarelto 3 days prior to this injection.    Thank you in advance   Shabnam

## 2024-05-13 NOTE — TELEPHONE ENCOUNTER
----- Message from Bailey Chadwick DO sent at 2024 11:26 AM CDT -----  Regarding: Order for SELIN HUDSON JR.    Patient Name: SELIN HUDSON JR.([id number])  Sex: Male  : 1945      PCP: FÁTIMA CHERY    Center: [facility] CENTRAL BILLING OFFICE     Types of orders made on 2024: Procedure Request    Order Date:2024  Ordering User:BAILEY CHADWICK [[id number]]  Encounter Provider:Bailey Chadwick DO [[id number]]  Z1   Authorizing Provider: Bailey Chadwick DO [[id number]]  Department:[facility] PAIN MANAGEMENT[[id number]]    Common Order Information  Procedure -> Transforaminal Injection (Specify level and laterality) Cmt: Right             L3-4 and L4-5 TFESI    Pre-op Diagnosis -> Lumbar radiculopathy       -> Lumbar degenerative disc disease     Order Specific Information  Order: Procedure Order to Pain Management [Custom: [id number]]  Order #:            [id number]: 1 FUTURE    Priority: Routine  Class: Clinic Performed    Future Order Information      Expires on:2025            Expected by:2024                   Associated Diagnoses      M54.16 Lumbar radiculopathy      M48.061 Neuroforaminal stenosis of lumbar spine      M51.36 DDD (degenerative disc disease), lumbar      Physician -> Gelter         Is patient on anti-coagulants? -> Yes C  mt: ASA and Xarelto        Facility Name: -> [place]           Priority: Routine  Class: Clinic Performed    Future Order Information      Expires on:2025            Expected by:2024                   Associated Diagnoses      M54.16 Lumbar radiculopathy      M48.061 Neuroforaminal stenosis of lumbar spine      M51.36 DDD (degenerative disc disease), lumbar      Procedure -> Transforaminal Injection (  Specify level and laterality) Cmt:                 Right L3-4 and L4-5 TFESI        Physician -> Gelter         Is patient on anti-coagulants? -> Yes Cmt: ASA and Xarelto        Pre-op Diagnosis -> Lumbar radiculopathy           -> Lumbar  degenerative disc disease         Facility Name: -> [place]

## 2024-05-13 NOTE — H&P (VIEW-ONLY)
[facility] Established Patient Evaluation    Chief Complaint  Chief Complaint   Patient presents with    Hip Pain     Right            5/13/2024    11:00 AM 2/23/2023     9:07 AM 6/2/2020     3:54 PM   Last 3 PDI Scores   Pain Disability Index (PDI) 30 14 0       Interval Updates    05/13/2024 - .Edis Huggins Jr. Presents today for follow up visit.  He complains of right sided low back pain that radiates to the right lower extremity. This is the same pain he had previously that responded well to TFESI. He would like to repeat the procedure. Denies lower extremity weakness, saddle anesthesia, or loss bowel or bladder function.  He rates his pain 7/10.      02/23/2023 - Edis Huggins Jr. presents today for follow up visit. He reports his low back pain remains well controlled.  Today he notes intermittent right lateral hip pain that is worse with lying on the right side at night.  He states he has been less active due to working from home and no longer taking daily walks.  He has not tried taking any medication for the hip pain. He denies any recent trauma to the hip. He reports his current pain to be 0/10.        Background from Chart Review  6/2/20 - Mr. Huggins returns to clinic for follow up visit reporting improved back pain.  Patient is s/p RIGHT L3-4 and L4-5TFESI on 5/26/20 with 100%  continued relief of his low back and right leg pain.   Pain intensity is currently 0/10.       05/06/2020 - Mr. Huggins returns to clinic for follow up visit for lower right back pain radiating down right side of leg. Pain intensity is currently 1/10.  Patient reporting only having pain when walking long distances patient states sleeping and sitting his pain is at a minimal or 0/10.  He reports pain starting in the low back radiating into his thigh stopping just above his knee.  The patient is established with our office he has previously had a right L3-4 and L4-5 TF SEB with 85% relief for approximately  6 months.     09/04/2019 - Edis Huggins Jr. presents for follow-up of chronic low back pain with radiculopathy after receiving a right L3-4 and L4-5 transforaminal epidural steroid injection on 06/25/2019.  He reports 85% improvement and lists numerous benefits from the injection including the ability to walk farther and faster; improved sleep; and ability to better handle some of his job responsibilities such as moving boxes.  He reports a pain intensity of 2/10 at this time along with a complete absence of radicular pain.  He notes occasional twinges of pain in the lumbar spine, specifically on the right side, but also states that he is very happy with the injection.    Treatment History  PT/OT:   HEP:   TENS:  Injections:               06/25/2019 - right L3-4 and L4-5 TFESI with 85% improvement in pain  05/26/20 - RIGHT L3-4 and L4-5 TFESI on  with 100% relief  Surgery: none  Medications:   - NSAIDS:   - MSK Relaxants: Flexeril  - TCAs:   - SNRIs:   - Topicals:   - Opioids:   - Anticonvulsants:     Current Pain Medications  None at this time     Full Medication List    Current Outpatient Medications:     acyclovir (ZOVIRAX) 400 MG tablet, TAKE 1 TABLET BY MOUTH THREE TIMES DAILY FOR 5 DAYS AT SIGN OF FEVER BLISTER, Disp: 90 tablet, Rfl: 0    azelastine (ASTELIN) 137 mcg (0.1 %) nasal spray, 1 spray (137 mcg total) by Nasal route 2 (two) times daily., Disp: 30 mL, Rfl: 3    b complex vitamins tablet, Take 1 tablet by mouth once daily., Disp: , Rfl:     Bifidobacterium infantis (ALIGN) 4 mg Cap, Take 4 mg by mouth once daily. , Disp: , Rfl:     cetirizine (ZYRTEC) 10 MG tablet, Take 10 mg by mouth once daily., Disp: , Rfl:     ciprofloxacin HCl (CILOXAN) 0.3 % ophthalmic solution, Place 1 drop into both eyes 4 (four) times daily. for 7 days, Disp: 10 mL, Rfl: 0    citalopram (CELEXA) 20 MG tablet, Take 1 tablet (20 mg total) by mouth once daily., Disp: 90 tablet, Rfl: 3    cyclobenzaprine (FLEXERIL) 10 MG  tablet, TAKE 1 TABLET THREE TIMES DAILY AS NEEDED, Disp: 30 tablet, Rfl: 10    finasteride (PROSCAR) 5 mg tablet, Take 1 tablet (5 mg total) by mouth once daily., Disp: 90 tablet, Rfl: 3    fish oil-omega-3 fatty acids 300-1,000 mg capsule, Take 1 g by mouth once daily., Disp: , Rfl:     gemfibroziL (LOPID) 600 MG tablet, Take 1 tablet (600 mg total) by mouth 2 (two) times daily., Disp: 180 tablet, Rfl: 3    irbesartan (AVAPRO) 75 MG tablet, Take 1 tablet (75 mg total) by mouth every evening., Disp: 90 tablet, Rfl: 3    mv-mn/iron/folic acid/herb 190 (VITAMIN D3 COMPLETE ORAL), Take 1 tablet by mouth once daily., Disp: , Rfl:     rivaroxaban (XARELTO) 20 mg Tab, Take 1 tablet (20 mg total) by mouth once daily., Disp: 90 tablet, Rfl: 0    tamsulosin (FLOMAX) 0.4 mg Cap, Take 1 capsule (0.4 mg total) by mouth once daily., Disp: 90 capsule, Rfl: 3    timolol maleate 0.5% (TIMOPTIC) 0.5 % Drop, Place 1 drop into both eyes 2 (two) times daily., Disp: 15 mL, Rfl: 3    vitamin E 1000 UNIT capsule, Take 2,000 Units by mouth once daily., Disp: , Rfl:     chlorthalidone (HYGROTEN) 25 MG Tab, Take 1 tablet (25 mg total) by mouth once daily. (Patient not taking: Reported on 3/14/2024), Disp: 30 tablet, Rfl: 11     Review of Systems  ROS    GENERAL:  No weight loss, malaise or fevers.  HEENT:   No recent changes in vision or hearing  NECK:  No difficulty with swallowing. No stridor.   RESPIRATORY:  Negative for cough, wheezing or shortness of breath, patient denies any recent URI.  CARDIOVASCULAR:  Negative for chest pain, leg swelling or palpitations.  GI:  Negative for abdominal discomfort, blood in stools or black stools or change in bowel habits.  MUSCULOSKELETAL:  See HPI.  SKIN:  Negative for lesions, rash, and itching.  PSYCH:  No mood disorder or recent psychosocial stressors.    HEMATOLOGY/LYMPHOLOGY:  Negative for prolonged bleeding, bruising easily or swollen nodes.  Patient is taking xarelto.   NEURO:   No history of  headaches, syncope, paralysis, seizures or tremors.  All other reviewed and negative other than HPI.      Medical History  Past Medical History:   Diagnosis Date    Allergic rhinitis due to animal (cat) (dog) hair and dander 3/20/2014    Allergy     Anemia     Anxiety     Basal cell carcinoma     BPH (benign prostatic hypertrophy)     Family history of colon cancer 3/20/2014    Family history of ischemic heart disease 3/20/2014    Fever blister     HLD (hyperlipidemia)     HTN (hypertension)     IFG (impaired fasting glucose)     Lumbar spondylosis 6/7/2019    Pain of right hip joint     Primary open angle glaucoma     Squamous cell carcinoma of skin     Status post total knee replacement 3/20/2014        Surgical History  Past Surgical History:   Procedure Laterality Date    CATARACT EXTRACTION W/  INTRAOCULAR LENS IMPLANT Left 02/08/2017    WITH KAHOOK GONIOTOMY (DR. WHITLEY)    CATARACT EXTRACTION W/  INTRAOCULAR LENS IMPLANT Right 05/10/2017        COLONOSCOPY N/A 02/08/2023    Procedure: COLONOSCOPY;  Surgeon: Geovanny Charles MD;  Location: [facility] ENDO;  Service: Endoscopy;  Laterality: N/A;    EYE SURGERY      HERNIA REPAIR      x 2    JOINT REPLACEMENT Right     right knee replacement    SKIN BIOPSY      in my [facility] record    TONSILLECTOMY      TOTAL KNEE ARTHROPLASTY Right     TRANSFORAMINAL EPIDURAL INJECTION OF STEROID Right 06/25/2019    Procedure: Transforaminal Epidural Steroid Injection, Right, L3-4, L4-5;  Surgeon: Eduard Mukherjee Jr., MD;  [place] [facility] [place] [facility] [place]  Service: Pain Management;  Laterality: Right;    TRANSFORAMINAL EPIDURAL INJECTION OF STEROID Right 05/26/2020    Procedure: Injection,steroid,epidural,transforaminal approach--Right L3-4, L4-5;  Surgeon: Eduard Mukherjee Jr., MD;  Location: [facility] PAIN [facility];  Service: Pain Management;  Laterality: Right;    YAG CAPSULOTOMY Left 04/04/2024        YAG CAPSULOTOMY\ Right 01/16/2020            Physical  "Exam  Vitals:    05/13/24 1057   BP: 138/69   Pulse: 73   Weight: 120.7 kg (266 lb 1.5 oz)   Height: 5' 11" (1.803 m)   PainSc:   7   PainLoc: Back       Ortho/SPM Exam    PHYSICAL EXAMINATION:    GENERAL: Well appearing, in no acute distress, alert and oriented x3.  PSYCH:  Mood and affect appropriate.  SKIN: Skin color, texture, turgor normal, no rashes or lesions.  HEAD/FACE:  Normocephalic, atraumatic. Cranial nerves grossly intact.  NECK: No pain to palpation over the cervical paraspinous muscles. Spurling Negative. No pain with neck flexion, extension, or lateral flexion.   CV: RRR with palpation of the radial artery.  PULM: No evidence of respiratory difficulty, symmetric chest rise.  GI:  Soft and non-distended.  MSK:   Straight leg raise is negative bilaterally. There is normal ROM of the lumbar spine and b/l LE. Normal range of motion of the hips with sitting and standing.  There is No tenderness over the lumbar axial spine or paraspinal musculature. There is no pain over the SIJ.  There is mild tenderness over the right greater trochanteric bursa with palpation.  Gait is normal. Strength in b/l LE is normal.   MENTAL STATUS: A x O x 3, good concentration, speech is fluent and goal directed  CN: CN II - XII grossly intact   MOTOR: 5/5 in all muscle groups      Imaging      EXAMINATION:  XR HIP 2 VIEW RIGHT  Date:                                            04/18/2019     CLINICAL HISTORY:  Pain in right hip     TECHNIQUE:  AP view of the pelvis and frog leg lateral view of the right hip were performed.     COMPARISON:  11/03/2016     FINDINGS:  Two views of the right hip demonstrate narrowing of the hip joint.  There is some remodeling of femoral head from chronic degenerative change.  Findings are similar to the prior study.      Labs:  BMP  Lab Results   Component Value Date     02/09/2024    K 4.1 02/09/2024     02/09/2024    CO2 25 02/09/2024    BUN 17 02/09/2024    CREATININE 0.8 02/09/2024 "    CALCIUM 9.2 02/09/2024    ANIONGAP 9 02/09/2024    EGFRNORACEVR >60 02/09/2024     Lab Results   Component Value Date    ALT 11 02/09/2024    AST 11 02/09/2024    ALKPHOS 58 02/09/2024    BILITOT 0.3 02/09/2024       Assessment:  Problem List Items Addressed This Visit          Neuro    Lumbar radiculopathy - Primary    Relevant Orders    Procedure Order to Pain Management    Neuroforaminal stenosis of lumbar spine    Relevant Orders    Procedure Order to Pain Management       Orthopedic    Chronic bilateral low back pain with right-sided sciatica     Other Visit Diagnoses       DDD (degenerative disc disease), lumbar        Relevant Orders    Procedure Order to Pain Management            09/04/2019 - Edis Huggins Jr. is a 75 y.o. male with chronic low back pain with right-sided radiculopathy which responded extremely well to a right L3-4 and L4-5 TFESI on 06/25/2019.  Patient experienced 85% relief.  Patient was instructed to contact our clinic when the pain relief against to wear off as we can schedule him directly for another injection given that he is already experienced 10 weeks of relief.     05/06/[id number]-otgm-old male presents with chronic low back and right-sided radiculopathy, he is status post right L3-4 and L4-5 TF SEB with 85% relief for approximately 6 months of relief, he presents today requesting to repeat his injection, he informed me that his right low back and leg pain increased over the last few weeks, pain is worse when walking he reports no pain while sleeping or sitting,   previous imaging was reviewed and discussed with the patient today, recommended repeating a right L3/4 and L4/5 TFESI, pt agreed and understood.      06/02/[id number]-gesk-old male presents status post right L3-4 and L4-5 TFESI reported 100% relief of his low back and right leg pain.  Patient is very please with his injection and overall relief.  He reports having no pain while performing his ADLs, sleeping and or  daily functioning.  Recommended he continue his home exercise plan that involves stretching and muscular strengthening to help with lumbar stabilization, patient agreed understood.    02/23/2023 - Edis Huggins Jr. is a 79 y.o. male with right hip pain consistent with right greater trochanteric bursitis.  I recommend OTC NSAIDs, Topicals, and a HEP.  We may consider a GTB injection if pain worsens or persists.     05/13/2024 -   Patient presents today with  return of right-sided radicular symptoms.  In the past his pain has responded well to right L3/4 and L4/5 TFESI  and he would like to repeat the injections.   Of note, the patient has recently started on blood thinners for Afib.      Treatment Plan:   Procedures:   Schedule patient for repeat right L3/4 and L4/5 TFESI.  He will need clearance to hold his blood thinners.  PT/OT/HEP: I have stressed the importance of physical activity and a home exercise plan to help with pain and improve health.   Continue with home exercise regimen as tolerated.  Medications:    -  no new medications were prescribed at this visit   -  Reviewed and consistent with medication use as prescribed.  Imaging:   Reviewed.  Follow Up: RTC 2 weeks postprocedure      Abida Chadwick DO  Interventional Pain Medicine / Anesthesiology    Disclaimer: This note was partly generated using dictation software which may occasionally result in transcription errors.

## 2024-05-28 ENCOUNTER — TELEPHONE (OUTPATIENT)
Dept: PAIN MEDICINE | Facility: CLINIC | Age: 79
End: 2024-05-28
Payer: MEDICARE

## 2024-06-03 NOTE — PRE-PROCEDURE INSTRUCTIONS
Hello ,     Your arrival time is 1020 and is roughly 1 hour before your anticipated procedure time to allow sufficient time for pre-op..  This procedure will take place at the [facility] at the corner of [address] [facility] [facility].  It is in the [place] next to [facility].  The address is:     [address]  [place] [address]     After entering the building, you will proceed to the second floor where you can check in with registration. You should take any medications that you routinely take for blood pressure, heart medications, thyroid, cholesterol, etc. As directed     The fasting restrictions are dependent on whether or not you are receiving sedation.        You CANNOT drive yourself and must have a .     If you are on blood thinners, you need to follow the anticoagulation instructions that had been discussed previously.  You should only stop the blood thinners if it was approved by your primary care physician or your cardiologist.  In the event that you are not able to stop your blood thinners, a blood thinner was not listed on your medication list, or we were not able to get clearance from your cardiologist, then the procedure may have to be postponed/canceled.      IF you were told to stop your blood thinners, this is how long you should generally hold some of the more common ones.  Remember that stopping blood thinners is only necessary for certain procedures. If you are unsure of your instructions, please call us.   Aspirin - 5 days  Plavix/Clopidogrel - 7 days  Warfarin / Coumadin - 5 days  Eliquis - 3 days  Pradaxa/Dabigatran - 4 days  Xarelto/Rivaroxaban - 3 days     If you are a diabetic, do not take your medication if you will be fasting, but bring it with you.          *HOLD ALL VITAMINS, MINERALS, HERBS (INCLUDING HERBAL TEAS) AND SUPPLEMENTS  *SHOWER WITH ANTIBACTERIAL SOAP (ex:. DIAL) NIGHT BEFORE AND MORNING OF PROCEDURE  *DO NOT APPLY ANY  LOTIONS, OILS, POWDERS, PERFUME/COLOGNE, OINTMENTS, GELS, CREAMS, MAKEUP OR DEODORANT TO YOUR SKIN MORNING OF PROCEDURE  *LEAVE JEWELRY AND ANY VALUABLES AT HOME  *WEAR LOOSE COMFORTABLE CLOTHING (PREFERABLY A BUTTON UP SHIRT) - you will change into a gown for the procedure      Thank you,  Vivi

## 2024-06-04 ENCOUNTER — HOSPITAL ENCOUNTER (OUTPATIENT)
Facility: HOSPITAL | Age: 79
Discharge: HOME | End: 2024-06-04
Attending: STUDENT IN AN ORGANIZED HEALTH CARE EDUCATION/TRAINING PROGRAM | Admitting: STUDENT IN AN ORGANIZED HEALTH CARE EDUCATION/TRAINING PROGRAM
Payer: MEDICARE

## 2024-06-04 VITALS
RESPIRATION RATE: 15 BRPM | DIASTOLIC BLOOD PRESSURE: 63 MMHG | TEMPERATURE: 98 F | OXYGEN SATURATION: 98 % | HEART RATE: 60 BPM | HEIGHT: 71 IN | WEIGHT: 266 LBS | BODY MASS INDEX: 37.24 KG/M2 | SYSTOLIC BLOOD PRESSURE: 131 MMHG

## 2024-06-04 DIAGNOSIS — M51.36 DDD (DEGENERATIVE DISC DISEASE), LUMBAR: ICD-10-CM

## 2024-06-04 DIAGNOSIS — M54.16 LUMBAR RADICULOPATHY: Primary | ICD-10-CM

## 2024-06-04 DIAGNOSIS — G89.29 CHRONIC PAIN: ICD-10-CM

## 2024-06-04 PROCEDURE — 25000003 PHARM REV CODE 250: Performed by: STUDENT IN AN ORGANIZED HEALTH CARE EDUCATION/TRAINING PROGRAM

## 2024-06-04 PROCEDURE — 64483 NJX AA&/STRD TFRM EPI L/S 1: CPT | Mod: HCNC,RT,, | Performed by: STUDENT IN AN ORGANIZED HEALTH CARE EDUCATION/TRAINING PROGRAM

## 2024-06-04 PROCEDURE — 64483 NJX AA&/STRD TFRM EPI L/S 1: CPT | Mod: RT | Performed by: STUDENT IN AN ORGANIZED HEALTH CARE EDUCATION/TRAINING PROGRAM

## 2024-06-04 PROCEDURE — 63600175 PHARM REV CODE 636 W HCPCS: Performed by: STUDENT IN AN ORGANIZED HEALTH CARE EDUCATION/TRAINING PROGRAM

## 2024-06-04 PROCEDURE — 64484 NJX AA&/STRD TFRM EPI L/S EA: CPT | Mod: HCNC,RT,, | Performed by: STUDENT IN AN ORGANIZED HEALTH CARE EDUCATION/TRAINING PROGRAM

## 2024-06-04 PROCEDURE — 25500020 PHARM REV CODE 255: Performed by: STUDENT IN AN ORGANIZED HEALTH CARE EDUCATION/TRAINING PROGRAM

## 2024-06-04 PROCEDURE — 64484 NJX AA&/STRD TFRM EPI L/S EA: CPT | Mod: RT | Performed by: STUDENT IN AN ORGANIZED HEALTH CARE EDUCATION/TRAINING PROGRAM

## 2024-06-04 RX ORDER — DEXAMETHASONE SODIUM PHOSPHATE 10 MG/ML
INJECTION INTRAMUSCULAR; INTRAVENOUS
Status: DISCONTINUED | OUTPATIENT
Start: 2024-06-04 | End: 2024-06-04 | Stop reason: HOSPADM

## 2024-06-04 RX ORDER — FENTANYL CITRATE 50 UG/ML
25 INJECTION, SOLUTION INTRAMUSCULAR; INTRAVENOUS
Status: DISCONTINUED | OUTPATIENT
Start: 2024-06-04 | End: 2024-06-04 | Stop reason: HOSPADM

## 2024-06-04 RX ORDER — LIDOCAINE HYDROCHLORIDE 10 MG/ML
INJECTION, SOLUTION EPIDURAL; INFILTRATION; INTRACAUDAL; PERINEURAL
Status: DISCONTINUED | OUTPATIENT
Start: 2024-06-04 | End: 2024-06-04 | Stop reason: HOSPADM

## 2024-06-04 RX ORDER — LIDOCAINE HYDROCHLORIDE 20 MG/ML
INJECTION, SOLUTION EPIDURAL; INFILTRATION; INTRACAUDAL; PERINEURAL
Status: DISCONTINUED | OUTPATIENT
Start: 2024-06-04 | End: 2024-06-04 | Stop reason: HOSPADM

## 2024-06-04 RX ORDER — SODIUM CHLORIDE 9 MG/ML
INJECTION, SOLUTION INTRAVENOUS CONTINUOUS
Status: DISCONTINUED | OUTPATIENT
Start: 2024-06-04 | End: 2024-06-04 | Stop reason: HOSPADM

## 2024-06-04 RX ORDER — MIDAZOLAM HYDROCHLORIDE 1 MG/ML
1 INJECTION, SOLUTION INTRAMUSCULAR; INTRAVENOUS
Status: DISCONTINUED | OUTPATIENT
Start: 2024-06-04 | End: 2024-06-04 | Stop reason: HOSPADM

## 2024-06-04 NOTE — OP NOTE
Lumbar Transforaminal Epidural Steroid Injection under Fluoroscopic Guidance    The procedure, risks, benefits, and options were discussed with the patient. There are no contraindications to the procedure. The patent expressed understanding and agreed to the procedure. Informed written consent was obtained prior to the start of the procedure and can be found in the patient's chart.    PATIENT NAME: Edis Huggins Jr.   MRN: [id number]     DATE OF PROCEDURE: 06/04/2024    PROCEDURE:  Right  L3/4 and L4/5 Lumbar Transforaminal Epidural Steroid Injection under Fluoroscopic Guidance    PRE-OP DIAGNOSIS: Lumbar radiculopathy [M54.16] Lumbar radiculopathy [M54.16]    POST-OP DIAGNOSIS: Same    PHYSICIAN: Abida Chadwick DO    ASSISTANTS: none     MEDICATIONS INJECTED: Preservative-free Decadron 10mg with 5cc of Lidocaine 1% MPF     LOCAL ANESTHETIC INJECTED: Xylocaine 2%     SEDATION: Versed 2mg and Fentanyl 50mcg                                                                                                                                                                                     Conscious sedation ordered by M.D. Patient re-evaluation prior to administration of conscious sedation. No changes noted in patient's status from initial evaluation. The patient's vital signs were monitored by RN and patient remained hemodynamically stable throughout the procedure.    Event Time In   Sedation Start 1145   Sedation End 1155       ESTIMATED BLOOD LOSS: None    COMPLICATIONS: None    TECHNIQUE: Time-out was performed to identify the patient and procedure to be performed. With the patient laying in a prone position, the surgical area was prepped and draped in the usual sterile fashion using ChloraPrep and a fenestrated drape.The levels were determined under fluoroscopy guidance. Skin anesthesia was achieved by injecting Lidocaine 2% over the injection sites. The transforaminal spaces were then approached with a 22 gauge,  5 inch spinal quinke needle that was introduced under fluoroscopic guidance in the AP and Lateral views. Once the needle tip was in the area of the transforaminal space, and there was no blood, CSF or paraesthesias, contrast dye Omnipaque (300mg/mL) was injected to confirm placement and there was no vascular runoff. Fluoroscopic imaging in the AP and lateral views revealed a clear outline of the spinal nerve with proximal spread of agent through the neural foramen into the epidural space. 3 mL of the medication mixture listed above was injected slowly at each site. Displacement of the radio opaque contrast after injection of the medication confirmed that the medication went into the area of the transforaminal spaces. The needles were removed and bleeding was nil. A sterile dressing was applied. No specimens collected. The patient tolerated the procedure well.       The patient was monitored after the procedure in the recovery area. They were given post-procedure and discharge instructions to follow at home. The patient was discharged in a stable condition.      Abida Chadwick DO

## 2024-06-04 NOTE — DISCHARGE SUMMARY
Discharge Note  Short Stay      SUMMARY     Admit Date: 6/4/2024    Attending Physician: Abida Chadwick      Discharge Physician: Abida Chadwick      Discharge Date: 6/4/2024 11:56 AM    Procedure(s) (LRB):  RT L3-4 and L4-5 TFESI (Right)    Final Diagnosis: Lumbar radiculopathy [M54.16]    Disposition: Home or self care    Patient Instructions:   Current Discharge Medication List        CONTINUE these medications which have NOT CHANGED    Details   acyclovir (ZOVIRAX) 400 MG tablet TAKE 1 TABLET BY MOUTH THREE TIMES DAILY FOR 5 DAYS AT SIGN OF FEVER BLISTER  Qty: 90 tablet, Refills: 0    Comments: Please inactivate all prior scripts with same name and strength including on holds.      b complex vitamins tablet Take 1 tablet by mouth once daily.      Bifidobacterium infantis (ALIGN) 4 mg Cap Take 4 mg by mouth once daily.       cetirizine (ZYRTEC) 10 MG tablet Take 10 mg by mouth once daily.      citalopram (CELEXA) 20 MG tablet Take 1 tablet (20 mg total) by mouth once daily.  Qty: 90 tablet, Refills: 3    Associated Diagnoses: Anxiety      cyclobenzaprine (FLEXERIL) 10 MG tablet TAKE 1 TABLET THREE TIMES DAILY AS NEEDED  Qty: 30 tablet, Refills: 10      finasteride (PROSCAR) 5 mg tablet Take 1 tablet (5 mg total) by mouth once daily.  Qty: 90 tablet, Refills: 3    Associated Diagnoses: Benign non-nodular prostatic hyperplasia without lower urinary tract symptoms      fish oil-omega-3 fatty acids 300-1,000 mg capsule Take 1 g by mouth once daily.      gemfibroziL (LOPID) 600 MG tablet Take 1 tablet (600 mg total) by mouth 2 (two) times daily.  Qty: 180 tablet, Refills: 3    Associated Diagnoses: Hypertriglyceridemia      irbesartan (AVAPRO) 75 MG tablet Take 1 tablet (75 mg total) by mouth every evening.  Qty: 90 tablet, Refills: 3    Comments: .  Associated Diagnoses: Essential hypertension      mv-mn/iron/folic acid/herb 190 (VITAMIN D3 COMPLETE ORAL) Take 1 tablet by mouth once daily.      rivaroxaban  (XARELTO) 20 mg Tab Take 1 tablet (20 mg total) by mouth once daily.  Qty: 90 tablet, Refills: 0      tamsulosin (FLOMAX) 0.4 mg Cap Take 1 capsule (0.4 mg total) by mouth once daily.  Qty: 90 capsule, Refills: 3    Associated Diagnoses: Benign non-nodular prostatic hyperplasia without lower urinary tract symptoms      timolol maleate 0.5% (TIMOPTIC) 0.5 % Drop Place 1 drop into both eyes 2 (two) times daily.  Qty: 15 mL, Refills: 3    Associated Diagnoses: Primary open angle glaucoma of left eye, moderate stage; Primary open-angle glaucoma, right eye, mild stage      vitamin E 1000 UNIT capsule Take 2,000 Units by mouth once daily.      azelastine (ASTELIN) 137 mcg (0.1 %) nasal spray 1 spray (137 mcg total) by Nasal route 2 (two) times daily.  Qty: 30 mL, Refills: 3    Comments: Please sent 90 day supply with 3 refills      chlorthalidone (HYGROTEN) 25 MG Tab Take 1 tablet (25 mg total) by mouth once daily.  Qty: 30 tablet, Refills: 11    Comments: .  Associated Diagnoses: Essential hypertension                 Discharge Diagnosis: Lumbar radiculopathy [M54.16]  Condition on Discharge: Stable with no complications to procedure   Diet on Discharge: Same as before.  Activity: as per instruction sheet.  Discharge to: Home with a responsible adult.  Follow up: 2-4 weeks       Please call my office or pager at [phone] if experienced any weakness or loss of sensation, fever > 101.5, pain uncontrolled with oral medications, persistent nausea/vomiting/or diarrhea, redness or drainage from the incisions, or any other worrisome concerns. If physician on call was not reached or could not communicate with our office for any reason please go to the nearest emergency department

## 2024-06-04 NOTE — DISCHARGE INSTRUCTIONS
Home Care Instructions Pain Management:    1.  DIET:    You may resume your normal diet today.    2.  BATHING:    You may shower with luke warm water.    3.  DRESSING:    You may remove your bandage today.    4.  ACTIVITY LEVEL:      You may resume your normal activities 24 hours after your procedure.    5.  MEDICATIONS:    You may resume your normal medications today.    6.  SPECIAL INSTRUCTIONS:    No heat to the injection site for 24 hours including bath or shower, heating pad, moist heat or hot tubs.    Use an ice pack to the injection site for any pain or discomfort.  Apply ice packs for 20 minute intervals as needed.    If you have received any sedatives by mouth today, you can not drive for 12 hours.    If you have received sedation through an IV, you can not drive for 24 hours.    PLEASE CALL YOUR DOCTOR FOR THE FOLLOWIN.  Redness or swelling around the injection site.  2.  Fever of 101 degrees.  3.  Drainage (pus) from the injection site.  4.  For any continuous bleeding (some dried blood over the incision is normal.)    FOR EMERGENCIES:    If any unusual problems or difficulties occur during clinic hours, call [phone] or dial [phone].    Follow up with with your physician in 2-3 weeks.

## 2024-06-04 NOTE — PLAN OF CARE
Patient awake, alert, and oriented x 4. No apparent distress noted. VS currently stable. Discharge instructions reviewed, patient verbalized understanding.

## 2024-06-06 ENCOUNTER — PATIENT MESSAGE (OUTPATIENT)
Dept: PAIN MEDICINE | Facility: CLINIC | Age: 79
End: 2024-06-06
Payer: MEDICARE

## 2024-06-11 ENCOUNTER — PATIENT MESSAGE (OUTPATIENT)
Dept: UROLOGY | Facility: CLINIC | Age: 79
End: 2024-06-11
Payer: MEDICARE

## 2024-06-14 ENCOUNTER — OFFICE VISIT (OUTPATIENT)
Dept: CARDIOLOGY | Facility: CLINIC | Age: 79
End: 2024-06-14
Payer: MEDICARE

## 2024-06-14 VITALS
HEART RATE: 71 BPM | DIASTOLIC BLOOD PRESSURE: 69 MMHG | HEIGHT: 71 IN | SYSTOLIC BLOOD PRESSURE: 117 MMHG | WEIGHT: 268.94 LBS | BODY MASS INDEX: 37.65 KG/M2

## 2024-06-14 DIAGNOSIS — I10 ESSENTIAL HYPERTENSION: ICD-10-CM

## 2024-06-14 DIAGNOSIS — I70.0 AORTIC ATHEROSCLEROSIS: ICD-10-CM

## 2024-06-14 DIAGNOSIS — I77.9 CAROTID ARTERY DISEASE, UNSPECIFIED LATERALITY: ICD-10-CM

## 2024-06-14 DIAGNOSIS — I48.0 PAROXYSMAL ATRIAL FIBRILLATION: Primary | ICD-10-CM

## 2024-06-14 DIAGNOSIS — R06.09 DYSPNEA ON EXERTION: ICD-10-CM

## 2024-06-14 DIAGNOSIS — E78.2 MIXED HYPERLIPIDEMIA: ICD-10-CM

## 2024-06-14 PROCEDURE — 99999 PR PBB SHADOW E&M-EST. PATIENT-LVL III: CPT | Mod: PBBFAC,,, | Performed by: INTERNAL MEDICINE

## 2024-06-14 RX ORDER — CHLORTHALIDONE 25 MG/1
25 TABLET ORAL DAILY PRN
Start: 2024-06-14 | End: 2025-06-14

## 2024-06-14 NOTE — PROGRESS NOTES
[facility] Established Patient Evaluation    Chief Complaint  Chief Complaint   Patient presents with    Hip Pain     Right            6/17/2024     2:30 PM 5/13/2024    11:00 AM 2/23/2023     9:07 AM   Last 3 PDI Scores   Pain Disability Index (PDI) 5 30 14         Interval Update 06/17/2024: Patient return to clinic S/P TFESI Rt L3-4 and L4-5 on 06/04/2024 with 100% relief with improved functionality.  The patient reports that he walks 1-2 miles per day he monitors his steps with a fit bit.  Overall he is doing much better since his injection his pain score today is 1/10.  Denies any new pain denies any profound weakness denies any bowel bladder dysfunction at this time.      05/13/2024 - .Edis Huggins Jr. Presents today for follow up visit.  He complains of right sided low back pain that radiates to the right lower extremity. This is the same pain he had previously that responded well to TFESI. He would like to repeat the procedure. Denies lower extremity weakness, saddle anesthesia, or loss bowel or bladder function.  He rates his pain 7/10.      02/23/2023 - Edis Huggins Jr. presents today for follow up visit. He reports his low back pain remains well controlled.  Today he notes intermittent right lateral hip pain that is worse with lying on the right side at night.  He states he has been less active due to working from home and no longer taking daily walks.  He has not tried taking any medication for the hip pain. He denies any recent trauma to the hip. He reports his current pain to be 0/10.        Background from Chart Review  6/2/20 - Mr. Huggins returns to clinic for follow up visit reporting improved back pain.  Patient is s/p RIGHT L3-4 and L4-5TFESI on 5/26/20 with 100%  continued relief of his low back and right leg pain.   Pain intensity is currently 0/10.       05/06/2020 - Mr. Huggins returns to clinic for follow up visit for lower right back pain radiating down right side  of leg. Pain intensity is currently 1/10.  Patient reporting only having pain when walking long distances patient states sleeping and sitting his pain is at a minimal or 0/10.  He reports pain starting in the low back radiating into his thigh stopping just above his knee.  The patient is established with our office he has previously had a right L3-4 and L4-5 TF SEB with 85% relief for approximately 6 months.     09/04/2019 - Edis Huggins  presents for follow-up of chronic low back pain with radiculopathy after receiving a right L3-4 and L4-5 transforaminal epidural steroid injection on 06/25/2019.  He reports 85% improvement and lists numerous benefits from the injection including the ability to walk farther and faster; improved sleep; and ability to better handle some of his job responsibilities such as moving boxes.  He reports a pain intensity of 2/10 at this time along with a complete absence of radicular pain.  He notes occasional twinges of pain in the lumbar spine, specifically on the right side, but also states that he is very happy with the injection.    Treatment History  PT/OT:   HEP:   TENS:  Injections:               06/04/2024 right L3-4 and L4-5 TF % relief              06/25/2019 - right L3-4 and L4-5 TFESI with 85% improvement in pain  05/26/20 - RIGHT L3-4 and L4-5 TFESI on  with 100% relief  Surgery: none  Medications:   - NSAIDS:   - MSK Relaxants: Flexeril  - TCAs:   - SNRIs:   - Topicals:   - Opioids:   - Anticonvulsants:     Current Pain Medications  None at this time     Full Medication List    Current Outpatient Medications:     acyclovir (ZOVIRAX) 400 MG tablet, TAKE 1 TABLET BY MOUTH THREE TIMES DAILY FOR 5 DAYS AT SIGN OF FEVER BLISTER, Disp: 90 tablet, Rfl: 0    azelastine (ASTELIN) 137 mcg (0.1 %) nasal spray, 1 spray (137 mcg total) by Nasal route 2 (two) times daily., Disp: 30 mL, Rfl: 3    b complex vitamins tablet, Take 1 tablet by mouth once daily., Disp: , Rfl:      Bifidobacterium infantis (ALIGN) 4 mg Cap, Take 4 mg by mouth once daily. , Disp: , Rfl:     cetirizine (ZYRTEC) 10 MG tablet, Take 10 mg by mouth once daily., Disp: , Rfl:     chlorthalidone (HYGROTEN) 25 MG Tab, Take 1 tablet (25 mg total) by mouth once daily. (Patient not taking: Reported on 3/14/2024), Disp: 30 tablet, Rfl: 11    citalopram (CELEXA) 20 MG tablet, Take 1 tablet (20 mg total) by mouth once daily., Disp: 90 tablet, Rfl: 3    cyclobenzaprine (FLEXERIL) 10 MG tablet, TAKE 1 TABLET THREE TIMES DAILY AS NEEDED, Disp: 30 tablet, Rfl: 10    finasteride (PROSCAR) 5 mg tablet, Take 1 tablet (5 mg total) by mouth once daily., Disp: 90 tablet, Rfl: 3    fish oil-omega-3 fatty acids 300-1,000 mg capsule, Take 1 g by mouth once daily., Disp: , Rfl:     gemfibroziL (LOPID) 600 MG tablet, Take 1 tablet (600 mg total) by mouth 2 (two) times daily., Disp: 180 tablet, Rfl: 3    irbesartan (AVAPRO) 75 MG tablet, Take 1 tablet (75 mg total) by mouth every evening., Disp: 90 tablet, Rfl: 3    mv-mn/iron/folic acid/herb 190 (VITAMIN D3 COMPLETE ORAL), Take 1 tablet by mouth once daily., Disp: , Rfl:     rivaroxaban (XARELTO) 20 mg Tab, Take 1 tablet (20 mg total) by mouth once daily., Disp: 90 tablet, Rfl: 0    tamsulosin (FLOMAX) 0.4 mg Cap, Take 1 capsule (0.4 mg total) by mouth once daily., Disp: 90 capsule, Rfl: 3    timolol maleate 0.5% (TIMOPTIC) 0.5 % Drop, Place 1 drop into both eyes 2 (two) times daily., Disp: 15 mL, Rfl: 3    vitamin E 1000 UNIT capsule, Take 2,000 Units by mouth once daily., Disp: , Rfl:      Review of Systems  ROS    GENERAL:  No weight loss, malaise or fevers.  HEENT:   No recent changes in vision or hearing  NECK:  No difficulty with swallowing. No stridor.   RESPIRATORY:  Negative for cough, wheezing or shortness of breath, patient denies any recent URI.  CARDIOVASCULAR:  Negative for chest pain, leg swelling or palpitations.  GI:  Negative for abdominal discomfort, blood in stools  or black stools or change in bowel habits.  MUSCULOSKELETAL:  See HPI.  SKIN:  Negative for lesions, rash, and itching.  PSYCH:  No mood disorder or recent psychosocial stressors.    HEMATOLOGY/LYMPHOLOGY:  Negative for prolonged bleeding, bruising easily or swollen nodes.  Patient is taking xarelto.   NEURO:   No history of headaches, syncope, paralysis, seizures or tremors.  All other reviewed and negative other than HPI.      Medical History  Past Medical History:   Diagnosis Date    Allergic rhinitis due to animal (cat) (dog) hair and dander 3/20/2014    Allergy     Anemia     Anxiety     Basal cell carcinoma     BPH (benign prostatic hypertrophy)     Family history of colon cancer 3/20/2014    Family history of ischemic heart disease 3/20/2014    Fever blister     HLD (hyperlipidemia)     HTN (hypertension)     IFG (impaired fasting glucose)     Lumbar spondylosis 6/7/2019    Pain of right hip joint     Primary open angle glaucoma     Squamous cell carcinoma of skin     Status post total knee replacement 3/20/2014        Surgical History  Past Surgical History:   Procedure Laterality Date    CATARACT EXTRACTION W/  INTRAOCULAR LENS IMPLANT Left 02/08/2017    WITH KAHOOK GONIOTOMY (DR. WHITLEY)    CATARACT EXTRACTION W/  INTRAOCULAR LENS IMPLANT Right 05/10/2017        COLONOSCOPY N/A 02/08/2023    Procedure: COLONOSCOPY;  Surgeon: Geovanny Charles MD;  Location: [facility] ENDO;  Service: Endoscopy;  Laterality: N/A;    EYE SURGERY      HERNIA REPAIR      x 2    INJECTION, SPINE, LUMBOSACRAL, TRANSFORAMINAL APPROACH Right 6/4/2024    Procedure: RT L3-4 and L4-5 TFESI;  Surgeon: Abida Chadwick DO;  Location: [facility];  Service: Pain Management;  Laterality: Right;  20 mins    JOINT REPLACEMENT Right     right knee replacement    SKIN BIOPSY      in my [facility] record    TONSILLECTOMY      TOTAL KNEE ARTHROPLASTY Right     TRANSFORAMINAL EPIDURAL INJECTION OF STEROID Right 06/25/2019     "Procedure: Transforaminal Epidural Steroid Injection, Right, L3-4, L4-5;  Surgeon: Eduard Mukherjee Jr., MD;  Location: [facility];  Service: Pain Management;  Laterality: Right;    TRANSFORAMINAL EPIDURAL INJECTION OF STEROID Right 05/26/2020    Procedure: Injection,steroid,epidural,transforaminal approach--Right L3-4, L4-5;  Surgeon: Eduard Mukherjee Jr., MD;  Location: [facility];  Service: Pain Management;  Laterality: Right;    YAG CAPSULOTOMY Left 04/04/2024        YAG CAPSULOTOMY\ Right 01/16/2020            Physical Exam  Vitals:    06/17/24 1431   BP: (!) 156/65   Pulse: (!) 59   Weight: 122 kg (268 lb 15.4 oz)   Height: 5' 11" (1.803 m)   PainSc:   1         Ortho/SPM Exam    PHYSICAL EXAMINATION:    GENERAL: Well appearing, in no acute distress, alert and oriented x3.  PSYCH:  Mood and affect appropriate.  SKIN: Skin color, texture, turgor normal, no rashes or lesions.  HEAD/FACE:  Normocephalic, atraumatic. Cranial nerves grossly intact.  NECK: No pain to palpation over the cervical paraspinous muscles. Spurling Negative. No pain with neck flexion, extension, or lateral flexion.   CV: RRR with palpation of the radial artery.  PULM: No evidence of respiratory difficulty, symmetric chest rise.  GI:  Soft and non-distended.  MSK:   Straight leg raise is negative bilaterally. There is normal ROM of the lumbar spine and b/l LE. Normal range of motion of the hips with sitting and standing.  There is No tenderness over the lumbar axial spine or paraspinal musculature. There is no pain over the SIJ.  There is mild tenderness over the right greater trochanteric bursa with palpation.  Gait is normal. Strength in b/l LE is normal.   MENTAL STATUS: A x O x 3, good concentration, speech is fluent and goal directed  CN: CN II - XII grossly intact   MOTOR: 5/5 in all muscle groups      Imaging      EXAMINATION:  XR HIP 2 VIEW RIGHT  Date:                                            " 04/18/2019     CLINICAL HISTORY:  Pain in right hip     TECHNIQUE:  AP view of the pelvis and frog leg lateral view of the right hip were performed.     COMPARISON:  11/03/2016     FINDINGS:  Two views of the right hip demonstrate narrowing of the hip joint.  There is some remodeling of femoral head from chronic degenerative change.  Findings are similar to the prior study.      Labs:  BMP  Lab Results   Component Value Date     02/09/2024    K 4.1 02/09/2024     02/09/2024    CO2 25 02/09/2024    BUN 17 02/09/2024    CREATININE 0.8 02/09/2024    CALCIUM 9.2 02/09/2024    ANIONGAP 9 02/09/2024    EGFRNORACEVR >60 02/09/2024     Lab Results   Component Value Date    ALT 11 02/09/2024    AST 11 02/09/2024    ALKPHOS 58 02/09/2024    BILITOT 0.3 02/09/2024       Assessment:  Problem List Items Addressed This Visit    None        09/04/2019 - Edis JIN Huggins Jr. is a 75 y.o. male with chronic low back pain with right-sided radiculopathy which responded extremely well to a right L3-4 and L4-5 TFESI on 06/25/2019.  Patient experienced 85% relief.  Patient was instructed to contact our clinic when the pain relief against to wear off as we can schedule him directly for another injection given that he is already experienced 10 weeks of relief.     05/06/[id number]-rnfm-old male presents with chronic low back and right-sided radiculopathy, he is status post right L3-4 and L4-5 TF SEB with 85% relief for approximately 6 months of relief, he presents today requesting to repeat his injection, he informed me that his right low back and leg pain increased over the last few weeks, pain is worse when walking he reports no pain while sleeping or sitting,   previous imaging was reviewed and discussed with the patient today, recommended repeating a right L3/4 and L4/5 TFESI, pt agreed and understood.      06/02/[id number]-hnfi-old male presents status post right L3-4 and L4-5 TFESI reported 100% relief of his low back and  right leg pain.  Patient is very please with his injection and overall relief.  He reports having no pain while performing his ADLs, sleeping and or daily functioning.  Recommended he continue his home exercise plan that involves stretching and muscular strengthening to help with lumbar stabilization, patient agreed understood.    02/23/2023 - Edis Huggins Jr. is a 79 y.o. male with right hip pain consistent with right greater trochanteric bursitis.  I recommend OTC NSAIDs, Topicals, and a HEP.  We may consider a GTB injection if pain worsens or persists.     05/13/2024 -   Patient presents today with  return of right-sided radicular symptoms.  In the past his pain has responded well to right L3/4 and L4/5 TFESI  and he would like to repeat the injections.   Of note, the patient has recently started on blood thinners for Afib.    06/17/[id number]-mygg-old male with a history of chronic low back and right radiculopathy pain is related to degenerative disc disease, facet arthropathy and neural foraminal and central canal stenosis.  He was recently provided a right L3-4 and L4-5 TF SEB which provided him excellent relief at 100%.  Recommended continue his home exercise plan that involves 1-2 miles a steps daily to help continue his functionality.    Treatment Plan:   Procedures:   None at this time.    PT/OT/HEP: I have stressed the importance of physical activity and a home exercise plan to help with pain and improve health.   Continue with home exercise regimen as tolerated.  Medications:    -  no new medications were prescribed at this visit   -  Reviewed and consistent with medication use as prescribed.  Imaging:   Reviewed.  Follow Up:  3-4 months or sooner if needed.      Ayden Samayoa, AFSHIN-C  [facility]]    Disclaimer: This note was partly generated using dictation software which may occasionally result in transcription errors.

## 2024-06-14 NOTE — PROGRESS NOTES
"Subjective:   06/14/2024     Patient ID:  Edis Huggins Jr. is a 79 y.o. male who presents for evaulation of No chief complaint on file.      Patient with a history of very rare episodes of atrial fibrillation comes in doing well with no chest pains or tightness, no PND no orthopnea no palpitations.    He does have hypertension, currently well controlled on fairly low-dose medications.      Hypertriglyceridemia is present, LDL has generally not been elevated.  Triglycerides well controlled on gemfibrozil.      Patient anticoagulated, no bleeding problems.    Prior notes reviewed:  78 y/o male with hx of HTN, HLD, obesity, PAfib on DOAC (CHADSVasc 2 for HTN, age), CARMELA on CPAP, who presents for f/u.   Has had extensive w/u for RESENDEZ including normal nuclear SPECT, normal 30 day monitor, normal PFT's. Did have a previous Holter monitor which showed a single run of Paroxysmal atrial fibrillation lasting for 13 sec. Had bilateral LE edema which has improved with chlorthalidone.   Had [employer] device for detection of afib, felt symptoms which woke him from sleep, checked device, and found to be in afib (strips available in media section). Resolved shortly after symptoms. Ne recurrence. Started on BB and DOAC. Currently on CPAP.   Has chronic, mild, unchanged RESENDEZ with moderate activity.   Following with digital HTN clinic and changes made to regimen and BP appears to be controlled.  Has COvid in January 2022 and has had lingering fatigue and SOB.   Denies CP, orthopnea, PND, syncope, palps. Compliant with meds and follows a low salt diet.      1/24/2023:  December 22 had a syncopal episode with prodrome after getting up and walking at a restaurant. Has been having issues with BP "being low." On review of digital HTN notes and per pt, BP has been in the low 100's-115's. BB, amlodipine stopped and currently just on irbesartan and chlorthalidone. Has pre-syncopal episodes, usually with positional changes, 1-2 times per week. "      2/23/2023:  Last visit decreased chlorthalidone to 25 mg daily and D/C'd BB. Symptoms improved, but still has intermittent dizziness. Feeling better overall. -115's. 2DE normal and Holter with PAC's.     3/23/2023:  Last visit decreased irbesartan to 75 mg daily and symptoms have significantly improved. Had only 1 episode. Uses chlorthalidone PRN.     Assessment:  1. Essential hypertension   2. Paroxysmal atrial fibrillation   3. Mixed hyperlipidemia   4. Carotid atherosclerosis, unspecified laterality   5. Aortic atherosclerosis -- CTA 06/2018   6. Chronic anticoagulation   7. Morbid obesity with BMI of 40.0-44.9, adult   8. CARMELA on CPAP      78 y/o pt with hx and presentation as above. Doing well from a cardiac perspective and compensated from a HF perspective. Bp controlled currently. Needs to be more active and lose weight. Discussed the etiology, evaluation, and management of Afib, CVA, DOAC, HTN, obesity, CARMELA. Discussed the importance of med compliance, heart healthy diet, and regular exercise.     Plan:      -Continue current medical management  -f/u virtual visit in 6 months          Past Medical History:   Diagnosis Date    Allergic rhinitis due to animal (cat) (dog) hair and dander 3/20/2014    Allergy     Anemia     Anxiety     Basal cell carcinoma     BPH (benign prostatic hypertrophy)     Family history of colon cancer 3/20/2014    Family history of ischemic heart disease 3/20/2014    Fever blister     HLD (hyperlipidemia)     HTN (hypertension)     IFG (impaired fasting glucose)     Lumbar spondylosis 6/7/2019    Pain of right hip joint     Primary open angle glaucoma     Squamous cell carcinoma of skin     Status post total knee replacement 3/20/2014       Review of patient's allergies indicates:  No Known Allergies      Current Outpatient Medications:     acyclovir (ZOVIRAX) 400 MG tablet, TAKE 1 TABLET BY MOUTH THREE TIMES DAILY FOR 5 DAYS AT SIGN OF FEVER BLISTER, Disp: 90 tablet, Rfl:  0    azelastine (ASTELIN) 137 mcg (0.1 %) nasal spray, 1 spray (137 mcg total) by Nasal route 2 (two) times daily., Disp: 30 mL, Rfl: 3    b complex vitamins tablet, Take 1 tablet by mouth once daily., Disp: , Rfl:     Bifidobacterium infantis (ALIGN) 4 mg Cap, Take 4 mg by mouth once daily. , Disp: , Rfl:     cetirizine (ZYRTEC) 10 MG tablet, Take 10 mg by mouth once daily., Disp: , Rfl:     citalopram (CELEXA) 20 MG tablet, Take 1 tablet (20 mg total) by mouth once daily., Disp: 90 tablet, Rfl: 3    cyclobenzaprine (FLEXERIL) 10 MG tablet, TAKE 1 TABLET THREE TIMES DAILY AS NEEDED, Disp: 30 tablet, Rfl: 10    finasteride (PROSCAR) 5 mg tablet, Take 1 tablet (5 mg total) by mouth once daily., Disp: 90 tablet, Rfl: 3    fish oil-omega-3 fatty acids 300-1,000 mg capsule, Take 1 g by mouth once daily., Disp: , Rfl:     gemfibroziL (LOPID) 600 MG tablet, Take 1 tablet (600 mg total) by mouth 2 (two) times daily., Disp: 180 tablet, Rfl: 3    irbesartan (AVAPRO) 75 MG tablet, Take 1 tablet (75 mg total) by mouth every evening., Disp: 90 tablet, Rfl: 3    mv-mn/iron/folic acid/herb 190 (VITAMIN D3 COMPLETE ORAL), Take 1 tablet by mouth once daily., Disp: , Rfl:     rivaroxaban (XARELTO) 20 mg Tab, Take 1 tablet (20 mg total) by mouth once daily., Disp: 90 tablet, Rfl: 0    tamsulosin (FLOMAX) 0.4 mg Cap, Take 1 capsule (0.4 mg total) by mouth once daily., Disp: 90 capsule, Rfl: 3    timolol maleate 0.5% (TIMOPTIC) 0.5 % Drop, Place 1 drop into both eyes 2 (two) times daily., Disp: 15 mL, Rfl: 3    vitamin E 1000 UNIT capsule, Take 2,000 Units by mouth once daily., Disp: , Rfl:     chlorthalidone (HYGROTEN) 25 MG Tab, Take 1 tablet (25 mg total) by mouth daily as needed (swelling)., Disp: , Rfl:      Objective:   Review of Systems   Cardiovascular:  Negative for chest pain, claudication, cyanosis, dyspnea on exertion, irregular heartbeat, leg swelling, near-syncope, orthopnea, palpitations, paroxysmal nocturnal dyspnea and  syncope.         Vitals:    06/14/24 1139   BP: 117/69   Pulse: 71     Wt Readings from Last 3 Encounters:   06/14/24 122 kg (268 lb 15.4 oz)   06/04/24 120.7 kg (266 lb)   05/13/24 120.7 kg (266 lb 1.5 oz)     Temp Readings from Last 3 Encounters:   06/04/24 97.9 °F (36.6 °C) (Oral)   04/01/24 98.4 °F (36.9 °C)   03/25/24 99 °F (37.2 °C)     BP Readings from Last 3 Encounters:   06/14/24 117/69   06/04/24 131/63   05/13/24 138/69     Pulse Readings from Last 3 Encounters:   06/14/24 71   06/04/24 60   05/13/24 73             Physical Exam  Vitals reviewed.   Constitutional:       General: He is not in acute distress.     Appearance: He is well-developed.   HENT:      Head: Normocephalic and atraumatic.      Nose: Nose normal.   Eyes:      Conjunctiva/sclera: Conjunctivae normal.      Pupils: Pupils are equal, round, and reactive to light.   Neck:      Vascular: No carotid bruit or JVD.   Cardiovascular:      Rate and Rhythm: Normal rate and regular rhythm.      Pulses: Intact distal pulses.      Heart sounds: Normal heart sounds. No murmur heard.     No friction rub. No gallop.   Pulmonary:      Effort: Pulmonary effort is normal. No respiratory distress.      Breath sounds: Normal breath sounds. No wheezing or rales.   Chest:      Chest wall: No tenderness.   Abdominal:      General: Bowel sounds are normal. There is no distension.      Palpations: Abdomen is soft.      Tenderness: There is no abdominal tenderness.   Musculoskeletal:         General: No tenderness or deformity. Normal range of motion.      Cervical back: Normal range of motion and neck supple.      Right lower leg: No edema.      Left lower leg: No edema.   Skin:     General: Skin is warm and dry.      Findings: No erythema or rash.   Neurological:      Mental Status: He is alert and oriented to person, place, and time.      Cranial Nerves: No cranial nerve deficit.      Motor: No abnormal muscle tone.      Coordination: Coordination normal.    Psychiatric:         Behavior: Behavior normal.         Thought Content: Thought content normal.         Judgment: Judgment normal.           Lab Results   Component Value Date    CHOL 117 (L) 02/09/2024    CHOL 120 08/11/2023    CHOL 132 07/05/2022     Lab Results   Component Value Date    HDL 29 (L) 02/09/2024    HDL 30 (L) 08/11/2023    HDL 32 (L) 07/05/2022     Lab Results   Component Value Date    LDLCALC 70.2 02/09/2024    LDLCALC 59.8 (L) 08/11/2023    LDLCALC 66.0 07/05/2022     Lab Results   Component Value Date    ALT 11 02/09/2024    AST 11 02/09/2024    AST 15 08/11/2023    AST 16 04/14/2023     Lab Results   Component Value Date    CREATININE 0.8 02/09/2024    BUN 17 02/09/2024     02/09/2024    K 4.1 02/09/2024    CO2 25 02/09/2024    CO2 21 (L) 08/11/2023    CO2 20 (L) 04/14/2023     Lab Results   Component Value Date    HGB 11.8 (L) 02/09/2024    HCT 35.7 (L) 02/09/2024    HCT 36.0 (L) 08/11/2023    HCT 38.2 (L) 04/14/2023                         Assessment and Plan:     Paroxysmal atrial fibrillation  Comments:  Continue thromboprophylaxis    Mixed hyperlipidemia  Comments:  Appears well controlled.    Essential hypertension  Comments:  Well controlled on current meds  Orders:  -     chlorthalidone (HYGROTEN) 25 MG Tab; Take 1 tablet (25 mg total) by mouth daily as needed (swelling).    Dyspnea on exertion  Comments:  Stable    Carotid artery disease, unspecified laterality  Comments:  Diagnosis in chart, no significant stenoses present on prior evaluation in 2014    Aortic atherosclerosis -- CTA 06/2018         Follow up in about 1 year (around 6/14/2025).          Future Appointments   Date Time Provider Department Center   6/17/2024  2:40 PM Ayden Samayoa FNP [facility] PAINMGT [facility]   6/19/2024 11:00 AM LAB, SPECIMEN [facility]   6/21/2024  2:00 PM Meagan Hopkins, NP [facility] HEM ONC [facility]   8/20/2024  1:00 PM Deborah Khan MD [facility] OPHTHAL [facility]

## 2024-06-17 ENCOUNTER — LAB VISIT (OUTPATIENT)
Dept: LAB | Facility: HOSPITAL | Age: 79
End: 2024-06-17
Attending: NURSE PRACTITIONER
Payer: MEDICARE

## 2024-06-17 ENCOUNTER — OFFICE VISIT (OUTPATIENT)
Dept: PAIN MEDICINE | Facility: CLINIC | Age: 79
End: 2024-06-17
Payer: MEDICARE

## 2024-06-17 VITALS
BODY MASS INDEX: 37.65 KG/M2 | HEART RATE: 59 BPM | WEIGHT: 268.94 LBS | DIASTOLIC BLOOD PRESSURE: 65 MMHG | HEIGHT: 71 IN | SYSTOLIC BLOOD PRESSURE: 156 MMHG

## 2024-06-17 DIAGNOSIS — M48.061 NEUROFORAMINAL STENOSIS OF LUMBAR SPINE: ICD-10-CM

## 2024-06-17 DIAGNOSIS — M54.16 LUMBAR RADICULOPATHY: Primary | ICD-10-CM

## 2024-06-17 DIAGNOSIS — D69.6 THROMBOCYTOPENIA: ICD-10-CM

## 2024-06-17 DIAGNOSIS — G89.4 CHRONIC PAIN SYNDROME: ICD-10-CM

## 2024-06-17 DIAGNOSIS — M54.41 CHRONIC BILATERAL LOW BACK PAIN WITH RIGHT-SIDED SCIATICA: ICD-10-CM

## 2024-06-17 DIAGNOSIS — D64.9 NORMOCYTIC ANEMIA: ICD-10-CM

## 2024-06-17 DIAGNOSIS — Z79.01 CHRONIC ANTICOAGULATION: ICD-10-CM

## 2024-06-17 DIAGNOSIS — G89.29 CHRONIC BILATERAL LOW BACK PAIN WITH RIGHT-SIDED SCIATICA: ICD-10-CM

## 2024-06-17 DIAGNOSIS — M51.36 DDD (DEGENERATIVE DISC DISEASE), LUMBAR: ICD-10-CM

## 2024-06-17 LAB
ALBUMIN SERPL BCP-MCNC: 3.8 G/DL (ref 3.5–5.2)
ALP SERPL-CCNC: 49 U/L (ref 55–135)
ALT SERPL W/O P-5'-P-CCNC: 9 U/L (ref 10–44)
ANION GAP SERPL CALC-SCNC: 10 MMOL/L (ref 8–16)
AST SERPL-CCNC: 13 U/L (ref 10–40)
BASOPHILS # BLD AUTO: 0.04 K/UL (ref 0–0.2)
BASOPHILS NFR BLD: 0.8 % (ref 0–1.9)
BILIRUB SERPL-MCNC: 0.5 MG/DL (ref 0.1–1)
BUN SERPL-MCNC: 20 MG/DL (ref 8–23)
CALCIUM SERPL-MCNC: 9.8 MG/DL (ref 8.7–10.5)
CHLORIDE SERPL-SCNC: 109 MMOL/L (ref 95–110)
CO2 SERPL-SCNC: 22 MMOL/L (ref 23–29)
CREAT SERPL-MCNC: 0.9 MG/DL (ref 0.5–1.4)
DIFFERENTIAL METHOD BLD: ABNORMAL
EOSINOPHIL # BLD AUTO: 0.2 K/UL (ref 0–0.5)
EOSINOPHIL NFR BLD: 3.9 % (ref 0–8)
ERYTHROCYTE [DISTWIDTH] IN BLOOD BY AUTOMATED COUNT: 13.1 % (ref 11.5–14.5)
EST. GFR  (NO RACE VARIABLE): >60 ML/MIN/1.73 M^2
FERRITIN SERPL-MCNC: 222 NG/ML (ref 20–300)
FOLATE SERPL-MCNC: 13.8 NG/ML (ref 4–24)
GLUCOSE SERPL-MCNC: 127 MG/DL (ref 70–110)
HCT VFR BLD AUTO: 38.1 % (ref 40–54)
HGB BLD-MCNC: 12.8 G/DL (ref 14–18)
IMM GRANULOCYTES # BLD AUTO: 0.01 K/UL (ref 0–0.04)
IMM GRANULOCYTES NFR BLD AUTO: 0.2 % (ref 0–0.5)
IRON SERPL-MCNC: 116 UG/DL (ref 45–160)
LYMPHOCYTES # BLD AUTO: 2.2 K/UL (ref 1–4.8)
LYMPHOCYTES NFR BLD: 42.3 % (ref 18–48)
MCH RBC QN AUTO: 32 PG (ref 27–31)
MCHC RBC AUTO-ENTMCNC: 33.6 G/DL (ref 32–36)
MCV RBC AUTO: 95 FL (ref 82–98)
MONOCYTES # BLD AUTO: 0.4 K/UL (ref 0.3–1)
MONOCYTES NFR BLD: 7 % (ref 4–15)
NEUTROPHILS # BLD AUTO: 2.4 K/UL (ref 1.8–7.7)
NEUTROPHILS NFR BLD: 45.8 % (ref 38–73)
NRBC BLD-RTO: 0 /100 WBC
PLATELET # BLD AUTO: 183 K/UL (ref 150–450)
PMV BLD AUTO: 11.5 FL (ref 9.2–12.9)
POTASSIUM SERPL-SCNC: 4 MMOL/L (ref 3.5–5.1)
PROT SERPL-MCNC: 7.2 G/DL (ref 6–8.4)
RBC # BLD AUTO: 4 M/UL (ref 4.6–6.2)
SATURATED IRON: 30 % (ref 20–50)
SODIUM SERPL-SCNC: 141 MMOL/L (ref 136–145)
TOTAL IRON BINDING CAPACITY: 385 UG/DL (ref 250–450)
TRANSFERRIN SERPL-MCNC: 260 MG/DL (ref 200–375)
VIT B12 SERPL-MCNC: 430 PG/ML (ref 210–950)
WBC # BLD AUTO: 5.13 K/UL (ref 3.9–12.7)

## 2024-06-17 PROCEDURE — 82728 ASSAY OF FERRITIN: CPT | Mod: HCNC | Performed by: NURSE PRACTITIONER

## 2024-06-17 PROCEDURE — 82746 ASSAY OF FOLIC ACID SERUM: CPT | Mod: HCNC | Performed by: NURSE PRACTITIONER

## 2024-06-17 PROCEDURE — 36415 COLL VENOUS BLD VENIPUNCTURE: CPT | Mod: HCNC | Performed by: NURSE PRACTITIONER

## 2024-06-17 PROCEDURE — 83540 ASSAY OF IRON: CPT | Mod: HCNC | Performed by: NURSE PRACTITIONER

## 2024-06-17 PROCEDURE — 82607 VITAMIN B-12: CPT | Mod: HCNC | Performed by: NURSE PRACTITIONER

## 2024-06-17 PROCEDURE — 80053 COMPREHEN METABOLIC PANEL: CPT | Mod: HCNC | Performed by: NURSE PRACTITIONER

## 2024-06-17 PROCEDURE — 99999 PR PBB SHADOW E&M-EST. PATIENT-LVL IV: CPT | Mod: PBBFAC,HCNC,, | Performed by: NURSE PRACTITIONER

## 2024-06-17 PROCEDURE — 85025 COMPLETE CBC W/AUTO DIFF WBC: CPT | Mod: HCNC | Performed by: NURSE PRACTITIONER

## 2024-06-21 ENCOUNTER — OFFICE VISIT (OUTPATIENT)
Dept: HEMATOLOGY/ONCOLOGY | Facility: CLINIC | Age: 79
End: 2024-06-21
Payer: MEDICARE

## 2024-06-21 VITALS
TEMPERATURE: 99 F | OXYGEN SATURATION: 97 % | HEIGHT: 71 IN | HEART RATE: 64 BPM | DIASTOLIC BLOOD PRESSURE: 69 MMHG | RESPIRATION RATE: 20 BRPM | WEIGHT: 270.06 LBS | SYSTOLIC BLOOD PRESSURE: 135 MMHG | BODY MASS INDEX: 37.81 KG/M2

## 2024-06-21 DIAGNOSIS — E53.8 B12 DEFICIENCY: ICD-10-CM

## 2024-06-21 DIAGNOSIS — Z86.79 HISTORY OF ATRIAL FIBRILLATION: ICD-10-CM

## 2024-06-21 DIAGNOSIS — I10 ESSENTIAL HYPERTENSION: ICD-10-CM

## 2024-06-21 DIAGNOSIS — D53.9 MACROCYTIC ANEMIA: ICD-10-CM

## 2024-06-21 DIAGNOSIS — Z79.01 CHRONIC ANTICOAGULATION: ICD-10-CM

## 2024-06-21 DIAGNOSIS — I70.0 AORTIC ATHEROSCLEROSIS: ICD-10-CM

## 2024-06-21 DIAGNOSIS — D50.8 IRON DEFICIENCY ANEMIA SECONDARY TO INADEQUATE DIETARY IRON INTAKE: Primary | ICD-10-CM

## 2024-06-21 DIAGNOSIS — D69.6 THROMBOCYTOPENIA: ICD-10-CM

## 2024-06-21 DIAGNOSIS — R76.8 HEPATITIS B CORE ANTIBODY POSITIVE: ICD-10-CM

## 2024-06-21 DIAGNOSIS — E66.01 SEVERE OBESITY (BMI 35.0-39.9) WITH COMORBIDITY: ICD-10-CM

## 2024-06-21 PROCEDURE — 99999 PR PBB SHADOW E&M-EST. PATIENT-LVL IV: CPT | Mod: PBBFAC,HCNC,, | Performed by: NURSE PRACTITIONER

## 2024-06-21 RX ORDER — IRON SUCROSE 20 MG/ML
INJECTION, SOLUTION INTRAVENOUS
COMMUNITY
Start: 2024-04-01

## 2024-06-21 NOTE — PROGRESS NOTES
"Subjective:       Patient ID: Edis Huggins Jr. is a 79 y.o. male.    Chief Complaint:  Anemia      Anemia  There has been no abdominal pain, bruising/bleeding easily, fever, light-headedness or palpitations.       HPI as per DR CARRASQUILLO's 6/16/22 office visit, reviewed, verified history with pt    He is here for f/u of normocytic anemia.    Hemoglobin 3/14/18 was 11.8.  WBC and platelets within normal limits.  Nutritional studies unremarkable.    He gets dyspnea when he walks.      None at rest.  Weight 229 pounds.  Height 5' 11"    Trying to lose weight.    No longer using CPAP for obstructive sleep apnea    On Xarelto, tolerating it well    INTERVAL  - pt is here for follow up on anemia 6/21/24  - received iron sucrose x4 Mar/Apr 2024, tolerated well  - bp is improved on monotherapy and dosing reduction; dizziness resolved.   - reports fatigue, cold intolerance resolved, he has some mild resendez intermittently, improved  - he denies headaches, chest pain, palpitations, abdominal pain, n/v/d, constipation, hemoptysis, hematemesis, melena, hematuria, extremity swelling.   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   Respiratory:  Positive for shortness of breath (RESENDEZ, mild intermittent).    Cardiovascular:  Negative for chest pain, palpitations and leg swelling.   Gastrointestinal:  Negative for abdominal pain, blood in stool, constipation, diarrhea, nausea and vomiting.   Genitourinary:  Negative for hematuria.   Musculoskeletal:  Negative for arthralgias.   Skin:  Negative for wound.   Neurological:  Negative for dizziness, facial asymmetry, weakness, light-headedness and headaches.   Hematological:  Negative for adenopathy. Does not bruise/bleed easily.   Psychiatric/Behavioral:  Negative for decreased concentration. " "         Objective:      Vitals:    06/21/24 1403   BP: 135/69   BP Location: Left arm   Patient Position: Sitting   BP Method: Medium (Automatic)   Pulse: 64   Resp: 20   Temp: 98.8 °F (37.1 °C)   TempSrc: Oral   SpO2: 97%   Weight: 122.5 kg (270 lb 1 oz)   Height: 5' 11" (1.803 m)             BMI: Body mass index is 37.67 kg/m².    Physical Exam  Vitals and nursing note reviewed.   Constitutional:       General: He is not in acute distress.     Appearance: He is obese. He is not ill-appearing, toxic-appearing or diaphoretic.   HENT:      Head: Normocephalic and atraumatic.   Eyes:      Conjunctiva/sclera: Conjunctivae normal.   Cardiovascular:      Rate and Rhythm: Normal rate and regular rhythm.      Heart sounds: Normal heart sounds. No murmur heard.  Pulmonary:      Effort: Pulmonary effort is normal. No respiratory distress.      Breath sounds: Normal breath sounds.      Comments: Talkative with no sob/oleary  Abdominal:      General: Bowel sounds are normal. There is no distension.      Palpations: Abdomen is soft.      Tenderness: There is no abdominal tenderness. There is no guarding.   Musculoskeletal:      Cervical back: Normal range of motion and neck supple. No rigidity.   Neurological:      Mental Status: He is alert and oriented to person, place, and time. Mental status is at baseline.      Motor: No weakness.      Gait: Gait normal.   Psychiatric:         Mood and Affect: Mood normal.         Behavior: Behavior normal.         Thought Content: Thought content normal.            Assessment:       1. Iron deficiency anemia secondary to inadequate dietary iron intake    2. Macrocytic anemia    3. B12 deficiency    4. Thrombocytopenia    5. Hepatitis B core antibody positive    6. Chronic anticoagulation    7. History of atrial fibrillation    8. Severe obesity (BMI 35.0-39.9) with obstructive sleep apnea    9. Essential hypertension    10. Aortic atherosclerosis                Plan:   Normocytic anemia, " iron deficiency, b12 deficiency  -reviewed recent and prior labs  -likely etiology chronic disease  -hemoglobin stable 12.6/38.2 (4/14/23), mild macrocytic evidence, iron levels stable, normal B12 and folate  -labs 8/11/23 stable, hgb 12gdL, mild macrocytic evidence, b12 low normal, folate normal, iron studies normal, ADRI currently resolved  - labs 2/9/24 slow decrease in hgb to 11.8 g/dL, iron sat decrease to 16%, ferritin stable 83 ng/mL, folate and b12 normal   - he is hesitant to take oral iron with history of constipation  - will benefit from iv iron, educated on infusions, potential side effects; orders placed  - continue B complex daily  - educated on iron rich foods coupled with vit C to enhance absorption, he has decreased red meats with current dietary habits  - received iron sucrose x4 Mar/Apr 2024  - 6/17/24 hgb improved 12.8 g/dL, normocytic, iron sat improved to 30%, ferritin normal/increased to 222 ng/mL, no iron deficiency at present; folate and b12 are normal  -will continue to monitor, rtc 6 month with labs; reviewed s/s or conditions that could exacerbate anemia and to call sooner any symptoms or concerns    Thrombocytopenia  -he did have thrombocytopenia which resolved spontaneously  -normal since 12/13/21 labs, currently 183,000 (6/17/24)  -continue to monitor    Hepatitis-B core antibody positive  -he had previous exposure to hepatitis B and is immune    History of atrial fibrillation  -on Xarelto, tolerating well with no bleeding side effects  -management deferred to Cardiology, Dr. Bello    Severe obesity with obstructive sleep apnea, fatigue  -Body mass index is 37.67 kg/m².  -fatigue could be related to untreated SHA as well  -healthy lifestyle changes encouraged, discussed benefit on general health as well as sleep apnea, energy  -on weight watchers now with 13# weight loss, congratulated  -continue to monitor, sha management deferred to sleep medicine    Hypertension  -elevated bp today  systolic 150/67, did not yet take med today  -continue with bps at home using [facility] home [facility]N program  -management deferred to cardiology    Aortic atherosclerosis  - noted on CTA 6/2018, no updated imaging, chronic  - lipid panel 2/9/24 improved  - taking lopid  - management deferred to pcp    F/u 6 month, sooner any questions or concerns    Total time of this visit, including time spent face to face with patient and/or via video/audio, and also in preparing for today's visit for MDM and documentation. (Medical Decision Making, including consideration of possible diagnoses, management options, complex medical record review, review of diagnostic tests and information, consideration and discussion of significant complications based on comorbidities, and discussion with providers involved with the care of the patient) 25 minutes. Greater than 50% was spent face to face with the patient counseling and coordinating care.          Meagan Hopkins, NP-C  [facility]  Hematology/Oncology  [address]  [place]  [address]  [phone]

## 2024-06-25 DIAGNOSIS — F41.9 ANXIETY: ICD-10-CM

## 2024-06-25 DIAGNOSIS — H40.1122 PRIMARY OPEN ANGLE GLAUCOMA OF LEFT EYE, MODERATE STAGE: ICD-10-CM

## 2024-06-25 DIAGNOSIS — N40.0 BENIGN NON-NODULAR PROSTATIC HYPERPLASIA WITHOUT LOWER URINARY TRACT SYMPTOMS: ICD-10-CM

## 2024-06-25 DIAGNOSIS — H40.1111 PRIMARY OPEN-ANGLE GLAUCOMA, RIGHT EYE, MILD STAGE: ICD-10-CM

## 2024-06-25 DIAGNOSIS — I10 ESSENTIAL HYPERTENSION: ICD-10-CM

## 2024-06-25 DIAGNOSIS — E78.1 HYPERTRIGLYCERIDEMIA: ICD-10-CM

## 2024-06-25 RX ORDER — TIMOLOL MALEATE 5 MG/ML
1 SOLUTION/ DROPS OPHTHALMIC 2 TIMES DAILY
Qty: 25 ML | Refills: 3 | Status: SHIPPED | OUTPATIENT
Start: 2024-06-25

## 2024-06-25 RX ORDER — FINASTERIDE 5 MG/1
5 TABLET, FILM COATED ORAL
Qty: 90 TABLET | Refills: 0 | Status: SHIPPED | OUTPATIENT
Start: 2024-06-25

## 2024-06-25 RX ORDER — TAMSULOSIN HYDROCHLORIDE 0.4 MG/1
1 CAPSULE ORAL
Qty: 90 CAPSULE | Refills: 0 | Status: SHIPPED | OUTPATIENT
Start: 2024-06-25

## 2024-06-25 RX ORDER — CITALOPRAM 20 MG/1
20 TABLET, FILM COATED ORAL
Qty: 90 TABLET | Refills: 0 | Status: SHIPPED | OUTPATIENT
Start: 2024-06-25

## 2024-06-25 RX ORDER — IRBESARTAN 75 MG/1
75 TABLET ORAL NIGHTLY
Qty: 90 TABLET | Refills: 0 | Status: SHIPPED | OUTPATIENT
Start: 2024-06-25

## 2024-06-25 NOTE — TELEPHONE ENCOUNTER
No care due was identified.  [facility] Embedded Care Due Messages. Reference number: [id number].   6/25/2024 4:38:33 PM CDT

## 2024-06-25 NOTE — TELEPHONE ENCOUNTER
No care due was identified.  [facility] Embedded Care Due Messages. Reference number: [id number].   6/25/2024 5:27:13 PM CDT

## 2024-06-25 NOTE — TELEPHONE ENCOUNTER
Refill Decision Note   Edis Bhavna  is requesting a refill authorization.  Brief Assessment and Rationale for Refill:  Approve     Medication Therapy Plan:         Comments:     Note composed:4:59 PM 06/25/2024

## 2024-06-26 RX ORDER — CITALOPRAM 20 MG/1
20 TABLET, FILM COATED ORAL DAILY
Qty: 90 TABLET | Refills: 3 | OUTPATIENT
Start: 2024-06-26

## 2024-06-26 RX ORDER — TAMSULOSIN HYDROCHLORIDE 0.4 MG/1
1 CAPSULE ORAL DAILY
Qty: 90 CAPSULE | Refills: 3 | OUTPATIENT
Start: 2024-06-26

## 2024-06-26 RX ORDER — GEMFIBROZIL 600 MG/1
600 TABLET, FILM COATED ORAL 2 TIMES DAILY
Qty: 180 TABLET | Refills: 0 | Status: SHIPPED | OUTPATIENT
Start: 2024-06-26

## 2024-06-26 RX ORDER — FINASTERIDE 5 MG/1
5 TABLET, FILM COATED ORAL DAILY
Qty: 90 TABLET | Refills: 3 | OUTPATIENT
Start: 2024-06-26

## 2024-06-26 RX ORDER — IRBESARTAN 75 MG/1
75 TABLET ORAL NIGHTLY
Qty: 90 TABLET | Refills: 3 | OUTPATIENT
Start: 2024-06-26

## 2024-06-26 NOTE — TELEPHONE ENCOUNTER
Refill Routing Note   Medication(s) are not appropriate for processing by [facility] for the following reason(s):     DDI not previously overridden by current provider--after initial override, the Refill Center will be able to continue overrides      Drug-disease interaction: gemfibroziL and Iron deficiency anemia secondary to inadequate dietary iron intake; Normocytic anemia; Macrocytic anemia     [facility] action(s):  Quick Discontinue  Defer      Medication Therapy Plan: Receipt confirmed by pharmacy (6/25/2024  4:59 PM CDT)    Pharmacist review requested: Yes     Appointments  past 12m or future 3m with PCP    Date Provider   Last Visit   8/9/2023 Chris Her MD   Next Visit   Visit date not found Chris Her MD   ED visits in past 90 days: 0        Note composed:7:10 AM 06/26/2024

## 2024-06-26 NOTE — TELEPHONE ENCOUNTER
Refill Decision Note   Edis Huggins  is requesting a refill authorization.  Brief Assessment and Rationale for Refill:  Quick Discontinue  Approve     Medication Therapy Plan: Receipt confirmed by pharmacy (6/25/2024  4:59 PM CDT). H&H low but stable - approved Lopid.      Pharmacist review requested: Yes   Extended chart review required: Yes   Comments:     Note composed:7:17 AM 06/26/2024

## 2024-07-27 RX ORDER — RIVAROXABAN 20 MG/1
TABLET, FILM COATED ORAL
Qty: 90 TABLET | Refills: 0 | Status: SHIPPED | OUTPATIENT
Start: 2024-07-27

## 2024-08-02 ENCOUNTER — TELEPHONE (OUTPATIENT)
Dept: INTERNAL MEDICINE | Facility: CLINIC | Age: 79
End: 2024-08-02
Payer: MEDICARE

## 2024-08-02 NOTE — TELEPHONE ENCOUNTER
Called and requested an PA for Ibersartan. Medication approved. Case #[id number]. ID#[id number] for approval until 12/24.

## 2024-08-17 NOTE — PROGRESS NOTES
HPI     Glaucoma            Comments: 4 month IOP ck today and pt states no changes since last exam          Comments    DLS: 4/4/24    1) POAG   2) PCIOL OU   3) Steroid Responder   4) APD OS   5) PAS OS   6) Yag Cap OD     MEDS:  Timolol BID OU          Last edited by Lili Booker MA on 8/20/2024  1:10 PM.            Assessment /Plan     For exam results, see Encounter Report.    Primary open angle glaucoma of left eye, moderate stage    Primary open-angle glaucoma, right eye, mild stage    PCO (posterior capsular opacification), left    Steroid responders, bilateral    Pupil irregular of left eye    Relative afferent pupillary defect - Left Eye    Pseudophakia, both eyes    Status post YAG capsulotomy of both eyes        Lost to F/u from 11/4/2013 to 8/11/2015   Knows Sharlene Mo and MIKHAIL - use to work with them     1. POAG (primary open-angle glaucoma) - Both Eyes   Old pt of [facility]IA [id number]  Began at [facility] 2012       POAG OS>OD           Family history    neg        Glaucoma meds   T1/2 GFS ou (use to use travatan- off post CE)         H/O adverse rxn to glaucoma drops    none        LASERS    SLT os 9/26/2013 -( good resp 17 --> 14) // yag cap od - 1/2020 // yag cap os 4/4/2024         GLAUCOMA SURGERIES    trabectome os (combined with phaco - 2/8/2017        OTHER EYE SURGERIES    Combined - phaco/IOL / goniotomy - trabectome os 2/8/2017 - +heme and increae in IOP)                                                 PC IOL OD - 5/10/2017         CDR    0.6/0.7-0.8        Tbase    20-23/21-24          Tmax    23/24            Ttarget    17/17           HVF   6 tests 0293-6928 OD essentially full; OS Sup paracentral defect, IAD/INS (([place]))             (([facility])) - 8 test 2012 to 2024 - full od // sup paracentral defect / inferior paracentral defect         Gonio    +4 ou        CCT    598/598        OCT    5 test 2019 to 2024  - RNFL - nl od // bord G/TS/T         HRT    7 test-2012 to  "2020  - MR -  [place] off  od // dec T, alissa I os /// CDR xxx od // 0.64 os        Disc photos    2012 - IOS    - Ttoday  20/11 (up from 15/12- ?? 2nd to steroid injection to back for sciatical)   - Test done today  IOP check       2. Steroid responders - Both Eyes  2/2 nasacort      3. ? Trace APD os      4. S/P Rt knee replacement Feb 2013  -doing well      5.  Myopia / presbyopia ou     6. Irregular pupil - w/ PAS   -2/2 very high IOP POD # 1 post phaco     7.  PC IOL OU  OD 5/10/2017 - PCB00 14.5  OS 2/8/2017 - + hyphema and high IOP 2/2 trabectome - irregular pupil post op - PCB00 14.0    8. PCO od -  S/P yag cap od - 1/2020  S/P yag cap os 4/4/2024       Plan  CSM  IOP below target OU    Cont timolol gfs ou q day     If the glare from the fixed dilated pupil bothers him a lot post 2nd eye getting done - consider pupil surgery with dr Peacock to "purse string it " smaller - pt is doing ok for now - glare is not bothering him too much     YAG CAP OS - 4/4/2024 -  good recovery of vision - improved to 20/20     F/U 2- 3 months -  IOP check to see if od coming down as steroid effect wears off  (had steroid inj to back in June 2024) - IOP up to 20 od  // gonio       "

## 2024-08-20 ENCOUNTER — OFFICE VISIT (OUTPATIENT)
Dept: OPHTHALMOLOGY | Facility: CLINIC | Age: 79
End: 2024-08-20
Payer: MEDICARE

## 2024-08-20 DIAGNOSIS — H57.09 RELATIVE AFFERENT PUPILLARY DEFECT: ICD-10-CM

## 2024-08-20 DIAGNOSIS — H40.043 STEROID RESPONDERS, BILATERAL: ICD-10-CM

## 2024-08-20 DIAGNOSIS — H40.1111 PRIMARY OPEN-ANGLE GLAUCOMA, RIGHT EYE, MILD STAGE: ICD-10-CM

## 2024-08-20 DIAGNOSIS — Z96.1 PSEUDOPHAKIA, BOTH EYES: ICD-10-CM

## 2024-08-20 DIAGNOSIS — H21.562 PUPIL IRREGULAR OF LEFT EYE: ICD-10-CM

## 2024-08-20 DIAGNOSIS — H26.492 PCO (POSTERIOR CAPSULAR OPACIFICATION), LEFT: ICD-10-CM

## 2024-08-20 DIAGNOSIS — H40.1122 PRIMARY OPEN ANGLE GLAUCOMA OF LEFT EYE, MODERATE STAGE: Primary | ICD-10-CM

## 2024-08-20 DIAGNOSIS — Z98.890 STATUS POST YAG CAPSULOTOMY OF BOTH EYES: ICD-10-CM

## 2024-08-20 PROCEDURE — 1160F RVW MEDS BY RX/DR IN RCRD: CPT | Mod: HCNC,CPTII,S$GLB, | Performed by: OPHTHALMOLOGY

## 2024-08-20 PROCEDURE — 99214 OFFICE O/P EST MOD 30 MIN: CPT | Mod: HCNC,S$GLB,, | Performed by: OPHTHALMOLOGY

## 2024-08-20 PROCEDURE — 99999 PR PBB SHADOW E&M-EST. PATIENT-LVL III: CPT | Mod: PBBFAC,HCNC,, | Performed by: OPHTHALMOLOGY

## 2024-08-20 PROCEDURE — 1159F MED LIST DOCD IN RCRD: CPT | Mod: HCNC,CPTII,S$GLB, | Performed by: OPHTHALMOLOGY

## 2024-08-20 PROCEDURE — 1101F PT FALLS ASSESS-DOCD LE1/YR: CPT | Mod: HCNC,CPTII,S$GLB, | Performed by: OPHTHALMOLOGY

## 2024-08-20 PROCEDURE — 1126F AMNT PAIN NOTED NONE PRSNT: CPT | Mod: HCNC,CPTII,S$GLB, | Performed by: OPHTHALMOLOGY

## 2024-08-20 PROCEDURE — 3288F FALL RISK ASSESSMENT DOCD: CPT | Mod: HCNC,CPTII,S$GLB, | Performed by: OPHTHALMOLOGY

## 2024-08-20 PROCEDURE — 1157F ADVNC CARE PLAN IN RCRD: CPT | Mod: HCNC,CPTII,S$GLB, | Performed by: OPHTHALMOLOGY

## 2024-09-15 DIAGNOSIS — F41.9 ANXIETY: ICD-10-CM

## 2024-09-15 DIAGNOSIS — N40.0 BENIGN NON-NODULAR PROSTATIC HYPERPLASIA WITHOUT LOWER URINARY TRACT SYMPTOMS: ICD-10-CM

## 2024-09-15 RX ORDER — TAMSULOSIN HYDROCHLORIDE 0.4 MG/1
1 CAPSULE ORAL DAILY
Qty: 90 CAPSULE | Refills: 0 | Status: CANCELLED | OUTPATIENT
Start: 2024-09-15

## 2024-09-15 RX ORDER — FINASTERIDE 5 MG/1
5 TABLET, FILM COATED ORAL DAILY
Qty: 90 TABLET | Refills: 0 | Status: CANCELLED | OUTPATIENT
Start: 2024-09-15

## 2024-09-15 NOTE — TELEPHONE ENCOUNTER
No care due was identified.  [facility] Embedded Care Due Messages. Reference number: [id number].   9/15/2024 5:23:12 PM CDT

## 2024-09-16 ENCOUNTER — PATIENT MESSAGE (OUTPATIENT)
Dept: INTERNAL MEDICINE | Facility: CLINIC | Age: 79
End: 2024-09-16
Payer: MEDICARE

## 2024-09-16 DIAGNOSIS — N40.0 BENIGN NON-NODULAR PROSTATIC HYPERPLASIA WITHOUT LOWER URINARY TRACT SYMPTOMS: ICD-10-CM

## 2024-09-16 RX ORDER — CYCLOBENZAPRINE HCL 10 MG
TABLET ORAL
Qty: 90 TABLET | Refills: 3 | Status: SHIPPED | OUTPATIENT
Start: 2024-09-16

## 2024-09-16 RX ORDER — TAMSULOSIN HYDROCHLORIDE 0.4 MG/1
1 CAPSULE ORAL DAILY
Qty: 90 CAPSULE | Refills: 3 | Status: SHIPPED | OUTPATIENT
Start: 2024-09-16

## 2024-09-16 RX ORDER — CITALOPRAM 20 MG/1
20 TABLET, FILM COATED ORAL DAILY
Qty: 90 TABLET | Refills: 1 | Status: SHIPPED | OUTPATIENT
Start: 2024-09-16

## 2024-09-16 RX ORDER — FINASTERIDE 5 MG/1
5 TABLET, FILM COATED ORAL DAILY
Qty: 90 TABLET | Refills: 3 | Status: SHIPPED | OUTPATIENT
Start: 2024-09-16

## 2024-09-16 NOTE — TELEPHONE ENCOUNTER
"Called pt to let him know "Refills sent. The system will automatically put zero refills in most cases if I haven't seen for checkup in more than 1 year "  "

## 2024-09-16 NOTE — TELEPHONE ENCOUNTER
Spoke to pt and let him know that finasteride and flomax were both sent to the pharmacy on 09/06. He would have to contact [facility] to see where the medication is at.     For his next set of refills, pt wants to know if you can do a years worth of refills.

## 2024-09-25 ENCOUNTER — PATIENT MESSAGE (OUTPATIENT)
Dept: OTHER | Facility: OTHER | Age: 79
End: 2024-09-25
Payer: MEDICARE

## 2024-09-28 ENCOUNTER — PATIENT MESSAGE (OUTPATIENT)
Dept: HEMATOLOGY/ONCOLOGY | Facility: CLINIC | Age: 79
End: 2024-09-28
Payer: MEDICARE

## 2024-09-30 ENCOUNTER — OFFICE VISIT (OUTPATIENT)
Dept: INTERNAL MEDICINE | Facility: CLINIC | Age: 79
End: 2024-09-30
Payer: MEDICARE

## 2024-09-30 VITALS
SYSTOLIC BLOOD PRESSURE: 124 MMHG | BODY MASS INDEX: 38.67 KG/M2 | WEIGHT: 276.25 LBS | OXYGEN SATURATION: 95 % | HEART RATE: 67 BPM | DIASTOLIC BLOOD PRESSURE: 62 MMHG | HEIGHT: 71 IN

## 2024-09-30 DIAGNOSIS — Z12.5 PROSTATE CANCER SCREENING: ICD-10-CM

## 2024-09-30 DIAGNOSIS — Z00.00 ANNUAL PHYSICAL EXAM: Primary | ICD-10-CM

## 2024-09-30 DIAGNOSIS — Z79.899 ENCOUNTER FOR LONG-TERM (CURRENT) USE OF OTHER MEDICATIONS: ICD-10-CM

## 2024-09-30 DIAGNOSIS — E66.01 SEVERE OBESITY (BMI 35.0-39.9) WITH COMORBIDITY: ICD-10-CM

## 2024-09-30 PROCEDURE — 99999 PR PBB SHADOW E&M-EST. PATIENT-LVL IV: CPT | Mod: PBBFAC,HCNC,, | Performed by: FAMILY MEDICINE

## 2024-09-30 PROCEDURE — 3288F FALL RISK ASSESSMENT DOCD: CPT | Mod: HCNC,CPTII,S$GLB, | Performed by: FAMILY MEDICINE

## 2024-09-30 PROCEDURE — 1101F PT FALLS ASSESS-DOCD LE1/YR: CPT | Mod: HCNC,CPTII,S$GLB, | Performed by: FAMILY MEDICINE

## 2024-09-30 PROCEDURE — 99214 OFFICE O/P EST MOD 30 MIN: CPT | Mod: HCNC,S$GLB,, | Performed by: FAMILY MEDICINE

## 2024-09-30 PROCEDURE — 1126F AMNT PAIN NOTED NONE PRSNT: CPT | Mod: HCNC,CPTII,S$GLB, | Performed by: FAMILY MEDICINE

## 2024-09-30 PROCEDURE — 1157F ADVNC CARE PLAN IN RCRD: CPT | Mod: HCNC,CPTII,S$GLB, | Performed by: FAMILY MEDICINE

## 2024-09-30 PROCEDURE — 1159F MED LIST DOCD IN RCRD: CPT | Mod: HCNC,CPTII,S$GLB, | Performed by: FAMILY MEDICINE

## 2024-09-30 PROCEDURE — G2211 COMPLEX E/M VISIT ADD ON: HCPCS | Mod: HCNC,S$GLB,, | Performed by: FAMILY MEDICINE

## 2024-09-30 PROCEDURE — 1160F RVW MEDS BY RX/DR IN RCRD: CPT | Mod: HCNC,CPTII,S$GLB, | Performed by: FAMILY MEDICINE

## 2024-09-30 PROCEDURE — 3074F SYST BP LT 130 MM HG: CPT | Mod: HCNC,CPTII,S$GLB, | Performed by: FAMILY MEDICINE

## 2024-09-30 PROCEDURE — 3078F DIAST BP <80 MM HG: CPT | Mod: HCNC,CPTII,S$GLB, | Performed by: FAMILY MEDICINE

## 2024-09-30 NOTE — PROGRESS NOTES
Subjective:       Patient ID: Edis Huggins Jr. is a 79 y.o. male.    Chief Complaint: Annual Exam    HPI    Edis Huggins Jr. is a 79 y.o. male PMHx HTN, HLD, Paroxysmal Afib, CARMELA for checkup.      #Cards: HTN, HLD, Afib on AC  - est w/ Dr. Verduzco,  6/2024  - reg: Irbesartan 75 qd; Chlorthalidone 25 prn; Gemfibrozil 600 bid, Xarelto 20 qd  - active with digital htn program  - cards d/c'd beta blocker  - declines statin for now     #Heme/onc: Normocytic anemia (likely etiology chronic dz)  - est w/ NP Sandeep,  6/2024 - fu 6m     #Ophtho: Glaucoma  - est w/ Dr. Khan,  8/2024    #Pain med: Lumbar radiculopathy  - est w/ NP Yao,  6/2024     #Sleep med: CARMELA  - est w/ Dr. Rolle - upcoming 8/2023  - no longer uses cpap, doesn't tolerate     #Derm:  - est w/ Dr. Ordaz,  2/2023     #BMI 38    Review of Systems   Constitutional:  Negative for unexpected weight change.   HENT:  Negative for hearing loss and trouble swallowing.    Eyes:  Negative for discharge and visual disturbance.   Respiratory:  Negative for chest tightness and wheezing.    Cardiovascular:  Negative for chest pain and palpitations.   Gastrointestinal:  Negative for blood in stool, constipation, diarrhea and vomiting.   Endocrine: Negative for polydipsia and polyuria.   Genitourinary:  Negative for difficulty urinating, hematuria and urgency.   Musculoskeletal:  Negative for arthralgias, joint swelling and neck pain.   Neurological:  Negative for headaches.   Psychiatric/Behavioral:  Negative for confusion and dysphoric mood.          Past Medical History:   Diagnosis Date    Allergic rhinitis due to animal (cat) (dog) hair and dander 03/20/2014    Allergy     Anemia     Anxiety     Basal cell carcinoma     BPH (benign prostatic hypertrophy)     Family history of colon cancer 03/20/2014    Family history of ischemic heart disease 03/20/2014    Fever blister     HLD (hyperlipidemia)     HTN (hypertension)     IFG (impaired  fasting glucose)     Lumbar spondylosis 06/07/2019    Pain of right hip joint     Primary open angle glaucoma     Squamous cell carcinoma of skin     Status post total knee replacement 03/20/2014        Prior to Admission medications    Medication Sig Start Date End Date Taking? Authorizing Provider   acyclovir (ZOVIRAX) 400 MG tablet TAKE 1 TABLET BY MOUTH THREE TIMES DAILY FOR 5 DAYS AT SIGN OF FEVER BLISTER 4/16/20   Forest Miles MD   azelastine (ASTELIN) 137 mcg (0.1 %) nasal spray 1 spray (137 mcg total) by Nasal route 2 (two) times daily. 2/6/24 2/5/25  Oriana Alcantara MD   b complex vitamins tablet Take 1 tablet by mouth once daily.    Provider, Historical   Bifidobacterium infantis (ALIGN) 4 mg Cap Take 4 mg by mouth once daily.     Provider, Historical   cetirizine (ZYRTEC) 10 MG tablet Take 10 mg by mouth once daily.    Provider, Historical   citalopram (CELEXA) 20 MG tablet Take 1 tablet (20 mg total) by mouth once daily. 9/16/24   Chris Her MD   cyclobenzaprine (FLEXERIL) 10 MG tablet TAKE 1 TABLET THREE TIMES DAILY AS NEEDED 9/16/24   Chris Her MD   finasteride (PROSCAR) 5 mg tablet Take 1 tablet (5 mg total) by mouth once daily. 9/16/24   Chris Her MD   fish oil-omega-3 fatty acids 300-1,000 mg capsule Take 1 g by mouth once daily.    Provider, Historical   gemfibroziL (LOPID) 600 MG tablet Take 1 tablet (600 mg total) by mouth 2 (two) times daily. 9/6/24   Chris Her MD   irbesartan (AVAPRO) 75 MG tablet Take 1.5 tablets (112.5 mg total) by mouth once daily. 9/26/24 9/26/25  Chris Her MD   mv-mn/iron/folic acid/herb 190 (VITAMIN D3 COMPLETE ORAL) Take 1 tablet by mouth once daily.    Provider, Historical   rivaroxaban (XARELTO) 20 mg Tab Take 1 tablet (20 mg total) by mouth once daily. 9/16/24   Chris Her MD   tamsulosin (FLOMAX) 0.4 mg Cap Take 1 capsule (0.4 mg total) by mouth once daily. 9/16/24   Chris Her MD   timolol maleate  "0.5% (TIMOPTIC) 0.5 % Drop Place 1 drop into both eyes 2 (two) times daily. 24   Deborah Khan MD   vitamin E 1000 UNIT capsule Take 2,000 Units by mouth once daily.    Provider, Historical        Past medical history, surgical history, and family medical history reviewed and updated as appropriate.    Medications and allergies reviewed.     Objective:          Vitals:    24 1507   BP: 124/62   BP Location: Right arm   Patient Position: Sitting   Pulse: 67   SpO2: 95%   Weight: 125.3 kg (276 lb 3.8 oz)   Height: 5' 11" (1.803 m)     Body mass index is 38.53 kg/m².  Physical Exam  Vitals and nursing note reviewed.   Constitutional:       General: He is not in acute distress.     Appearance: Normal appearance. He is well-developed. He is obese.   Eyes:      Extraocular Movements: Extraocular movements intact.   Cardiovascular:      Rate and Rhythm: Normal rate and regular rhythm.      Pulses: Normal pulses.      Heart sounds: Normal heart sounds. No murmur heard.  Pulmonary:      Effort: Pulmonary effort is normal. No respiratory distress.      Breath sounds: Normal breath sounds. No wheezing.   Musculoskeletal:      Right lower le+ Edema present.      Left lower le+ Edema present.   Neurological:      Mental Status: He is alert and oriented to person, place, and time.   Psychiatric:         Mood and Affect: Mood normal.         Behavior: Behavior normal.         Lab Results   Component Value Date    WBC 5.13 2024    HGB 12.8 (L) 2024    HCT 38.1 (L) 2024     2024    CHOL 117 (L) 2024    TRIG 89 2024    HDL 29 (L) 2024    ALT 9 (L) 2024    AST 13 2024     2024    K 4.0 2024     2024    CREATININE 0.9 2024    BUN 20 2024    CO2 22 (L) 2024    TSH 0.957 2021    PSA 1.3 2023    INR 2.4 (A) 2013    GLUF 118 (H) 2017    HGBA1C 5.5 2024       Assessment:     "   1. Annual physical exam    2. Encounter for long-term (current) use of other medications    3. Prostate cancer screening    4. Severe obesity (BMI 35.0-39.9) with comorbidity          Plan:   1. Annual physical exam  -     Lipid Panel; Future; Expected date: 09/30/2024    2. Encounter for long-term (current) use of other medications  -     Hemoglobin A1C; Future; Expected date: 09/30/2024  -     PSA, Screening; Future; Expected date: 09/30/2024  -     Lipid Panel; Future; Expected date: 09/30/2024    3. Prostate cancer screening  -     PSA, Screening; Future; Expected date: 09/30/2024    4. Severe obesity (BMI 35.0-39.9) with comorbidity  Overview:  Counseled regarding benefits of healthy diet (goal of 5 or more servings of fruits/veggies daily, water as main drink, increased consumption of healthy fats such as nuts, beans, avocados, olive oil instead of unhealthy fat options) and physical activity (150 minutes of moderate-intensity aerobic activity per week) to improve overall health      Other orders  -     rivaroxaban (XARELTO) 20 mg Tab; Take 1 tablet (20 mg total) by mouth once daily.  Dispense: 90 tablet; Refill: 3        Health maintenance reviewed with patient.     Follow up in about 1 year (around 9/30/2025) for Annual.    Chris Her MD  Family Medicine / Primary Care  [facility]  9/30/2024

## 2024-10-07 ENCOUNTER — PATIENT MESSAGE (OUTPATIENT)
Dept: ADMINISTRATIVE | Facility: OTHER | Age: 79
End: 2024-10-07
Payer: MEDICARE

## 2024-10-18 ENCOUNTER — TELEPHONE (OUTPATIENT)
Dept: HEMATOLOGY/ONCOLOGY | Facility: CLINIC | Age: 79
End: 2024-10-18
Payer: MEDICARE

## 2024-11-09 NOTE — OP NOTE
"Procedure Note    Pre-operative Diagnosis: Lumbar Radiculopathy  Post-operative Diagnosis: Lumbar Radiculopathy  Procedure Date: 06/25/2019  Procedure:  (1) Lumbar Transforaminal Epidural Steroid Injection, Two Levels    (2) Intraoperative fluoroscopy    (3) IV Moderate Sedation (Midazolam 2 mg, Fentanyl 50 mcg)          Indications: To alleviate pain and suffering, and reduce functional impairment associated with Lumbar radiculopathy.      The patients history and physical exam were reviewed. The risks, benefits and alternatives to the procedure were discussed, and all questions were answered to the patients satisfaction. The patient agreed to proceed, and written informed consent was verified.    Procedure in Detail: Using a fenestrated drape and Chloro-prep, the skin was prepared and draped in sterile fashion. The Right L3-4 and L4-5 neural foramena were identified by fluoroscopy by Right lateral oblique angle. Lidocaine 1% 5 mL was used to anesthetize the skin at the skin entry point and subcutaneous tissue with 25G 1.5" in needle. Then a 22G 5" spinal needle was advanced under intermittent fluoroscopy toward each target point on the 5 o'clock position on the pedicle. Fluoroscopy was then inspected from lateral and AP view and needle adjusted appropriately. There was no paresthesia with needle placement. Aspiration was negative for blood and CSF. Omnipaque contrast was injected live in an AP fluoroscopic view at each level demonstrating appropriate needle position with contrast spread into the nerve root sheath and medially into the epidural space without intravascular or intrathecal spread. Next, a mixture 1 mL MPF Ropivacaine 0.2% and 15 mg dexamethasone (total 2.5 mL) was divided evenly between the two levels and injected slowly and incrementally. The patient tolerated the procedure without complaint and was transported to the recovery room in stable condition.     Disposition: The patient tolerated the " procedure well, and there were no apparent complications. Vital signs remained stable throughout the procedure. The patient was taken to the recovery area where written discharge instructions for the procedure were given.     Follow-up: RTC as scheduled      Eduard Mukherjee Jr, MD  Interventional Pain Medicine / Anesthesiology         (1) Female

## 2024-11-10 NOTE — PROGRESS NOTES
HPI     Glaucoma            Comments: 2 month IOP ck today and pt states no changes since last exam           Comments    DLS: 8/20/24    1) POAG   2) PCIOL OU   3) Steroid Responder   4) APD OS   5) PAS OS   6) Yag Cap OD     MEDS:  Timolol BID OU          Last edited by Lili Booker MA on 11/14/2024  3:27 PM.            Assessment /Plan     For exam results, see Encounter Report.    Primary open angle glaucoma of left eye, moderate stage    Primary open-angle glaucoma, right eye, mild stage    PCO (posterior capsular opacification), left    Steroid responders, bilateral    Pupil irregular of left eye    Relative afferent pupillary defect - Left Eye    Pseudophakia, both eyes    Status post YAG capsulotomy of both eyes        Lost to F/u from 11/4/2013 to 8/11/2015   Knows Sharlene Mo and MIKHAIL - use to work with them     1. POAG (primary open-angle glaucoma) - Both Eyes   Old pt of [employer] [id number]  Began at [facility] 2012       POAG OS>OD           Family history    neg        Glaucoma meds   T1/2 GFS ou (use to use travatan- off post CE)         H/O adverse rxn to glaucoma drops    none        LASERS    SLT os 9/26/2013 -( good resp 17 --> 14) // yag cap od - 1/2020 // yag cap os 4/4/2024         GLAUCOMA SURGERIES    trabectome os (combined with phaco - 2/8/2017        OTHER EYE SURGERIES    Combined - phaco/IOL / goniotomy - trabectome os 2/8/2017 - +heme and increae in IOP)                                                 PC IOL OD - 5/10/2017         CDR    0.6/0.7-0.8        Tbase    20-23/21-24          Tmax    23/24            Ttarget    17/17           HVF   6 tests 9567-2681 OD essentially full; OS Sup paracentral defect, IAD/INS (([place]))             (([facility])) - 8 test 2012 to 2024 - full od // sup paracentral defect / inferior paracentral defect         Gonio    +4 ou        CCT    598/598        OCT    5 test 2019 to 2024  - RNFL - nl od // bord G/TS/T         HRT    7 test-2012 to  "2020  - MR -  [place] off  od // dec T, borsuleiman I os /// CDR xxx od // 0.64 os        Disc photos    2012 - IOS    - Ttoday  16/11 ( steroid effect kamran off - was up to 20/11)  (up from 15/12- ?? 2nd to steroid injection to back for sciatical)   - Test done today  IOP check = post steroid  inj       2. Steroid responders - Both Eyes  2/2 nasacort      3. ? Trace APD os      4. S/P Rt knee replacement Feb 2013  -doing well      5.  Myopia / presbyopia ou     6. Irregular pupil - w/ PAS   -2/2 very high IOP POD # 1 post phaco     7.  PC IOL OU  OD 5/10/2017 - PCB00 14.5  OS 2/8/2017 - + hyphema and high IOP 2/2 trabectome - irregular pupil post op - PCB00 14.0    8. PCO od -  S/P yag cap od - 1/2020  S/P yag cap os 4/4/2024       Plan  CSM  IOP below target OU    Cont timolol gfs ou q day     If the glare from the fixed dilated pupil bothers him a lot post 2nd eye getting done - consider pupil surgery with dr Peacock to "purse string it " smaller - pt is doing ok for now - glare is not bothering him too much     YAG CAP OS - 4/4/2024 -  good recovery of vision - improved to 20/20     F/U 4 months IOP // gonio ( VF's in 8 months )   "

## 2024-11-13 DIAGNOSIS — E78.1 HYPERTRIGLYCERIDEMIA: ICD-10-CM

## 2024-11-13 RX ORDER — GEMFIBROZIL 600 MG/1
600 TABLET, FILM COATED ORAL 2 TIMES DAILY
Qty: 180 TABLET | Refills: 0 | Status: SHIPPED | OUTPATIENT
Start: 2024-11-13

## 2024-11-13 NOTE — TELEPHONE ENCOUNTER
Care Due:                  Date            Visit Type   Department     Provider  --------------------------------------------------------------------------------                                EP -                              PRIMARY      [facility] INTERNAL  Last Visit: 09-      CARE ([facility])   MEDICINE       Chris Her  Next Visit: None Scheduled  None         None Found                                                            Last  Test          Frequency    Reason                     Performed    Due Date  --------------------------------------------------------------------------------    Lipid Panel.  12 months..  gemfibroziL..............  02- 02-    [facility] Embedded Care Due Messages. Reference number: [id number].   11/13/2024 1:45:52 AM CST

## 2024-11-13 NOTE — TELEPHONE ENCOUNTER
Refill Decision Note   Edis Huggins  is requesting a refill authorization.  Brief Assessment and Rationale for Refill:  Approve     Medication Therapy Plan: FLOS 12/06/24      Alert overridden per protocol: Yes   Pharmacist review requested: Yes   Comments:     Note composed:12:22 PM 11/13/2024

## 2024-11-13 NOTE — TELEPHONE ENCOUNTER
Refill Routing Note   Medication(s) are not appropriate for processing by [facility] for the following reason(s):        Drug-disease interaction: gemfibroziL and Iron deficiency anemia secondary to inadequate dietary iron intake     [facility] action(s):  Defer     Requires labs : Yes           Pharmacist review requested: Yes     Appointments  past 12m or future 3m with PCP    Date Provider   Last Visit   9/30/2024 Chris Her MD   Next Visit   Visit date not found Chris Her MD   ED visits in past 90 days: 0        Note composed:12:06 PM 11/13/2024

## 2024-11-14 ENCOUNTER — OFFICE VISIT (OUTPATIENT)
Dept: OPHTHALMOLOGY | Facility: CLINIC | Age: 79
End: 2024-11-14
Payer: MEDICARE

## 2024-11-14 DIAGNOSIS — Z96.1 PSEUDOPHAKIA, BOTH EYES: ICD-10-CM

## 2024-11-14 DIAGNOSIS — H40.1111 PRIMARY OPEN-ANGLE GLAUCOMA, RIGHT EYE, MILD STAGE: ICD-10-CM

## 2024-11-14 DIAGNOSIS — H26.492 PCO (POSTERIOR CAPSULAR OPACIFICATION), LEFT: ICD-10-CM

## 2024-11-14 DIAGNOSIS — Z98.890 STATUS POST YAG CAPSULOTOMY OF BOTH EYES: ICD-10-CM

## 2024-11-14 DIAGNOSIS — H40.1122 PRIMARY OPEN ANGLE GLAUCOMA OF LEFT EYE, MODERATE STAGE: Primary | ICD-10-CM

## 2024-11-14 DIAGNOSIS — H21.562 PUPIL IRREGULAR OF LEFT EYE: ICD-10-CM

## 2024-11-14 DIAGNOSIS — H57.09 RELATIVE AFFERENT PUPILLARY DEFECT: ICD-10-CM

## 2024-11-14 DIAGNOSIS — H40.043 STEROID RESPONDERS, BILATERAL: ICD-10-CM

## 2024-11-14 PROCEDURE — 3288F FALL RISK ASSESSMENT DOCD: CPT | Mod: HCNC,CPTII,S$GLB, | Performed by: OPHTHALMOLOGY

## 2024-11-14 PROCEDURE — 99214 OFFICE O/P EST MOD 30 MIN: CPT | Mod: HCNC,S$GLB,, | Performed by: OPHTHALMOLOGY

## 2024-11-14 PROCEDURE — 99999 PR PBB SHADOW E&M-EST. PATIENT-LVL III: CPT | Mod: PBBFAC,HCNC,, | Performed by: OPHTHALMOLOGY

## 2024-11-14 PROCEDURE — 1159F MED LIST DOCD IN RCRD: CPT | Mod: HCNC,CPTII,S$GLB, | Performed by: OPHTHALMOLOGY

## 2024-11-14 PROCEDURE — 1126F AMNT PAIN NOTED NONE PRSNT: CPT | Mod: HCNC,CPTII,S$GLB, | Performed by: OPHTHALMOLOGY

## 2024-11-14 PROCEDURE — 1101F PT FALLS ASSESS-DOCD LE1/YR: CPT | Mod: HCNC,CPTII,S$GLB, | Performed by: OPHTHALMOLOGY

## 2024-11-14 PROCEDURE — 1157F ADVNC CARE PLAN IN RCRD: CPT | Mod: HCNC,CPTII,S$GLB, | Performed by: OPHTHALMOLOGY

## 2024-11-14 PROCEDURE — 1160F RVW MEDS BY RX/DR IN RCRD: CPT | Mod: HCNC,CPTII,S$GLB, | Performed by: OPHTHALMOLOGY

## 2024-11-21 ENCOUNTER — PATIENT MESSAGE (OUTPATIENT)
Dept: OTHER | Facility: OTHER | Age: 79
End: 2024-11-21
Payer: MEDICARE

## 2024-12-04 RX ORDER — AZELASTINE 1 MG/ML
1 SPRAY, METERED NASAL 2 TIMES DAILY
Qty: 30 ML | Refills: 3 | Status: SHIPPED | OUTPATIENT
Start: 2024-12-04 | End: 2025-12-04

## 2024-12-06 ENCOUNTER — LAB VISIT (OUTPATIENT)
Dept: LAB | Facility: HOSPITAL | Age: 79
End: 2024-12-06
Attending: NURSE PRACTITIONER
Payer: MEDICARE

## 2024-12-06 DIAGNOSIS — Z79.01 CHRONIC ANTICOAGULATION: ICD-10-CM

## 2024-12-06 DIAGNOSIS — Z00.00 ANNUAL PHYSICAL EXAM: ICD-10-CM

## 2024-12-06 DIAGNOSIS — Z79.899 ENCOUNTER FOR LONG-TERM (CURRENT) USE OF MEDICATIONS: ICD-10-CM

## 2024-12-06 DIAGNOSIS — D69.6 THROMBOCYTOPENIA: ICD-10-CM

## 2024-12-06 DIAGNOSIS — D64.9 NORMOCYTIC ANEMIA: ICD-10-CM

## 2024-12-06 DIAGNOSIS — Z12.5 PROSTATE CANCER SCREENING: ICD-10-CM

## 2024-12-06 LAB
ALBUMIN SERPL BCP-MCNC: 3.9 G/DL (ref 3.5–5.2)
ALP SERPL-CCNC: 44 U/L (ref 40–150)
ALT SERPL W/O P-5'-P-CCNC: 26 U/L (ref 10–44)
ANION GAP SERPL CALC-SCNC: 10 MMOL/L (ref 8–16)
AST SERPL-CCNC: 25 U/L (ref 10–40)
BASOPHILS # BLD AUTO: 0.04 K/UL (ref 0–0.2)
BASOPHILS NFR BLD: 0.7 % (ref 0–1.9)
BILIRUB SERPL-MCNC: 0.5 MG/DL (ref 0.1–1)
BUN SERPL-MCNC: 14 MG/DL (ref 8–23)
CALCIUM SERPL-MCNC: 9.4 MG/DL (ref 8.7–10.5)
CHLORIDE SERPL-SCNC: 108 MMOL/L (ref 95–110)
CHOLEST SERPL-MCNC: 122 MG/DL (ref 120–199)
CHOLEST/HDLC SERPL: 3.8 {RATIO} (ref 2–5)
CO2 SERPL-SCNC: 22 MMOL/L (ref 23–29)
COMPLEXED PSA SERPL-MCNC: 1.6 NG/ML (ref 0–4)
CREAT SERPL-MCNC: 0.8 MG/DL (ref 0.5–1.4)
DIFFERENTIAL METHOD BLD: ABNORMAL
EOSINOPHIL # BLD AUTO: 0.4 K/UL (ref 0–0.5)
EOSINOPHIL NFR BLD: 6.3 % (ref 0–8)
ERYTHROCYTE [DISTWIDTH] IN BLOOD BY AUTOMATED COUNT: 13.1 % (ref 11.5–14.5)
EST. GFR  (NO RACE VARIABLE): >60 ML/MIN/1.73 M^2
ESTIMATED AVG GLUCOSE: 117 MG/DL (ref 68–131)
FERRITIN SERPL-MCNC: 155 NG/ML (ref 20–300)
FOLATE SERPL-MCNC: 14.8 NG/ML (ref 4–24)
GLUCOSE SERPL-MCNC: 156 MG/DL (ref 70–110)
HBA1C MFR BLD: 5.7 % (ref 4–5.6)
HCT VFR BLD AUTO: 37.5 % (ref 40–54)
HDLC SERPL-MCNC: 32 MG/DL (ref 40–75)
HDLC SERPL: 26.2 % (ref 20–50)
HGB BLD-MCNC: 12.7 G/DL (ref 14–18)
IMM GRANULOCYTES # BLD AUTO: 0.02 K/UL (ref 0–0.04)
IMM GRANULOCYTES NFR BLD AUTO: 0.3 % (ref 0–0.5)
IRON SERPL-MCNC: 99 UG/DL (ref 45–160)
LDLC SERPL CALC-MCNC: 62.2 MG/DL (ref 63–159)
LYMPHOCYTES # BLD AUTO: 2.5 K/UL (ref 1–4.8)
LYMPHOCYTES NFR BLD: 43.8 % (ref 18–48)
MCH RBC QN AUTO: 32.5 PG (ref 27–31)
MCHC RBC AUTO-ENTMCNC: 33.9 G/DL (ref 32–36)
MCV RBC AUTO: 96 FL (ref 82–98)
MONOCYTES # BLD AUTO: 0.4 K/UL (ref 0.3–1)
MONOCYTES NFR BLD: 7.7 % (ref 4–15)
NEUTROPHILS # BLD AUTO: 2.4 K/UL (ref 1.8–7.7)
NEUTROPHILS NFR BLD: 41.2 % (ref 38–73)
NONHDLC SERPL-MCNC: 90 MG/DL
NRBC BLD-RTO: 0 /100 WBC
PLATELET # BLD AUTO: 167 K/UL (ref 150–450)
PMV BLD AUTO: 11.5 FL (ref 9.2–12.9)
POTASSIUM SERPL-SCNC: 4.1 MMOL/L (ref 3.5–5.1)
PROT SERPL-MCNC: 7.3 G/DL (ref 6–8.4)
RBC # BLD AUTO: 3.91 M/UL (ref 4.6–6.2)
SATURATED IRON: 26 % (ref 20–50)
SODIUM SERPL-SCNC: 140 MMOL/L (ref 136–145)
TOTAL IRON BINDING CAPACITY: 382 UG/DL (ref 250–450)
TRANSFERRIN SERPL-MCNC: 258 MG/DL (ref 200–375)
TRIGL SERPL-MCNC: 139 MG/DL (ref 30–150)
VIT B12 SERPL-MCNC: 482 PG/ML (ref 210–950)
WBC # BLD AUTO: 5.75 K/UL (ref 3.9–12.7)

## 2024-12-06 PROCEDURE — 80053 COMPREHEN METABOLIC PANEL: CPT | Mod: HCNC | Performed by: NURSE PRACTITIONER

## 2024-12-06 PROCEDURE — 85025 COMPLETE CBC W/AUTO DIFF WBC: CPT | Mod: HCNC | Performed by: NURSE PRACTITIONER

## 2024-12-06 PROCEDURE — 83036 HEMOGLOBIN GLYCOSYLATED A1C: CPT | Mod: HCNC | Performed by: FAMILY MEDICINE

## 2024-12-06 PROCEDURE — 80061 LIPID PANEL: CPT | Mod: HCNC | Performed by: FAMILY MEDICINE

## 2024-12-06 PROCEDURE — 84153 ASSAY OF PSA TOTAL: CPT | Mod: HCNC | Performed by: FAMILY MEDICINE

## 2024-12-06 PROCEDURE — 82728 ASSAY OF FERRITIN: CPT | Mod: HCNC | Performed by: NURSE PRACTITIONER

## 2024-12-06 PROCEDURE — 36415 COLL VENOUS BLD VENIPUNCTURE: CPT | Mod: HCNC | Performed by: FAMILY MEDICINE

## 2024-12-06 PROCEDURE — 82746 ASSAY OF FOLIC ACID SERUM: CPT | Mod: HCNC | Performed by: NURSE PRACTITIONER

## 2024-12-06 PROCEDURE — 83540 ASSAY OF IRON: CPT | Mod: HCNC | Performed by: NURSE PRACTITIONER

## 2024-12-06 PROCEDURE — 82607 VITAMIN B-12: CPT | Mod: HCNC | Performed by: NURSE PRACTITIONER

## 2024-12-06 NOTE — PROGRESS NOTES
"Subjective:       Patient ID: Edis Huggins Jr. is a 79 y.o. male.    Chief Complaint:  Anemia    HPI  He presented for evaluation/management of anemia.    He is here for f/u of normocytic anemia.    Hemoglobin 3/14/18 was 11.8.  WBC and platelets within normal limits.  Nutritional studies unremarkable.    He gets dyspnea when he walks.      None at rest.  Weight 229 pounds.  Height 5' 11"    Trying to lose weight.    No longer using CPAP for obstructive sleep apnea    On Xarelto, tolerating it well    - received iron sucrose x4 Mar/Apr 2024, tolerated well    Interval history  - he presents for a follow-up appointment for his anemia.  - today, he endorses fatigue, dyspnea upon exertion.   - He denies shortness of breath, chest pain, nausea, vomiting, diarrhea, constipation.      Review of Systems   Constitutional:  Negative for fatigue and unexpected weight change.   Respiratory:  Positive for shortness of breath (RESENDEZ, mild intermittent).    Cardiovascular:  Negative for chest pain and leg swelling.   Gastrointestinal:  Negative for blood in stool, constipation, diarrhea, nausea and vomiting.   Skin:  Negative for wound.   Neurological:  Negative for dizziness, facial asymmetry, weakness and headaches.   Hematological:  Negative for adenopathy.   Psychiatric/Behavioral:  Negative for decreased concentration.          Objective:      Vitals:    12/09/24 1318   BP: 137/65   BP Location: Left arm   Patient Position: Sitting   Pulse: 64   Resp: (!) 22   Temp: 97.7 °F (36.5 °C)   TempSrc: Oral   SpO2: 96%   Weight: 128.4 kg (283 lb 1.1 oz)   Height: 5' 11" (1.803 m)     BMI: Body mass index is 39.48 kg/m².    Physical Exam  Vitals and nursing note reviewed.   Constitutional:       General: He is not in acute distress.     Appearance: He is obese. He is not ill-appearing, toxic-appearing or diaphoretic.   HENT:      Head: Normocephalic and atraumatic.   Eyes:      Conjunctiva/sclera: Conjunctivae normal. "   Cardiovascular:      Rate and Rhythm: Normal rate and regular rhythm.      Heart sounds: Normal heart sounds. No murmur heard.  Pulmonary:      Effort: Pulmonary effort is normal. No respiratory distress.      Breath sounds: Normal breath sounds.      Comments: Talkative with no sob/oleary  Abdominal:      General: Bowel sounds are normal. There is no distension.      Palpations: Abdomen is soft.      Tenderness: There is no abdominal tenderness. There is no guarding.   Musculoskeletal:      Cervical back: Normal range of motion and neck supple. No rigidity.   Neurological:      Mental Status: He is alert and oriented to person, place, and time. Mental status is at baseline.      Motor: No weakness.      Gait: Gait normal.   Psychiatric:         Mood and Affect: Mood normal.         Behavior: Behavior normal.         Thought Content: Thought content normal.         LABS:  Labs have been reviewed.    Lab Results   Component Value Date    WBC 5.75 12/06/2024    HGB 12.7 (L) 12/06/2024    HCT 37.5 (L) 12/06/2024    MCV 96 12/06/2024     12/06/2024              Assessment:       1. Iron deficiency anemia secondary to inadequate dietary iron intake    2. B12 deficiency    3. Chronic anticoagulation    4. Severe obesity (BMI 35.0-39.9) with obstructive sleep apnea            Plan:     Normocytic anemia, iron deficiency, b12 deficiency / testosterone deficiency / anemia of chronic disease.  - he had previously seen Dr. Auguste and nurse practitioner Meagan Hopkins.  - felt to have anemia of chronic disease. Also noted to have testosterone deficiency   - he received iron sucrose x 4 doses in March/April 2024.  - Labs have been reviewed. Hemoglobin is 12.7 g/dL. Ferritin 155 ng/mL. B12 is normal at 482 pg/mL.  - return to clinic in 6 months    Hepatitis-B core antibody positive  -he had previous exposure to hepatitis B and is immune    History of atrial fibrillation  -on Xarelto, tolerating well with no bleeding side  effects  -management deferred to Cardiology, Dr. Bello    Severe obesity with obstructive sleep apnea, fatigue  - Body mass index is 39.48 kg/m².  - comorbid conditions include hypertension, atherosclerosis.    - return to clinic in 6 months.    Thaddeus Lozano M.D.  Hematology/Oncology  [facility] - [address], [address]  [place] [address]  Phone: [phone]  Fax: [phone]

## 2024-12-09 ENCOUNTER — OFFICE VISIT (OUTPATIENT)
Dept: HEMATOLOGY/ONCOLOGY | Facility: CLINIC | Age: 79
End: 2024-12-09
Payer: MEDICARE

## 2024-12-09 VITALS
TEMPERATURE: 98 F | WEIGHT: 283.06 LBS | RESPIRATION RATE: 22 BRPM | SYSTOLIC BLOOD PRESSURE: 137 MMHG | BODY MASS INDEX: 39.63 KG/M2 | HEIGHT: 71 IN | HEART RATE: 64 BPM | OXYGEN SATURATION: 96 % | DIASTOLIC BLOOD PRESSURE: 65 MMHG

## 2024-12-09 DIAGNOSIS — Z79.01 CHRONIC ANTICOAGULATION: ICD-10-CM

## 2024-12-09 DIAGNOSIS — D64.9 NORMOCYTIC ANEMIA: ICD-10-CM

## 2024-12-09 DIAGNOSIS — D63.8 ANEMIA OF CHRONIC DISEASE: Primary | ICD-10-CM

## 2024-12-09 DIAGNOSIS — E53.8 B12 DEFICIENCY: ICD-10-CM

## 2024-12-09 DIAGNOSIS — E66.01 SEVERE OBESITY (BMI 35.0-39.9) WITH COMORBIDITY: ICD-10-CM

## 2024-12-09 PROCEDURE — 99999 PR PBB SHADOW E&M-EST. PATIENT-LVL III: CPT | Mod: PBBFAC,HCNC,, | Performed by: INTERNAL MEDICINE

## 2024-12-22 ENCOUNTER — PATIENT MESSAGE (OUTPATIENT)
Dept: INTERNAL MEDICINE | Facility: CLINIC | Age: 79
End: 2024-12-22
Payer: MEDICARE

## 2024-12-22 DIAGNOSIS — Z79.01 CHRONIC ANTICOAGULATION: ICD-10-CM

## 2024-12-22 DIAGNOSIS — I48.0 PAROXYSMAL ATRIAL FIBRILLATION: Primary | ICD-10-CM

## 2024-12-26 RX ORDER — DABIGATRAN ETEXILATE 150 MG/1
150 CAPSULE ORAL 2 TIMES DAILY
Qty: 180 CAPSULE | Refills: 3 | Status: SHIPPED | OUTPATIENT
Start: 2024-12-26

## 2025-01-08 NOTE — TELEPHONE ENCOUNTER
Dr. Zimmer, We have the results from the 2 tests you ordered. Superficially from what I can understand, it appears that my sinuses were normal and that I do not have any nasal allergies to speak of.  Is my deviated septum worthy of surgery? Please let me know what you plan.     Also I have been using the nasal spray you prescribed for me. In this recent dry weather, I believe it is drying my throat more than normal. (I take a diuretic for hypertension). Do I have to do 2 sprays a day? Can I cut back to once a day? Can I use only intermittently? Thanks, Alexi       To get better and follow your care plan as instructed.

## 2025-01-10 RX ORDER — CYCLOBENZAPRINE HCL 10 MG
TABLET ORAL
Qty: 90 TABLET | Refills: 5 | Status: SHIPPED | OUTPATIENT
Start: 2025-01-10

## 2025-01-10 NOTE — TELEPHONE ENCOUNTER
No care due was identified.  [facility] Embedded Care Due Messages. Reference number: [id number].   1/10/2025 1:53:43 PM CST

## 2025-01-25 DIAGNOSIS — E78.1 HYPERTRIGLYCERIDEMIA: ICD-10-CM

## 2025-01-25 RX ORDER — GEMFIBROZIL 600 MG/1
600 TABLET, FILM COATED ORAL 2 TIMES DAILY
Qty: 180 TABLET | Refills: 2 | Status: SHIPPED | OUTPATIENT
Start: 2025-01-25

## 2025-01-25 NOTE — TELEPHONE ENCOUNTER
Refill Decision Note   Edis Bhavna  is requesting a refill authorization.  Brief Assessment and Rationale for Refill:  Approve     Medication Therapy Plan:        Comments:     Note composed:11:27 AM 01/25/2025

## 2025-01-25 NOTE — TELEPHONE ENCOUNTER
No care due was identified.  [facility] Embedded Care Due Messages. Reference number: [id number].   1/25/2025 1:26:28 AM CST

## 2025-01-29 NOTE — PROGRESS NOTES
HPI     Post-op Evaluation    Additional comments: Pt states no complaints last night           Comments   1 day po Complex Phaco IOL OD 5/10/17  S/p Complex Phaco IOL and [place] OS 2/8/17    MEDS:  [place] 2% TID OS  Durezol QDAY OS  T1/2 GFS QAM OU  Travatan Z QHS OD       Last edited by Lili Booker MA on 5/11/2017  8:48 AM. (History)            Assessment /Plan     For exam results, see Encounter Report.    Postoperative care for cataract    Primary open angle glaucoma of left eye, moderate stage    Primary open-angle glaucoma, right eye, mild stage    Steroid responders, bilateral    Relative afferent pupillary defect - Left Eye    Dilated pupil    Pseudophakia, both eyes      F/U phaco/IOL os with trabectome OS // ++ heme and increase IOP post op 2/2 healon   VA still blurry / IOP ok now     Pt is switching from [facility] to [place] - bring photo file over   Lost to F/u from 11/4/2013 to 8/11/2015   Knows Sharlene Steele - use to work with them     1. POAG (primary open-angle glaucoma) - Both Eyes   Old pt of [facility] [phone]  Began at [facility] 2012       POAG OS>OD           Family history    neg        Glaucoma meds    travatan z ou, T1/2 GFS ou        H/O adverse rxn to glaucoma drops    none        LASERS    SLT os 9/26/2013 -( good resp 17 --> 14)        GLAUCOMA SURGERIES    trabectome os (combined with phaco - 2/8/2017        OTHER EYE SURGERIES    Combined - phaco/IOL / goniotomy - trabectome os 2/8/2017 - +heme and increae in IOP)         CDR    0.6/0.7-0.8        Tbase    20-23/21-24          Tmax    23/24            Ttarget    16/16            HVF    5 tests 0697-1619 OD essentially full; OS Sup paracentral defect, IAD/INS        Gonio    +4 ou        CCT    598/598        OCT    3 test 2012 to 2013 - RNFL - nl od // dec S bord T        HRT    3 test-2012 to 2016 OD bord N; OS dec S/T/I        Disc photos    2012 - IOS    - Ttoday   15/15 OS  - Test done today   Post-op phaco/IOL os  - complex - small pupil // goniotomy -  trabectome       2. Senile nuclear sclerosis - Both Eyes   -  vis sig      3. Steroid responders - Both Eyes  2/2 nasacort      4. ? Trace APD os      5. S/P Rt knee replacement Feb 2013  -doing well      6.  Myopia / presbyopia ou       Plan  CSM  IOP below target OU  SLT done OS 9/26/2013 - good initial resp 24-->17    -( if responds well consider SLT od - but full VF and helathy ON still od)    HVF stable OU    Explained the pre-existing HVF losses OS to patient at length and went over OCT- patient understands that he may still have blurry vision 2/2 to glaucoma after cataract surgery OS.     Phaco / IOL OS Date: 2/8/2017 - combined with a goniotomy / trabectome // ++ heme at end of case - healon left in eye - PCB00 - 14.0  POM # 2- phaco/IOL   High IOP 2/2 healon - decrease vision 2/2 to heme // IOP better after decompression - done twice on POD #1   con't durezol - decrease to bid x 2 weeks the q day till runs out   rio 128 -  - stop when runs out   VA improving os -   [place]ornea has cleared   Fixed dilated pupil os - 2/2 high IOP post op - will likely be a permeant change     discussed with patient that the dilated pupil may be permeate - trial of terese 2% os tid - so far no response      Aim for -0.50 ou   OS  PCB00 14.0  AC IOL 11.5    Try and balance the 2 eyes refraction - intolerant to glasses - 2/2 diplopia from the anisometropia     Ok to re-start  aleeve (takes for hip / knee pain) and the fish oil ahead of time   Of to re-start  flomax -  he just recently started it     Phaco / IOL OD Date: 5/10/2017 - PCB00 14.5 - 2nd eye // no TAYLER's done 2nd eye   POD # 1 - phaco/IOL   Doing well  Begin Pred Acetate QID   Begin vigamox QID  Shield at night  Glasses day  No lifting, no bending, no eye rubbing  F/U 1 week, AR/MR ou - IOP check ou - if IOP remains high can use brimonidine or sys or topical AMY     IOL calc OD  PCB00 14.5    Cont timolol gfs ou q day   Stop travatan -  "OU   Cont terese os   Cont durezol os     If the glare from the fixed dilated pupil bothers him a lot post 2nd eye getting done - consider pupil surgery with dr Peacock to "purse string it " smaller     I have seen and personally examined the patient.  I agree with the findings, assessment and plan of the resident and/or fellow.     Deborah Khan MD    " Satisfactory

## 2025-02-21 DIAGNOSIS — Z00.00 ENCOUNTER FOR MEDICARE ANNUAL WELLNESS EXAM: ICD-10-CM

## 2025-03-17 DIAGNOSIS — I10 ESSENTIAL HYPERTENSION: ICD-10-CM

## 2025-03-17 DIAGNOSIS — F41.9 ANXIETY: ICD-10-CM

## 2025-03-17 DIAGNOSIS — I48.0 PAROXYSMAL ATRIAL FIBRILLATION: ICD-10-CM

## 2025-03-17 DIAGNOSIS — Z79.01 CHRONIC ANTICOAGULATION: ICD-10-CM

## 2025-03-17 RX ORDER — IRBESARTAN 75 MG/1
TABLET ORAL
Qty: 270 TABLET | Refills: 1 | Status: SHIPPED | OUTPATIENT
Start: 2025-03-17

## 2025-03-17 RX ORDER — CITALOPRAM 20 MG/1
20 TABLET, FILM COATED ORAL DAILY
Qty: 90 TABLET | Refills: 1 | Status: SHIPPED | OUTPATIENT
Start: 2025-03-17

## 2025-03-17 NOTE — TELEPHONE ENCOUNTER
No care due was identified.  [facility] Embedded Care Due Messages. Reference number: [id number].   3/17/2025 1:11:32 PM CDT

## 2025-03-18 RX ORDER — DABIGATRAN ETEXILATE 150 MG/1
CAPSULE ORAL
Qty: 180 CAPSULE | Refills: 3 | Status: SHIPPED | OUTPATIENT
Start: 2025-03-18

## 2025-03-18 NOTE — TELEPHONE ENCOUNTER
Refill Routing Note   Medication(s) are not appropriate for processing by [facility] for the following reason(s):        Outside of protocol    [facility] action(s):  Route             Appointments  past 12m or future 3m with PCP    Date Provider   Last Visit   9/30/2024 Chris Her MD   Next Visit   3/17/2025 Chris Her MD   ED visits in past 90 days: 0        Note composed:6:31 AM 03/18/2025

## 2025-03-24 ENCOUNTER — TELEPHONE (OUTPATIENT)
Dept: FAMILY MEDICINE | Facility: CLINIC | Age: 80
End: 2025-03-24
Payer: MEDICARE

## 2025-03-24 NOTE — TELEPHONE ENCOUNTER
"Patient arrived to [facility] requesting to be seen today for his Annual Wellness Visit. The patient was informed that Tawnydemarcus Fung, whom he is scheduled to see on March 31st at 4pm, doesn't have any opening today and none for the rest of the week. The patient was asked if he would like to come in sooner the day of his appointment or would like the appointment to be converted to a virtual instead. The patient stated, "I don't think I will come! Y'all are the ones that has me coming anyway". The patient was informed that it was not "us" but his insurance. The patient declined all offers and then exited the building.   "

## 2025-03-31 ENCOUNTER — PATIENT OUTREACH (OUTPATIENT)
Dept: ADMINISTRATIVE | Facility: OTHER | Age: 80
End: 2025-03-31
Payer: MEDICARE

## 2025-03-31 ENCOUNTER — OFFICE VISIT (OUTPATIENT)
Dept: FAMILY MEDICINE | Facility: CLINIC | Age: 80
End: 2025-03-31
Payer: MEDICARE

## 2025-03-31 VITALS
HEART RATE: 60 BPM | HEIGHT: 71 IN | DIASTOLIC BLOOD PRESSURE: 62 MMHG | OXYGEN SATURATION: 97 % | BODY MASS INDEX: 40.23 KG/M2 | SYSTOLIC BLOOD PRESSURE: 130 MMHG | WEIGHT: 287.38 LBS | RESPIRATION RATE: 20 BRPM

## 2025-03-31 DIAGNOSIS — I10 ESSENTIAL HYPERTENSION: ICD-10-CM

## 2025-03-31 DIAGNOSIS — Z00.00 ENCOUNTER FOR MEDICARE ANNUAL WELLNESS EXAM: Primary | ICD-10-CM

## 2025-03-31 DIAGNOSIS — F41.9 ANXIETY: ICD-10-CM

## 2025-03-31 DIAGNOSIS — I70.0 AORTIC ATHEROSCLEROSIS: ICD-10-CM

## 2025-03-31 DIAGNOSIS — E78.2 MIXED HYPERLIPIDEMIA: ICD-10-CM

## 2025-03-31 DIAGNOSIS — I48.0 PAROXYSMAL ATRIAL FIBRILLATION: ICD-10-CM

## 2025-03-31 DIAGNOSIS — Z74.09 OTHER REDUCED MOBILITY: ICD-10-CM

## 2025-03-31 DIAGNOSIS — E66.01 MORBID OBESITY WITH BMI OF 40.0-44.9, ADULT: ICD-10-CM

## 2025-03-31 PROCEDURE — 99999 PR PBB SHADOW E&M-EST. PATIENT-LVL V: CPT | Mod: PBBFAC,HCNC,, | Performed by: NURSE PRACTITIONER

## 2025-03-31 NOTE — PATIENT INSTRUCTIONS
Counseling and Referral of Other Preventative  (Italic type indicates deductible and co-insurance are waived)    Patient Name: Edis Huggins  Today's Date: 3/31/2025    Health Maintenance       Date Due Completion Date    RSV Vaccine (Age 60+ and Pregnant patients) (1 - 1-dose 75+ series) Never done ---    TETANUS VACCINE 06/19/2025 6/19/2015    PROSTATE-SPECIFIC ANTIGEN 12/06/2025 12/6/2024    Hemoglobin A1c (Prediabetes) 12/06/2025 12/6/2024    Lipid Panel 12/06/2025 12/6/2024        No orders of the defined types were placed in this encounter.      The following information is provided to all patients.  This information is to help you find resources for any of the problems found today that may be affecting your health:                  Living healthy guide: www.[facility].[place].gov      Understanding Diabetes: www.diabetes.org      Eating healthy: www.cdc.gov/healthyweight      Formerly named [facility] home safety checklist: www.cdc.gov/steadi/patient.html      Agency on Aging: www.goea.[place].gov      Alcoholics anonymous (AA): www.aa.org      Physical Activity: www.krysta.nih.gov/fn5ezqj      Tobacco use: www.quitwithusla.org

## 2025-03-31 NOTE — PROGRESS NOTES
"  Edis Huggins presented for a  Medicare AWV and comprehensive Health Risk Assessment today. The following components were reviewed and updated:    Medical history  Family History  Social history  Allergies and Current Medications  Health Risk Assessment  Health Maintenance  Care Team         ** See Completed Assessments for Annual Wellness Visit within the encounter summary.**         The following assessments were completed:  Living Situation  CAGE  Depression Screening  Timed Get Up and Go  Whisper Test  Cognitive Function Screening      Nutrition Screening  ADL Screening  PAQ Screening      Opioid documentation:      Patient does not have a current opioid prescription.        Vitals:    03/31/25 1413   BP: 130/62   BP Location: Left arm   Patient Position: Sitting   Pulse: 60   Resp: 20   SpO2: 97%   Weight: 130.4 kg (287 lb 6.4 oz)   Height: 5' 11" (1.803 m)     Body mass index is 40.08 kg/m².    Physical Exam  Vitals reviewed.   Constitutional:       General: He is awake. He is not in acute distress.     Appearance: Normal appearance. He is well-developed and well-groomed. He is morbidly obese.   HENT:      Head: Normocephalic.   Eyes:      General:         Right eye: No discharge.         Left eye: No discharge.   Cardiovascular:      Rate and Rhythm: Normal rate.   Pulmonary:      Effort: Pulmonary effort is normal. No respiratory distress.   Skin:     General: Skin is dry.      Coloration: Skin is not pale.   Neurological:      Mental Status: He is alert and oriented to person, place, and time.      Coordination: Coordination normal.      Gait: Gait abnormal (slowed but stable).   Psychiatric:         Attention and Perception: Attention normal.         Mood and Affect: Mood and affect normal.         Speech: Speech normal.         Behavior: Behavior normal. Behavior is cooperative.         Thought Content: Thought content normal.             Diagnoses and health risks identified today and associated " recommendations/orders:    1. Encounter for Medicare annual wellness exam  - Referral to Enhanced Annual Wellness Visit (eAWV) W+1    2. Aortic atherosclerosis -- CTA 06/2018  Chronic; stable. Takes fish oil-omega 3 capsule daily and Lopid. Follow up with PCP.    3. Paroxysmal atrial fibrillation  Chronic; stable on Pradaxa medication. Follow up with PCP.    4. Essential hypertension  Chronic; stable on irbesartan medication. Follow up with PCP.    5. Mixed hyperlipidemia  Chronic; stable on fish oil-omega 3 capsule and Lopid daily. Follow up with PCP.    6. Anxiety  Chronic; stable on citalopram medication. Follow up with PCP.    7. Other reduced mobility  Ambulates independently; slightly slowed but stable gait. Follow up with PCP.    8. Morbid obesity with BMI of 40.0-44.9, adult  Eat a low salt/low fat diet and discussed importance of engaging in physical activity at least 5x/week for a minimum of 30 min/day.      Provided Edis with a 5-10 year written screening schedule and personal prevention plan. Recommendations were developed using the USPSTF age appropriate recommendations. Education, counseling, and referrals were provided as needed. After Visit Summary given to patient which includes a list of additional screenings/tests needed.    Follow up for your next annual wellness visit.    Tawny Hendricks NP      Advance Care Planning     I offered to discuss advanced care planning, including how to pick a person who would make decisions for you if you were unable to make them for yourself, called a health care power of , and what kind of decisions you might make such as use of life sustaining treatments such as ventilators and tube feeding when faced with a life limiting illness recorded on a living will that they will need to know. (How you want to be cared for as you near the end of your natural life)     X  Patient has advanced directives on file, they would like to make changes. I provided  information on how to revoke their previous directives and make new ones.

## 2025-04-10 DIAGNOSIS — H40.1111 PRIMARY OPEN-ANGLE GLAUCOMA, RIGHT EYE, MILD STAGE: ICD-10-CM

## 2025-04-10 DIAGNOSIS — F41.9 ANXIETY: ICD-10-CM

## 2025-04-10 DIAGNOSIS — H40.1122 PRIMARY OPEN ANGLE GLAUCOMA OF LEFT EYE, MODERATE STAGE: ICD-10-CM

## 2025-04-10 RX ORDER — CITALOPRAM 20 MG/1
20 TABLET, FILM COATED ORAL
Qty: 90 TABLET | Refills: 1 | Status: SHIPPED | OUTPATIENT
Start: 2025-04-10

## 2025-04-10 RX ORDER — TIMOLOL MALEATE 5 MG/ML
1 SOLUTION/ DROPS OPHTHALMIC 2 TIMES DAILY
Qty: 25 ML | Refills: 3 | Status: SHIPPED | OUTPATIENT
Start: 2025-04-10

## 2025-04-10 NOTE — PROGRESS NOTES
CHW - Case Closure    This Community Health Worker spoke to patient via telephone today.   Pt/Caregiver reported: Pt denied needing any assistance at this time  Pt/Caregiver denied any additional needs at this time and agrees with episode closure at this time.  Provided patient with Community Health Worker's contact information and encouraged him/her to contact this Community Health Worker if additional needs arise.

## 2025-04-10 NOTE — TELEPHONE ENCOUNTER
Refill Decision Note   Edis Bhavna  is requesting a refill authorization.  Brief Assessment and Rationale for Refill:  Approve     Medication Therapy Plan:         Comments:     Note composed:9:24 AM 04/10/2025

## 2025-04-10 NOTE — TELEPHONE ENCOUNTER
No care due was identified.  [facility] Embedded Care Due Messages. Reference number: [id number].   4/10/2025 1:27:21 AM CDT

## 2025-04-15 NOTE — PROGRESS NOTES
HPI    DLS: 11/14/2024    Pt here for 4 Month Check;  Pt states no eye pain at times he sees a floater in his OS but its not all   the time. Pt denies any flashes or diplopia.     Meds;  Timolol BID OU    1) POAG   2) PCIOL OU   3) Steroid Responder   4) APD OS   5) PAS OS   6) Yag Cap OD     Last edited by Millie Lakhani MA on 4/17/2025  3:28 PM.            Assessment /Plan     For exam results, see Encounter Report.    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 POAG (primary open-angle glaucoma) - Both Eyes   Old pt of [facility] [id number]  Began at [facility] 2012       POAG OS>OD           Family history    neg        Glaucoma meds   T1/2 GFS ou (use to use travatan- off post CE)         H/O adverse rxn to glaucoma drops    none        LASERS    SLT os 9/26/2013 -( good resp 17 --> 14) // yag cap od - 1/2020 // yag cap os 4/4/2024         GLAUCOMA SURGERIES    trabectome os (combined with phaco - 2/8/2017        OTHER EYE SURGERIES    Combined - phaco/IOL / goniotomy - trabectome os 2/8/2017 - +heme and increae in IOP)                                                 PC IOL OD - 5/10/2017         CDR    0.6/0.7-0.8        Tbase    20-23/21-24          Tmax    23/24            Ttarget    17/17           HVF   6 tests 5053-1447 OD essentially full; OS Sup  "paracentral defect, IAD/INS (([facility]))             (([facility])) - 8 test 2012 to 2024 - full od // sup paracentral defect / inferior paracentral defect         Gonio    +4 ou        CCT    598/598        OCT    5 test 2019 to 2024  - RNFL - nl od // bord G/TS/T         HRT    7 test-2012 to 2020  - MR -  Arma off  od // dec T, bord I os /// CDR xxx od // 0.64 os        Disc photos    2012 - IOS    - Ttoday  15/12 ( steroid effect kamran off - was up to 20/11)  (up from 15/12- ?? 2nd to steroid injection to back for sciatical)   - Test done today  IOP / gonio       2. Steroid responders - Both Eyes  2/2 nasacort      3. ? Trace APD os      4. S/P Rt knee replacement Feb 2013  -doing well      5.  Myopia / presbyopia ou     6. Irregular pupil - w/ PAS   -2/2 very high IOP POD # 1 post phaco     7.  PC IOL OU  OD 5/10/2017 - PCB00 14.5  OS 2/8/2017 - + hyphema and high IOP 2/2 trabectome - irregular pupil post op - PCB00 14.0    8. PCO od -  S/P yag cap od - 1/2020  S/P yag cap os 4/4/2024       Plan  CSM  IOP below target OU    Cont timolol gfs ou q day     If the glare from the fixed dilated pupil bothers him a lot post 2nd eye getting done - consider pupil surgery with dr Peacock to "purse string it " smaller - pt is doing ok for now - glare is not bothering him too much     YAG CAP OS - 4/4/2024 -  good recovery of vision - improved to 20/20     F/U 4 months IOP // HVF / DFE / OCT     "

## 2025-04-17 ENCOUNTER — OFFICE VISIT (OUTPATIENT)
Dept: OPHTHALMOLOGY | Facility: CLINIC | Age: 80
End: 2025-04-17
Payer: MEDICARE

## 2025-04-17 DIAGNOSIS — H40.1122 PRIMARY OPEN ANGLE GLAUCOMA OF LEFT EYE, MODERATE STAGE: Primary | ICD-10-CM

## 2025-04-17 DIAGNOSIS — Z98.890 STATUS POST YAG CAPSULOTOMY OF BOTH EYES: ICD-10-CM

## 2025-04-17 DIAGNOSIS — H57.09 RELATIVE AFFERENT PUPILLARY DEFECT: ICD-10-CM

## 2025-04-17 DIAGNOSIS — H40.043 STEROID RESPONDERS, BILATERAL: ICD-10-CM

## 2025-04-17 DIAGNOSIS — H40.1111 PRIMARY OPEN-ANGLE GLAUCOMA, RIGHT EYE, MILD STAGE: ICD-10-CM

## 2025-04-17 DIAGNOSIS — H26.492 PCO (POSTERIOR CAPSULAR OPACIFICATION), LEFT: ICD-10-CM

## 2025-04-17 DIAGNOSIS — Z96.1 PSEUDOPHAKIA, BOTH EYES: ICD-10-CM

## 2025-04-17 DIAGNOSIS — H21.562 PUPIL IRREGULAR OF LEFT EYE: ICD-10-CM

## 2025-04-17 PROCEDURE — 99999 PR PBB SHADOW E&M-EST. PATIENT-LVL III: CPT | Mod: PBBFAC,HCNC,, | Performed by: OPHTHALMOLOGY

## 2025-05-30 DIAGNOSIS — Z79.01 CHRONIC ANTICOAGULATION: ICD-10-CM

## 2025-05-30 DIAGNOSIS — I48.0 PAROXYSMAL ATRIAL FIBRILLATION: ICD-10-CM

## 2025-05-30 RX ORDER — DABIGATRAN ETEXILATE 150 MG/1
CAPSULE ORAL
Qty: 180 CAPSULE | Refills: 3 | Status: SHIPPED | OUTPATIENT
Start: 2025-05-30

## 2025-06-02 ENCOUNTER — LAB VISIT (OUTPATIENT)
Dept: LAB | Facility: HOSPITAL | Age: 80
End: 2025-06-02
Attending: INTERNAL MEDICINE
Payer: MEDICARE

## 2025-06-02 DIAGNOSIS — D63.8 ANEMIA OF CHRONIC DISEASE: ICD-10-CM

## 2025-06-02 DIAGNOSIS — E53.8 B12 DEFICIENCY: ICD-10-CM

## 2025-06-02 DIAGNOSIS — D64.9 NORMOCYTIC ANEMIA: ICD-10-CM

## 2025-06-02 LAB
ABSOLUTE EOSINOPHIL (OHS): 0.25 K/UL
ABSOLUTE MONOCYTE (OHS): 0.33 K/UL (ref 0.3–1)
ABSOLUTE NEUTROPHIL COUNT (OHS): 2.03 K/UL (ref 1.8–7.7)
BASOPHILS # BLD AUTO: 0.02 K/UL
BASOPHILS NFR BLD AUTO: 0.4 %
CRP SERPL-MCNC: 3.3 MG/L
ERYTHROCYTE [DISTWIDTH] IN BLOOD BY AUTOMATED COUNT: 13 % (ref 11.5–14.5)
ERYTHROCYTE [SEDIMENTATION RATE] IN BLOOD BY PHOTOMETRIC METHOD: 26 MM/HR
FERRITIN SERPL-MCNC: 151.4 NG/ML (ref 20–300)
HCT VFR BLD AUTO: 37.6 % (ref 40–54)
HGB BLD-MCNC: 12.4 GM/DL (ref 14–18)
IMM GRANULOCYTES # BLD AUTO: 0.01 K/UL (ref 0–0.04)
IMM GRANULOCYTES NFR BLD AUTO: 0.2 % (ref 0–0.5)
IRON SATN MFR SERPL: 37 % (ref 20–50)
IRON SERPL-MCNC: 146 UG/DL (ref 45–160)
LYMPHOCYTES # BLD AUTO: 2.23 K/UL (ref 1–4.8)
MCH RBC QN AUTO: 32.1 PG (ref 27–31)
MCHC RBC AUTO-ENTMCNC: 33 G/DL (ref 32–36)
MCV RBC AUTO: 97 FL (ref 82–98)
NUCLEATED RBC (/100WBC) (OHS): 0 /100 WBC
PLATELET # BLD AUTO: 175 K/UL (ref 150–450)
PMV BLD AUTO: 12.1 FL (ref 9.2–12.9)
RBC # BLD AUTO: 3.86 M/UL (ref 4.6–6.2)
RELATIVE EOSINOPHIL (OHS): 5.1 %
RELATIVE LYMPHOCYTE (OHS): 45.8 % (ref 18–48)
RELATIVE MONOCYTE (OHS): 6.8 % (ref 4–15)
RELATIVE NEUTROPHIL (OHS): 41.7 % (ref 38–73)
TESTOST SERPL-MCNC: 273 NG/DL (ref 304–1227)
TIBC SERPL-MCNC: 395 UG/DL (ref 250–450)
TRANSFERRIN SERPL-MCNC: 267 MG/DL (ref 200–375)
VIT B12 SERPL-MCNC: 376 PG/ML (ref 210–950)
WBC # BLD AUTO: 4.87 K/UL (ref 3.9–12.7)

## 2025-06-02 PROCEDURE — 82728 ASSAY OF FERRITIN: CPT

## 2025-06-02 PROCEDURE — 85652 RBC SED RATE AUTOMATED: CPT

## 2025-06-02 PROCEDURE — 86140 C-REACTIVE PROTEIN: CPT

## 2025-06-02 PROCEDURE — 85025 COMPLETE CBC W/AUTO DIFF WBC: CPT

## 2025-06-02 PROCEDURE — 84403 ASSAY OF TOTAL TESTOSTERONE: CPT

## 2025-06-02 PROCEDURE — 36415 COLL VENOUS BLD VENIPUNCTURE: CPT

## 2025-06-02 PROCEDURE — 82607 VITAMIN B-12: CPT

## 2025-06-02 PROCEDURE — 81269 HBA1/HBA2 GENE DUP/DEL VRNTS: CPT

## 2025-06-02 PROCEDURE — 84466 ASSAY OF TRANSFERRIN: CPT

## 2025-06-20 ENCOUNTER — PATIENT MESSAGE (OUTPATIENT)
Dept: HEMATOLOGY/ONCOLOGY | Facility: CLINIC | Age: 80
End: 2025-06-20
Payer: MEDICARE

## 2025-08-13 ENCOUNTER — PATIENT MESSAGE (OUTPATIENT)
Dept: ADMINISTRATIVE | Facility: OTHER | Age: 80
End: 2025-08-13
Payer: MEDICARE

## 2025-08-18 DIAGNOSIS — I10 ESSENTIAL HYPERTENSION: ICD-10-CM

## 2025-08-18 RX ORDER — IRBESARTAN 75 MG/1
TABLET ORAL
Qty: 270 TABLET | Refills: 1 | Status: SHIPPED | OUTPATIENT
Start: 2025-08-18 | End: 2025-08-20

## 2025-08-20 ENCOUNTER — PATIENT MESSAGE (OUTPATIENT)
Dept: OTHER | Facility: OTHER | Age: 80
End: 2025-08-20
Payer: MEDICARE

## 2025-08-20 ENCOUNTER — OFFICE VISIT (OUTPATIENT)
Dept: CARDIOLOGY | Facility: CLINIC | Age: 80
End: 2025-08-20
Payer: MEDICARE

## 2025-08-20 VITALS
DIASTOLIC BLOOD PRESSURE: 80 MMHG | SYSTOLIC BLOOD PRESSURE: 120 MMHG | WEIGHT: 286.63 LBS | BODY MASS INDEX: 40.13 KG/M2 | HEART RATE: 63 BPM | HEIGHT: 71 IN

## 2025-08-20 DIAGNOSIS — I65.23 BILATERAL CAROTID ARTERY STENOSIS: ICD-10-CM

## 2025-08-20 DIAGNOSIS — I10 ESSENTIAL HYPERTENSION: Primary | ICD-10-CM

## 2025-08-20 DIAGNOSIS — Z79.01 CHRONIC ANTICOAGULATION: ICD-10-CM

## 2025-08-20 DIAGNOSIS — E78.2 MIXED HYPERLIPIDEMIA: ICD-10-CM

## 2025-08-20 DIAGNOSIS — I48.0 PAROXYSMAL ATRIAL FIBRILLATION: ICD-10-CM

## 2025-08-20 PROCEDURE — 1100F PTFALLS ASSESS-DOCD GE2>/YR: CPT | Mod: CPTII,S$GLB,, | Performed by: INTERNAL MEDICINE

## 2025-08-20 PROCEDURE — 1159F MED LIST DOCD IN RCRD: CPT | Mod: CPTII,S$GLB,, | Performed by: INTERNAL MEDICINE

## 2025-08-20 PROCEDURE — 3079F DIAST BP 80-89 MM HG: CPT | Mod: CPTII,S$GLB,, | Performed by: INTERNAL MEDICINE

## 2025-08-20 PROCEDURE — 99214 OFFICE O/P EST MOD 30 MIN: CPT | Mod: S$GLB,,, | Performed by: INTERNAL MEDICINE

## 2025-08-20 PROCEDURE — 99999 PR PBB SHADOW E&M-EST. PATIENT-LVL III: CPT | Mod: PBBFAC,,, | Performed by: INTERNAL MEDICINE

## 2025-08-20 PROCEDURE — 3288F FALL RISK ASSESSMENT DOCD: CPT | Mod: CPTII,S$GLB,, | Performed by: INTERNAL MEDICINE

## 2025-08-20 PROCEDURE — 1126F AMNT PAIN NOTED NONE PRSNT: CPT | Mod: CPTII,S$GLB,, | Performed by: INTERNAL MEDICINE

## 2025-08-20 PROCEDURE — 1157F ADVNC CARE PLAN IN RCRD: CPT | Mod: CPTII,S$GLB,, | Performed by: INTERNAL MEDICINE

## 2025-08-20 PROCEDURE — G2211 COMPLEX E/M VISIT ADD ON: HCPCS | Mod: S$GLB,,, | Performed by: INTERNAL MEDICINE

## 2025-08-20 PROCEDURE — 3074F SYST BP LT 130 MM HG: CPT | Mod: CPTII,S$GLB,, | Performed by: INTERNAL MEDICINE

## 2025-08-20 RX ORDER — IRBESARTAN 75 MG/1
TABLET ORAL
Qty: 360 TABLET | Refills: 11 | Status: SHIPPED | OUTPATIENT
Start: 2025-08-20

## 2025-08-21 ENCOUNTER — CLINICAL SUPPORT (OUTPATIENT)
Dept: OPHTHALMOLOGY | Facility: CLINIC | Age: 80
End: 2025-08-21
Payer: MEDICARE

## 2025-08-21 ENCOUNTER — OFFICE VISIT (OUTPATIENT)
Dept: OPHTHALMOLOGY | Facility: CLINIC | Age: 80
End: 2025-08-21
Payer: MEDICARE

## 2025-08-21 DIAGNOSIS — H26.492 PCO (POSTERIOR CAPSULAR OPACIFICATION), LEFT: ICD-10-CM

## 2025-08-21 DIAGNOSIS — H57.09 RELATIVE AFFERENT PUPILLARY DEFECT: ICD-10-CM

## 2025-08-21 DIAGNOSIS — H40.043 STEROID RESPONDERS, BILATERAL: ICD-10-CM

## 2025-08-21 DIAGNOSIS — H21.562 PUPIL IRREGULAR OF LEFT EYE: ICD-10-CM

## 2025-08-21 DIAGNOSIS — Z98.890 STATUS POST YAG CAPSULOTOMY OF BOTH EYES: ICD-10-CM

## 2025-08-21 DIAGNOSIS — Z96.1 PSEUDOPHAKIA, BOTH EYES: ICD-10-CM

## 2025-08-21 DIAGNOSIS — H40.1111 PRIMARY OPEN-ANGLE GLAUCOMA, RIGHT EYE, MILD STAGE: ICD-10-CM

## 2025-08-21 DIAGNOSIS — H40.1122 PRIMARY OPEN ANGLE GLAUCOMA OF LEFT EYE, MODERATE STAGE: Primary | ICD-10-CM

## 2025-08-21 PROCEDURE — 99999 PR PBB SHADOW E&M-EST. PATIENT-LVL III: CPT | Mod: PBBFAC,HCNC,, | Performed by: OPHTHALMOLOGY

## 2025-08-21 PROCEDURE — 1159F MED LIST DOCD IN RCRD: CPT | Mod: CPTII,HCNC,S$GLB, | Performed by: OPHTHALMOLOGY

## 2025-08-21 PROCEDURE — 1160F RVW MEDS BY RX/DR IN RCRD: CPT | Mod: CPTII,HCNC,S$GLB, | Performed by: OPHTHALMOLOGY

## 2025-08-21 PROCEDURE — 99214 OFFICE O/P EST MOD 30 MIN: CPT | Mod: HCNC,S$GLB,, | Performed by: OPHTHALMOLOGY

## 2025-08-21 PROCEDURE — 1126F AMNT PAIN NOTED NONE PRSNT: CPT | Mod: CPTII,HCNC,S$GLB, | Performed by: OPHTHALMOLOGY

## 2025-08-21 PROCEDURE — 92083 EXTENDED VISUAL FIELD XM: CPT | Mod: HCNC,S$GLB,, | Performed by: OPHTHALMOLOGY

## 2025-08-21 PROCEDURE — 3288F FALL RISK ASSESSMENT DOCD: CPT | Mod: CPTII,HCNC,S$GLB, | Performed by: OPHTHALMOLOGY

## 2025-08-21 PROCEDURE — 1157F ADVNC CARE PLAN IN RCRD: CPT | Mod: CPTII,HCNC,S$GLB, | Performed by: OPHTHALMOLOGY

## 2025-08-21 PROCEDURE — 92133 CPTRZD OPH DX IMG PST SGM ON: CPT | Mod: HCNC,S$GLB,, | Performed by: OPHTHALMOLOGY

## 2025-08-21 PROCEDURE — 1101F PT FALLS ASSESS-DOCD LE1/YR: CPT | Mod: CPTII,HCNC,S$GLB, | Performed by: OPHTHALMOLOGY

## 2025-08-30 DIAGNOSIS — N40.0 BENIGN NON-NODULAR PROSTATIC HYPERPLASIA WITHOUT LOWER URINARY TRACT SYMPTOMS: ICD-10-CM

## 2025-09-02 RX ORDER — FINASTERIDE 5 MG/1
5 TABLET, FILM COATED ORAL
Qty: 90 TABLET | Refills: 3 | Status: SHIPPED | OUTPATIENT
Start: 2025-09-02

## 2025-09-02 RX ORDER — TAMSULOSIN HYDROCHLORIDE 0.4 MG/1
1 CAPSULE ORAL
Qty: 90 CAPSULE | Refills: 3 | Status: SHIPPED | OUTPATIENT
Start: 2025-09-02

## (undated) DEVICE — Device

## (undated) DEVICE — KNIFE ANGLE 1MM

## (undated) DEVICE — KIT GREY EYE

## (undated) DEVICE — FILTER STRAW

## (undated) DEVICE — SHIELD COLLAGEN 12HR CORNEAL

## (undated) DEVICE — SEE MEDLINE ITEM 157131

## (undated) DEVICE — GOWN SURGICAL X-LARGE

## (undated) DEVICE — BLADE SURG BVL ANG COAX 2.4MM

## (undated) DEVICE — DRESSING LEUKOPLAST FLEX 1X3IN

## (undated) DEVICE — DRAPE STERI 32 X 50

## (undated) DEVICE — SOL IRR BSS OPHTH 500ML STRL

## (undated) DEVICE — SHIELD FOX W/GARTER